# Patient Record
Sex: FEMALE | Race: WHITE | NOT HISPANIC OR LATINO
[De-identification: names, ages, dates, MRNs, and addresses within clinical notes are randomized per-mention and may not be internally consistent; named-entity substitution may affect disease eponyms.]

---

## 2017-12-27 ENCOUNTER — HOSPITAL (OUTPATIENT)
Dept: OTHER | Age: 42
End: 2017-12-27
Attending: NURSE PRACTITIONER

## 2018-06-27 PROBLEM — Z90.710 HISTORY OF TOTAL VAGINAL HYSTERECTOMY: Status: ACTIVE | Noted: 2018-06-27

## 2018-06-27 PROBLEM — Z90.710 HISTORY OF ROBOT-ASSISTED LAPAROSCOPIC HYSTERECTOMY: Status: ACTIVE | Noted: 2018-06-27

## 2018-06-27 PROBLEM — R19.00 PELVIC MASS: Status: ACTIVE | Noted: 2018-06-27

## 2018-06-27 PROCEDURE — 88175 CYTOPATH C/V AUTO FLUID REDO: CPT | Performed by: OBSTETRICS & GYNECOLOGY

## 2018-06-27 PROCEDURE — 87624 HPV HI-RISK TYP POOLED RSLT: CPT | Performed by: OBSTETRICS & GYNECOLOGY

## 2018-07-13 PROBLEM — R19.00 PELVIC MASS: Status: ACTIVE | Noted: 2018-07-13

## 2018-07-13 PROBLEM — R19.00 PELVIC MASS: Status: RESOLVED | Noted: 2018-07-13 | Resolved: 2018-07-13

## 2020-01-08 ENCOUNTER — WALK IN (OUTPATIENT)
Dept: URGENT CARE | Age: 45
End: 2020-01-08

## 2020-01-08 VITALS
TEMPERATURE: 97.7 F | HEART RATE: 104 BPM | DIASTOLIC BLOOD PRESSURE: 90 MMHG | RESPIRATION RATE: 20 BRPM | SYSTOLIC BLOOD PRESSURE: 120 MMHG | OXYGEN SATURATION: 95 %

## 2020-01-08 DIAGNOSIS — J32.9 SINUSITIS, UNSPECIFIED CHRONICITY, UNSPECIFIED LOCATION: ICD-10-CM

## 2020-01-08 DIAGNOSIS — J40 BRONCHITIS: Primary | ICD-10-CM

## 2020-01-08 PROCEDURE — 99204 OFFICE O/P NEW MOD 45 MIN: CPT | Performed by: FAMILY MEDICINE

## 2020-01-08 RX ORDER — CYCLOBENZAPRINE HCL 10 MG
TABLET ORAL
Refills: 0 | COMMUNITY
Start: 2019-12-15 | End: 2023-09-12 | Stop reason: ALTCHOICE

## 2020-01-08 RX ORDER — OMEPRAZOLE 20 MG/1
CAPSULE, DELAYED RELEASE ORAL
Refills: 0 | COMMUNITY
Start: 2020-01-04 | End: 2023-09-12 | Stop reason: SDUPTHER

## 2020-01-08 RX ORDER — SUCRALFATE 1 G/1
TABLET ORAL
Refills: 0 | COMMUNITY
Start: 2019-12-13 | End: 2023-09-12 | Stop reason: SDUPTHER

## 2020-01-08 RX ORDER — OXYCODONE AND ACETAMINOPHEN 10; 325 MG/1; MG/1
TABLET ORAL
Refills: 0 | COMMUNITY
Start: 2019-12-17 | End: 2023-09-12 | Stop reason: SDUPTHER

## 2020-01-08 RX ORDER — ALBUTEROL SULFATE 90 UG/1
AEROSOL, METERED RESPIRATORY (INHALATION)
Qty: 1 INHALER | Refills: 0 | Status: SHIPPED | OUTPATIENT
Start: 2020-01-08 | End: 2023-09-12 | Stop reason: ALTCHOICE

## 2020-01-08 RX ORDER — SUMATRIPTAN 25 MG/1
TABLET, FILM COATED ORAL
COMMUNITY
Start: 2019-10-25 | End: 2023-09-12 | Stop reason: ALTCHOICE

## 2020-01-08 RX ORDER — MORPHINE SULFATE 30 MG/1
TABLET, FILM COATED, EXTENDED RELEASE ORAL
Refills: 0 | COMMUNITY
Start: 2019-12-18 | End: 2023-09-12 | Stop reason: ALTCHOICE

## 2020-01-08 RX ORDER — CEFUROXIME AXETIL 500 MG/1
500 TABLET ORAL 2 TIMES DAILY
Qty: 20 TABLET | Refills: 0 | Status: SHIPPED | OUTPATIENT
Start: 2020-01-08 | End: 2020-01-18

## 2020-01-08 RX ORDER — MORPHINE SULFATE 15 MG/1
TABLET, FILM COATED, EXTENDED RELEASE ORAL
Refills: 0 | COMMUNITY
Start: 2019-12-17 | End: 2023-09-12 | Stop reason: ALTCHOICE

## 2022-01-04 ENCOUNTER — WALK IN (OUTPATIENT)
Dept: URGENT CARE | Age: 47
End: 2022-01-04

## 2022-01-04 VITALS
DIASTOLIC BLOOD PRESSURE: 102 MMHG | RESPIRATION RATE: 18 BRPM | OXYGEN SATURATION: 98 % | TEMPERATURE: 97.9 F | HEART RATE: 104 BPM | SYSTOLIC BLOOD PRESSURE: 154 MMHG

## 2022-01-04 DIAGNOSIS — Z20.822 SUSPECTED COVID-19 VIRUS INFECTION: ICD-10-CM

## 2022-01-04 DIAGNOSIS — J32.9 SINUSITIS, UNSPECIFIED CHRONICITY, UNSPECIFIED LOCATION: Primary | ICD-10-CM

## 2022-01-04 LAB — SARS-COV+SARS-COV-2 AG RESP QL IA.RAPID: NOT DETECTED

## 2022-01-04 PROCEDURE — 87426 SARSCOV CORONAVIRUS AG IA: CPT | Performed by: FAMILY MEDICINE

## 2022-01-04 PROCEDURE — 99213 OFFICE O/P EST LOW 20 MIN: CPT | Performed by: FAMILY MEDICINE

## 2022-01-04 RX ORDER — CEFUROXIME AXETIL 500 MG/1
500 TABLET ORAL 2 TIMES DAILY
Qty: 20 TABLET | Refills: 0 | Status: SHIPPED | OUTPATIENT
Start: 2022-01-04 | End: 2022-01-14

## 2022-01-04 RX ORDER — PREDNISONE 20 MG/1
40 TABLET ORAL DAILY
Qty: 10 TABLET | Refills: 0 | Status: SHIPPED | OUTPATIENT
Start: 2022-01-04 | End: 2022-01-09

## 2023-09-12 ENCOUNTER — WALK IN (OUTPATIENT)
Dept: URGENT CARE | Age: 48
End: 2023-09-12

## 2023-09-12 ENCOUNTER — HOSPITAL ENCOUNTER (EMERGENCY)
Facility: HOSPITAL | Age: 48
Discharge: HOME | End: 2023-09-13
Attending: EMERGENCY MEDICINE
Payer: COMMERCIAL

## 2023-09-12 VITALS
SYSTOLIC BLOOD PRESSURE: 146 MMHG | HEART RATE: 84 BPM | TEMPERATURE: 97.9 F | OXYGEN SATURATION: 100 % | DIASTOLIC BLOOD PRESSURE: 86 MMHG | RESPIRATION RATE: 16 BRPM

## 2023-09-12 DIAGNOSIS — D72.829 LEUKOCYTOSIS, UNSPECIFIED TYPE: ICD-10-CM

## 2023-09-12 DIAGNOSIS — R59.1 LYMPHADENOPATHY: Primary | ICD-10-CM

## 2023-09-12 DIAGNOSIS — D72.829 LEUKOCYTOSIS, UNSPECIFIED TYPE: Primary | ICD-10-CM

## 2023-09-12 DIAGNOSIS — R59.9 SWOLLEN LYMPH NODES: ICD-10-CM

## 2023-09-12 DIAGNOSIS — R59.0 CERVICAL ADENOPATHY: ICD-10-CM

## 2023-09-12 LAB
ALBUMIN SERPL-MCNC: 4 G/DL (ref 3.4–5)
ALBUMIN/GLOB SERPL: 1.2 {RATIO} (ref 1–2)
ALP LIVER SERPL-CCNC: 85 U/L
ALT SERPL-CCNC: 35 U/L
ANION GAP SERPL CALC-SCNC: 4 MMOL/L (ref 0–18)
AST SERPL-CCNC: 22 U/L (ref 15–37)
BASOPHILS # BLD AUTO: 0.16 X10(3) UL (ref 0–0.2)
BASOPHILS NFR BLD AUTO: 0.6 %
BILIRUB SERPL-MCNC: 0.2 MG/DL (ref 0.1–2)
BUN BLD-MCNC: 14 MG/DL (ref 7–18)
CALCIUM BLD-MCNC: 9.3 MG/DL (ref 8.5–10.1)
CHLORIDE SERPL-SCNC: 107 MMOL/L (ref 98–112)
CO2 SERPL-SCNC: 28 MMOL/L (ref 21–32)
CREAT BLD-MCNC: 0.83 MG/DL
DEPRECATED RDW RBC: 43.9 FL (ref 39–50)
EGFRCR SERPLBLD CKD-EPI 2021: 86 ML/MIN/1.73M2 (ref 60–?)
EOSINOPHIL # BLD AUTO: 0.18 X10(3) UL (ref 0–0.7)
EOSINOPHIL NFR BLD AUTO: 0.7 %
ERYTHROCYTE [DISTWIDTH] IN BLOOD BY AUTOMATED COUNT: 12.7 %
ERYTHROCYTE [DISTWIDTH] IN BLOOD: 12.8 % (ref 11–15)
GLOBULIN PLAS-MCNC: 3.3 G/DL (ref 2.8–4.4)
GLUCOSE BLD-MCNC: 123 MG/DL (ref 70–99)
HCT VFR BLD AUTO: 42.7 %
HCT VFR BLD CALC: 45.9 % (ref 36–46.5)
HGB BLD-MCNC: 14.2 G/DL
HGB BLD-MCNC: 14.9 G/DL (ref 12–15.5)
IMM GRANULOCYTES # BLD AUTO: 0.08 X10(3) UL (ref 0–1)
IMM GRANULOCYTES NFR BLD: 0.3 %
INTERNAL PROCEDURAL CONTROLS ACCEPTABLE: YES
LYMPHOCYTES # BLD AUTO: 16.3 X10(3) UL (ref 1–4)
LYMPHOCYTES # BLD: 16.8 K/MCL (ref 1–4.8)
LYMPHOCYTES NFR BLD AUTO: 61 %
LYMPHOCYTES NFR BLD: 62 %
MCH RBC QN AUTO: 30 PG (ref 26–34)
MCH RBC QN AUTO: 30.3 PG (ref 26–34)
MCHC RBC AUTO-ENTMCNC: 32.5 G/DL (ref 32–36.5)
MCHC RBC AUTO-ENTMCNC: 33.3 G/DL (ref 31–37)
MCV RBC AUTO: 90.3 FL
MCV RBC AUTO: 93.5 FL (ref 78–100)
MONOCYTES # BLD AUTO: 3.43 X10(3) UL (ref 0.1–1)
MONOCYTES # BLD: 1 K/MCL (ref 0.3–0.9)
MONOCYTES NFR BLD AUTO: 12.8 %
MONOCYTES NFR BLD: 4 %
NEUTROPHILS # BLD AUTO: 6.56 X10 (3) UL (ref 1.5–7.7)
NEUTROPHILS # BLD AUTO: 6.56 X10(3) UL (ref 1.5–7.7)
NEUTROPHILS # BLD: 6.6 K/MCL (ref 1.8–7.7)
NEUTROPHILS NFR BLD AUTO: 24.6 %
NEUTS SEG NFR BLD: 27 %
NRBC BLD MANUAL-RTO: 0 /100 WBC
OSMOLALITY SERPL CALC.SUM OF ELEC: 290 MOSM/KG (ref 275–295)
PLAT MORPH BLD: NORMAL
PLATELET # BLD AUTO: 191 10(3)UL (ref 150–450)
PLATELET # BLD AUTO: 193 K/MCL (ref 140–450)
POTASSIUM SERPL-SCNC: 3.7 MMOL/L (ref 3.5–5.1)
PROT SERPL-MCNC: 7.3 G/DL (ref 6.4–8.2)
RBC # BLD AUTO: 4.73 X10(6)UL
RBC # BLD: 4.91 MIL/MCL (ref 4–5.2)
RBC MORPH BLD: NORMAL
S PYO AG THROAT QL IA.RAPID: NEGATIVE
SODIUM SERPL-SCNC: 139 MMOL/L (ref 136–145)
TEST LOT EXPIRATION DATE: NORMAL
TEST LOT NUMBER: NORMAL
VARIANT LYMPHS NFR BLD: 7 % (ref 0–5)
WBC # BLD AUTO: 26.7 X10(3) UL (ref 4–11)
WBC # BLD: 24.3 K/MCL (ref 4.2–11)

## 2023-09-12 PROCEDURE — 36415 COLL VENOUS BLD VENIPUNCTURE: CPT | Performed by: FAMILY MEDICINE

## 2023-09-12 PROCEDURE — 86645 CMV ANTIBODY IGM: CPT | Performed by: INTERNAL MEDICINE

## 2023-09-12 PROCEDURE — 99285 EMERGENCY DEPT VISIT HI MDM: CPT

## 2023-09-12 PROCEDURE — 99214 OFFICE O/P EST MOD 30 MIN: CPT | Performed by: FAMILY MEDICINE

## 2023-09-12 PROCEDURE — 85027 COMPLETE CBC AUTOMATED: CPT | Performed by: INTERNAL MEDICINE

## 2023-09-12 PROCEDURE — 86644 CMV ANTIBODY: CPT | Performed by: INTERNAL MEDICINE

## 2023-09-12 PROCEDURE — 85025 COMPLETE CBC W/AUTO DIFF WBC: CPT | Performed by: EMERGENCY MEDICINE

## 2023-09-12 PROCEDURE — 36415 COLL VENOUS BLD VENIPUNCTURE: CPT

## 2023-09-12 PROCEDURE — 86665 EPSTEIN-BARR CAPSID VCA: CPT | Performed by: INTERNAL MEDICINE

## 2023-09-12 PROCEDURE — 86618 LYME DISEASE ANTIBODY: CPT | Performed by: INTERNAL MEDICINE

## 2023-09-12 PROCEDURE — 99284 EMERGENCY DEPT VISIT MOD MDM: CPT

## 2023-09-12 PROCEDURE — 80053 COMPREHEN METABOLIC PANEL: CPT

## 2023-09-12 PROCEDURE — 80053 COMPREHEN METABOLIC PANEL: CPT | Performed by: EMERGENCY MEDICINE

## 2023-09-12 PROCEDURE — 85025 COMPLETE CBC W/AUTO DIFF WBC: CPT

## 2023-09-12 PROCEDURE — 87880 STREP A ASSAY W/OPTIC: CPT | Performed by: FAMILY MEDICINE

## 2023-09-12 RX ORDER — NICOTINE POLACRILEX 4 MG/1
20 GUM, CHEWING ORAL 2 TIMES DAILY
COMMUNITY
Start: 2023-08-08

## 2023-09-12 RX ORDER — SUCRALFATE 1 G/1
1 TABLET ORAL
COMMUNITY

## 2023-09-12 RX ORDER — OXYCODONE AND ACETAMINOPHEN 10; 325 MG/1; MG/1
1 TABLET ORAL EVERY 4 HOURS PRN
COMMUNITY

## 2023-09-13 ENCOUNTER — APPOINTMENT (OUTPATIENT)
Dept: GENERAL RADIOLOGY | Facility: HOSPITAL | Age: 48
End: 2023-09-13
Attending: EMERGENCY MEDICINE
Payer: COMMERCIAL

## 2023-09-13 ENCOUNTER — TELEPHONE (OUTPATIENT)
Dept: HEMATOLOGY/ONCOLOGY | Facility: HOSPITAL | Age: 48
End: 2023-09-13

## 2023-09-13 VITALS
BODY MASS INDEX: 29.62 KG/M2 | RESPIRATION RATE: 18 BRPM | HEART RATE: 62 BPM | HEIGHT: 69 IN | OXYGEN SATURATION: 99 % | WEIGHT: 200 LBS | TEMPERATURE: 98 F | DIASTOLIC BLOOD PRESSURE: 65 MMHG | SYSTOLIC BLOOD PRESSURE: 101 MMHG

## 2023-09-13 LAB
B BURGDOR IGG+IGM SER QL IA: NEGATIVE
CMV IGG SERPL IA-ACNC: 0.06 ISR
EBV VCA IGG SER-ACNC: >8 AI
EBV VCA IGM SER-ACNC: 1 AI

## 2023-09-13 PROCEDURE — 71045 X-RAY EXAM CHEST 1 VIEW: CPT | Performed by: EMERGENCY MEDICINE

## 2023-09-13 PROCEDURE — 87040 BLOOD CULTURE FOR BACTERIA: CPT | Performed by: EMERGENCY MEDICINE

## 2023-09-13 RX ORDER — AMOXICILLIN AND CLAVULANATE POTASSIUM 875; 125 MG/1; MG/1
1 TABLET, FILM COATED ORAL 2 TIMES DAILY
Qty: 20 TABLET | Refills: 0 | Status: SHIPPED | OUTPATIENT
Start: 2023-09-13 | End: 2023-10-03 | Stop reason: ALTCHOICE

## 2023-09-15 ENCOUNTER — OFFICE VISIT (OUTPATIENT)
Dept: INTERNAL MEDICINE CLINIC | Facility: CLINIC | Age: 48
End: 2023-09-15
Payer: COMMERCIAL

## 2023-09-15 VITALS
SYSTOLIC BLOOD PRESSURE: 110 MMHG | TEMPERATURE: 97 F | DIASTOLIC BLOOD PRESSURE: 68 MMHG | RESPIRATION RATE: 18 BRPM | HEIGHT: 69 IN | WEIGHT: 234.19 LBS | BODY MASS INDEX: 34.69 KG/M2 | OXYGEN SATURATION: 99 % | HEART RATE: 91 BPM

## 2023-09-15 DIAGNOSIS — Z12.11 SCREENING FOR COLON CANCER: ICD-10-CM

## 2023-09-15 DIAGNOSIS — R59.0 CERVICAL ADENOPATHY: Primary | ICD-10-CM

## 2023-09-15 DIAGNOSIS — Z12.31 ENCOUNTER FOR SCREENING MAMMOGRAM FOR MALIGNANT NEOPLASM OF BREAST: ICD-10-CM

## 2023-09-15 DIAGNOSIS — R59.0 AXILLARY ADENOPATHY: ICD-10-CM

## 2023-09-15 DIAGNOSIS — D72.820 LYMPHOCYTOSIS: ICD-10-CM

## 2023-09-15 LAB — CMV IGM SERPL IA-ACNC: 0.08 OD RATIO

## 2023-09-15 PROCEDURE — 99204 OFFICE O/P NEW MOD 45 MIN: CPT | Performed by: INTERNAL MEDICINE

## 2023-09-15 PROCEDURE — 3008F BODY MASS INDEX DOCD: CPT | Performed by: INTERNAL MEDICINE

## 2023-09-15 PROCEDURE — 3078F DIAST BP <80 MM HG: CPT | Performed by: INTERNAL MEDICINE

## 2023-09-15 PROCEDURE — 3074F SYST BP LT 130 MM HG: CPT | Performed by: INTERNAL MEDICINE

## 2023-09-18 ENCOUNTER — LAB ENCOUNTER (OUTPATIENT)
Dept: LAB | Age: 48
End: 2023-09-18
Attending: INTERNAL MEDICINE
Payer: COMMERCIAL

## 2023-09-18 DIAGNOSIS — R59.0 CERVICAL ADENOPATHY: ICD-10-CM

## 2023-09-18 DIAGNOSIS — D72.820 LYMPHOCYTOSIS: ICD-10-CM

## 2023-09-18 DIAGNOSIS — R59.0 AXILLARY ADENOPATHY: ICD-10-CM

## 2023-09-18 LAB
BASOPHILS # BLD AUTO: 0.18 X10(3) UL (ref 0–0.2)
BASOPHILS NFR BLD AUTO: 0.7 %
EOSINOPHIL # BLD AUTO: 0.15 X10(3) UL (ref 0–0.7)
EOSINOPHIL NFR BLD AUTO: 0.5 %
ERYTHROCYTE [DISTWIDTH] IN BLOOD BY AUTOMATED COUNT: 13 %
HCT VFR BLD AUTO: 45.9 %
HGB BLD-MCNC: 15.1 G/DL
IMM GRANULOCYTES # BLD AUTO: 0.09 X10(3) UL (ref 0–1)
IMM GRANULOCYTES NFR BLD: 0.3 %
LYMPHOCYTES # BLD AUTO: 17.81 X10(3) UL (ref 1–4)
LYMPHOCYTES NFR BLD AUTO: 65 %
MCH RBC QN AUTO: 30.4 PG (ref 26–34)
MCHC RBC AUTO-ENTMCNC: 32.9 G/DL (ref 31–37)
MCV RBC AUTO: 92.4 FL
MONOCYTES # BLD AUTO: 2.67 X10(3) UL (ref 0.1–1)
MONOCYTES NFR BLD AUTO: 9.7 %
NEUTROPHILS # BLD AUTO: 6.51 X10 (3) UL (ref 1.5–7.7)
NEUTROPHILS # BLD AUTO: 6.51 X10(3) UL (ref 1.5–7.7)
NEUTROPHILS NFR BLD AUTO: 23.8 %
PLATELET # BLD AUTO: 200 10(3)UL (ref 150–450)
RBC # BLD AUTO: 4.97 X10(6)UL
WBC # BLD AUTO: 27.4 X10(3) UL (ref 4–11)

## 2023-09-18 PROCEDURE — 88184 FLOWCYTOMETRY/ TC 1 MARKER: CPT | Performed by: INTERNAL MEDICINE

## 2023-09-18 PROCEDURE — 85025 COMPLETE CBC W/AUTO DIFF WBC: CPT | Performed by: INTERNAL MEDICINE

## 2023-09-19 ENCOUNTER — HOSPITAL ENCOUNTER (OUTPATIENT)
Dept: MAMMOGRAPHY | Age: 48
Discharge: HOME | End: 2023-09-19
Attending: INTERNAL MEDICINE
Payer: COMMERCIAL

## 2023-09-19 ENCOUNTER — TELEPHONE (OUTPATIENT)
Dept: INTERNAL MEDICINE CLINIC | Facility: CLINIC | Age: 48
End: 2023-09-19

## 2023-09-19 DIAGNOSIS — N63.0 MASS OF BREAST, UNSPECIFIED LATERALITY: ICD-10-CM

## 2023-09-19 DIAGNOSIS — Z12.31 ENCOUNTER FOR SCREENING MAMMOGRAM FOR MALIGNANT NEOPLASM OF BREAST: ICD-10-CM

## 2023-09-19 DIAGNOSIS — N64.4 BREAST PAIN: Primary | ICD-10-CM

## 2023-09-19 NOTE — TELEPHONE ENCOUNTER
Patient presented at [facility] mammography today for a bilateral mammogram.  Patient has lumps and pain and the exam was stopped per protocol  Need orders for diagnostic mammogram amd ultrasound ASAP

## 2023-09-20 ENCOUNTER — OFFICE VISIT (OUTPATIENT)
Dept: HEMATOLOGY/ONCOLOGY | Facility: HOSPITAL | Age: 48
End: 2023-09-20
Attending: INTERNAL MEDICINE
Payer: COMMERCIAL

## 2023-09-20 ENCOUNTER — TELEPHONE (OUTPATIENT)
Dept: HEMATOLOGY/ONCOLOGY | Facility: HOSPITAL | Age: 48
End: 2023-09-20

## 2023-09-20 VITALS
DIASTOLIC BLOOD PRESSURE: 88 MMHG | HEART RATE: 91 BPM | SYSTOLIC BLOOD PRESSURE: 136 MMHG | WEIGHT: 231 LBS | RESPIRATION RATE: 18 BRPM | HEIGHT: 69.02 IN | BODY MASS INDEX: 34.21 KG/M2 | TEMPERATURE: 98 F | OXYGEN SATURATION: 99 %

## 2023-09-20 DIAGNOSIS — R59.0 CERVICAL LYMPHADENOPATHY: ICD-10-CM

## 2023-09-20 DIAGNOSIS — R59.0 AXILLARY LYMPHADENOPATHY: ICD-10-CM

## 2023-09-20 DIAGNOSIS — D72.820 LYMPHOCYTOSIS: ICD-10-CM

## 2023-09-20 DIAGNOSIS — C91.10 CLL (CHRONIC LYMPHOCYTIC LEUKEMIA) (HCC): ICD-10-CM

## 2023-09-20 DIAGNOSIS — R59.1 LYMPHADENOPATHY: Primary | ICD-10-CM

## 2023-09-20 LAB
BASOPHILS # BLD AUTO: 0.24 X10(3) UL (ref 0–0.2)
BASOPHILS NFR BLD AUTO: 0.8 %
EOSINOPHIL # BLD AUTO: 0.21 X10(3) UL (ref 0–0.7)
EOSINOPHIL NFR BLD AUTO: 0.7 %
ERYTHROCYTE [DISTWIDTH] IN BLOOD BY AUTOMATED COUNT: 12.9 %
HCT VFR BLD AUTO: 43.3 %
HGB BLD-MCNC: 14.7 G/DL
IGA SERPL-MCNC: 137 MG/DL (ref 70–312)
IGM SERPL-MCNC: 43.9 MG/DL (ref 43–279)
IMM GRANULOCYTES # BLD AUTO: 0.15 X10(3) UL (ref 0–1)
IMM GRANULOCYTES NFR BLD: 0.5 %
IMMUNOGLOBULIN PNL SER-MCNC: 691 MG/DL (ref 791–1643)
IRON SATN MFR SERPL: 23 %
IRON SERPL-MCNC: 95 UG/DL
LYMPHOCYTES # BLD AUTO: 19.49 X10(3) UL (ref 1–4)
LYMPHOCYTES NFR BLD AUTO: 61.5 %
MCH RBC QN AUTO: 30.2 PG (ref 26–34)
MCHC RBC AUTO-ENTMCNC: 33.9 G/DL (ref 31–37)
MCV RBC AUTO: 89.1 FL
MONOCYTES # BLD AUTO: 4.52 X10(3) UL (ref 0.1–1)
MONOCYTES NFR BLD AUTO: 14.3 %
NEUTROPHILS # BLD AUTO: 7.1 X10 (3) UL (ref 1.5–7.7)
NEUTROPHILS # BLD AUTO: 7.1 X10(3) UL (ref 1.5–7.7)
NEUTROPHILS NFR BLD AUTO: 22.2 %
PLATELET # BLD AUTO: 201 10(3)UL (ref 150–450)
RBC # BLD AUTO: 4.86 X10(6)UL
TIBC SERPL-MCNC: 416 UG/DL (ref 240–450)
TRANSFERRIN SERPL-MCNC: 279 MG/DL (ref 200–360)
URATE SERPL-MCNC: 5.1 MG/DL
WBC # BLD AUTO: 31.7 X10(3) UL (ref 4–11)

## 2023-09-20 PROCEDURE — 99244 OFF/OP CNSLTJ NEW/EST MOD 40: CPT | Performed by: INTERNAL MEDICINE

## 2023-09-20 NOTE — PROGRESS NOTES
Education Record    Learner:  Patient    Disease / Diagnosis: swollen LN, elevated WBC     Barriers / Limitations:  None   Comments:    Method:  Discussion   Comments:    General Topics:  Plan of care reviewed   Comments:    Outcome:  Shows understanding   Comments:   Reports swollen LN noted 2-3 weeks. No change with ABX starting. Never felt sick, some scratchy throat, but expected with allergies. Some fatigue. Says some night sweating, and itching. Felt like hot flashes. Unsure about weight loss, unable to exercise due to back trouble. Taking oxycodone/ acetaminophen about 3 per day for her back pain, trying to wean off, but unable to with recent stress. Given contrast and instructions for CT scan. Asked to call / message with any questions.

## 2023-09-20 NOTE — CONSULTS
Cancer Center Report of Consultation    Patient Name: Wilder Blanton   YOB: 1975   Medical Record Number: [id number]   CSN: [id number]   Consulting Physician: Jacky Tapia MD  Referring Physician(s): Ashley eLe DO    Date of Consultation: 9/20/2023     Reason for Consultation:  Wilder Blanton was seen today in the [facility] for the diagnosis of lymphocytosis/lymphadenopathy. History of Present Illness:     51year old female that was seen in the ER a week back with complaints of lymphadenopathy in the neck. She went to immediate care and was noted to have leukocytosis. Patient describes lymphadenopathy in the neck in the axillary area for about 2 weeks. Some paresthesias in her hands. She did have recent sinus infection. In the ER, WBC was elevated at 26.7 with absolute lymphocytosis. EBV IgG was elevated and IgM was a bit high as well. No imaging was done. Presents for same. Occasional illia sweats, 1 ovary, lot of chr back pain. Smoker. Has been noted to have high WBC in past as well. Past Medical History:  No past medical history on file.     Past Surgical History:  Past Surgical History:   Procedure Laterality Date    HYSTERECTOMY      LAPAROSCOPIC CHOLECYSTECTOMY      LUMBAR SPINE FUSION COMBINED      OTHER      C-Spine fusion       Family Medical History:  Family History   Problem Relation Age of Onset    High Blood Pressure Mother     High Blood Pressure Father     Heart Disease Maternal Grandfather     Cancer Paternal Grandfather     Colon Cancer Maternal Uncle     Breast Cancer Paternal Aunt        Psychosocial History:  Social History    Socioeconomic History      Marital status:       Spouse name: Not on file      Number of children: Not on file      Years of education: Not on file      Highest education level: Not on file    Occupational History      Not on file    Tobacco Use      Smoking status: Every Day        Types: Cigarettes      Smokeless tobacco: Never      Tobacco comments: 1 pack every two weeks - stress related    Substance and Sexual Activity      Alcohol use: No      Drug use: No      Sexual activity: Not Currently        Partners: Male        Birth control/protection: Hysterectomy    Other Topics      Concerns:        Not on file    Social History Narrative      Not on file    Social Determinants of Health  Financial Resource Strain: Not on file  Food Insecurity: Not on file  Transportation Needs: Not on file  Physical Activity: Not on file  Stress: Not on file  Social Connections: Not on file  Housing Stability: Not on file    Allergies:     Aleve [Naproxen]        RASH  Ultram [Tramadol]       NAUSEA AND VOMITING    Comment:Severe migraine    Current Medications:    Current Outpatient Medications:     amoxicillin clavulanate 875-125 MG Oral Tab, Take 1 tablet by mouth 2 (two) times daily for 10 days. , Disp: 20 tablet, Rfl: 0    oxyCODONE-acetaminophen  MG Oral Tab, TK 1 T PO QID PRN P, Disp: , Rfl: 0    chlorzoxazone 500 MG Oral Tab, 1 tablet (500 mg total) daily. , Disp: , Rfl: 0    sucralfate 1 g Oral Tab, TK 1 T PO QID OES 1 HOUR B MEALS AND HS, Disp: , Rfl: 0    omeprazole 20 MG Oral Capsule Delayed Release, TK ONE C PO BID 30 MIN B EATING, Disp: , Rfl: 0    Multiple Vitamins-Minerals (MULTI-VITAMIN/MINERALS) Oral Tab, Take 1 tablet by mouth daily. , Disp: , Rfl:     Cholecalciferol (VITAMIN D-3) 5000 units Oral Tab, Take 1 tablet (5,000 Units total) by mouth., Disp: , Rfl:     Review of Systems:    Constitutional: No chills, fevers, malaise, night sweats and weight loss. Eyes: No visual disturbance, irritation and redness. Ears, nose, mouth, throat, and face: No hearing loss, tinnitus, hoarseness and voice change.   Respiratory: No cough, sputum, hemoptysis, chest pain, wheezing, dyspnea on exertion  Cardiovascular: No chest pain, palpitations, syncope,orthopnea, PND, edema  Gastrointestinal:No dysphagia, odynophagia, nausea, vomiting, diarrhea, abdominal pain. Derm: No rash, skin lesions, and pruritus. Hematologic/lymphatic: No easy bruising, bleeding, and lymphadenopathy. Musculoskeletal: No myalgias, arthralgias, muscle weakness. Neurological: No headaches, dizziness, seizures, speech problems, gait problems   Psych: No anxiety/depression. Vital Signs: There were no vitals taken for this visit. ECOG PS: 0    Physical Examination:    General: Patient is alert and oriented x 3, not in acute distress. HEENT: EOMs intact. PERRL. Oropharynx is clear. Neck: Palpable enlarged and rubbery bilateral cervical adenopathy. Lymphatics: Palpable cervical and axillary adenopathy. Mild splenomegaly. Unable to examine inguinal lymph nodes properly. Chest: Clear to auscultation. Heart: Regular rate and rhythm. S1S2 normal.  Abdomen: Soft, non tender with good bowel sounds. No hepatosplenomegaly/mass. Extremities: Pedal pulses are present. No edema. Neurological: Grossly intact. Labs:         Assessment and Plan:    # Lymphocytosis/lymphadenopathy: Lymphocytosis noted since 2015. Has bilateral cervical and axillary lymphadenopathy, rubbery. Concerning for lymphoproliferative disorder. Flow is pending. Recommend imaging and complete lab work-up. Orders Placed This Encounter        CBC With Differential With Platelet      Iron And Tibc      Uric Acid      Monoclonal Protein Study      Immunoglobulin A/G/M, Quant      SED RATE [E]      Connective Tissue Disease (LEANNE) Screen      Tuberculosis Panel      SERUM MONOCLONAL PROTEIN STUDY      CT SOFT TISSUE OF NECK(CONTRAST ONLY) (CPT=70491)      CT CHEST+ABDOMEN+PELVIS(ALL CNTRST ONLY)(FTW=90449/94974)    Neeru Mayer M.D.     [facility]    [address], [address]    9/20/2023

## 2023-09-20 NOTE — TELEPHONE ENCOUNTER
Returned the call,   Let her know we need the CT results for more information. Dr reviewed the labs from today, and one other is still pending. Questions addressed. Reviewed that CT instructions are in the bag, pt did locate, and verbalized understanding. Emotional support offered.

## 2023-09-20 NOTE — TELEPHONE ENCOUNTER
Patient called for test results and instructions on how to use the contrast for her CT scan next week. Please call back.

## 2023-09-21 ENCOUNTER — TELEPHONE (OUTPATIENT)
Dept: HEMATOLOGY/ONCOLOGY | Facility: HOSPITAL | Age: 48
End: 2023-09-21

## 2023-09-21 ENCOUNTER — PATIENT MESSAGE (OUTPATIENT)
Dept: HEMATOLOGY/ONCOLOGY | Facility: HOSPITAL | Age: 48
End: 2023-09-21

## 2023-09-21 LAB
ALBUMIN SERPL ELPH-MCNC: 4.72 G/DL (ref 3.75–5.21)
ALBUMIN/GLOB SERPL: 1.83 {RATIO} (ref 1–2)
ALPHA1 GLOB SERPL ELPH-MCNC: 0.31 G/DL (ref 0.19–0.46)
ALPHA2 GLOB SERPL ELPH-MCNC: 0.87 G/DL (ref 0.48–1.05)
B-GLOBULIN SERPL ELPH-MCNC: 0.76 G/DL (ref 0.68–1.23)
B2 MICROGLOB SERPL-MCNC: 0.3 MG/DL (ref 0.11–0.25)
CD10 CELLS NFR SPEC: <1 %
CD10/CD19: <1 %
CD19 CELLS NFR SPEC: 84 %
CD19+/CD200+: 79 %
CD2 CELLS NFR SPEC: 21 %
CD20 CELLS NFR SPEC: 83 %
CD200 CELLS: 83 %
CD3 CELLS NFR SPEC: 18 %
CD3+/TCRGD+: <1 %
CD3+CD4+ CELLS NFR SPEC: 14 %
CD3+CD4+ CELLS/CD3+CD8+ CLL SPEC: 3.5
CD3+CD8+ CELLS NFR SPEC: 4 %
CD3-/CD56+: 3 %
CD34 CELLS NFR SPEC: <1 %
CD38 CELLS NFR SPEC: 1 %
CD38+/CD19+: <1 %
CD45 CELLS NFR SPEC: 100 %
CD5 CELLS NFR SPEC: 96 %
CD5/CD19 CELLS: 83 %
CD7 CELLS NFR SPEC: 56 %
CELL SURF KAPPA/LAMBDA RATIO: 80
CELL SURF LAMBDA LIGHT CHAIN: 1 %
CELL SURFACE KAPPA LIGHT CHAIN: 80 %
GAMMA GLOB SERPL ELPH-MCNC: 0.64 G/DL (ref 0.62–1.7)
KAPPA LC FREE SER-MCNC: 4.12 MG/DL (ref 0.33–1.94)
KAPPA LC FREE/LAMBDA FREE SER NEPH: 3.83 {RATIO} (ref 0.26–1.65)
LAMBDA LC FREE SERPL-MCNC: 1.07 MG/DL (ref 0.57–2.63)
PROT SERPL-MCNC: 7.3 G/DL (ref 6.4–8.2)
TCR G-D CELLS NFR SPEC: <1 %

## 2023-09-21 NOTE — TELEPHONE ENCOUNTER
Spoke to pt about flow results. CLL. Will try to run [address] on last sample. Adolfo cervical LN bx to r/o mantle cell lymphoma and she agrees. Also ordered PET.

## 2023-09-22 ENCOUNTER — TELEPHONE (OUTPATIENT)
Dept: HEMATOLOGY/ONCOLOGY | Facility: HOSPITAL | Age: 48
End: 2023-09-22

## 2023-09-22 ENCOUNTER — TELEPHONE (OUTPATIENT)
Dept: INTERNAL MEDICINE CLINIC | Facility: CLINIC | Age: 48
End: 2023-09-22

## 2023-09-22 LAB — HAPTOGLOB SERPL-MCNC: 208 MG/DL (ref 30–200)

## 2023-09-22 RX ORDER — ALPRAZOLAM 0.25 MG/1
0.25 TABLET ORAL DAILY PRN
Qty: 10 TABLET | Refills: 0 | Status: SHIPPED | OUTPATIENT
Start: 2023-09-22

## 2023-09-22 NOTE — TELEPHONE ENCOUNTER
Contacted pt, will have labs drawn at the [facility] when she has her biopsy done on Tuesday. Other questions addressed. Pt requests something for claustrophobia for the PET scan. She will have a . Emotional support offered.

## 2023-09-22 NOTE — TELEPHONE ENCOUNTER
Contacted patient, updated that we need the PET scan prior to the biopsy. Will move the biopsy to Friday 9/29/23 at 11am.   Pt upset, but understood. Emotional support offered.

## 2023-09-22 NOTE — TELEPHONE ENCOUNTER
Patient requesting Xanax due to anxiety about recent diagnosis.  Please instruct patient to be cautious with benzo while on oxycodone-acetaminophen for pain

## 2023-09-22 NOTE — TELEPHONE ENCOUNTER
Patient calling just saw Dr. Siobhan Odonnell and is dealing with a lot. Patient tearful on phone. Patient asking for anxiety medication Xanex.     [address] [facility], [phone], [phone]

## 2023-09-22 NOTE — TELEPHONE ENCOUNTER
Called and spoke to pt. Informed her xanax has been sent, but do not take with oxycodone-acetaminophen. Pt stated understanding.

## 2023-09-26 ENCOUNTER — HOSPITAL ENCOUNTER (OUTPATIENT)
Dept: ULTRASOUND IMAGING | Facility: HOSPITAL | Age: 48
End: 2023-09-26
Attending: INTERNAL MEDICINE
Payer: COMMERCIAL

## 2023-09-26 ENCOUNTER — HOSPITAL ENCOUNTER (OUTPATIENT)
Dept: NUCLEAR MEDICINE | Facility: HOSPITAL | Age: 48
Discharge: HOME | End: 2023-09-26
Attending: INTERNAL MEDICINE
Payer: COMMERCIAL

## 2023-09-26 ENCOUNTER — TELEPHONE (OUTPATIENT)
Dept: HEMATOLOGY/ONCOLOGY | Facility: HOSPITAL | Age: 48
End: 2023-09-26

## 2023-09-26 DIAGNOSIS — C85.10 B-CELL LYMPHOMA, UNSPECIFIED B-CELL LYMPHOMA TYPE, UNSPECIFIED BODY REGION (HCC): ICD-10-CM

## 2023-09-26 LAB — GLUCOSE BLD-MCNC: 98 MG/DL (ref 70–99)

## 2023-09-26 PROCEDURE — 82962 GLUCOSE BLOOD TEST: CPT

## 2023-09-26 PROCEDURE — 78815 PET IMAGE W/CT SKULL-THIGH: CPT | Performed by: INTERNAL MEDICINE

## 2023-09-26 RX ORDER — ALPRAZOLAM 0.25 MG/1
0.25 TABLET ORAL DAILY PRN
Qty: 10 TABLET | Refills: 0 | Status: CANCELLED | OUTPATIENT
Start: 2023-09-26

## 2023-09-26 NOTE — TELEPHONE ENCOUNTER
Eric Cruz [phone] would like Loni Veronica to call and give her with the results of her Pet Scan.  Thanks [employer]

## 2023-09-27 ENCOUNTER — LAB ENCOUNTER (OUTPATIENT)
Dept: LAB | Age: 48
End: 2023-09-27
Attending: INTERNAL MEDICINE
Payer: COMMERCIAL

## 2023-09-27 DIAGNOSIS — R59.1 LYMPHADENOPATHY: ICD-10-CM

## 2023-09-27 DIAGNOSIS — R59.0 CERVICAL LYMPHADENOPATHY: ICD-10-CM

## 2023-09-27 DIAGNOSIS — C91.10 CLL (CHRONIC LYMPHOCYTIC LEUKEMIA) (HCC): ICD-10-CM

## 2023-09-27 DIAGNOSIS — R59.0 AXILLARY LYMPHADENOPATHY: ICD-10-CM

## 2023-09-27 DIAGNOSIS — D72.820 LYMPHOCYTOSIS: ICD-10-CM

## 2023-09-27 LAB
BASOPHILS # BLD AUTO: 0.22 X10(3) UL (ref 0–0.2)
BASOPHILS NFR BLD AUTO: 0.7 %
DIRECT COOMBS POLY: NEGATIVE
EOSINOPHIL # BLD AUTO: 0.17 X10(3) UL (ref 0–0.7)
EOSINOPHIL NFR BLD AUTO: 0.5 %
ERYTHROCYTE [DISTWIDTH] IN BLOOD BY AUTOMATED COUNT: 12.8 %
ERYTHROCYTE [SEDIMENTATION RATE] IN BLOOD: 34 MM/HR
HBV CORE AB SERPL QL IA: NONREACTIVE
HBV SURFACE AB SER QL: REACTIVE
HBV SURFACE AB SERPL IA-ACNC: 99.53 MIU/ML
HBV SURFACE AG SER-ACNC: 0.18 [IU]/L
HBV SURFACE AG SERPL QL IA: NONREACTIVE
HCT VFR BLD AUTO: 43.8 %
HCV AB SERPL QL IA: NONREACTIVE
HGB BLD-MCNC: 14.6 G/DL
IMM GRANULOCYTES # BLD AUTO: 0.12 X10(3) UL (ref 0–1)
IMM GRANULOCYTES NFR BLD: 0.4 %
INR BLD: 0.93 (ref 0.85–1.16)
LDH SERPL L TO P-CCNC: 246 U/L
LYMPHOCYTES # BLD AUTO: 20.36 X10(3) UL (ref 1–4)
LYMPHOCYTES NFR BLD AUTO: 62.3 %
MCH RBC QN AUTO: 30.3 PG (ref 26–34)
MCHC RBC AUTO-ENTMCNC: 33.3 G/DL (ref 31–37)
MCV RBC AUTO: 90.9 FL
MONOCYTES # BLD AUTO: 3.93 X10(3) UL (ref 0.1–1)
MONOCYTES NFR BLD AUTO: 12 %
NEUTROPHILS # BLD AUTO: 7.86 X10 (3) UL (ref 1.5–7.7)
NEUTROPHILS # BLD AUTO: 7.86 X10(3) UL (ref 1.5–7.7)
NEUTROPHILS NFR BLD AUTO: 24.1 %
PLATELET # BLD AUTO: 207 10(3)UL (ref 150–450)
PROTHROMBIN TIME: 12.5 SECONDS (ref 11.6–14.8)
RBC # BLD AUTO: 4.82 X10(6)UL
WBC # BLD AUTO: 32.7 X10(3) UL (ref 4–11)

## 2023-09-27 PROCEDURE — 86480 TB TEST CELL IMMUN MEASURE: CPT

## 2023-09-27 PROCEDURE — 85652 RBC SED RATE AUTOMATED: CPT

## 2023-09-27 PROCEDURE — 85025 COMPLETE CBC W/AUTO DIFF WBC: CPT

## 2023-09-27 PROCEDURE — 36415 COLL VENOUS BLD VENIPUNCTURE: CPT

## 2023-09-27 PROCEDURE — 87340 HEPATITIS B SURFACE AG IA: CPT

## 2023-09-27 PROCEDURE — 86880 COOMBS TEST DIRECT: CPT

## 2023-09-27 PROCEDURE — 86038 ANTINUCLEAR ANTIBODIES: CPT

## 2023-09-27 PROCEDURE — 86803 HEPATITIS C AB TEST: CPT

## 2023-09-27 PROCEDURE — 86225 DNA ANTIBODY NATIVE: CPT

## 2023-09-27 PROCEDURE — 83615 LACTATE (LD) (LDH) ENZYME: CPT

## 2023-09-27 PROCEDURE — 81263 IGH VARI REGIONAL MUTATION: CPT

## 2023-09-27 PROCEDURE — 86704 HEP B CORE ANTIBODY TOTAL: CPT

## 2023-09-27 PROCEDURE — 86706 HEP B SURFACE ANTIBODY: CPT

## 2023-09-27 PROCEDURE — 85610 PROTHROMBIN TIME: CPT

## 2023-09-28 LAB
DSDNA IGG SERPL IA-ACNC: <0.6 IU/ML
ENA AB SER QL IA: 0.1 UG/L
ENA AB SER QL IA: NEGATIVE

## 2023-09-29 ENCOUNTER — HOSPITAL ENCOUNTER (OUTPATIENT)
Dept: ULTRASOUND IMAGING | Facility: HOSPITAL | Age: 48
Discharge: HOME | End: 2023-09-29
Attending: INTERNAL MEDICINE
Payer: COMMERCIAL

## 2023-09-29 DIAGNOSIS — D72.820 LYMPHOCYTOSIS: ICD-10-CM

## 2023-09-29 DIAGNOSIS — C85.10 B-CELL LYMPHOMA, UNSPECIFIED B-CELL LYMPHOMA TYPE, UNSPECIFIED BODY REGION (HCC): ICD-10-CM

## 2023-09-29 LAB
M TB IFN-G CD4+ T-CELLS BLD-ACNC: 0.02 IU/ML
M TB TUBERC IFN-G BLD QL: NEGATIVE
M TB TUBERC IGNF/MITOGEN IGNF CONTROL: >10 IU/ML
QFT TB1 AG MINUS NIL: 0.04 IU/ML
QFT TB2 AG MINUS NIL: 0.02 IU/ML

## 2023-09-29 PROCEDURE — 88184 FLOWCYTOMETRY/ TC 1 MARKER: CPT | Performed by: INTERNAL MEDICINE

## 2023-09-29 PROCEDURE — 88185 FLOWCYTOMETRY/TC ADD-ON: CPT | Performed by: INTERNAL MEDICINE

## 2023-09-29 PROCEDURE — 88341 IMHCHEM/IMCYTCHM EA ADD ANTB: CPT | Performed by: INTERNAL MEDICINE

## 2023-09-29 PROCEDURE — 76942 ECHO GUIDE FOR BIOPSY: CPT | Performed by: INTERNAL MEDICINE

## 2023-09-29 PROCEDURE — 38505 NEEDLE BIOPSY LYMPH NODES: CPT | Performed by: INTERNAL MEDICINE

## 2023-09-29 PROCEDURE — 88342 IMHCHEM/IMCYTCHM 1ST ANTB: CPT | Performed by: INTERNAL MEDICINE

## 2023-09-29 PROCEDURE — 88307 TISSUE EXAM BY PATHOLOGIST: CPT | Performed by: INTERNAL MEDICINE

## 2023-10-03 ENCOUNTER — OFFICE VISIT (OUTPATIENT)
Dept: HEMATOLOGY/ONCOLOGY | Facility: HOSPITAL | Age: 48
End: 2023-10-03
Attending: INTERNAL MEDICINE
Payer: COMMERCIAL

## 2023-10-03 ENCOUNTER — RESEARCH ENCOUNTER (OUTPATIENT)
Dept: HEMATOLOGY/ONCOLOGY | Facility: HOSPITAL | Age: 48
End: 2023-10-03

## 2023-10-03 VITALS
HEART RATE: 92 BPM | HEIGHT: 69.02 IN | SYSTOLIC BLOOD PRESSURE: 157 MMHG | WEIGHT: 231 LBS | OXYGEN SATURATION: 98 % | RESPIRATION RATE: 16 BRPM | BODY MASS INDEX: 34.21 KG/M2 | DIASTOLIC BLOOD PRESSURE: 95 MMHG | TEMPERATURE: 98 F

## 2023-10-03 DIAGNOSIS — C91.10 CLL (CHRONIC LYMPHOCYTIC LEUKEMIA) (HCC): Primary | ICD-10-CM

## 2023-10-03 LAB
CD10 CELLS NFR SPEC: <1 %
CD10/CD19: <1 %
CD19 CELLS NFR SPEC: 82 %
CD19+/CD200+: 79 %
CD2 CELLS NFR SPEC: 23 %
CD20 CELLS NFR SPEC: 79 %
CD200 CELLS: 80 %
CD3 CELLS NFR SPEC: 22 %
CD3+/TCRGD+: <1 %
CD3+CD4+ CELLS NFR SPEC: 20 %
CD3+CD4+ CELLS/CD3+CD8+ CLL SPEC: 10
CD3+CD8+ CELLS NFR SPEC: 2 %
CD3-/CD56+: <1 %
CD34 CELLS NFR SPEC: <1 %
CD38 CELLS NFR SPEC: <1 %
CD38+/CD19+: <1 %
CD45 CELLS NFR SPEC: 100 %
CD5 CELLS NFR SPEC: 99 %
CD5/CD19 CELLS: 81 %
CD7 CELLS NFR SPEC: 46 %
CELL SURF LAMBDA LIGHT CHAIN: <1 %
CELL SURFACE KAPPA LIGHT CHAIN: 81 %
TCR G-D CELLS NFR SPEC: <1 %

## 2023-10-03 PROCEDURE — 99215 OFFICE O/P EST HI 40 MIN: CPT | Performed by: INTERNAL MEDICINE

## 2023-10-03 NOTE — PROGRESS NOTES
Education Record    Learner:  Patient    Disease / Diagnosis: plan of care recent imaging and biopsy     Barriers / Limitations:  None   Comments:    Method:  Discussion   Comments:    General Topics:  Plan of care reviewed   Comments:    Outcome:  Shows understanding   Comments:   Reports still painful at biopsy site. Has chronic pain and very intensified now with \"everything that is going on\"  Been a rough 2 weeks. Has taken xanax occ, but not regularly. Says she has a counselor, and multiple good friends she can talk to. Emotional support offered.

## 2023-10-03 NOTE — PROGRESS NOTES
CLINICAL RESEARCH NOTE  STUDY:  , \"Randomized, Phase III Study of Early Intervention with Venetoclax and Obinutuzumab Versus Delayed Therapy with Venetoclax and Obinutuzumab in Newly Diagnosed Asymptomatic High-Risk Patients with Chronic Lymphocytic Leukemia / Small Lymphocytic Lymphoma (CLL/SLL): EVOLVE CLL/SLL Study. \"     Met with patient briefly at the request of med onc. Presented consent to patient to review at home. Advised I would follow up with her later this week after reviewing her for eligibility but requested that patient reach out with any questions or concerns should they arise. Patient agreeable.

## 2023-10-06 ENCOUNTER — RESEARCH ENCOUNTER (OUTPATIENT)
Dept: HEMATOLOGY/ONCOLOGY | Facility: HOSPITAL | Age: 48
End: 2023-10-06

## 2023-10-06 NOTE — PROGRESS NOTES
RESEARCH NOTE    Patients work up is complete. Reviewed and confirmed eligibility with Puneet Lord. Called patient to follow up on her interest in clinical trial. She had questions and these were all addressed. I asked her to write down any other concerns and bring them with her on Monday at 1:00 in the [facility]. She will meet with research to sign consent. Consent (Scalp)/Introductory Paragraph: The rationale for Mohs was explained to the patient and consent was obtained. The risks, benefits and alternatives to therapy were discussed in detail. Specifically, the risks of alopecia in or around the treatment site, changes in hair growth pattern secondary to repair, infection, scarring, bleeding, prolonged wound healing, incomplete removal, allergy to anesthesia, nerve injury and recurrence were addressed. Prior to the procedure, the treatment site was clearly identified and confirmed by the patient using a hand mirror. All components of Universal Protocol/PAUSE Rule completed.

## 2023-10-09 ENCOUNTER — NURSE ONLY (OUTPATIENT)
Dept: HEMATOLOGY/ONCOLOGY | Facility: HOSPITAL | Age: 48
End: 2023-10-09
Attending: INTERNAL MEDICINE
Payer: COMMERCIAL

## 2023-10-09 ENCOUNTER — RESEARCH ENCOUNTER (OUTPATIENT)
Dept: HEMATOLOGY/ONCOLOGY | Facility: HOSPITAL | Age: 48
End: 2023-10-09

## 2023-10-09 DIAGNOSIS — C91.10 CLL (CHRONIC LYMPHOCYTIC LEUKEMIA) (HCC): ICD-10-CM

## 2023-10-09 DIAGNOSIS — C91.10 CLL (CHRONIC LYMPHOCYTIC LEUKEMIA) (HCC): Primary | ICD-10-CM

## 2023-10-09 LAB
ALBUMIN SERPL-MCNC: 4.1 G/DL (ref 3.4–5)
ALBUMIN/GLOB SERPL: 1.2 {RATIO} (ref 1–2)
ALP LIVER SERPL-CCNC: 81 U/L
ALT SERPL-CCNC: 36 U/L
ANION GAP SERPL CALC-SCNC: 3 MMOL/L (ref 0–18)
AST SERPL-CCNC: 26 U/L (ref 15–37)
BILIRUB SERPL-MCNC: 0.3 MG/DL (ref 0.1–2)
BUN BLD-MCNC: 10 MG/DL (ref 7–18)
CALCIUM BLD-MCNC: 9.6 MG/DL (ref 8.5–10.1)
CHLORIDE SERPL-SCNC: 106 MMOL/L (ref 98–112)
CO2 SERPL-SCNC: 30 MMOL/L (ref 21–32)
CREAT BLD-MCNC: 0.86 MG/DL
EGFRCR SERPLBLD CKD-EPI 2021: 83 ML/MIN/1.73M2 (ref 60–?)
FASTING STATUS PATIENT QL REPORTED: NO
GLOBULIN PLAS-MCNC: 3.3 G/DL (ref 2.8–4.4)
GLUCOSE BLD-MCNC: 93 MG/DL (ref 70–99)
OSMOLALITY SERPL CALC.SUM OF ELEC: 287 MOSM/KG (ref 275–295)
PHOSPHATE SERPL-MCNC: 3.4 MG/DL (ref 2.5–4.9)
POTASSIUM SERPL-SCNC: 4 MMOL/L (ref 3.5–5.1)
PROT SERPL-MCNC: 7.4 G/DL (ref 6.4–8.2)
SODIUM SERPL-SCNC: 139 MMOL/L (ref 136–145)

## 2023-10-09 PROCEDURE — 87389 HIV-1 AG W/HIV-1&-2 AB AG IA: CPT

## 2023-10-09 PROCEDURE — 36415 COLL VENOUS BLD VENIPUNCTURE: CPT

## 2023-10-09 PROCEDURE — 84100 ASSAY OF PHOSPHORUS: CPT

## 2023-10-09 PROCEDURE — 80053 COMPREHEN METABOLIC PANEL: CPT

## 2023-10-10 ENCOUNTER — DOCUMENTATION ONLY (OUTPATIENT)
Dept: HEMATOLOGY/ONCOLOGY | Facility: HOSPITAL | Age: 48
End: 2023-10-10

## 2023-10-11 ENCOUNTER — TELEPHONE (OUTPATIENT)
Dept: HEMATOLOGY/ONCOLOGY | Facility: HOSPITAL | Age: 48
End: 2023-10-11

## 2023-10-11 ENCOUNTER — RESEARCH ENCOUNTER (OUTPATIENT)
Dept: HEMATOLOGY/ONCOLOGY | Facility: HOSPITAL | Age: 48
End: 2023-10-11

## 2023-10-11 NOTE — TELEPHONE ENCOUNTER
Spoke to patient. She has been randomized to the early treatment arm on clinical trial . She is agreeable. Knows that she will need a CT, bone marrow biopsy, chemotherapy education. We can cancel her upcoming appointment with me on 10/17 so other appointments can be accommodated. I will see her on 10/25, on day of treatment start.

## 2023-10-11 NOTE — PROGRESS NOTES
CLINICAL RESEARCH NOTE  STUDY:  Evolve  PT ID: TBD  TIMEPOINT: Randomization    Patient was determined to meet all eligibility criteria and also completed all pre-registration requirements per calendar (with exception of buccal swab). It was appropriate to proceed with registration/randomization. Completed in OPEN - patient was assigned to Arm 2: Early Obinutuzumab + Venetoclax treatment. MD was notified. Pt notified of assignment and next steps required prior to treatment. Per protocol, treatment must start within 14 days. Prior to treatment, patient must complete a CT chest/abdomen/pelvis and a bone marrow biopsy. Aspirate will be required to submit to study. Study blood due prior to treatment as well. Study drug ordered pending delivery. Treatment to begin by 10/25.

## 2023-10-13 ENCOUNTER — HOSPITAL ENCOUNTER (OUTPATIENT)
Dept: CT IMAGING | Facility: HOSPITAL | Age: 48
Discharge: HOME | End: 2023-10-13
Attending: INTERNAL MEDICINE
Payer: COMMERCIAL

## 2023-10-13 DIAGNOSIS — C91.10 CLL (CHRONIC LYMPHOCYTIC LEUKEMIA) (HCC): ICD-10-CM

## 2023-10-13 DIAGNOSIS — R59.1 LYMPHADENOPATHY: ICD-10-CM

## 2023-10-13 DIAGNOSIS — R59.0 CERVICAL LYMPHADENOPATHY: ICD-10-CM

## 2023-10-13 DIAGNOSIS — R59.0 AXILLARY LYMPHADENOPATHY: ICD-10-CM

## 2023-10-13 PROCEDURE — 74177 CT ABD & PELVIS W/CONTRAST: CPT | Performed by: INTERNAL MEDICINE

## 2023-10-13 PROCEDURE — 71260 CT THORAX DX C+: CPT | Performed by: INTERNAL MEDICINE

## 2023-10-16 ENCOUNTER — OFFICE VISIT (OUTPATIENT)
Dept: HEMATOLOGY/ONCOLOGY | Facility: HOSPITAL | Age: 48
End: 2023-10-16
Attending: INTERNAL MEDICINE
Payer: COMMERCIAL

## 2023-10-16 DIAGNOSIS — Z71.9 HEALTH EDUCATION/COUNSELING: ICD-10-CM

## 2023-10-16 DIAGNOSIS — C91.10 CLL (CHRONIC LYMPHOCYTIC LEUKEMIA) (HCC): Primary | ICD-10-CM

## 2023-10-16 PROCEDURE — 99215 OFFICE O/P EST HI 40 MIN: CPT | Performed by: NURSE PRACTITIONER

## 2023-10-16 PROCEDURE — 99417 PROLNG OP E/M EACH 15 MIN: CPT | Performed by: NURSE PRACTITIONER

## 2023-10-16 RX ORDER — ALLOPURINOL 300 MG/1
300 TABLET ORAL DAILY
Qty: 90 TABLET | Refills: 0 | Status: SHIPPED | OUTPATIENT
Start: 2023-10-16

## 2023-10-16 RX ORDER — PROCHLORPERAZINE MALEATE 10 MG
10 TABLET ORAL EVERY 6 HOURS PRN
Qty: 30 TABLET | Refills: 3 | Status: SHIPPED | OUTPATIENT
Start: 2023-10-16

## 2023-10-16 NOTE — PROGRESS NOTES
IV Chemotherapy Education    Drug names:  Obinutuzumab     Learner:  Patient    Chemotherapy education goals:  Learn the drug names  Administration schedule  Routes of administration  Treatment setting    Chemotherapy action on cancer / normal cells    Treatment Effects on Bone Marrow:    Chemotherapy action on cancer / normal cells}  Function of white blood cells / signs of infection  Function of red blood cells / signs of anemia:    Function of platelets / signs of bleeding:    Notify MD/RN of any chills or fever 100.5 and above:     Treatment Effects on Nutritional Status/Mucous Membranes:    Appropriate oral hygiene / signs of stomatitis/oral lesions  Oral rinses procedure    Changes in taste perception / appetite  Nausea and vomiting / use of anti-nausea medications.   Diarrhea / constipation / dietary changes    Treatment Effects on Hair:    Possibility of hair loss     Treatment Effects on Neurological System:    Potential numbness / tingling in hands or feet/Notify MD/RN of any numbness / tingling at next visit    Treatment Effects on the Bladder and Kidneys:    Function of the kidneys and bladder  Signs / symptoms of hemorrhagic cystitis  Suggested fluid intake     Notify MD/RN if blood appears in urine or if you have a decreased urine output     Treatment Effects on Reproductive System:    Avoid pregnancy / use of barrier birth control methods:    Possible sterility, impotence, changes in sex drive:      Treatment Effects on Emotional Status:    Potential mood changes, depression, nervousness, difficulty sleeping  Importance of support system  Notify MD/RN of any emotional changes    Vesicants / Irritants:    Potential extravasation at site of administration   Signs / symptoms include redness, swelling, pain, burning, or blistering at the site of administration   To Notify MD/RN IMMEDIATELY if any of the above signs / symptoms occur         Teaching Materials Provided:      [facility] Chemotherapy information sheets  When to contact the Treatment Team Information Sheet  Side Effect Management [address] information sheet    Patient was given ample opportunity to ask questions. All questions and concerns addressed. Chemotherapy Consent Form signed by the patient. Today we reviewed CLL cancer, chemotherapy, and treatment plan. Discussed side effects of each drug in detail, importance of close follow up, lab monitoring and symptom management. Infection and bleeding precautions reviewed while on chemo. Home medications and schedule reviewed. ORAL CHEMOTHERAPY EDUCATION RECORD  Diagnosis:   CLL     Medication Name:   Venetoclax   C1: Start 20mg days 22-28 of 28 day cycle  C2: 50mg days 1-7, 100mg days 8-14, 200mg days 15-21, days 22-28 400mg   C3: 400mg daily    Administration Guidelines:  Take With Food                  Drug / Drug Interactions:  Reviewed     Start Date of Treatment:  C1D22 - 11/15/2023    Chemo Toxicities:  Per chemocare      When to Call the Office:  Fevers  - 100.5 or greater  Nausea /vomiting not controlled with anti-nausea medications  Diarrhea -not controlled with Imodium AD or more than 6 episodes in 24 hours  Constipation - No BM x 3 days, no responsive to stool softeners or laxatives  Shortness of Breath  Excessive fatigue or weakness  New onset rash  Sudden onset of any unexpected symptom - such as change in vision, sense of balance, dizziness or lightheadedness, swelling one arm or leg                  Safe Handling / Disposal:  This was reviewed with the patient and handout provided.                   Missed Dose Management:  Call office     What Phone Number to Call: [phone]    Teaching Materials Provided:   Oral Chemotherapy information sheets  When to contact the Treatment Team Information Sheet  Side Effect Management [address] information sheet  [facility] Sheet      Patient was given ample opportunity to ask questions. All questions and concerns addressed. We discussed self care techniques, symptom management, fluid, diet, and activities. Chemotherapy Consent Form signed by the patient. Encounter Times  PreCharting:   minutes    Reviewing/Obtaining: 10 minutes      Medical Exam:   minutes    Plan:   minutes      Notes:   minutes    Counseling/Education: 60 minutes      Referring/Communicating:   minutes    Ind Interpretation:   minutes      Care Coordination:   minutes       My total time spent caring for the patient on the day of the encounter: 70 minutes.          [address]   Nurse Practitioner  [facility]

## 2023-10-17 ENCOUNTER — APPOINTMENT (OUTPATIENT)
Dept: HEMATOLOGY/ONCOLOGY | Facility: HOSPITAL | Age: 48
End: 2023-10-17
Attending: INTERNAL MEDICINE
Payer: COMMERCIAL

## 2023-10-23 ENCOUNTER — TELEPHONE (OUTPATIENT)
Dept: CT IMAGING | Facility: HOSPITAL | Age: 48
End: 2023-10-23

## 2023-10-24 ENCOUNTER — RESEARCH ENCOUNTER (OUTPATIENT)
Dept: HEMATOLOGY/ONCOLOGY | Facility: HOSPITAL | Age: 48
End: 2023-10-24

## 2023-10-24 ENCOUNTER — HOSPITAL ENCOUNTER (OUTPATIENT)
Dept: CT IMAGING | Facility: HOSPITAL | Age: 48
Discharge: HOME | End: 2023-10-24
Attending: INTERNAL MEDICINE

## 2023-10-24 ENCOUNTER — NURSE ONLY (OUTPATIENT)
Dept: LAB | Facility: HOSPITAL | Age: 48
End: 2023-10-24
Attending: INTERNAL MEDICINE

## 2023-10-24 VITALS
HEIGHT: 69 IN | HEART RATE: 74 BPM | OXYGEN SATURATION: 96 % | SYSTOLIC BLOOD PRESSURE: 125 MMHG | RESPIRATION RATE: 18 BRPM | BODY MASS INDEX: 34.07 KG/M2 | TEMPERATURE: 98 F | DIASTOLIC BLOOD PRESSURE: 80 MMHG | WEIGHT: 230 LBS

## 2023-10-24 DIAGNOSIS — C91.10 CLL (CHRONIC LYMPHOCYTIC LEUKEMIA) (HCC): ICD-10-CM

## 2023-10-24 DIAGNOSIS — C91.10 CLL (CHRONIC LYMPHOCYTIC LEUKEMIA) (HCC): Primary | ICD-10-CM

## 2023-10-24 LAB
INR BLD: 0.94 (ref 0.85–1.16)
PROTHROMBIN TIME: 12.5 SECONDS (ref 11.6–14.8)

## 2023-10-24 PROCEDURE — 36415 COLL VENOUS BLD VENIPUNCTURE: CPT

## 2023-10-24 PROCEDURE — 88342 IMHCHEM/IMCYTCHM 1ST ANTB: CPT | Performed by: INTERNAL MEDICINE

## 2023-10-24 PROCEDURE — 88185 FLOWCYTOMETRY/TC ADD-ON: CPT | Performed by: INTERNAL MEDICINE

## 2023-10-24 PROCEDURE — 88341 IMHCHEM/IMCYTCHM EA ADD ANTB: CPT | Performed by: INTERNAL MEDICINE

## 2023-10-24 PROCEDURE — 85097 BONE MARROW INTERPRETATION: CPT | Performed by: INTERNAL MEDICINE

## 2023-10-24 PROCEDURE — 88305 TISSUE EXAM BY PATHOLOGIST: CPT | Performed by: INTERNAL MEDICINE

## 2023-10-24 PROCEDURE — 88264 CHROMOSOME ANALYSIS 20-25: CPT | Performed by: INTERNAL MEDICINE

## 2023-10-24 PROCEDURE — 88313 SPECIAL STAINS GROUP 2: CPT | Performed by: INTERNAL MEDICINE

## 2023-10-24 PROCEDURE — 38222 DX BONE MARROW BX & ASPIR: CPT | Performed by: INTERNAL MEDICINE

## 2023-10-24 PROCEDURE — 88237 TISSUE CULTURE BONE MARROW: CPT | Performed by: INTERNAL MEDICINE

## 2023-10-24 PROCEDURE — 88291 CYTO/MOLECULAR REPORT: CPT | Performed by: INTERNAL MEDICINE

## 2023-10-24 PROCEDURE — 77012 CT SCAN FOR NEEDLE BIOPSY: CPT | Performed by: INTERNAL MEDICINE

## 2023-10-24 PROCEDURE — 99152 MOD SED SAME PHYS/QHP 5/>YRS: CPT | Performed by: INTERNAL MEDICINE

## 2023-10-24 PROCEDURE — 85610 PROTHROMBIN TIME: CPT

## 2023-10-24 PROCEDURE — 88184 FLOWCYTOMETRY/ TC 1 MARKER: CPT | Performed by: INTERNAL MEDICINE

## 2023-10-24 RX ORDER — HYDROCODONE BITARTRATE AND ACETAMINOPHEN 5; 325 MG/1; MG/1
1 TABLET ORAL EVERY 4 HOURS PRN
Status: DISCONTINUED | OUTPATIENT
Start: 2023-10-24 | End: 2023-10-26

## 2023-10-24 RX ORDER — SODIUM CHLORIDE 9 MG/ML
INJECTION, SOLUTION INTRAVENOUS CONTINUOUS
Status: DISCONTINUED | OUTPATIENT
Start: 2023-10-24 | End: 2023-10-26

## 2023-10-24 RX ORDER — ACETAMINOPHEN 325 MG/1
650 TABLET ORAL EVERY 6 HOURS PRN
Status: DISCONTINUED | OUTPATIENT
Start: 2023-10-24 | End: 2023-10-26

## 2023-10-24 RX ORDER — NALOXONE HYDROCHLORIDE 0.4 MG/ML
80 INJECTION, SOLUTION INTRAMUSCULAR; INTRAVENOUS; SUBCUTANEOUS AS NEEDED
Status: ACTIVE | OUTPATIENT
Start: 2023-10-24 | End: 2023-10-24

## 2023-10-24 RX ORDER — MIDAZOLAM HYDROCHLORIDE 1 MG/ML
1 INJECTION INTRAMUSCULAR; INTRAVENOUS EVERY 5 MIN PRN
Status: ACTIVE | OUTPATIENT
Start: 2023-10-24 | End: 2023-10-24

## 2023-10-24 RX ORDER — DIPHENHYDRAMINE HYDROCHLORIDE 50 MG/ML
50 INJECTION INTRAMUSCULAR; INTRAVENOUS ONCE AS NEEDED
Status: ACTIVE | OUTPATIENT
Start: 2023-10-24 | End: 2023-10-24

## 2023-10-24 RX ORDER — FLUMAZENIL 0.1 MG/ML
0.2 INJECTION INTRAVENOUS AS NEEDED
Status: ACTIVE | OUTPATIENT
Start: 2023-10-24 | End: 2023-10-24

## 2023-10-24 RX ORDER — ONDANSETRON 2 MG/ML
4 INJECTION INTRAMUSCULAR; INTRAVENOUS ONCE AS NEEDED
Status: ACTIVE | OUTPATIENT
Start: 2023-10-24 | End: 2023-10-24

## 2023-10-24 RX ORDER — MIDAZOLAM HYDROCHLORIDE 1 MG/ML
INJECTION INTRAMUSCULAR; INTRAVENOUS
Status: COMPLETED
Start: 2023-10-24 | End: 2023-10-24

## 2023-10-24 RX ADMIN — MIDAZOLAM HYDROCHLORIDE 0.5 MG: 1 INJECTION INTRAMUSCULAR; INTRAVENOUS at 11:38:00

## 2023-10-24 RX ADMIN — MIDAZOLAM HYDROCHLORIDE 0.5 MG: 1 INJECTION INTRAMUSCULAR; INTRAVENOUS at 11:50:00

## 2023-10-24 RX ADMIN — MIDAZOLAM HYDROCHLORIDE 0.5 MG: 1 INJECTION INTRAMUSCULAR; INTRAVENOUS at 11:41:00

## 2023-10-24 RX ADMIN — SODIUM CHLORIDE: 9 INJECTION, SOLUTION INTRAVENOUS at 11:25:00

## 2023-10-24 RX ADMIN — MIDAZOLAM HYDROCHLORIDE 1 MG: 1 INJECTION INTRAMUSCULAR; INTRAVENOUS at 11:33:00

## 2023-10-24 RX ADMIN — HYDROCODONE BITARTRATE AND ACETAMINOPHEN 1 TABLET: 5; 325 TABLET ORAL at 12:55:00

## 2023-10-24 NOTE — DISCHARGE INSTRUCTIONS
[facility] Department of Radiology    Biopsy of any type    Dr. Lyudmila Morton    Have someone drive you home after your procedure. You may not drive yourself home    [address]    Take things easy for the rest of the day after your biopsy.   You may be sore in the biopsy area for one to five days  DO drink plenty of fluids  DO resume your regular diet  DO keep a bandage on the biopsy site for at least 24 hours  DO NOT take a hot bath or shower for at least 12 hours  DO NOT drive or operative machinery for at least 24 hours  DO NOT smoke for at least 24 hours  DO NOT do any strenuous exercise or lifting for at least 48 hours  DO call your doctor immediately if  You start bleeding  There is any change in color or temperature of the area where the biopsy was performed  You develop increasing pain in shortness of breath    You have been given a prescription for Norco 5/325  Norco was Given to you at:12:55 pm  Next dose due: 4:55 pm   Take this medication as directed  This medication contains Tylenol (acetaminophen)  Do not take additional Tylenol while taking [place] [employer] is a Narcotic and can be constipating or upset your stomach  Don't take Norco on an empty stomach  Drink plenty of water  Alcoholic beverages should be avoided while taking narcotics

## 2023-10-24 NOTE — PROCEDURES
[address] Patient Status:  Outpatient    3/12/1975 MRN [id number]   [facility] CT Attending Marilyn Hodgkins, MD   Hosp Day # 0 PCP Sofiya Cross DO         Brief Procedure Report    Pre-Operative Diagnosis: CLL    Post-Operative Diagnosis: Same as above. Procedure Performed: CT guided left iliac bone marrow aspiration and biopsy    Anesthesia: 1% lidocaine. Moderate sedation    EBL: <7LC    Complications: None    Summary of Case:11g bone marrow aspiration and biopsy from left iliac bone. Patient tolerated procedure well without immediate complication. Full report to follow in PACS.     Ying Monty

## 2023-10-24 NOTE — IMAGING NOTE
NPO status verified  Pertinent labs and radiology imaging reviewed  Consent signed and on file    Patient tolerated procedural sedation without any immediate complications noted. Biopsy site to Left iliac crest with tegaderm dressing. C/D/I. Patient denies pain. Report given to [facility]. Patient [facility] to [id number] accompanied by RN and transporter.

## 2023-10-24 NOTE — PROGRESS NOTES
Clinical Research Note  Study: , \"Randomized, Phase III Study of Early Intervention with Venetoclax and Obinutuzumab Versus Delayed Therapy with Venetoclax and Obinutuzumab in Newly Diagnosed Asymptomatic High-Risk Patients with Chronic Lymphocytic Leukemia / Small Lymphocytic Lymphoma (CLL/SLL): EVOLVE CLL/SLL Study   Date:  10/24/2023    Specimen Collection/Shipping Note    Patient presented for bone marrow biopsy this morning. Research present during procedure for collection of optional bone marrow aspirate for biobanking. Collected 10mL (5 mL in x 2 6.0 mL EDTA tubes). Specimens tracked in OG STS system. Shipped to [employer] [facility] (Lab #[id number]). Shipped ambient per protocol/SWOG procedures. Shipped [employer] Expressed priority overnight, tracking # [phone].

## 2023-10-24 NOTE — PROCEDURES
I have discussed with the patient and/or legal representative the potential benefits, risks, and side effects of this CT guided bone marrow aspiration and biopsyprocedure, the likelihood of the patient achieving goals; and the potential problems that might occur during recuperation. I discussed reasonable alternatives to the procedure, including risks, benefits and side effects related to the alternatives, and risks related to not receiving this procedure.

## 2023-10-25 ENCOUNTER — SOCIAL WORK SERVICES (OUTPATIENT)
Dept: HEMATOLOGY/ONCOLOGY | Facility: HOSPITAL | Age: 48
End: 2023-10-25

## 2023-10-25 ENCOUNTER — RESEARCH ENCOUNTER (OUTPATIENT)
Dept: HEMATOLOGY/ONCOLOGY | Facility: HOSPITAL | Age: 48
End: 2023-10-25

## 2023-10-25 ENCOUNTER — OFFICE VISIT (OUTPATIENT)
Dept: HEMATOLOGY/ONCOLOGY | Facility: HOSPITAL | Age: 48
End: 2023-10-25
Attending: INTERNAL MEDICINE
Payer: COMMERCIAL

## 2023-10-25 VITALS
OXYGEN SATURATION: 92 % | HEART RATE: 80 BPM | DIASTOLIC BLOOD PRESSURE: 77 MMHG | SYSTOLIC BLOOD PRESSURE: 124 MMHG | RESPIRATION RATE: 16 BRPM | TEMPERATURE: 100 F

## 2023-10-25 VITALS
SYSTOLIC BLOOD PRESSURE: 141 MMHG | DIASTOLIC BLOOD PRESSURE: 94 MMHG | TEMPERATURE: 98 F | WEIGHT: 230.38 LBS | HEART RATE: 88 BPM | BODY MASS INDEX: 34.12 KG/M2 | OXYGEN SATURATION: 98 % | HEIGHT: 69.02 IN | RESPIRATION RATE: 16 BRPM

## 2023-10-25 DIAGNOSIS — C91.10 CLL (CHRONIC LYMPHOCYTIC LEUKEMIA) (HCC): Primary | ICD-10-CM

## 2023-10-25 DIAGNOSIS — T88.9XXA: ICD-10-CM

## 2023-10-25 LAB
ALBUMIN SERPL-MCNC: 3.8 G/DL (ref 3.4–5)
ALBUMIN/GLOB SERPL: 1.1 {RATIO} (ref 1–2)
ALP LIVER SERPL-CCNC: 92 U/L
ALT SERPL-CCNC: 39 U/L
ANION GAP SERPL CALC-SCNC: 6 MMOL/L (ref 0–18)
AST SERPL-CCNC: 29 U/L (ref 15–37)
BASOPHILS # BLD AUTO: 0.26 X10(3) UL (ref 0–0.2)
BASOPHILS NFR BLD AUTO: 0.7 %
BILIRUB SERPL-MCNC: 0.3 MG/DL (ref 0.1–2)
BUN BLD-MCNC: 11 MG/DL (ref 7–18)
CALCIUM BLD-MCNC: 9.1 MG/DL (ref 8.5–10.1)
CHLORIDE SERPL-SCNC: 107 MMOL/L (ref 98–112)
CO2 SERPL-SCNC: 26 MMOL/L (ref 21–32)
CREAT BLD-MCNC: 1.04 MG/DL
EGFRCR SERPLBLD CKD-EPI 2021: 66 ML/MIN/1.73M2 (ref 60–?)
EOSINOPHIL # BLD AUTO: 0.25 X10(3) UL (ref 0–0.7)
EOSINOPHIL NFR BLD AUTO: 0.7 %
ERYTHROCYTE [DISTWIDTH] IN BLOOD BY AUTOMATED COUNT: 12.9 %
GLOBULIN PLAS-MCNC: 3.5 G/DL (ref 2.8–4.4)
GLUCOSE BLD-MCNC: 109 MG/DL (ref 70–99)
HCT VFR BLD AUTO: 41.6 %
HGB BLD-MCNC: 13.9 G/DL
IMM GRANULOCYTES # BLD AUTO: 0.17 X10(3) UL (ref 0–1)
IMM GRANULOCYTES NFR BLD: 0.5 %
LDH SERPL L TO P-CCNC: 273 U/L
LYMPHOCYTES # BLD AUTO: 18.22 X10(3) UL (ref 1–4)
LYMPHOCYTES NFR BLD AUTO: 49.5 %
MCH RBC QN AUTO: 30.5 PG (ref 26–34)
MCHC RBC AUTO-ENTMCNC: 33.4 G/DL (ref 31–37)
MCV RBC AUTO: 91.4 FL
MONOCYTES # BLD AUTO: 10.8 X10(3) UL (ref 0.1–1)
MONOCYTES NFR BLD AUTO: 29.3 %
NEUTROPHILS # BLD AUTO: 7.13 X10 (3) UL (ref 1.5–7.7)
NEUTROPHILS # BLD AUTO: 7.13 X10(3) UL (ref 1.5–7.7)
NEUTROPHILS NFR BLD AUTO: 19.3 %
OSMOLALITY SERPL CALC.SUM OF ELEC: 288 MOSM/KG (ref 275–295)
PHOSPHATE SERPL-MCNC: 3 MG/DL (ref 2.5–4.9)
PLATELET # BLD AUTO: 189 10(3)UL (ref 150–450)
POTASSIUM SERPL-SCNC: 4.1 MMOL/L (ref 3.5–5.1)
PROT SERPL-MCNC: 7.3 G/DL (ref 6.4–8.2)
RBC # BLD AUTO: 4.55 X10(6)UL
SODIUM SERPL-SCNC: 139 MMOL/L (ref 136–145)
URATE SERPL-MCNC: 3.5 MG/DL
WBC # BLD AUTO: 36.8 X10(3) UL (ref 4–11)

## 2023-10-25 PROCEDURE — 96413 CHEMO IV INFUSION 1 HR: CPT

## 2023-10-25 PROCEDURE — 84100 ASSAY OF PHOSPHORUS: CPT | Performed by: INTERNAL MEDICINE

## 2023-10-25 PROCEDURE — 80053 COMPREHEN METABOLIC PANEL: CPT | Performed by: INTERNAL MEDICINE

## 2023-10-25 PROCEDURE — 96415 CHEMO IV INFUSION ADDL HR: CPT

## 2023-10-25 PROCEDURE — 84550 ASSAY OF BLOOD/URIC ACID: CPT | Performed by: INTERNAL MEDICINE

## 2023-10-25 PROCEDURE — 85025 COMPLETE CBC W/AUTO DIFF WBC: CPT | Performed by: INTERNAL MEDICINE

## 2023-10-25 PROCEDURE — 36415 COLL VENOUS BLD VENIPUNCTURE: CPT

## 2023-10-25 PROCEDURE — 83615 LACTATE (LD) (LDH) ENZYME: CPT | Performed by: INTERNAL MEDICINE

## 2023-10-25 PROCEDURE — 96375 TX/PRO/DX INJ NEW DRUG ADDON: CPT

## 2023-10-25 RX ORDER — ACETAMINOPHEN 325 MG/1
650 TABLET ORAL ONCE
Status: CANCELLED | OUTPATIENT
Start: 2023-10-26

## 2023-10-25 RX ORDER — ACETAMINOPHEN 325 MG/1
650 TABLET ORAL ONCE
Status: CANCELLED | OUTPATIENT
Start: 2023-10-25

## 2023-10-25 RX ORDER — METOCLOPRAMIDE HYDROCHLORIDE 5 MG/ML
10 INJECTION INTRAMUSCULAR; INTRAVENOUS ONCE
Status: COMPLETED | OUTPATIENT
Start: 2023-10-25 | End: 2023-10-25

## 2023-10-25 RX ORDER — ACETAMINOPHEN 325 MG/1
650 TABLET ORAL ONCE
Status: COMPLETED | OUTPATIENT
Start: 2023-10-25 | End: 2023-10-25

## 2023-10-25 RX ORDER — ONDANSETRON HYDROCHLORIDE 8 MG/1
8 TABLET, FILM COATED ORAL EVERY 8 HOURS PRN
Qty: 30 TABLET | Refills: 3 | Status: SHIPPED | OUTPATIENT
Start: 2023-10-25

## 2023-10-25 RX ORDER — DIPHENHYDRAMINE HCL 25 MG
50 CAPSULE ORAL ONCE
Status: COMPLETED | OUTPATIENT
Start: 2023-10-25 | End: 2023-10-25

## 2023-10-25 RX ORDER — DIPHENHYDRAMINE HCL 25 MG
50 CAPSULE ORAL ONCE
Status: CANCELLED | OUTPATIENT
Start: 2023-10-25

## 2023-10-25 RX ORDER — DIPHENHYDRAMINE HCL 25 MG
50 CAPSULE ORAL ONCE
Status: CANCELLED | OUTPATIENT
Start: 2023-10-26

## 2023-10-25 RX ADMIN — DIPHENHYDRAMINE HCL 50 MG: 25 MG CAPSULE ORAL at 11:21:00

## 2023-10-25 RX ADMIN — METOCLOPRAMIDE HYDROCHLORIDE 10 MG: 5 INJECTION INTRAMUSCULAR; INTRAVENOUS at 15:10:00

## 2023-10-25 RX ADMIN — ACETAMINOPHEN 650 MG: 325 TABLET ORAL at 11:21:00

## 2023-10-25 NOTE — PROGRESS NOTES
met with patient in treatment room for introduction and role explanation. Patient scored 5 on Distress Screening, with Practical, Emotional, Physical, and Social concerns. Patient states that she is doing well, though overwhelmed. She states that she had a lot of friends/family who offered to come with her, but she preferred to come alone. Support provided. Patient lives in [place], [place] with  Carlitos Grayson. They have a Puggle, 160lb Pig named Rand, and 2 Cats (Zakia Minor and Ginna). Daughters Federica Voss and Chencho live nearby, and Step-Daughter Piper Esparza lives in [place]. She reports that they are all very supportive and helpful. Her Aunt lives 5minutes from her as well, and is a great support. Patient is on Disability and reports not working since 2013. 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 Her  works as a  and works 6p-3a overnight. Educated on [employer]; family is aware of option, but she prefers that he does not take time off due to high chance of being laid off (has happened even after vacation days). Offered to complete paperwork or letters as needed. Patient continued to share about the different programs and classes her Spiritual Community offers, stating that she is there multiple times a week participating herself. It appears to be a great comfort and coping skill for her. She also reports that she has free counseling through her 's benefits, and that she is comfortable with therapy, and goes about 1x monthly.  spent time educating on other resources, and patient states that she already found flyers for the [facility] around the [employer] that she took. Social Determinants of Health assessment completed with patient; stated concerns with Finances.   provided information on Leukemia and Lymphoma Society for assistance, as well as Isac Werner. She is aware to bring application back with needed documents.  educated on Power of  for [employer], providing document for review; Patient is aware to reach out if they choose to complete. Educated on available resources, providing Social Work Support Packet including [address]ha [address], [facility], Transportation, and [address] contacts. Educated on BHI if desired. Contact provided and family is aware to reach out with any needs. Bringing Zayda Bag given, which patient was grateful for.

## 2023-10-25 NOTE — PROGRESS NOTES
Education Record    Learner:  Patient    Disease / [address] study patient Evolve       Barriers / Limitations:  None   Comments:    Method:  Discussion   Comments:    General Topics:  Plan of care reviewed   Comments:    Outcome:  Shows understanding   Comments:   Chemo ed completed. Other testing done. Pt sore from bone marrow biopsy yesterday. Med list reviewed. Emotional support offered.

## 2023-10-25 NOTE — PROGRESS NOTES
Pt here for C1D1 Drug(s)Obinutuzumab Evolve Study. Arrives Ambulating independently, accompanied by Self     Patient was evaluated today by MD.    Oral medications included in this regimen:  yes - allopurinol    Patient confirms comprehension of cancer treatment schedule:  yes    Pregnancy screening:  Denies possibility of pregnancy    Modifications in dose or schedule:  No    Medications appearance and physical integrity checked by RN: yes. Chemotherapy IV pump settings verified by 2 RNs:  No due to targeted therapy IV administration. Frequency of blood return and site check throughout administration: Prior to administration and At completion of therapy     Infusion/treatment outcome:   patient tolerated treatment, about detention through infusion patient had headache 4/10, nausea, and some burning/tingling of skin on left forearm and top of head. The burning/tingling quickly subsided. Reglan given for nausea per [facility] APRN. Research nurse Laura saunders and reglan was allowed per study guidelines.      Education Record    Learner:  Patient  Barriers / Limitations:  None  Method:  Discussion  Education / instructions given:  reviewed antinausea med schedule for home use and treatment schedule  Outcome:  Shows understanding    Discharged Home, Ambulating independently, accompanied by:Self    Patient/family verbalized understanding of future appointments: by [facility]

## 2023-10-25 NOTE — PROGRESS NOTES
Clinical Research Note  Study: , \"Randomized, Phase III Study of Early Intervention with Venetoclax and Obinutuzumab Versus Delayed Therapy with Venetoclax and Obinutuzumab in Newly Diagnosed Asymptomatic High-Risk Patients with Chronic Lymphocytic Leukemia / Small Lymphocytic Lymphoma (CLL/SLL): EVOLVE CLL/SLL Study   Date: 10/25/2023  Visit: C1D1       Patient is new participant on clinical trial [phone] Evolve, here for C1D1 treatment - obinutuzumab IV infusion. She had her chemo labs drawn peripherally and was assessed by MD. Labs adequate for treatment today. Study blood drawn, buccal swab collected. She completed her bone marrow biopsy yesterday and reports she is still in some pain from procedure. She is nervous about treatment, reports she is anxious/nervous person at baseline. She will meet with social work today. No questionnaires due at this timepoint;due next at 3 mo. Patient denies any changes to medications at this time. Throughout treatment - patient experienced some mild nausea, mild headache, some transient infusion site discomfort radiating up to the top of head. Temp also slightly elevated. APN did not believe this was infusion reaction, just side effects - treated with reglan and motrin. Not in prohibited meds list.     Patient was discharged in stable condition. RTC tomorrow for C1D2 infusion. Specimen Shipping Note:    Whole Blood for MRD:  Whole blood collected in x2 6mL lavender top EDTA K2 vacutainers. Collected via peripheral venipucture today at 1025 AM. Shipped ambient to [facility] lab #[id number] in [facility]. Shipped overnight [employer], tracking # [id number]. Buccal Swab for Biobanking:  Buccal swab collected per SWOG kit instructions today at 1028 AM. Shipped ambient to [facility] , lab #[id number] in [place], [place]. Shipped overnight [employer], tracking # [id number].

## 2023-10-26 ENCOUNTER — RESEARCH ENCOUNTER (OUTPATIENT)
Dept: HEMATOLOGY/ONCOLOGY | Facility: HOSPITAL | Age: 48
End: 2023-10-26

## 2023-10-26 ENCOUNTER — OFFICE VISIT (OUTPATIENT)
Dept: HEMATOLOGY/ONCOLOGY | Facility: HOSPITAL | Age: 48
End: 2023-10-26
Attending: INTERNAL MEDICINE
Payer: COMMERCIAL

## 2023-10-26 VITALS
OXYGEN SATURATION: 97 % | HEART RATE: 86 BPM | HEIGHT: 69.02 IN | DIASTOLIC BLOOD PRESSURE: 82 MMHG | TEMPERATURE: 98 F | RESPIRATION RATE: 18 BRPM | WEIGHT: 228.81 LBS | BODY MASS INDEX: 33.89 KG/M2 | SYSTOLIC BLOOD PRESSURE: 137 MMHG

## 2023-10-26 DIAGNOSIS — C91.10 CLL (CHRONIC LYMPHOCYTIC LEUKEMIA) (HCC): Primary | ICD-10-CM

## 2023-10-26 LAB
ALBUMIN SERPL-MCNC: 3.7 G/DL (ref 3.4–5)
ALBUMIN/GLOB SERPL: 1 {RATIO} (ref 1–2)
ALP LIVER SERPL-CCNC: 113 U/L
ALT SERPL-CCNC: 98 U/L
ANION GAP SERPL CALC-SCNC: 8 MMOL/L (ref 0–18)
AST SERPL-CCNC: 93 U/L (ref 15–37)
BASOPHILS # BLD AUTO: 0.09 X10(3) UL (ref 0–0.2)
BASOPHILS NFR BLD AUTO: 0.5 %
BILIRUB SERPL-MCNC: 0.2 MG/DL (ref 0.1–2)
BUN BLD-MCNC: 14 MG/DL (ref 7–18)
CALCIUM BLD-MCNC: 9.6 MG/DL (ref 8.5–10.1)
CHLORIDE SERPL-SCNC: 107 MMOL/L (ref 98–112)
CO2 SERPL-SCNC: 23 MMOL/L (ref 21–32)
CREAT BLD-MCNC: 1.07 MG/DL
EGFRCR SERPLBLD CKD-EPI 2021: 64 ML/MIN/1.73M2 (ref 60–?)
EOSINOPHIL # BLD AUTO: 0.01 X10(3) UL (ref 0–0.7)
EOSINOPHIL NFR BLD AUTO: 0.1 %
ERYTHROCYTE [DISTWIDTH] IN BLOOD BY AUTOMATED COUNT: 12.9 %
FASTING STATUS PATIENT QL REPORTED: NO
GLOBULIN PLAS-MCNC: 3.7 G/DL (ref 2.8–4.4)
GLUCOSE BLD-MCNC: 128 MG/DL (ref 70–99)
HCT VFR BLD AUTO: 39.4 %
HGB BLD-MCNC: 13.5 G/DL
IMM GRANULOCYTES # BLD AUTO: 0.19 X10(3) UL (ref 0–1)
IMM GRANULOCYTES NFR BLD: 1 %
LDH SERPL L TO P-CCNC: 538 U/L
LYMPHOCYTES # BLD AUTO: 4.36 X10(3) UL (ref 1–4)
LYMPHOCYTES NFR BLD AUTO: 21.8 %
MCH RBC QN AUTO: 30.7 PG (ref 26–34)
MCHC RBC AUTO-ENTMCNC: 34.3 G/DL (ref 31–37)
MCV RBC AUTO: 89.5 FL
MONOCYTES # BLD AUTO: 0.24 X10(3) UL (ref 0.1–1)
MONOCYTES NFR BLD AUTO: 1.2 %
NEUTROPHILS # BLD AUTO: 15.07 X10 (3) UL (ref 1.5–7.7)
NEUTROPHILS # BLD AUTO: 15.07 X10(3) UL (ref 1.5–7.7)
NEUTROPHILS NFR BLD AUTO: 75.4 %
OSMOLALITY SERPL CALC.SUM OF ELEC: 288 MOSM/KG (ref 275–295)
PHOSPHATE SERPL-MCNC: 2.6 MG/DL (ref 2.5–4.9)
PLATELET # BLD AUTO: 142 10(3)UL (ref 150–450)
POTASSIUM SERPL-SCNC: 4.2 MMOL/L (ref 3.5–5.1)
PROT SERPL-MCNC: 7.4 G/DL (ref 6.4–8.2)
RBC # BLD AUTO: 4.4 X10(6)UL
SODIUM SERPL-SCNC: 138 MMOL/L (ref 136–145)
URATE SERPL-MCNC: 3.6 MG/DL
WBC # BLD AUTO: 20 X10(3) UL (ref 4–11)

## 2023-10-26 PROCEDURE — 80053 COMPREHEN METABOLIC PANEL: CPT

## 2023-10-26 PROCEDURE — 83615 LACTATE (LD) (LDH) ENZYME: CPT

## 2023-10-26 PROCEDURE — 84550 ASSAY OF BLOOD/URIC ACID: CPT

## 2023-10-26 PROCEDURE — 96375 TX/PRO/DX INJ NEW DRUG ADDON: CPT

## 2023-10-26 PROCEDURE — 85025 COMPLETE CBC W/AUTO DIFF WBC: CPT

## 2023-10-26 PROCEDURE — 96415 CHEMO IV INFUSION ADDL HR: CPT

## 2023-10-26 PROCEDURE — 84100 ASSAY OF PHOSPHORUS: CPT

## 2023-10-26 PROCEDURE — 96413 CHEMO IV INFUSION 1 HR: CPT

## 2023-10-26 RX ORDER — PROCHLORPERAZINE MALEATE 5 MG/1
5 TABLET ORAL EVERY 6 HOURS PRN
Status: DISCONTINUED | OUTPATIENT
Start: 2023-10-26 | End: 2023-10-26

## 2023-10-26 RX ORDER — PROCHLORPERAZINE MALEATE 10 MG
10 TABLET ORAL ONCE
Status: COMPLETED | OUTPATIENT
Start: 2023-10-26 | End: 2023-10-26

## 2023-10-26 RX ORDER — DIPHENHYDRAMINE HCL 25 MG
50 CAPSULE ORAL ONCE
Status: COMPLETED | OUTPATIENT
Start: 2023-10-26 | End: 2023-10-26

## 2023-10-26 RX ORDER — ONDANSETRON 2 MG/ML
4 INJECTION INTRAMUSCULAR; INTRAVENOUS EVERY 6 HOURS PRN
Status: DISCONTINUED | OUTPATIENT
Start: 2023-10-26 | End: 2023-10-26

## 2023-10-26 RX ORDER — ACETAMINOPHEN 325 MG/1
650 TABLET ORAL ONCE
Status: COMPLETED | OUTPATIENT
Start: 2023-10-26 | End: 2023-10-26

## 2023-10-26 RX ADMIN — ONDANSETRON 4 MG: 2 INJECTION INTRAMUSCULAR; INTRAVENOUS at 16:06:00

## 2023-10-26 RX ADMIN — ACETAMINOPHEN 650 MG: 325 TABLET ORAL at 10:26:00

## 2023-10-26 RX ADMIN — DIPHENHYDRAMINE HCL 50 MG: 25 MG CAPSULE ORAL at 10:26:00

## 2023-10-26 RX ADMIN — PROCHLORPERAZINE MALEATE 10 MG: 10 MG TABLET ORAL at 16:08:00

## 2023-10-26 NOTE — PROGRESS NOTES
Pt here for C1D2 Drug(s)Gazyva. Arrives Ambulating independently, accompanied by Self     Labs reviewed with vane suárez and orders received to proceed with treatment today. Upon completion of infusion today, patient states she is having nausea. Mono suárez notified and orders received to give zofran 4mg ivp now and compazine 10mg po now. Patient was evaluated today by Treatment Nurse. Oral medications included in this regimen:  no    Patient confirms comprehension of cancer treatment schedule:  yes    Pregnancy screening:  Denies possibility of pregnancy    Modifications in dose or schedule:  No    Medications appearance and physical integrity checked by RN: yes. Chemotherapy IV pump settings verified by 2 RNs:  No due to targeted therapy IV administration.   Frequency of blood return and site check throughout administration: Prior to administration and At completion of therapy     Infusion/treatment outcome:  patient tolerated treatment without incident    Education Record    Learner:  Patient  Barriers / Limitations:  None  Method:  Brief focused  Education / instructions given:  schedule reviewed  Outcome:  Shows understanding    Discharged Home, Ambulating independently, accompanied by:Self    Patient/family verbalized understanding of future appointments: by [facility]

## 2023-10-26 NOTE — PROGRESS NOTES
Clinical Research Note  Study: , \"Randomized, Phase III Study of Early Intervention with Venetoclax and Obinutuzumab Versus Delayed Therapy with Venetoclax and Obinutuzumab in Newly Diagnosed Asymptomatic High-Risk Patients with Chronic Lymphocytic Leukemia / Small Lymphocytic Lymphoma (CLL/SLL): EVOLVE CLL/SLL Study   Date: 10/25/2023  Visit: C1D2      Patient presents to clinic for C1D2, Obinutuzumab IV infusion. She did not go home with her IV yesterday, today a new IV was placed in her left hand. Treatment labs drawn peripherally without issue. VSS. Reviewed s/s of TLS with patient and corresponding lab results that would indicate potential TLS. Per APN, labs stable for treatment today. Patient reporting, she experienced some hot flashes and mild sweating yesterday evening, she didn't take her temperature, it resolved before she went to bed. Denies any chills or SOB. Denies any further issues overnight or this am. Mild nausea yesterday, patient currently has Compazine at home and plans to  her Zofran today. Denies nausea today. Patient did take her pain medication (Percocet  mg) at 0600 for her chronic pain. Patient denies constipation, has Senna PRN for any issues. Conmeds reviewed, denies any changes. Patient tolerated treatment well but did experience some nausea at the end of treatment. She was given Zofran 4mg IVP and Compazine 10 mg PO. Patient feeling better and discharged home with instructions to call for any worsening symptoms.   RTC 11/1 for f/u with MD & C1D8

## 2023-10-27 LAB
CD10 CELLS NFR SPEC: <1 %
CD10/CD19: <1 %
CD19 CELLS NFR SPEC: 95 %
CD19+/CD200+: 91 %
CD2 CELLS NFR SPEC: 4 %
CD20 CELLS NFR SPEC: 99 %
CD200 CELLS: 91 %
CD3 CELLS NFR SPEC: 4 %
CD3+/TCRGD+: <1 %
CD3+CD4+ CELLS NFR SPEC: 3 %
CD3+CD4+ CELLS/CD3+CD8+ CLL SPEC: 3
CD3+CD8+ CELLS NFR SPEC: 1 %
CD3-/CD56+: <1 %
CD34 CELLS NFR SPEC: <1 %
CD38 CELLS NFR SPEC: <1 %
CD38+/CD19+: <1 %
CD45 CELLS NFR SPEC: 100 %
CD5 CELLS NFR SPEC: 99 %
CD5/CD19 CELLS: 94 %
CD7 CELLS NFR SPEC: 7 %
CELL SURF KAPPA/LAMBDA RATIO: 95
CELL SURF LAMBDA LIGHT CHAIN: 1 %
CELL SURFACE KAPPA LIGHT CHAIN: 95 %
TCR G-D CELLS NFR SPEC: <1 %

## 2023-10-30 ENCOUNTER — HOSPITAL ENCOUNTER (EMERGENCY)
Facility: HOSPITAL | Age: 48
Discharge: HOME | End: 2023-10-31
Attending: EMERGENCY MEDICINE
Payer: COMMERCIAL

## 2023-10-30 ENCOUNTER — APPOINTMENT (OUTPATIENT)
Dept: CT IMAGING | Facility: HOSPITAL | Age: 48
End: 2023-10-30
Attending: EMERGENCY MEDICINE
Payer: COMMERCIAL

## 2023-10-30 ENCOUNTER — TELEPHONE (OUTPATIENT)
Dept: HEMATOLOGY/ONCOLOGY | Facility: HOSPITAL | Age: 48
End: 2023-10-30

## 2023-10-30 DIAGNOSIS — R51.9 HEADACHE DISORDER: Primary | ICD-10-CM

## 2023-10-30 LAB
ALBUMIN SERPL-MCNC: 3.3 G/DL (ref 3.4–5)
ALBUMIN/GLOB SERPL: 0.9 {RATIO} (ref 1–2)
ALP LIVER SERPL-CCNC: 130 U/L
ALT SERPL-CCNC: 87 U/L
ANION GAP SERPL CALC-SCNC: 3 MMOL/L (ref 0–18)
AST SERPL-CCNC: 28 U/L (ref 15–37)
BASOPHILS # BLD AUTO: 0.04 X10(3) UL (ref 0–0.2)
BASOPHILS NFR BLD AUTO: 0.6 %
BILIRUB SERPL-MCNC: 0.3 MG/DL (ref 0.1–2)
BUN BLD-MCNC: 11 MG/DL (ref 7–18)
CALCIUM BLD-MCNC: 9.1 MG/DL (ref 8.5–10.1)
CHLORIDE SERPL-SCNC: 106 MMOL/L (ref 98–112)
CO2 SERPL-SCNC: 29 MMOL/L (ref 21–32)
CREAT BLD-MCNC: 0.89 MG/DL
EGFRCR SERPLBLD CKD-EPI 2021: 79 ML/MIN/1.73M2 (ref 60–?)
EOSINOPHIL # BLD AUTO: 0.09 X10(3) UL (ref 0–0.7)
EOSINOPHIL NFR BLD AUTO: 1.4 %
ERYTHROCYTE [DISTWIDTH] IN BLOOD BY AUTOMATED COUNT: 12.6 %
GLOBULIN PLAS-MCNC: 3.7 G/DL (ref 2.8–4.4)
GLUCOSE BLD-MCNC: 119 MG/DL (ref 70–99)
HCT VFR BLD AUTO: 42.1 %
HGB BLD-MCNC: 14.6 G/DL
IMM GRANULOCYTES # BLD AUTO: 0.06 X10(3) UL (ref 0–1)
IMM GRANULOCYTES NFR BLD: 1 %
LYMPHOCYTES # BLD AUTO: 1.18 X10(3) UL (ref 1–4)
LYMPHOCYTES NFR BLD AUTO: 19 %
MCH RBC QN AUTO: 30.9 PG (ref 26–34)
MCHC RBC AUTO-ENTMCNC: 34.7 G/DL (ref 31–37)
MCV RBC AUTO: 89.2 FL
MONOCYTES # BLD AUTO: 0.28 X10(3) UL (ref 0.1–1)
MONOCYTES NFR BLD AUTO: 4.5 %
NEUTROPHILS # BLD AUTO: 4.56 X10 (3) UL (ref 1.5–7.7)
NEUTROPHILS # BLD AUTO: 4.56 X10(3) UL (ref 1.5–7.7)
NEUTROPHILS NFR BLD AUTO: 73.5 %
OSMOLALITY SERPL CALC.SUM OF ELEC: 287 MOSM/KG (ref 275–295)
PLATELET # BLD AUTO: 106 10(3)UL (ref 150–450)
POTASSIUM SERPL-SCNC: 4.1 MMOL/L (ref 3.5–5.1)
PROT SERPL-MCNC: 7 G/DL (ref 6.4–8.2)
RBC # BLD AUTO: 4.72 X10(6)UL
SODIUM SERPL-SCNC: 138 MMOL/L (ref 136–145)
WBC # BLD AUTO: 6.2 X10(3) UL (ref 4–11)

## 2023-10-30 PROCEDURE — 70450 CT HEAD/BRAIN W/O DYE: CPT | Performed by: EMERGENCY MEDICINE

## 2023-10-30 PROCEDURE — 99285 EMERGENCY DEPT VISIT HI MDM: CPT

## 2023-10-30 PROCEDURE — 96376 TX/PRO/DX INJ SAME DRUG ADON: CPT

## 2023-10-30 PROCEDURE — 80053 COMPREHEN METABOLIC PANEL: CPT | Performed by: EMERGENCY MEDICINE

## 2023-10-30 PROCEDURE — 96374 THER/PROPH/DIAG INJ IV PUSH: CPT

## 2023-10-30 PROCEDURE — 96372 THER/PROPH/DIAG INJ SC/IM: CPT

## 2023-10-30 PROCEDURE — 96361 HYDRATE IV INFUSION ADD-ON: CPT

## 2023-10-30 PROCEDURE — 85025 COMPLETE CBC W/AUTO DIFF WBC: CPT | Performed by: EMERGENCY MEDICINE

## 2023-10-30 PROCEDURE — 96375 TX/PRO/DX INJ NEW DRUG ADDON: CPT

## 2023-10-30 RX ORDER — HYDROMORPHONE HYDROCHLORIDE 1 MG/ML
0.5 INJECTION, SOLUTION INTRAMUSCULAR; INTRAVENOUS; SUBCUTANEOUS ONCE
Status: COMPLETED | OUTPATIENT
Start: 2023-10-30 | End: 2023-10-31

## 2023-10-30 RX ORDER — DIPHENHYDRAMINE HYDROCHLORIDE 50 MG/ML
25 INJECTION INTRAMUSCULAR; INTRAVENOUS ONCE
Status: COMPLETED | OUTPATIENT
Start: 2023-10-30 | End: 2023-10-30

## 2023-10-30 RX ORDER — METOCLOPRAMIDE HYDROCHLORIDE 5 MG/ML
5 INJECTION INTRAMUSCULAR; INTRAVENOUS ONCE
Status: COMPLETED | OUTPATIENT
Start: 2023-10-30 | End: 2023-10-30

## 2023-10-30 RX ORDER — DEXAMETHASONE SODIUM PHOSPHATE 4 MG/ML
10 VIAL (ML) INJECTION ONCE
Status: COMPLETED | OUTPATIENT
Start: 2023-10-30 | End: 2023-10-30

## 2023-10-30 RX ORDER — SUMATRIPTAN 6 MG/.5ML
6 INJECTION, SOLUTION SUBCUTANEOUS ONCE
Status: COMPLETED | OUTPATIENT
Start: 2023-10-30 | End: 2023-10-30

## 2023-10-30 NOTE — ED INITIAL ASSESSMENT (HPI)
+migraine. Starting today, states that the pain is in the top of her head. +Cx (CLL). Received chemo last Thursday. +nausea. Denies any episodes of emesis.

## 2023-10-30 NOTE — TELEPHONE ENCOUNTER
10/25/23- C1D1 - Obinutuzumab, C1D2 - Obinutuzumab Evolve Study Drug    Patient called, having a \"raging migraine H/A \"started today. States pain in top of head. Nausea, (took previous medication for H/A as well as Oxy-which did not help. Denies fevers, occasional coldness. Denies sob but will get winded. No chest pain. Having nausea, no vomiting no diarrhea. Oral intake decreased, only took some crackers and drank about a liter of fluids today. Having lightheadedness, can't get out of bed. No urinary or bowel complaints. Please advise.

## 2023-10-30 NOTE — TELEPHONE ENCOUNTER
I called Mer Acosta to let her know that Dr Fabio Arenas thinks she should be evaluated in the ER. She said her  is driving her to the [facility] ER now.

## 2023-10-31 VITALS
HEIGHT: 69 IN | BODY MASS INDEX: 34.07 KG/M2 | DIASTOLIC BLOOD PRESSURE: 94 MMHG | RESPIRATION RATE: 18 BRPM | OXYGEN SATURATION: 96 % | SYSTOLIC BLOOD PRESSURE: 157 MMHG | TEMPERATURE: 99 F | HEART RATE: 60 BPM | WEIGHT: 230 LBS

## 2023-10-31 DIAGNOSIS — C91.10 CLL (CHRONIC LYMPHOCYTIC LEUKEMIA) (HCC): Primary | ICD-10-CM

## 2023-11-01 ENCOUNTER — OFFICE VISIT (OUTPATIENT)
Dept: HEMATOLOGY/ONCOLOGY | Facility: HOSPITAL | Age: 48
End: 2023-11-01
Attending: INTERNAL MEDICINE
Payer: COMMERCIAL

## 2023-11-01 ENCOUNTER — RESEARCH ENCOUNTER (OUTPATIENT)
Dept: HEMATOLOGY/ONCOLOGY | Facility: HOSPITAL | Age: 48
End: 2023-11-01

## 2023-11-01 VITALS
TEMPERATURE: 98 F | RESPIRATION RATE: 16 BRPM | DIASTOLIC BLOOD PRESSURE: 85 MMHG | BODY MASS INDEX: 34 KG/M2 | WEIGHT: 228 LBS | OXYGEN SATURATION: 97 % | SYSTOLIC BLOOD PRESSURE: 127 MMHG | HEART RATE: 78 BPM

## 2023-11-01 DIAGNOSIS — T88.9XXA ADVERSE EFFECT OF TREATMENT, INITIAL ENCOUNTER: ICD-10-CM

## 2023-11-01 DIAGNOSIS — R11.0 NAUSEA: ICD-10-CM

## 2023-11-01 DIAGNOSIS — C91.10 CLL (CHRONIC LYMPHOCYTIC LEUKEMIA) (HCC): Primary | ICD-10-CM

## 2023-11-01 LAB
ALBUMIN SERPL-MCNC: 3.5 G/DL (ref 3.4–5)
ALBUMIN/GLOB SERPL: 1 {RATIO} (ref 1–2)
ALP LIVER SERPL-CCNC: 105 U/L
ALT SERPL-CCNC: 63 U/L
ANION GAP SERPL CALC-SCNC: 6 MMOL/L (ref 0–18)
AST SERPL-CCNC: 25 U/L (ref 15–37)
BASOPHILS # BLD AUTO: 0.05 X10(3) UL (ref 0–0.2)
BASOPHILS NFR BLD AUTO: 0.7 %
BILIRUB SERPL-MCNC: 0.4 MG/DL (ref 0.1–2)
BUN BLD-MCNC: 12 MG/DL (ref 9–23)
CALCIUM BLD-MCNC: 8.9 MG/DL (ref 8.5–10.1)
CHLORIDE SERPL-SCNC: 108 MMOL/L (ref 98–112)
CO2 SERPL-SCNC: 26 MMOL/L (ref 21–32)
CREAT BLD-MCNC: 0.94 MG/DL
EGFRCR SERPLBLD CKD-EPI 2021: 74 ML/MIN/1.73M2 (ref 60–?)
EOSINOPHIL # BLD AUTO: 0.19 X10(3) UL (ref 0–0.7)
EOSINOPHIL NFR BLD AUTO: 2.8 %
ERYTHROCYTE [DISTWIDTH] IN BLOOD BY AUTOMATED COUNT: 13.1 %
FASTING STATUS PATIENT QL REPORTED: NO
GLOBULIN PLAS-MCNC: 3.6 G/DL (ref 2.8–4.4)
GLUCOSE BLD-MCNC: 111 MG/DL (ref 70–99)
HCT VFR BLD AUTO: 40.7 %
HGB BLD-MCNC: 13.9 G/DL
IMM GRANULOCYTES # BLD AUTO: 0.08 X10(3) UL (ref 0–1)
IMM GRANULOCYTES NFR BLD: 1.2 %
LDH SERPL L TO P-CCNC: 289 U/L
LYMPHOCYTES # BLD AUTO: 1.86 X10(3) UL (ref 1–4)
LYMPHOCYTES NFR BLD AUTO: 27.2 %
MCH RBC QN AUTO: 30.2 PG (ref 26–34)
MCHC RBC AUTO-ENTMCNC: 34.2 G/DL (ref 31–37)
MCV RBC AUTO: 88.5 FL
MONOCYTES # BLD AUTO: 0.28 X10(3) UL (ref 0.1–1)
MONOCYTES NFR BLD AUTO: 4.1 %
NEUTROPHILS # BLD AUTO: 4.38 X10 (3) UL (ref 1.5–7.7)
NEUTROPHILS # BLD AUTO: 4.38 X10(3) UL (ref 1.5–7.7)
NEUTROPHILS NFR BLD AUTO: 64 %
OSMOLALITY SERPL CALC.SUM OF ELEC: 290 MOSM/KG (ref 275–295)
PHOSPHATE SERPL-MCNC: 3.3 MG/DL (ref 2.5–4.9)
PLATELET # BLD AUTO: 126 10(3)UL (ref 150–450)
POTASSIUM SERPL-SCNC: 3.4 MMOL/L (ref 3.5–5.1)
PROT SERPL-MCNC: 7.1 G/DL (ref 6.4–8.2)
RBC # BLD AUTO: 4.6 X10(6)UL
SODIUM SERPL-SCNC: 140 MMOL/L (ref 136–145)
WBC # BLD AUTO: 6.8 X10(3) UL (ref 4–11)

## 2023-11-01 PROCEDURE — 85025 COMPLETE CBC W/AUTO DIFF WBC: CPT

## 2023-11-01 PROCEDURE — 36415 COLL VENOUS BLD VENIPUNCTURE: CPT

## 2023-11-01 PROCEDURE — 96374 THER/PROPH/DIAG INJ IV PUSH: CPT

## 2023-11-01 PROCEDURE — 96361 HYDRATE IV INFUSION ADD-ON: CPT

## 2023-11-01 PROCEDURE — 96375 TX/PRO/DX INJ NEW DRUG ADDON: CPT

## 2023-11-01 PROCEDURE — 84100 ASSAY OF PHOSPHORUS: CPT

## 2023-11-01 PROCEDURE — 80053 COMPREHEN METABOLIC PANEL: CPT

## 2023-11-01 PROCEDURE — 83615 LACTATE (LD) (LDH) ENZYME: CPT

## 2023-11-01 PROCEDURE — 96415 CHEMO IV INFUSION ADDL HR: CPT

## 2023-11-01 PROCEDURE — 96413 CHEMO IV INFUSION 1 HR: CPT

## 2023-11-01 RX ORDER — DIPHENHYDRAMINE HCL 25 MG
50 CAPSULE ORAL ONCE
Status: CANCELLED | OUTPATIENT
Start: 2023-11-01

## 2023-11-01 RX ORDER — ACETAMINOPHEN 325 MG/1
650 TABLET ORAL ONCE
Status: CANCELLED | OUTPATIENT
Start: 2023-11-01

## 2023-11-01 RX ORDER — ACETAMINOPHEN 325 MG/1
650 TABLET ORAL ONCE
Status: COMPLETED | OUTPATIENT
Start: 2023-11-01 | End: 2023-11-01

## 2023-11-01 RX ORDER — DIPHENHYDRAMINE HCL 25 MG
50 CAPSULE ORAL ONCE
Status: COMPLETED | OUTPATIENT
Start: 2023-11-01 | End: 2023-11-01

## 2023-11-01 RX ORDER — HYDROMORPHONE HYDROCHLORIDE 2 MG/1
2 TABLET ORAL EVERY 6 HOURS PRN
Qty: 20 TABLET | Refills: 0 | Status: SHIPPED | OUTPATIENT
Start: 2023-11-01

## 2023-11-01 RX ORDER — METOCLOPRAMIDE HYDROCHLORIDE 5 MG/ML
10 INJECTION INTRAMUSCULAR; INTRAVENOUS ONCE
Status: COMPLETED | OUTPATIENT
Start: 2023-11-01 | End: 2023-11-01

## 2023-11-01 RX ADMIN — METOCLOPRAMIDE HYDROCHLORIDE 10 MG: 5 INJECTION INTRAMUSCULAR; INTRAVENOUS at 15:56:00

## 2023-11-01 RX ADMIN — DIPHENHYDRAMINE HCL 50 MG: 25 MG CAPSULE ORAL at 09:58:00

## 2023-11-01 RX ADMIN — ACETAMINOPHEN 650 MG: 325 TABLET ORAL at 09:58:00

## 2023-11-01 NOTE — PROGRESS NOTES
Oncology Nutrition Consultation    Patient Name: Roz Portillo  YOB: 1975  Medical Record Number: [id number]   Account Number: [de-identified]  Dietitian: Jasmin White RD, LDN    Date of visit: 11/1/2023    Diet Rx: high protein/quality    Pertinent Dx/PMH: CLL    Past Medical History:   Diagnosis Date    Anxiety     Cancer ([address]     CLL    Depression     PONV (postoperative nausea and vomiting)     Visual impairment     reading glasses       TX: clinical trial  and started Obinutuzumab     Other pertinent subjective/objective information: sx, diet hx obtained    Pertinent Meds:    Current Outpatient Medications:     HYDROmorphone 2 MG Oral Tab, Take 1 tablet (2 mg total) by mouth every 6 (six) hours as needed for Pain., Disp: 20 tablet, Rfl: 0    ondansetron (ZOFRAN) 8 MG tablet, Take 1 tablet (8 mg total) by mouth every 8 (eight) hours as needed for Nausea., Disp: 30 tablet, Rfl: 3    prochlorperazine (COMPAZINE) 10 mg tablet, Take 1 tablet (10 mg total) by mouth every 6 (six) hours as needed for Nausea., Disp: 30 tablet, Rfl: 3    allopurinol 300 MG Oral Tab, Take 1 tablet (300 mg total) by mouth daily. , Disp: 90 tablet, Rfl: 0    ASHWAGANDHA OR, Take 1 capsule by mouth daily. (Patient not taking: Reported on 10/25/2023), Disp: , Rfl:     ALPRAZolam (XANAX) 0.25 MG Oral Tab, Take 1 tablet (0.25 mg total) by mouth daily as needed. , Disp: 10 tablet, Rfl: 0    Turmeric (QC TUMERIC COMPLEX OR), Take 1 tablet by mouth 2 (two) times daily. (Patient not taking: Reported on 10/25/2023), Disp: , Rfl:     Cyanocobalamin (VITAMIN B 12 OR), Take 1 tablet by mouth daily. B 12 complex, Disp: , Rfl:     oxyCODONE-acetaminophen  MG Oral Tab, TK 1 T PO QID PRN P, Disp: , Rfl: 0    sucralfate 1 g Oral Tab, Take 1 tablet (1 g total) by mouth 2 (two) times daily. , Disp: , Rfl: 0    omeprazole 20 MG Oral Capsule Delayed Release, TK ONE C PO BID 30 MIN B EATING, Disp: , Rfl: 0    Cholecalciferol (VITAMIN D-3) 5000 units Oral Tab, Take 1 tablet (5,000 Units total) by mouth daily. , Disp: , Rfl:     Pertinent Labs: noted    Height: 5'9\"            IBW: 145  +/- 10%    WT HX:   Wt Readings from Last 9 Encounters:  11/01/23 : 103.4 kg (228 lb)  10/30/23 : 104.3 kg (230 lb)  10/26/23 : 103.8 kg (228 lb 12.8 oz)  10/25/23 : 104.5 kg (230 lb 6.4 oz)  10/13/23 : 104.3 kg (230 lb)  10/03/23 : 104.8 kg (231 lb)  09/20/23 : 104.8 kg (231 lb)  09/15/23 : 106.2 kg (234 lb 3.2 oz)  09/12/23 : 90.7 kg (200 lb)      Estimated Nutrition Needs: 18-20 kcals/kg = 3274-1173 KCALS/d; 1.0 gms protein/kg = 105 gms/d    Services Provided: Verbal and written ix provided addressing -  importance of nutrition during tx; nutrition w/ c/o Nausea; samples/coupons for [employer]; samples of pineapple brent chews    Assessment/Plan: RD met w/ this pleasant, 52 y/o female in tx room today for introduction, assessment and recommendations. Pt c/o nausea needing to be on antiemetics constantly. Pt c/o poor appetite having only 1 meal/d and drinking primarily H2O throughout the day. RD reviewed recommendations as noted encouraging small, frequent feeds and sipping on calorie containing beverages or brent tea throughout the day. Pt verbalized understanding. RD offered support and will continue to monitor. Thank you for allowing me to participate in the care of [place]. The [facility] makes medical notes like these available to patients in the interest of transparency. Please be advised this is a medical document. Medical documents are intended to carry relevant information, facts as evident, and the clinical opinion of the practitioner. The medical note is intended as peer to peer communication and may appear blunt or direct. It is written in medical language and may contain abbreviations or verbiage that are unfamiliar.

## 2023-11-01 NOTE — PROGRESS NOTES
Education Record    Learner:  Patient    Disease / Diagnosis:  CLL  OTV Evolve research study   Barriers / Limitations:  None   Comments:    Method:  Discussion   Comments:    General Topics:  Plan of care reviewed   Comments:    Outcome:  Shows understanding   Comments:   Ed visit for migraine on Monday. Today HA 3/10. Taking zofran around the clock for nausea. Appetite and energy is low. Constipation. Bone pain, sushant left femur area.

## 2023-11-01 NOTE — PROGRESS NOTES
Clinical Research Note  Study: , \"Randomized, Phase III Study of Early Intervention with Venetoclax and Obinutuzumab Versus Delayed Therapy with Venetoclax and Obinutuzumab in Newly Diagnosed Asymptomatic High-Risk Patients with Chronic Lymphocytic Leukemia / Small Lymphocytic Lymphoma (CLL/SLL): EVOLVE CLL/SLL Study   Date:  11/1/2023  Visit:   C1D8    Patient presented for continued treatment on trial. Patient had chemo labs drawn peripherally. .0, down 33% from baseilne C1D1) which is gr 2 per protocol, no treatment mod indicated. Labs adequate for treatment today, results not indicative of potential TLS. Patient assessed by MD.     Patient was in the ER on 10/30 with a persistent headache. Patient does have PMH of migraines, but states her PMH is cervicogenic and that this HA felt different. Today, pain is still 3/10. Today she was prescribed Dilaudid as home med. AE review:  - Headache gr 1 - improved from a gr 3 on Monday that brought her to ER  - Nausea gr 1 - stable with compazine and zofran at home. Escalated to a gr 2 on Monday, has improved since ER visit. Continues to have neg affect on appetite and water intake. - Constipation gr 1 - minimal symptoms. Likely r/t Zofran. MD encouraged adding fiber. Patient also reports having senna at home  - Fatigue - gr 2. Patient feels hat resting does not relieve her symptoms  - Decreased appetite gr1. Patient reports not eating or drinking as usual due to nausea. - Bone pain - gr 1. Mostly in L femur. R/t tx per MD.   - Platelet count decreased - gr 2. No tx modification necessary per protocol.   - Hypokalemia gr 1 - probably not r/t treatment, not supplemented today. Not reportable. - Fever - gr 1 , patient reports mildly elevated fever on Thursday night only, max temp up to 99.7*.   - Insomnia gr 1 - last Thursday night only. Pt states it was related to fever.      Med changes:  Start: Dilaudid 2 mg oral 1 tab po QID PRN for pain   0.9% NaCl 1000mL bolus once - given today for decreased appetite/nausea  Reglan 10 mg IVP once - given for nausea during treatment    Patient tolerated treatment well and was discharged home in stable condition.  RTC in 1 week for C1D15 pl/chemo

## 2023-11-01 NOTE — PROGRESS NOTES
Pt here for C1D8 Drug(s)Gazyva. Arrives Ambulating independently, accompanied by Self     Patient was evaluated today by MD and Research RN, Laura    Oral medications included in this regimen:  no    Patient confirms comprehension of cancer treatment schedule:  yes    Pregnancy screening:  Denies possibility of pregnancy    Modifications in dose or schedule:  No    Medications appearance and physical integrity checked by RN: yes. Chemotherapy IV pump settings verified by 2 RNs:  Yes. Frequency of blood return and site check throughout administration: Prior to administration and At completion of therapy     Infusion/treatment outcome:  patient tolerated treatment without incident    Education Record    Learner:  Patient  Barriers / Limitations:  None  Method:  Brief focused and Reinforcement  Education / instructions given:  Plan of care reviewed. Outcome:  Shows understanding    Discharged Home, Ambulating independently, accompanied by:Self    Patient/family verbalized understanding of future appointments: by [employer]  Patient c/o headache and nausea toward the end of the Gazyva infusion. Informed research RN, Magalys Draek and  Dr. Rob Keen RN, Cory Fajardo. Patient asked if she can have Reglan as that always works really well for her. Received order from Dr. Mary An for Reglan 10 IVP. Patient also got this on her Day 1 last week. Reglan given as ordered. Patient also complained of chest pain while the Reglan was being given. Patient described it as sharp pain. Patient said that she has hx of acid reflux, but doubts that it's caused by acid. After about a couple of minutes of flushing the IV. Patient disconnected from it and went to the restroom. Patient said that she felt better as far as nausea and chest pain, but the headache was still there. Reminded patient not to take Compazine today due to a drug interaction with  Reglan. May take Zofran at 6 PM tonight. May start Compazine tomorrow PRN.  IV removed and patient discharged home without any further issues. Leanna Santana RN and Dr. Jiménez Done aware of these. Dr. Jiménez Done prescribed Dialudid for the patient as this seemed to help her when she was in the ER. Called patient to inform her of this, but no answer. Left a message with this information.

## 2023-11-08 ENCOUNTER — OFFICE VISIT (OUTPATIENT)
Dept: HEMATOLOGY/ONCOLOGY | Facility: HOSPITAL | Age: 48
End: 2023-11-08
Attending: INTERNAL MEDICINE
Payer: COMMERCIAL

## 2023-11-08 ENCOUNTER — RESEARCH ENCOUNTER (OUTPATIENT)
Dept: HEMATOLOGY/ONCOLOGY | Facility: HOSPITAL | Age: 48
End: 2023-11-08

## 2023-11-08 ENCOUNTER — TELEPHONE (OUTPATIENT)
Dept: HEMATOLOGY/ONCOLOGY | Facility: HOSPITAL | Age: 48
End: 2023-11-08

## 2023-11-08 VITALS
OXYGEN SATURATION: 97 % | RESPIRATION RATE: 16 BRPM | HEIGHT: 69.02 IN | SYSTOLIC BLOOD PRESSURE: 158 MMHG | HEART RATE: 95 BPM | WEIGHT: 228.81 LBS | BODY MASS INDEX: 33.89 KG/M2 | DIASTOLIC BLOOD PRESSURE: 97 MMHG | TEMPERATURE: 98 F

## 2023-11-08 DIAGNOSIS — T88.9XXA ADVERSE EFFECT OF TREATMENT, INITIAL ENCOUNTER: Primary | ICD-10-CM

## 2023-11-08 DIAGNOSIS — R11.0 NAUSEA: ICD-10-CM

## 2023-11-08 DIAGNOSIS — C91.10 CLL (CHRONIC LYMPHOCYTIC LEUKEMIA) (HCC): ICD-10-CM

## 2023-11-08 DIAGNOSIS — C91.10 CLL (CHRONIC LYMPHOCYTIC LEUKEMIA) (HCC): Primary | ICD-10-CM

## 2023-11-08 LAB
ALBUMIN SERPL-MCNC: 3.6 G/DL (ref 3.4–5)
ALBUMIN/GLOB SERPL: 1 {RATIO} (ref 1–2)
ALP LIVER SERPL-CCNC: 90 U/L
ALT SERPL-CCNC: 31 U/L
ANION GAP SERPL CALC-SCNC: 7 MMOL/L (ref 0–18)
AST SERPL-CCNC: 24 U/L (ref 15–37)
BASOPHILS # BLD AUTO: 0.07 X10(3) UL (ref 0–0.2)
BASOPHILS NFR BLD AUTO: 1 %
BILIRUB SERPL-MCNC: 0.3 MG/DL (ref 0.1–2)
BUN BLD-MCNC: 10 MG/DL (ref 9–23)
CALCIUM BLD-MCNC: 9.2 MG/DL (ref 8.5–10.1)
CHLORIDE SERPL-SCNC: 107 MMOL/L (ref 98–112)
CO2 SERPL-SCNC: 25 MMOL/L (ref 21–32)
CREAT BLD-MCNC: 0.89 MG/DL
EGFRCR SERPLBLD CKD-EPI 2021: 79 ML/MIN/1.73M2 (ref 60–?)
EOSINOPHIL # BLD AUTO: 0.15 X10(3) UL (ref 0–0.7)
EOSINOPHIL NFR BLD AUTO: 2.1 %
ERYTHROCYTE [DISTWIDTH] IN BLOOD BY AUTOMATED COUNT: 13.2 %
FASTING STATUS PATIENT QL REPORTED: NO
GLOBULIN PLAS-MCNC: 3.6 G/DL (ref 2.8–4.4)
GLUCOSE BLD-MCNC: 109 MG/DL (ref 70–99)
HCT VFR BLD AUTO: 42.7 %
HGB BLD-MCNC: 14.4 G/DL
IMM GRANULOCYTES # BLD AUTO: 0.04 X10(3) UL (ref 0–1)
IMM GRANULOCYTES NFR BLD: 0.6 %
LYMPHOCYTES # BLD AUTO: 1.58 X10(3) UL (ref 1–4)
LYMPHOCYTES NFR BLD AUTO: 22 %
MCH RBC QN AUTO: 30.5 PG (ref 26–34)
MCHC RBC AUTO-ENTMCNC: 33.7 G/DL (ref 31–37)
MCV RBC AUTO: 90.5 FL
MONOCYTES # BLD AUTO: 0.36 X10(3) UL (ref 0.1–1)
MONOCYTES NFR BLD AUTO: 5 %
NEUTROPHILS # BLD AUTO: 4.99 X10 (3) UL (ref 1.5–7.7)
NEUTROPHILS # BLD AUTO: 4.99 X10(3) UL (ref 1.5–7.7)
NEUTROPHILS NFR BLD AUTO: 69.3 %
OSMOLALITY SERPL CALC.SUM OF ELEC: 288 MOSM/KG (ref 275–295)
PHOSPHATE SERPL-MCNC: 3.9 MG/DL (ref 2.5–4.9)
PLATELET # BLD AUTO: 173 10(3)UL (ref 150–450)
POTASSIUM SERPL-SCNC: 4.1 MMOL/L (ref 3.5–5.1)
PROT SERPL-MCNC: 7.2 G/DL (ref 6.4–8.2)
RBC # BLD AUTO: 4.72 X10(6)UL
SODIUM SERPL-SCNC: 139 MMOL/L (ref 136–145)
WBC # BLD AUTO: 7.2 X10(3) UL (ref 4–11)

## 2023-11-08 PROCEDURE — 36415 COLL VENOUS BLD VENIPUNCTURE: CPT

## 2023-11-08 PROCEDURE — 96375 TX/PRO/DX INJ NEW DRUG ADDON: CPT

## 2023-11-08 PROCEDURE — 85025 COMPLETE CBC W/AUTO DIFF WBC: CPT

## 2023-11-08 PROCEDURE — 96413 CHEMO IV INFUSION 1 HR: CPT

## 2023-11-08 PROCEDURE — 80053 COMPREHEN METABOLIC PANEL: CPT

## 2023-11-08 PROCEDURE — 96415 CHEMO IV INFUSION ADDL HR: CPT

## 2023-11-08 PROCEDURE — 84100 ASSAY OF PHOSPHORUS: CPT

## 2023-11-08 RX ORDER — ACETAMINOPHEN 325 MG/1
650 TABLET ORAL ONCE
Status: COMPLETED | OUTPATIENT
Start: 2023-11-08 | End: 2023-11-08

## 2023-11-08 RX ORDER — ACETAMINOPHEN 325 MG/1
650 TABLET ORAL ONCE
Status: CANCELLED | OUTPATIENT
Start: 2023-11-08

## 2023-11-08 RX ORDER — METOCLOPRAMIDE HYDROCHLORIDE 5 MG/ML
10 INJECTION INTRAMUSCULAR; INTRAVENOUS EVERY 6 HOURS PRN
Status: CANCELLED
Start: 2023-11-08

## 2023-11-08 RX ORDER — DIPHENHYDRAMINE HCL 25 MG
50 CAPSULE ORAL ONCE
Status: CANCELLED | OUTPATIENT
Start: 2023-11-08

## 2023-11-08 RX ORDER — DIPHENHYDRAMINE HCL 25 MG
50 CAPSULE ORAL ONCE
Status: COMPLETED | OUTPATIENT
Start: 2023-11-08 | End: 2023-11-08

## 2023-11-08 RX ORDER — METOCLOPRAMIDE HYDROCHLORIDE 5 MG/ML
10 INJECTION INTRAMUSCULAR; INTRAVENOUS ONCE
Status: COMPLETED | OUTPATIENT
Start: 2023-11-08 | End: 2023-11-08

## 2023-11-08 RX ADMIN — DIPHENHYDRAMINE HCL 50 MG: 25 MG CAPSULE ORAL at 09:51:00

## 2023-11-08 RX ADMIN — ACETAMINOPHEN 650 MG: 325 TABLET ORAL at 09:50:00

## 2023-11-08 RX ADMIN — METOCLOPRAMIDE HYDROCHLORIDE 10 MG: 5 INJECTION INTRAMUSCULAR; INTRAVENOUS at 12:51:00

## 2023-11-08 NOTE — PROGRESS NOTES
Pt here for C 1 D 15  Drug(s)gazyva. Arrives Ambulating independently, accompanied by Self     Patient was evaluated today by MD.    Oral medications included in this regimen:  no    Patient confirms comprehension of cancer treatment schedule:  yes    Pregnancy screening:  Denies possibility of pregnancy    Modifications in dose or schedule:  No    Medications appearance and physical integrity checked by RN: yes. Chemotherapy IV pump settings verified by 2 RNs:  Yes.   Frequency of blood return and site check throughout administration: Prior to administration and At completion of therapy     Infusion/treatment outcome:  patient tolerated treatment without incident    Education Record    Learner:  Patient  Barriers / Limitations:  None  Method:  Discussion  Education / instructions given:  antinausea medication  Outcome:  Shows understanding    Discharged Home, Ambulating independently, accompanied by:Self    Patient/family verbalized understanding of future appointments: by [facility]

## 2023-11-08 NOTE — PROGRESS NOTES
Clinical Research Note  Study: , \"Randomized, Phase III Study of Early Intervention with Venetoclax and Obinutuzumab Versus Delayed Therapy with Venetoclax and Obinutuzumab in Newly Diagnosed Asymptomatic High-Risk Patients with Chronic Lymphocytic Leukemia / Small Lymphocytic Lymphoma (CLL/SLL): EVOLVE CLL/SLL Study   Date:  11/8/2023  Visit:  Slim Hernandez    Patient presenting for continued treatment on trial, this timepoint is still only obinutuzumab infusion only. Labs drawn peripherally. Adequate for treatment today. Patient assessed by MD. No labs values or symptoms suggestive of TLS. Patient's biggest complaint right now is headache. It is consistently 5/10 . Probably now gr 2 now and is helped only slightly by rescue meds. Patient reports that her chronic cervical spine issues are also rearing up and possibly be contributing to headache. Discussion was had with regards to continuing with obinutuzumab or delaying or withdrawing completely due to quality of life concerns. Patient willing to continue with treatment today and will continue to reassess. Patient reports she is still not taking in as much water as usual due to the nausea, but is trying different things and is compensating ok. Labs reflective. No hydration administered today. Patient denies fever, numbness, tingling, heart palpitations, vision changes, ambulation issues. AE review:  Nausea - still gr 1, patient take home meds occasionally  Constipation - still gr 1, patient increasing fiber intake  Decreased appetite - gr 2  Fatigue - still gr 2, patient reported she gets \"winded\" but not \"out of breath\" by activity. She spends most of the day on the couch, but reports she is able to perform ADLs and housekeeping. Bone pain - now in left arm as well as L femur. Reports it is shooting and at times very uncomfortable.  Now gr 2  Platelet count decreased - resolved  Hypokalemia - resolved    Med Changes:  Reglan - 10 mg IVP added as premed/nausea prophylaxis    Patient tolerated treatment today and was discharged in stable condition. Patient will RTC in 1 week for labs, MD and to start venetoclax. TLS Risk Reassessment due prior to administration of first dose. Patient to return weekly for labs and escalating doses of venetoclax through cycle 2. Obinutuzumab now moving to infusions on D1 on each cycle.

## 2023-11-09 ENCOUNTER — RESEARCH ENCOUNTER (OUTPATIENT)
Dept: HEMATOLOGY/ONCOLOGY | Facility: HOSPITAL | Age: 48
End: 2023-11-09

## 2023-11-09 NOTE — PROGRESS NOTES
Research Phone Call Note  Study: , \"Randomized, Phase III Study of Early Intervention with Venetoclax and Obinutuzumab Versus Delayed Therapy with Venetoclax and Obinutuzumab in Newly Diagnosed Asymptomatic High-Risk Patients with Chronic Lymphocytic Leukemia / Small Lymphocytic Lymphoma (CLL/SLL): EVOLVE CLL/SLL Study     Call made to patient to discuss appt for next week. It was rescheduled to [facility] 11/15 945 A. Patient agreeable. Patient also reporting headache is hard to manage. She is also very nauseous. She is taking compazine and zofran alternating. She is able to eat and drink a little today. She is going to take a Dilaudid to see how it managed her pain. I asked that she call back tomorrow if symptoms persist so that we can make a better plan for the weekend. Patient agreeable.

## 2023-11-10 ENCOUNTER — TELEPHONE (OUTPATIENT)
Dept: HEMATOLOGY/ONCOLOGY | Facility: HOSPITAL | Age: 48
End: 2023-11-10

## 2023-11-10 NOTE — TELEPHONE ENCOUNTER
Pt is calling to speak to a nurse, she has nausea, headache and a cough. Report no fever.  Please call her at [phone]

## 2023-11-10 NOTE — TELEPHONE ENCOUNTER
Spoke with patient, chronic headache and nausea. Nausea is a little better with Compazine and Zofran. Patient took Dilaudid yesterday but has not taken today. Took Imitrex yesterday and today as well. Per Dr Siobhan Odonnell, patient to continue antiemetics and advised patient to take Dilaudid now for pain relief. If pain doesn't improve or symptoms are not controlled, she will need to go to the ER for IV pain relief. Patient verbalized understanding.

## 2023-11-14 ENCOUNTER — APPOINTMENT (OUTPATIENT)
Dept: HEMATOLOGY/ONCOLOGY | Facility: HOSPITAL | Age: 48
End: 2023-11-14
Attending: INTERNAL MEDICINE
Payer: COMMERCIAL

## 2023-11-15 ENCOUNTER — OFFICE VISIT (OUTPATIENT)
Dept: HEMATOLOGY/ONCOLOGY | Facility: HOSPITAL | Age: 48
End: 2023-11-15
Attending: INTERNAL MEDICINE
Payer: COMMERCIAL

## 2023-11-15 ENCOUNTER — RESEARCH ENCOUNTER (OUTPATIENT)
Dept: HEMATOLOGY/ONCOLOGY | Facility: HOSPITAL | Age: 48
End: 2023-11-15

## 2023-11-15 VITALS
HEART RATE: 86 BPM | DIASTOLIC BLOOD PRESSURE: 86 MMHG | BODY MASS INDEX: 34.21 KG/M2 | TEMPERATURE: 98 F | SYSTOLIC BLOOD PRESSURE: 129 MMHG | HEIGHT: 69.02 IN | OXYGEN SATURATION: 99 % | WEIGHT: 231 LBS

## 2023-11-15 DIAGNOSIS — R59.1 LYMPHADENOPATHY: ICD-10-CM

## 2023-11-15 DIAGNOSIS — C91.10 CLL (CHRONIC LYMPHOCYTIC LEUKEMIA) (HCC): ICD-10-CM

## 2023-11-15 DIAGNOSIS — C91.10 CLL (CHRONIC LYMPHOCYTIC LEUKEMIA) (HCC): Primary | ICD-10-CM

## 2023-11-15 DIAGNOSIS — T88.9XXA ADVERSE EFFECT OF TREATMENT, INITIAL ENCOUNTER: Primary | ICD-10-CM

## 2023-11-15 LAB
ALBUMIN SERPL-MCNC: 3.5 G/DL (ref 3.4–5)
ALBUMIN/GLOB SERPL: 0.9 {RATIO} (ref 1–2)
ALP LIVER SERPL-CCNC: 92 U/L
ALT SERPL-CCNC: 46 U/L
ANION GAP SERPL CALC-SCNC: 6 MMOL/L (ref 0–18)
AST SERPL-CCNC: 31 U/L (ref 15–37)
BASOPHILS # BLD AUTO: 0.05 X10(3) UL (ref 0–0.2)
BASOPHILS NFR BLD AUTO: 0.7 %
BILIRUB SERPL-MCNC: 0.3 MG/DL (ref 0.1–2)
BUN BLD-MCNC: 11 MG/DL (ref 9–23)
CALCIUM BLD-MCNC: 9.3 MG/DL (ref 8.5–10.1)
CHLORIDE SERPL-SCNC: 105 MMOL/L (ref 98–112)
CO2 SERPL-SCNC: 27 MMOL/L (ref 21–32)
CREAT BLD-MCNC: 0.94 MG/DL
EGFRCR SERPLBLD CKD-EPI 2021: 74 ML/MIN/1.73M2 (ref 60–?)
EOSINOPHIL # BLD AUTO: 0.16 X10(3) UL (ref 0–0.7)
EOSINOPHIL NFR BLD AUTO: 2.1 %
ERYTHROCYTE [DISTWIDTH] IN BLOOD BY AUTOMATED COUNT: 13.1 %
FASTING STATUS PATIENT QL REPORTED: NO
GLOBULIN PLAS-MCNC: 3.7 G/DL (ref 2.8–4.4)
GLUCOSE BLD-MCNC: 122 MG/DL (ref 70–99)
HCT VFR BLD AUTO: 43.5 %
HGB BLD-MCNC: 14.6 G/DL
IMM GRANULOCYTES # BLD AUTO: 0.06 X10(3) UL (ref 0–1)
IMM GRANULOCYTES NFR BLD: 0.8 %
LDH SERPL L TO P-CCNC: 267 U/L
LYMPHOCYTES # BLD AUTO: 1.69 X10(3) UL (ref 1–4)
LYMPHOCYTES NFR BLD AUTO: 22.5 %
MCH RBC QN AUTO: 30.6 PG (ref 26–34)
MCHC RBC AUTO-ENTMCNC: 33.6 G/DL (ref 31–37)
MCV RBC AUTO: 91.2 FL
MONOCYTES # BLD AUTO: 0.47 X10(3) UL (ref 0.1–1)
MONOCYTES NFR BLD AUTO: 6.3 %
NEUTROPHILS # BLD AUTO: 5.08 X10 (3) UL (ref 1.5–7.7)
NEUTROPHILS # BLD AUTO: 5.08 X10(3) UL (ref 1.5–7.7)
NEUTROPHILS NFR BLD AUTO: 67.6 %
OSMOLALITY SERPL CALC.SUM OF ELEC: 287 MOSM/KG (ref 275–295)
PLATELET # BLD AUTO: 140 10(3)UL (ref 150–450)
POTASSIUM SERPL-SCNC: 4 MMOL/L (ref 3.5–5.1)
PROT SERPL-MCNC: 7.2 G/DL (ref 6.4–8.2)
RBC # BLD AUTO: 4.77 X10(6)UL
SODIUM SERPL-SCNC: 138 MMOL/L (ref 136–145)
URATE SERPL-MCNC: 2.8 MG/DL
WBC # BLD AUTO: 7.5 X10(3) UL (ref 4–11)

## 2023-11-15 PROCEDURE — 84550 ASSAY OF BLOOD/URIC ACID: CPT | Performed by: INTERNAL MEDICINE

## 2023-11-15 PROCEDURE — 99211 OFF/OP EST MAY X REQ PHY/QHP: CPT

## 2023-11-15 PROCEDURE — 80053 COMPREHEN METABOLIC PANEL: CPT | Performed by: INTERNAL MEDICINE

## 2023-11-15 PROCEDURE — 36415 COLL VENOUS BLD VENIPUNCTURE: CPT

## 2023-11-15 PROCEDURE — 83615 LACTATE (LD) (LDH) ENZYME: CPT | Performed by: INTERNAL MEDICINE

## 2023-11-15 PROCEDURE — 85025 COMPLETE CBC W/AUTO DIFF WBC: CPT | Performed by: INTERNAL MEDICINE

## 2023-11-15 RX ORDER — METOCLOPRAMIDE HYDROCHLORIDE 5 MG/ML
10 INJECTION INTRAMUSCULAR; INTRAVENOUS EVERY 6 HOURS PRN
Status: CANCELLED
Start: 2023-11-15

## 2023-11-15 RX ORDER — MORPHINE SULFATE 15 MG/1
15 TABLET ORAL EVERY 4 HOURS PRN
Qty: 40 TABLET | Refills: 0 | Status: SHIPPED | OUTPATIENT
Start: 2023-11-15

## 2023-11-15 NOTE — PROGRESS NOTES
Education Record    Learner:  Patient    Disease / Diagnosis:  CLL  OTV C1D22 - clinical trial     Barriers / Limitations:  None   Comments:    Method:  Discussion   Comments:    General Topics:  Plan of care reviewed   Comments:    Outcome:  Shows understanding   Comments: To start Venetoclax 20mg daily today, week 1. Pt feeling poorly. Very severe fatigue, low stamina, feels out of breath with activity. Reports muscle spasms to her back, different  than her normal , started a few days ago. Cont with severe HA, taking imitrex and dilaudid prn, takes the edge off, but not completely gone. Reiki therapy helps. Constipation, taking senna. Appetite is up and down, craving sugar. Needing to take more of her regular pain meds. Emotional support offered.

## 2023-11-15 NOTE — PROGRESS NOTES
CLINICAL RESEARCH NOTE  Study: , \"Randomized, Phase III Study of Early Intervention with Venetoclax and Obinutuzumab Versus Delayed Therapy with Venetoclax and Obinutuzumab in Newly Diagnosed Asymptomatic High-Risk Patients with Chronic Lymphocytic Leukemia / Small Lymphocytic Lymphoma (CLL/SLL): EVOLVE CLL/SLL Study   Patient ID: [id number]  Date: 11/15/2023  Visit:  C1D22    Patient presents for treatment on study - due today for labs and to start venetoclax. Labs drawn peripherally. Patient assessed by MD. TLS Risk Reassessment completed by MD - patient now low risk for TLS. Taking allopurinol. Patient evaluated for TLS using [place] Criteria - nothing suggestive noted. Patient denies fever, numbness, tingling, heart palpitations. Stable AEs:   - Nausea, gr 1. taking meds at home. Affects appetite but weight is stable. - Constipation, gr 1. taking colace now  - Fatigue, gr 2. She is still able to complete self care ADLs, but does feel she is spending more time on the couch. - Bone pain, gr 2. Reports it is widespread now, but does not feel it is limiting her self care    AE Changes:  - New insomnia, gr 1. Feels she is having some trouble falling asleep  - New arthralgia, gr 1 + new myalgia, gr 1 - patient reports feeling mildly achy in her muscles and joints. Less painful than the bone pain. - New muscle spasms/cramps, gr 1 - reporting muscle spasms to mid and upper back. She does have chronic spasms in this area due to h/o spinal surgeries, but feels this is different. - Platelet count decreased, gr 2 using IWCLL scale. -25.9% change from baseline (C1D1). - Headache. Patient report today is first day she is headache-free, but reports they have been persistent since treatment started. Medication Changes:  Stop Dilaudid  Start Morphine 15 mg 1 tablet po q4h PRN for headache    Medication Dispensing:  Dispensed Venetoclax as blister pack of #16 10mg tablets, lot #[id number].  Patient also given pill diary. Patient given instruction on taking pills and using diary. Answered all questions, patient reminded to contact research with any questions or concerns. Starting dose for [id number] is 20mg daily.      Patient will return to clinic for C2D1 for labs/APN/chemo

## 2023-11-20 ENCOUNTER — OFFICE VISIT (OUTPATIENT)
Dept: HEMATOLOGY/ONCOLOGY | Facility: HOSPITAL | Age: 48
End: 2023-11-20
Attending: INTERNAL MEDICINE
Payer: COMMERCIAL

## 2023-11-20 ENCOUNTER — RESEARCH ENCOUNTER (OUTPATIENT)
Dept: HEMATOLOGY/ONCOLOGY | Facility: HOSPITAL | Age: 48
End: 2023-11-20

## 2023-11-20 VITALS
WEIGHT: 229 LBS | TEMPERATURE: 98 F | OXYGEN SATURATION: 97 % | SYSTOLIC BLOOD PRESSURE: 126 MMHG | DIASTOLIC BLOOD PRESSURE: 83 MMHG | HEIGHT: 69.02 IN | BODY MASS INDEX: 33.92 KG/M2 | RESPIRATION RATE: 16 BRPM | HEART RATE: 95 BPM

## 2023-11-20 DIAGNOSIS — R10.30 LOWER ABDOMINAL PAIN: Primary | ICD-10-CM

## 2023-11-20 DIAGNOSIS — C91.10 CLL (CHRONIC LYMPHOCYTIC LEUKEMIA) (HCC): ICD-10-CM

## 2023-11-20 DIAGNOSIS — R10.30 LOWER ABDOMINAL PAIN: ICD-10-CM

## 2023-11-20 DIAGNOSIS — C91.10 CLL (CHRONIC LYMPHOCYTIC LEUKEMIA) (HCC): Primary | ICD-10-CM

## 2023-11-20 DIAGNOSIS — R51.9 NONINTRACTABLE EPISODIC HEADACHE, UNSPECIFIED HEADACHE TYPE: ICD-10-CM

## 2023-11-20 LAB
ALBUMIN SERPL-MCNC: 3.4 G/DL (ref 3.4–5)
ALBUMIN/GLOB SERPL: 1 {RATIO} (ref 1–2)
ALP LIVER SERPL-CCNC: 95 U/L
ALT SERPL-CCNC: 35 U/L
ANION GAP SERPL CALC-SCNC: 3 MMOL/L (ref 0–18)
AST SERPL-CCNC: 31 U/L (ref 15–37)
BASOPHILS # BLD AUTO: 0.03 X10(3) UL (ref 0–0.2)
BASOPHILS NFR BLD AUTO: 0.4 %
BILIRUB SERPL-MCNC: 0.4 MG/DL (ref 0.1–2)
BILIRUB UR QL STRIP.AUTO: NEGATIVE
BUN BLD-MCNC: 12 MG/DL (ref 9–23)
CALCIUM BLD-MCNC: 9.2 MG/DL (ref 8.5–10.1)
CHLORIDE SERPL-SCNC: 107 MMOL/L (ref 98–112)
CLARITY UR REFRACT.AUTO: CLEAR
CO2 SERPL-SCNC: 28 MMOL/L (ref 21–32)
COLOR UR AUTO: YELLOW
CREAT BLD-MCNC: 0.95 MG/DL
EGFRCR SERPLBLD CKD-EPI 2021: 73 ML/MIN/1.73M2 (ref 60–?)
EOSINOPHIL # BLD AUTO: 0.12 X10(3) UL (ref 0–0.7)
EOSINOPHIL NFR BLD AUTO: 1.8 %
ERYTHROCYTE [DISTWIDTH] IN BLOOD BY AUTOMATED COUNT: 13.2 %
GLOBULIN PLAS-MCNC: 3.5 G/DL (ref 2.8–4.4)
GLUCOSE BLD-MCNC: 111 MG/DL (ref 70–99)
GLUCOSE UR STRIP.AUTO-MCNC: NORMAL MG/DL
HCT VFR BLD AUTO: 39.4 %
HGB BLD-MCNC: 13.4 G/DL
IMM GRANULOCYTES # BLD AUTO: 0.02 X10(3) UL (ref 0–1)
IMM GRANULOCYTES NFR BLD: 0.3 %
KETONES UR STRIP.AUTO-MCNC: NEGATIVE MG/DL
LDH SERPL L TO P-CCNC: 210 U/L
LEUKOCYTE ESTERASE UR QL STRIP.AUTO: NEGATIVE
LYMPHOCYTES # BLD AUTO: 1.41 X10(3) UL (ref 1–4)
LYMPHOCYTES NFR BLD AUTO: 20.7 %
MCH RBC QN AUTO: 30.7 PG (ref 26–34)
MCHC RBC AUTO-ENTMCNC: 34 G/DL (ref 31–37)
MCV RBC AUTO: 90.2 FL
MONOCYTES # BLD AUTO: 0.42 X10(3) UL (ref 0.1–1)
MONOCYTES NFR BLD AUTO: 6.2 %
NEUTROPHILS # BLD AUTO: 4.8 X10 (3) UL (ref 1.5–7.7)
NEUTROPHILS # BLD AUTO: 4.8 X10(3) UL (ref 1.5–7.7)
NEUTROPHILS NFR BLD AUTO: 70.6 %
NITRITE UR QL STRIP.AUTO: NEGATIVE
OSMOLALITY SERPL CALC.SUM OF ELEC: 286 MOSM/KG (ref 275–295)
PH UR STRIP.AUTO: 5.5 [PH] (ref 5–8)
PHOSPHATE SERPL-MCNC: 3.1 MG/DL (ref 2.5–4.9)
PLATELET # BLD AUTO: 165 10(3)UL (ref 150–450)
POTASSIUM SERPL-SCNC: 3.9 MMOL/L (ref 3.5–5.1)
PROT SERPL-MCNC: 6.9 G/DL (ref 6.4–8.2)
PROT UR STRIP.AUTO-MCNC: NEGATIVE MG/DL
RBC # BLD AUTO: 4.37 X10(6)UL
RBC UR QL AUTO: NEGATIVE
SODIUM SERPL-SCNC: 138 MMOL/L (ref 136–145)
SP GR UR STRIP.AUTO: 1.01 (ref 1–1.03)
URATE SERPL-MCNC: 3.5 MG/DL
UROBILINOGEN UR STRIP.AUTO-MCNC: NORMAL MG/DL
WBC # BLD AUTO: 6.8 X10(3) UL (ref 4–11)

## 2023-11-20 PROCEDURE — 80053 COMPREHEN METABOLIC PANEL: CPT | Performed by: CLINICAL NURSE SPECIALIST

## 2023-11-20 PROCEDURE — 84550 ASSAY OF BLOOD/URIC ACID: CPT | Performed by: CLINICAL NURSE SPECIALIST

## 2023-11-20 PROCEDURE — 96415 CHEMO IV INFUSION ADDL HR: CPT

## 2023-11-20 PROCEDURE — 83615 LACTATE (LD) (LDH) ENZYME: CPT | Performed by: CLINICAL NURSE SPECIALIST

## 2023-11-20 PROCEDURE — 85025 COMPLETE CBC W/AUTO DIFF WBC: CPT | Performed by: CLINICAL NURSE SPECIALIST

## 2023-11-20 PROCEDURE — 96375 TX/PRO/DX INJ NEW DRUG ADDON: CPT

## 2023-11-20 PROCEDURE — 84100 ASSAY OF PHOSPHORUS: CPT | Performed by: CLINICAL NURSE SPECIALIST

## 2023-11-20 PROCEDURE — 81003 URINALYSIS AUTO W/O SCOPE: CPT

## 2023-11-20 PROCEDURE — 96413 CHEMO IV INFUSION 1 HR: CPT

## 2023-11-20 RX ORDER — METOCLOPRAMIDE HYDROCHLORIDE 5 MG/ML
10 INJECTION INTRAMUSCULAR; INTRAVENOUS ONCE
Status: COMPLETED | OUTPATIENT
Start: 2023-11-20 | End: 2023-11-20

## 2023-11-20 RX ORDER — METOCLOPRAMIDE HYDROCHLORIDE 5 MG/ML
10 INJECTION INTRAMUSCULAR; INTRAVENOUS EVERY 6 HOURS PRN
Status: CANCELLED
Start: 2023-11-22

## 2023-11-20 RX ORDER — DIPHENHYDRAMINE HCL 25 MG
50 CAPSULE ORAL ONCE
Status: CANCELLED | OUTPATIENT
Start: 2023-11-22

## 2023-11-20 RX ORDER — BUTALBITAL, ACETAMINOPHEN AND CAFFEINE 300; 40; 50 MG/1; MG/1; MG/1
1 CAPSULE ORAL EVERY 4 HOURS PRN
Qty: 40 CAPSULE | Refills: 0 | Status: SHIPPED | OUTPATIENT
Start: 2023-11-20 | End: 2023-11-20

## 2023-11-20 RX ORDER — ACETAMINOPHEN 325 MG/1
650 TABLET ORAL ONCE
Status: COMPLETED | OUTPATIENT
Start: 2023-11-20 | End: 2023-11-20

## 2023-11-20 RX ORDER — ACETAMINOPHEN 325 MG/1
650 TABLET ORAL ONCE
Status: CANCELLED | OUTPATIENT
Start: 2023-11-22

## 2023-11-20 RX ORDER — DIPHENHYDRAMINE HCL 25 MG
50 CAPSULE ORAL ONCE
Status: COMPLETED | OUTPATIENT
Start: 2023-11-20 | End: 2023-11-20

## 2023-11-20 RX ADMIN — ACETAMINOPHEN 650 MG: 325 TABLET ORAL at 10:27:00

## 2023-11-20 RX ADMIN — METOCLOPRAMIDE HYDROCHLORIDE 10 MG: 5 INJECTION INTRAMUSCULAR; INTRAVENOUS at 14:20:00

## 2023-11-20 RX ADMIN — DIPHENHYDRAMINE HCL 50 MG: 25 MG CAPSULE ORAL at 10:27:00

## 2023-11-20 NOTE — PROGRESS NOTES
Pt here for C2D1 Drug(s) Gazyva. Arrives Ambulating independently, accompanied by Self     Patient was evaluated today by SRINIVAS. Oral medications included in this regimen:  yes - venetoclax    Patient confirms comprehension of cancer treatment schedule:  yes    Pregnancy screening:  Denies possibility of pregnancy    Modifications in dose or schedule:  No    Medications appearance and physical integrity checked by RN: yes. Chemotherapy IV pump settings verified by 2 RNs:  No due to targeted therapy IV administration.   Frequency of blood return and site check throughout administration: Prior to administration, Prior to each drug, and At completion of therapy     Infusion/treatment outcome:  patient tolerated treatment without incident    Education Record    Learner:  Patient  Barriers / Limitations:  None  Method:  Discussion and Printed material  Education / instructions given:  plan of care, next appts  Outcome:  Shows understanding    Discharged Home, Ambulating independently, accompanied by:Self    Patient/family verbalized understanding of future appointments: by printed AVS

## 2023-11-20 NOTE — PROGRESS NOTES
Chief Complaint   Patient presents with    Follow - Up     Pt is here today for cycle 1 day 22 of venetoclax and obinutuzumab. Pt has multiple complaints. Pt c/o headache, constipation, extreme fatigue, pain head/neck/back LOP 7/10, SOB with activities, abd cramping, and neuropathy spasms. Pt has questions on mushroom supplements.  Pt denies V, D, N, and fevers    Education Record    Learner:  Patient    Disease / Diagnosis: chronic lymphocytic leukemia    Barriers / Limitations:  None   Comments:    Method:  Brief focused   Comments:    General Topics:  Pain and Plan of care reviewed   Comments:    Outcome:  Shows understanding   Comments:

## 2023-11-20 NOTE — PROGRESS NOTES
STUDY:  EVOLVE  ID # [id number]  Date: 11/20/23  VISIT: C2D1 Obinutuzumab/Venetclax    Labs drawn, reviewed and adequate for treatment today. Seen and examined by APN. Clinical Response Assessment was reviewed and indicated in APN's note. Per protocol, current assessment using parameters listed in Table 10.1 indicates CR. All current AE's that patient is experiencing are treatment related and not disease related side effects. No evidence of TLS; assessed per [facility] Criteria. AE's reviewed:   Nausea, gr 1 - takes compazine as needed but does not effect her appetite. Constipation, gr 1-takes senna as needed  Fatigue, gr 2- tires easily and wants to [employer]" most of the day. Able to perform ADLs and goes out. Headaches, gr 2 - persist , manages with Morphine  Bone pain, gr 2- still in left arm and femur, manageable    No change in conmeds. Reconciled her current venetoclax 10mg tabs, only 4 doses left and given back to complete the cycle. Provided her with the the new escalated dose pack , 50mg tabs  with instructions to take one daily beginning 11/22/23. Collected her  pill diary and provided with a new one for cycle 2. Patient to return on 11/28 for labs/ RN.

## 2023-11-21 ENCOUNTER — APPOINTMENT (OUTPATIENT)
Dept: HEMATOLOGY/ONCOLOGY | Facility: HOSPITAL | Age: 48
End: 2023-11-21
Attending: INTERNAL MEDICINE
Payer: COMMERCIAL

## 2023-11-28 ENCOUNTER — NURSE ONLY (OUTPATIENT)
Dept: HEMATOLOGY/ONCOLOGY | Facility: HOSPITAL | Age: 48
End: 2023-11-28
Attending: INTERNAL MEDICINE
Payer: COMMERCIAL

## 2023-11-28 ENCOUNTER — RESEARCH ENCOUNTER (OUTPATIENT)
Dept: HEMATOLOGY/ONCOLOGY | Facility: HOSPITAL | Age: 48
End: 2023-11-28

## 2023-11-28 DIAGNOSIS — C91.10 CLL (CHRONIC LYMPHOCYTIC LEUKEMIA) (HCC): ICD-10-CM

## 2023-11-28 DIAGNOSIS — C91.10 CLL (CHRONIC LYMPHOCYTIC LEUKEMIA) (HCC): Primary | ICD-10-CM

## 2023-11-28 LAB
ALBUMIN SERPL-MCNC: 4 G/DL (ref 3.4–5)
ALBUMIN/GLOB SERPL: 1 {RATIO} (ref 1–2)
ALP LIVER SERPL-CCNC: 97 U/L
ALT SERPL-CCNC: 34 U/L
ANION GAP SERPL CALC-SCNC: 7 MMOL/L (ref 0–18)
AST SERPL-CCNC: 17 U/L (ref 15–37)
BASOPHILS # BLD AUTO: 0.02 X10(3) UL (ref 0–0.2)
BASOPHILS NFR BLD AUTO: 0.2 %
BILIRUB SERPL-MCNC: 0.5 MG/DL (ref 0.1–2)
BUN BLD-MCNC: 12 MG/DL (ref 9–23)
CALCIUM BLD-MCNC: 9.6 MG/DL (ref 8.5–10.1)
CHLORIDE SERPL-SCNC: 105 MMOL/L (ref 98–112)
CO2 SERPL-SCNC: 27 MMOL/L (ref 21–32)
CREAT BLD-MCNC: 0.98 MG/DL
EGFRCR SERPLBLD CKD-EPI 2021: 71 ML/MIN/1.73M2 (ref 60–?)
EOSINOPHIL # BLD AUTO: 0.06 X10(3) UL (ref 0–0.7)
EOSINOPHIL NFR BLD AUTO: 0.7 %
ERYTHROCYTE [DISTWIDTH] IN BLOOD BY AUTOMATED COUNT: 13.2 %
FASTING STATUS PATIENT QL REPORTED: NO
GLOBULIN PLAS-MCNC: 4 G/DL (ref 2.8–4.4)
GLUCOSE BLD-MCNC: 148 MG/DL (ref 70–99)
HCT VFR BLD AUTO: 46.6 %
HGB BLD-MCNC: 15.5 G/DL
IMM GRANULOCYTES # BLD AUTO: 0.04 X10(3) UL (ref 0–1)
IMM GRANULOCYTES NFR BLD: 0.5 %
LDH SERPL L TO P-CCNC: 174 U/L
LYMPHOCYTES # BLD AUTO: 1.48 X10(3) UL (ref 1–4)
LYMPHOCYTES NFR BLD AUTO: 18.2 %
MCH RBC QN AUTO: 30.6 PG (ref 26–34)
MCHC RBC AUTO-ENTMCNC: 33.3 G/DL (ref 31–37)
MCV RBC AUTO: 92.1 FL
MONOCYTES # BLD AUTO: 0.39 X10(3) UL (ref 0.1–1)
MONOCYTES NFR BLD AUTO: 4.8 %
NEUTROPHILS # BLD AUTO: 6.15 X10 (3) UL (ref 1.5–7.7)
NEUTROPHILS # BLD AUTO: 6.15 X10(3) UL (ref 1.5–7.7)
NEUTROPHILS NFR BLD AUTO: 75.6 %
OSMOLALITY SERPL CALC.SUM OF ELEC: 291 MOSM/KG (ref 275–295)
PHOSPHATE SERPL-MCNC: 3.1 MG/DL (ref 2.5–4.9)
PLATELET # BLD AUTO: 203 10(3)UL (ref 150–450)
POTASSIUM SERPL-SCNC: 4.4 MMOL/L (ref 3.5–5.1)
PROT SERPL-MCNC: 8 G/DL (ref 6.4–8.2)
RBC # BLD AUTO: 5.06 X10(6)UL
SODIUM SERPL-SCNC: 139 MMOL/L (ref 136–145)
URATE SERPL-MCNC: 4.3 MG/DL
WBC # BLD AUTO: 8.1 X10(3) UL (ref 4–11)

## 2023-11-28 PROCEDURE — 84100 ASSAY OF PHOSPHORUS: CPT

## 2023-11-28 PROCEDURE — 83615 LACTATE (LD) (LDH) ENZYME: CPT

## 2023-11-28 PROCEDURE — 80053 COMPREHEN METABOLIC PANEL: CPT

## 2023-11-28 PROCEDURE — 36415 COLL VENOUS BLD VENIPUNCTURE: CPT

## 2023-11-28 PROCEDURE — 85025 COMPLETE CBC W/AUTO DIFF WBC: CPT

## 2023-11-28 PROCEDURE — 84550 ASSAY OF BLOOD/URIC ACID: CPT

## 2023-11-28 NOTE — PROGRESS NOTES
CLINICAL RESEARCH NOTE  Study: , \"Randomized, Phase III Study of Early Intervention with Venetoclax and Obinutuzumab Versus Delayed Therapy with Venetoclax and Obinutuzumab in Newly Diagnosed Asymptomatic High-Risk Patients with Chronic Lymphocytic Leukemia / Small Lymphocytic Lymphoma (CLL/SLL): EVOLVE CLL/SLL Study   Patient ID: [id number]  Visit: C2D8    Patient presents for labs and evaluation to continue escalation of Venetoclax. Labs drawn peripherally. Labs reviewewed, only abnormal value is nonfasted glucose. Labs and symptoms reviewed for TLS via [address], nothing indicative of this. Discussed with MD, patient to continue treatment. AE Changes:  - Malaise (new), gr 1. Patient reports feeling shaky and uneasy intermittently over the last week. She states it resolves after taking prescribed xanax. - Neuropathy (new), gr 1 to L shoulder and L hand. Reports she experienced a burning feeling to posterior L shoulder and L hand on 11/24 and 11/25 only and has since resolved. She did not take anything for the symptom. - Lori Driscoll, increased to gr 2. patient reports feeling moderately pain, achy and sore 11/25 - 11/26. Today it is back to gr 1 with mild symptoms onlu. Stable AEs:  - Headache, gr 1  - Fatigue, gr 2  - Nausea, gr 1  - Constipation, gr 1  - Bone pain, gr 1  - Muscle cramps, gr 1  - Insomnia, gr 1    Medication Changes:  None    Venetoclax Dispensing/Reconciliation:  Patient returned diary for [id number] which was reviewed and placed in chart. Patient also returned 10mg tablets x 2 for disposal. Patient has x2 tablets remaining of 50mg tablets of which she will take 1 more tablet tonight and then escalate to 100mg daily tomorrow. Reviewed diary - patient compliant. Dispensed x 1 bottle of 100mg tablets  #32 to patient. Patient will return to clinic in 1 week for C2D15 MD/labs.

## 2023-12-05 ENCOUNTER — OFFICE VISIT (OUTPATIENT)
Dept: HEMATOLOGY/ONCOLOGY | Facility: HOSPITAL | Age: 48
End: 2023-12-05
Attending: INTERNAL MEDICINE
Payer: COMMERCIAL

## 2023-12-05 ENCOUNTER — RESEARCH ENCOUNTER (OUTPATIENT)
Dept: HEMATOLOGY/ONCOLOGY | Facility: HOSPITAL | Age: 48
End: 2023-12-05

## 2023-12-05 VITALS
SYSTOLIC BLOOD PRESSURE: 139 MMHG | DIASTOLIC BLOOD PRESSURE: 89 MMHG | WEIGHT: 229 LBS | OXYGEN SATURATION: 98 % | HEIGHT: 69.02 IN | RESPIRATION RATE: 16 BRPM | TEMPERATURE: 98 F | BODY MASS INDEX: 33.92 KG/M2 | HEART RATE: 93 BPM

## 2023-12-05 DIAGNOSIS — R51.9 NONINTRACTABLE EPISODIC HEADACHE, UNSPECIFIED HEADACHE TYPE: ICD-10-CM

## 2023-12-05 DIAGNOSIS — T88.9XXD: ICD-10-CM

## 2023-12-05 DIAGNOSIS — C91.10 CLL (CHRONIC LYMPHOCYTIC LEUKEMIA) (HCC): Primary | ICD-10-CM

## 2023-12-05 LAB
ALBUMIN SERPL-MCNC: 3.7 G/DL (ref 3.4–5)
ALBUMIN/GLOB SERPL: 1 {RATIO} (ref 1–2)
ALP LIVER SERPL-CCNC: 91 U/L
ALT SERPL-CCNC: 35 U/L
ANION GAP SERPL CALC-SCNC: 6 MMOL/L (ref 0–18)
AST SERPL-CCNC: 24 U/L (ref 15–37)
BASOPHILS # BLD AUTO: 0.02 X10(3) UL (ref 0–0.2)
BASOPHILS NFR BLD AUTO: 0.3 %
BILIRUB SERPL-MCNC: 0.3 MG/DL (ref 0.1–2)
BUN BLD-MCNC: 9 MG/DL (ref 9–23)
CALCIUM BLD-MCNC: 9.1 MG/DL (ref 8.5–10.1)
CHLORIDE SERPL-SCNC: 107 MMOL/L (ref 98–112)
CO2 SERPL-SCNC: 28 MMOL/L (ref 21–32)
CREAT BLD-MCNC: 0.91 MG/DL
EGFRCR SERPLBLD CKD-EPI 2021: 77 ML/MIN/1.73M2 (ref 60–?)
EOSINOPHIL # BLD AUTO: 0.01 X10(3) UL (ref 0–0.7)
EOSINOPHIL NFR BLD AUTO: 0.1 %
ERYTHROCYTE [DISTWIDTH] IN BLOOD BY AUTOMATED COUNT: 13.2 %
FASTING STATUS PATIENT QL REPORTED: NO
GLOBULIN PLAS-MCNC: 3.8 G/DL (ref 2.8–4.4)
GLUCOSE BLD-MCNC: 108 MG/DL (ref 70–99)
HCT VFR BLD AUTO: 42.4 %
HGB BLD-MCNC: 14.4 G/DL
IMM GRANULOCYTES # BLD AUTO: 0.03 X10(3) UL (ref 0–1)
IMM GRANULOCYTES NFR BLD: 0.4 %
LYMPHOCYTES # BLD AUTO: 1.27 X10(3) UL (ref 1–4)
LYMPHOCYTES NFR BLD AUTO: 17.8 %
MCH RBC QN AUTO: 30.8 PG (ref 26–34)
MCHC RBC AUTO-ENTMCNC: 34 G/DL (ref 31–37)
MCV RBC AUTO: 90.8 FL
MONOCYTES # BLD AUTO: 0.26 X10(3) UL (ref 0.1–1)
MONOCYTES NFR BLD AUTO: 3.6 %
NEUTROPHILS # BLD AUTO: 5.55 X10 (3) UL (ref 1.5–7.7)
NEUTROPHILS # BLD AUTO: 5.55 X10(3) UL (ref 1.5–7.7)
NEUTROPHILS NFR BLD AUTO: 77.8 %
OSMOLALITY SERPL CALC.SUM OF ELEC: 291 MOSM/KG (ref 275–295)
PHOSPHATE SERPL-MCNC: 3.5 MG/DL (ref 2.5–4.9)
PLATELET # BLD AUTO: 239 10(3)UL (ref 150–450)
POTASSIUM SERPL-SCNC: 4.2 MMOL/L (ref 3.5–5.1)
PROT SERPL-MCNC: 7.5 G/DL (ref 6.4–8.2)
RBC # BLD AUTO: 4.67 X10(6)UL
SODIUM SERPL-SCNC: 141 MMOL/L (ref 136–145)
URATE SERPL-MCNC: 3.9 MG/DL
WBC # BLD AUTO: 7.1 X10(3) UL (ref 4–11)

## 2023-12-05 PROCEDURE — 80053 COMPREHEN METABOLIC PANEL: CPT | Performed by: INTERNAL MEDICINE

## 2023-12-05 PROCEDURE — 36415 COLL VENOUS BLD VENIPUNCTURE: CPT

## 2023-12-05 PROCEDURE — 84550 ASSAY OF BLOOD/URIC ACID: CPT | Performed by: INTERNAL MEDICINE

## 2023-12-05 PROCEDURE — 84100 ASSAY OF PHOSPHORUS: CPT | Performed by: INTERNAL MEDICINE

## 2023-12-05 PROCEDURE — 85025 COMPLETE CBC W/AUTO DIFF WBC: CPT | Performed by: INTERNAL MEDICINE

## 2023-12-05 NOTE — PROGRESS NOTES
CLINICAL RESEARCH NOTE  Study: , \"Randomized, Phase III Study of Early Intervention with Venetoclax and Obinutuzumab Versus Delayed Therapy with Venetoclax and Obinutuzumab in Newly Diagnosed Asymptomatic High-Risk Patients with Chronic Lymphocytic Leukemia / Small Lymphocytic Lymphoma (CLL/SLL): EVOLVE CLL/SLL Study   Patient ID: [id number]  Visit: V7S07    Patient presents for continued treatment on trial.  Labs drawn peripherally. Patient assessed by MD today. LDH not drawn, was not noticed until patient had left clinic. Patient declined to return to clinic for additional blood draw. MD made aware. No abnormal labs today besides nonfasting glucose. Nothing indicative of TLS, will continue to monitor. AE changes:  - Myalgia/Arthralgia, inc to grade 2. She reports feeling achy all over and uncomfortable more frequently. She reports spending more time on the couch and more fatigued in general due to this. However, still able to complete ADLs as needed. Stable AEs:   - Headache remains grade 2. Patient reporting pain is still fairly persistent and slightly more intense over the last several days. She is taking meds as prescribed. She reports only needing to use Morphine 1-2 times per week. She maintains symptoms are tolerable. - Fatigue, gr 2  - Nausea, gr 1  - Constipation, Gr 1  - Bone pain, gr 1  - Muscle cramps, gr 1  - Insomnia, gr 1  - Paresthesia (L shoulder and L hand), gr 1  - Malaise, gr 1    Medication Changes:  None    Reviewed pill diary for last week - patient is compliant. Collected pill pack for 50 mg pills, empty, Lot # [id number]. Returned to pharmacy. Patient will return to clinic in 1 week for C2D22 labs/research.

## 2023-12-05 NOTE — PROGRESS NOTES
Education Record    Learner:  Patient    Disease / Diagnosis:CLL   On study       Barriers / Limitations:  None   Comments:    Method:  Discussion   Comments:    General Topics:  Plan of care reviewed   Comments:    Outcome:  Shows understanding   Comments:   Pt reports HA frequents  and severe, pain meds take the edge off. Did not have nausea for about a week, has some today. Feels SOB with minimal exertion. Visited with grand kids last weekend in [place]. Emotional support offered.

## 2023-12-06 ENCOUNTER — APPOINTMENT (OUTPATIENT)
Dept: HEMATOLOGY/ONCOLOGY | Facility: HOSPITAL | Age: 48
End: 2023-12-06
Attending: INTERNAL MEDICINE
Payer: COMMERCIAL

## 2023-12-07 ENCOUNTER — TELEPHONE (OUTPATIENT)
Dept: INTERNAL MEDICINE CLINIC | Facility: CLINIC | Age: 48
End: 2023-12-07

## 2023-12-07 ENCOUNTER — RESEARCH ENCOUNTER (OUTPATIENT)
Dept: HEMATOLOGY/ONCOLOGY | Facility: HOSPITAL | Age: 48
End: 2023-12-07

## 2023-12-07 NOTE — TELEPHONE ENCOUNTER
Received message from nurse at oncology regarding patient having headaches.    Lm to see if patient wanted to schedule appointment with MP for headaches.

## 2023-12-08 NOTE — PROGRESS NOTES
Research Phone Call Note    Received call from patient appx [phone] on 12/7/2023. Patient reporting she received a call from PCP Dr. Sangita Munoz office to discuss headaches. Patient reports she is not sure she wants to involve PCP office at this point and also wasn't aware that was the plan. I advised hem onc is involving this office to get more opinions on pain management regime for headaches. Patient would like to defer for now. She is also reporting that since last visit, patient feeling many symptoms are still continuing to worsen, specifically myalgias/arthalgias and fatigue. Patient reporting she slept into later morning, which is unusual for. In addition, she spent the day on the couch, only getting up to use the bathroom. She reports not feeling like she has the energy to tackle tasks she needs to (like [employer] shopping, wrapping gifts). She did however attend a weekly meeting on Wed evening and did go to the pet store and pharmacy today, so she is still able to complete some tasks as needed. She also reports that she just feels more emotional than usual, we discussed malaise that started a couple weeks ago, patient confirmed feeling uneasy and \"off\" and that it intruding slightly more in her everyday life. She feels more overwhelmed than usual and usual methods to manage anxiety are not as effective    These symptoms were discussed with med onc who is recommending that patient hold Venetoclax x 1 week. This was discussed with patient. She will hold medication starting tonight. She will still be seen on Tuesday for labs and assessments, and understands that we will most likely discuss ASs further over the phone next Friday, before deciding whether or not to restart medication . Patient agreeable. Per protocol, should hold medication until AE is grade 1 or less and with first occurrence can restart at same dosage.

## 2023-12-12 ENCOUNTER — NURSE ONLY (OUTPATIENT)
Dept: HEMATOLOGY/ONCOLOGY | Facility: HOSPITAL | Age: 48
End: 2023-12-12
Attending: INTERNAL MEDICINE
Payer: COMMERCIAL

## 2023-12-12 ENCOUNTER — RESEARCH ENCOUNTER (OUTPATIENT)
Dept: HEMATOLOGY/ONCOLOGY | Facility: HOSPITAL | Age: 48
End: 2023-12-12

## 2023-12-12 DIAGNOSIS — C91.10 CLL (CHRONIC LYMPHOCYTIC LEUKEMIA) (HCC): ICD-10-CM

## 2023-12-12 DIAGNOSIS — C91.10 CLL (CHRONIC LYMPHOCYTIC LEUKEMIA) (HCC): Primary | ICD-10-CM

## 2023-12-12 LAB
ALBUMIN SERPL-MCNC: 3.9 G/DL (ref 3.4–5)
ALBUMIN/GLOB SERPL: 1.1 {RATIO} (ref 1–2)
ALP LIVER SERPL-CCNC: 79 U/L
ALT SERPL-CCNC: 22 U/L
ANION GAP SERPL CALC-SCNC: 4 MMOL/L (ref 0–18)
AST SERPL-CCNC: 14 U/L (ref 15–37)
BASOPHILS # BLD AUTO: 0.03 X10(3) UL (ref 0–0.2)
BASOPHILS NFR BLD AUTO: 0.4 %
BILIRUB SERPL-MCNC: 0.4 MG/DL (ref 0.1–2)
BUN BLD-MCNC: 8 MG/DL (ref 9–23)
CALCIUM BLD-MCNC: 8.8 MG/DL (ref 8.5–10.1)
CHLORIDE SERPL-SCNC: 105 MMOL/L (ref 98–112)
CO2 SERPL-SCNC: 31 MMOL/L (ref 21–32)
CREAT BLD-MCNC: 0.93 MG/DL
EGFRCR SERPLBLD CKD-EPI 2021: 75 ML/MIN/1.73M2 (ref 60–?)
EOSINOPHIL # BLD AUTO: 0.01 X10(3) UL (ref 0–0.7)
EOSINOPHIL NFR BLD AUTO: 0.1 %
ERYTHROCYTE [DISTWIDTH] IN BLOOD BY AUTOMATED COUNT: 13.2 %
FASTING STATUS PATIENT QL REPORTED: NO
GLOBULIN PLAS-MCNC: 3.4 G/DL (ref 2.8–4.4)
GLUCOSE BLD-MCNC: 111 MG/DL (ref 70–99)
HCT VFR BLD AUTO: 42.6 %
HGB BLD-MCNC: 14.1 G/DL
IMM GRANULOCYTES # BLD AUTO: 0.02 X10(3) UL (ref 0–1)
IMM GRANULOCYTES NFR BLD: 0.3 %
LDH SERPL L TO P-CCNC: 169 U/L
LYMPHOCYTES # BLD AUTO: 1.55 X10(3) UL (ref 1–4)
LYMPHOCYTES NFR BLD AUTO: 19.7 %
MCH RBC QN AUTO: 31.1 PG (ref 26–34)
MCHC RBC AUTO-ENTMCNC: 33.1 G/DL (ref 31–37)
MCV RBC AUTO: 93.8 FL
MONOCYTES # BLD AUTO: 0.46 X10(3) UL (ref 0.1–1)
MONOCYTES NFR BLD AUTO: 5.9 %
NEUTROPHILS # BLD AUTO: 5.78 X10 (3) UL (ref 1.5–7.7)
NEUTROPHILS # BLD AUTO: 5.78 X10(3) UL (ref 1.5–7.7)
NEUTROPHILS NFR BLD AUTO: 73.6 %
OSMOLALITY SERPL CALC.SUM OF ELEC: 289 MOSM/KG (ref 275–295)
PHOSPHATE SERPL-MCNC: 2.4 MG/DL (ref 2.5–4.9)
PLATELET # BLD AUTO: 253 10(3)UL (ref 150–450)
POTASSIUM SERPL-SCNC: 3.7 MMOL/L (ref 3.5–5.1)
PROT SERPL-MCNC: 7.3 G/DL (ref 6.4–8.2)
RBC # BLD AUTO: 4.54 X10(6)UL
SODIUM SERPL-SCNC: 140 MMOL/L (ref 136–145)
URATE SERPL-MCNC: 3.4 MG/DL
WBC # BLD AUTO: 7.9 X10(3) UL (ref 4–11)

## 2023-12-12 PROCEDURE — 36415 COLL VENOUS BLD VENIPUNCTURE: CPT

## 2023-12-12 PROCEDURE — 80053 COMPREHEN METABOLIC PANEL: CPT

## 2023-12-12 PROCEDURE — 84550 ASSAY OF BLOOD/URIC ACID: CPT

## 2023-12-12 PROCEDURE — 83615 LACTATE (LD) (LDH) ENZYME: CPT

## 2023-12-12 PROCEDURE — 85025 COMPLETE CBC W/AUTO DIFF WBC: CPT

## 2023-12-12 PROCEDURE — 84100 ASSAY OF PHOSPHORUS: CPT

## 2023-12-12 NOTE — PROGRESS NOTES
[facility]: [facility]  ID # [id number]  VISIT: C2 D22    Labs drawn and noted. Patient does not have any clinically significant abnormal values. TLS grading reviewed and patient does not meet the definitions per [facility] criteria. On Friday ,12/8, patient was instructed to HOLD venetoclax and is currently not taking. She reports an improvement within 24 hours of stopping the drug in mood, emotions described as \"feeling like a cloud over me was lifted \". She still has the fatigue,  myalgias, arthralgia but those symptoms have slightly improved. No change in conmeds. Given today's assessment, patient will continue to hold the Venetoclax. We will call her on 12/15 for an update on symptoms prior to her resuming the drug. Patient expresses concern over restarting the drug, that the emotions and depression symptoms will come back. Discussed with Patito Cedeno and a prescription was sent for Lexapro. Called patient and instructed to begin taking now at low dose, 5 mg. Tolerated better if taken in am. Advised to call us if any new side effects develop. Verbalized understanding.

## 2023-12-13 NOTE — TELEPHONE ENCOUNTER
CAROLE     Pt was called and stated that the headaches have subsided since she is not on chemo at the moment. She is seeing her oncologist soon and will see how they would like her to proceed moving forward. Pt stated that she will call us back if she needs to schedule something with us for a follow up or if the headaches return.

## 2023-12-19 ENCOUNTER — APPOINTMENT (OUTPATIENT)
Dept: HEMATOLOGY/ONCOLOGY | Facility: HOSPITAL | Age: 48
End: 2023-12-19
Attending: INTERNAL MEDICINE
Payer: COMMERCIAL

## 2023-12-20 ENCOUNTER — OFFICE VISIT (OUTPATIENT)
Dept: HEMATOLOGY/ONCOLOGY | Facility: HOSPITAL | Age: 48
End: 2023-12-20
Attending: INTERNAL MEDICINE
Payer: COMMERCIAL

## 2023-12-20 ENCOUNTER — RESEARCH ENCOUNTER (OUTPATIENT)
Dept: HEMATOLOGY/ONCOLOGY | Facility: HOSPITAL | Age: 48
End: 2023-12-20

## 2023-12-20 VITALS
HEART RATE: 70 BPM | OXYGEN SATURATION: 95 % | HEIGHT: 69.02 IN | RESPIRATION RATE: 18 BRPM | TEMPERATURE: 98 F | DIASTOLIC BLOOD PRESSURE: 70 MMHG | BODY MASS INDEX: 34.21 KG/M2 | WEIGHT: 231 LBS | SYSTOLIC BLOOD PRESSURE: 131 MMHG

## 2023-12-20 DIAGNOSIS — C91.10 CLL (CHRONIC LYMPHOCYTIC LEUKEMIA) (HCC): Primary | ICD-10-CM

## 2023-12-20 DIAGNOSIS — F32.A DEPRESSION, UNSPECIFIED DEPRESSION TYPE: ICD-10-CM

## 2023-12-20 DIAGNOSIS — T88.9XXD: ICD-10-CM

## 2023-12-20 DIAGNOSIS — R51.9 NONINTRACTABLE EPISODIC HEADACHE, UNSPECIFIED HEADACHE TYPE: ICD-10-CM

## 2023-12-20 LAB
ALBUMIN SERPL-MCNC: 3.7 G/DL (ref 3.4–5)
ALBUMIN/GLOB SERPL: 1.1 {RATIO} (ref 1–2)
ALP LIVER SERPL-CCNC: 71 U/L
ALT SERPL-CCNC: 17 U/L
ANION GAP SERPL CALC-SCNC: 4 MMOL/L (ref 0–18)
AST SERPL-CCNC: 21 U/L (ref 15–37)
BASOPHILS # BLD AUTO: 0.01 X10(3) UL (ref 0–0.2)
BASOPHILS NFR BLD AUTO: 0.1 %
BILIRUB SERPL-MCNC: 0.4 MG/DL (ref 0.1–2)
BUN BLD-MCNC: 10 MG/DL (ref 9–23)
CALCIUM BLD-MCNC: 8.9 MG/DL (ref 8.5–10.1)
CHLORIDE SERPL-SCNC: 106 MMOL/L (ref 98–112)
CO2 SERPL-SCNC: 28 MMOL/L (ref 21–32)
CREAT BLD-MCNC: 0.93 MG/DL
EGFRCR SERPLBLD CKD-EPI 2021: 75 ML/MIN/1.73M2 (ref 60–?)
EOSINOPHIL # BLD AUTO: 0.01 X10(3) UL (ref 0–0.7)
EOSINOPHIL NFR BLD AUTO: 0.1 %
ERYTHROCYTE [DISTWIDTH] IN BLOOD BY AUTOMATED COUNT: 13.2 %
GLOBULIN PLAS-MCNC: 3.3 G/DL (ref 2.8–4.4)
GLUCOSE BLD-MCNC: 108 MG/DL (ref 70–99)
HCT VFR BLD AUTO: 40.5 %
HGB BLD-MCNC: 13.5 G/DL
IMM GRANULOCYTES # BLD AUTO: 0.04 X10(3) UL (ref 0–1)
IMM GRANULOCYTES NFR BLD: 0.4 %
LDH SERPL L TO P-CCNC: 164 U/L
LYMPHOCYTES # BLD AUTO: 1.34 X10(3) UL (ref 1–4)
LYMPHOCYTES NFR BLD AUTO: 14.7 %
MCH RBC QN AUTO: 30.2 PG (ref 26–34)
MCHC RBC AUTO-ENTMCNC: 33.3 G/DL (ref 31–37)
MCV RBC AUTO: 90.6 FL
MONOCYTES # BLD AUTO: 0.44 X10(3) UL (ref 0.1–1)
MONOCYTES NFR BLD AUTO: 4.8 %
NEUTROPHILS # BLD AUTO: 7.28 X10 (3) UL (ref 1.5–7.7)
NEUTROPHILS # BLD AUTO: 7.28 X10(3) UL (ref 1.5–7.7)
NEUTROPHILS NFR BLD AUTO: 79.9 %
OSMOLALITY SERPL CALC.SUM OF ELEC: 286 MOSM/KG (ref 275–295)
PHOSPHATE SERPL-MCNC: 2.9 MG/DL (ref 2.5–4.9)
PLATELET # BLD AUTO: 222 10(3)UL (ref 150–450)
POTASSIUM SERPL-SCNC: 3.9 MMOL/L (ref 3.5–5.1)
PROT SERPL-MCNC: 7 G/DL (ref 6.4–8.2)
RBC # BLD AUTO: 4.47 X10(6)UL
SODIUM SERPL-SCNC: 138 MMOL/L (ref 136–145)
URATE SERPL-MCNC: 3.4 MG/DL
WBC # BLD AUTO: 9.1 X10(3) UL (ref 4–11)

## 2023-12-20 PROCEDURE — 84100 ASSAY OF PHOSPHORUS: CPT | Performed by: INTERNAL MEDICINE

## 2023-12-20 PROCEDURE — 80053 COMPREHEN METABOLIC PANEL: CPT | Performed by: INTERNAL MEDICINE

## 2023-12-20 PROCEDURE — 83615 LACTATE (LD) (LDH) ENZYME: CPT | Performed by: INTERNAL MEDICINE

## 2023-12-20 PROCEDURE — 85025 COMPLETE CBC W/AUTO DIFF WBC: CPT | Performed by: INTERNAL MEDICINE

## 2023-12-20 PROCEDURE — 96375 TX/PRO/DX INJ NEW DRUG ADDON: CPT

## 2023-12-20 PROCEDURE — 36415 COLL VENOUS BLD VENIPUNCTURE: CPT

## 2023-12-20 PROCEDURE — 96413 CHEMO IV INFUSION 1 HR: CPT

## 2023-12-20 PROCEDURE — 84550 ASSAY OF BLOOD/URIC ACID: CPT | Performed by: INTERNAL MEDICINE

## 2023-12-20 PROCEDURE — 96415 CHEMO IV INFUSION ADDL HR: CPT

## 2023-12-20 RX ORDER — DIPHENHYDRAMINE HCL 25 MG
50 CAPSULE ORAL ONCE
Status: CANCELLED | OUTPATIENT
Start: 2023-12-20

## 2023-12-20 RX ORDER — METOCLOPRAMIDE HYDROCHLORIDE 5 MG/ML
10 INJECTION INTRAMUSCULAR; INTRAVENOUS EVERY 6 HOURS PRN
Status: DISCONTINUED | OUTPATIENT
Start: 2023-12-20 | End: 2023-12-20

## 2023-12-20 RX ORDER — DIPHENHYDRAMINE HCL 25 MG
50 CAPSULE ORAL ONCE
Status: COMPLETED | OUTPATIENT
Start: 2023-12-20 | End: 2023-12-20

## 2023-12-20 RX ORDER — METOCLOPRAMIDE HYDROCHLORIDE 5 MG/ML
10 INJECTION INTRAMUSCULAR; INTRAVENOUS EVERY 6 HOURS PRN
Status: CANCELLED
Start: 2023-12-20

## 2023-12-20 RX ORDER — ACETAMINOPHEN 325 MG/1
650 TABLET ORAL ONCE
Status: COMPLETED | OUTPATIENT
Start: 2023-12-20 | End: 2023-12-20

## 2023-12-20 RX ORDER — ACETAMINOPHEN 325 MG/1
650 TABLET ORAL ONCE
Status: CANCELLED | OUTPATIENT
Start: 2023-12-20

## 2023-12-20 RX ADMIN — DIPHENHYDRAMINE HCL 50 MG: 25 MG CAPSULE ORAL at 10:32:00

## 2023-12-20 RX ADMIN — ACETAMINOPHEN 650 MG: 325 TABLET ORAL at 10:33:00

## 2023-12-20 RX ADMIN — METOCLOPRAMIDE HYDROCHLORIDE 10 MG: 5 INJECTION INTRAMUSCULAR; INTRAVENOUS at 14:34:00

## 2023-12-20 NOTE — PROGRESS NOTES
Education Record    Learner:  Patient    Disease / Diagnosis:CLL  Evolve Study  Cycle 3 Obinutuzumab infusion    Barriers / Limitations:  None   Comments:    Method:  Discussion   Comments:    General Topics:  Plan of care reviewed   Comments:    Outcome:  Shows understanding   Comments:   Resumed Venetoclax 200mg Friday. Mild headache came back within a day of resuming venetoclax. Feeling body aches, and tiredness as well. Pt reports these symptoms are tolerable. Depression started after taking increased dose about a week into, will see how she feels on Saturday. Taking antidepressant, but feeling ok today.

## 2023-12-20 NOTE — PROGRESS NOTES
Pt here for C2D22 Drug(s)Obinutuzumab (EVOLVE STUDY). Arrives Ambulating independently, accompanied by Self     Patient was evaluated today by MD.    Oral medications included in this regimen:  yes - Venetoclax (EVOLVE STUDY)    Patient confirms comprehension of cancer treatment schedule:  yes    Pregnancy screening:  Denies possibility of pregnancy    Modifications in dose or schedule:  No    Medications appearance and physical integrity checked by RN: yes. Chemotherapy IV pump settings verified by 2 RNs:  No due to targeted therapy IV administration. Frequency of blood return and site check throughout administration: Prior to administration and At completion of therapy     Infusion/treatment outcome:  patient tolerated treatment without incident    Education Record    Learner:  Patient  Barriers / Limitations:  None  Method:  Brief focused and Reinforcement  Education / instructions given:  Plan of care reviewed. Outcome:  Shows understanding    Discharged Home, Ambulating independently, accompanied by:Self    Patient/family verbalized understanding of future appointments: by printed AVS  Research RN, Magalys Drake, met with the patient. All appointments set up by Magalys Drake.

## 2023-12-27 ENCOUNTER — RESEARCH ENCOUNTER (OUTPATIENT)
Dept: HEMATOLOGY/ONCOLOGY | Facility: HOSPITAL | Age: 48
End: 2023-12-27

## 2023-12-27 NOTE — PROGRESS NOTES
RESEARCH TELEPHONE CALL NOTE    Received call from patient to report symptoms. Patient reporting several symptoms previously reported, but have been worsening over the last week. To review, she had her obinutuzumab infusion and increased venetoclax to 400mg last Wed, 12/20. Worst AEs:  - Depression + Fatigue -  she states feels uninterested in everything, doesn't want to shower, to move, to do anything after she has sat down on the couch. Doesn't have to energy to care. She report she is not crying uncontrollably, not emotionally labile but just feeling down. In addition she reports the following AEs:  - Myalgia/Arthalgia - worsening, states it takes her twice the amount of time to get ready  - Headache - has had persistent HA for 1.5 weeks. Overall managea  - Neuropathy - burning pain in shoulder, arm and other various locations (not new but worsening)  - Night sweats - had prior to study treatment administration. Now returned, wakes her x3 times a night. - Temperature intolerance - she reports being either cold or hot at any given moment, never comfortable. This has been happening for about a month. [facility] increase her escitalopram again to see if that will help her tolerate the symptoms better - she has been taking 10mg daily x 1 week. She is almost out. Advised patient I will send a message to clinic and someone will reach out to discuss options. UPDATE, 1:30 PM - provider indicating patient to increase to 20mg tablet, 1 po daily. New rx sent. Patient notified on new prescription and dosing. She will deplete her current supply of 5mg tabs (taking 4po daily) and then obtain new prescription from pharmacy. Patient agreed to call back if symptoms do not improve or worsen.

## 2024-01-17 ENCOUNTER — OFFICE VISIT (OUTPATIENT)
Dept: HEMATOLOGY/ONCOLOGY | Facility: HOSPITAL | Age: 49
End: 2024-01-17
Attending: INTERNAL MEDICINE
Payer: COMMERCIAL

## 2024-01-17 VITALS
TEMPERATURE: 98 F | RESPIRATION RATE: 18 BRPM | DIASTOLIC BLOOD PRESSURE: 79 MMHG | OXYGEN SATURATION: 93 % | WEIGHT: 235.63 LBS | BODY MASS INDEX: 34.9 KG/M2 | HEIGHT: 69.02 IN | HEART RATE: 82 BPM | SYSTOLIC BLOOD PRESSURE: 122 MMHG

## 2024-01-17 DIAGNOSIS — R51.9 NONINTRACTABLE EPISODIC HEADACHE, UNSPECIFIED HEADACHE TYPE: ICD-10-CM

## 2024-01-17 DIAGNOSIS — T88.9XXA: ICD-10-CM

## 2024-01-17 DIAGNOSIS — T88.9XXD: Primary | ICD-10-CM

## 2024-01-17 DIAGNOSIS — C91.10 CLL (CHRONIC LYMPHOCYTIC LEUKEMIA) (HCC): Primary | ICD-10-CM

## 2024-01-17 DIAGNOSIS — C91.10 CLL (CHRONIC LYMPHOCYTIC LEUKEMIA) (HCC): ICD-10-CM

## 2024-01-17 DIAGNOSIS — R11.0 NAUSEA: ICD-10-CM

## 2024-01-17 PROBLEM — Z51.5 PALLIATIVE CARE BY SPECIALIST: Status: ACTIVE | Noted: 2024-01-17

## 2024-01-17 LAB
ALBUMIN SERPL-MCNC: 3.7 G/DL (ref 3.4–5)
ALBUMIN/GLOB SERPL: 1.1 {RATIO} (ref 1–2)
ALP LIVER SERPL-CCNC: 81 U/L
ALT SERPL-CCNC: 26 U/L
ANION GAP SERPL CALC-SCNC: 2 MMOL/L (ref 0–18)
AST SERPL-CCNC: 22 U/L (ref 15–37)
BASOPHILS # BLD AUTO: 0.01 X10(3) UL (ref 0–0.2)
BASOPHILS NFR BLD AUTO: 0.1 %
BILIRUB SERPL-MCNC: 0.3 MG/DL (ref 0.1–2)
BUN BLD-MCNC: 13 MG/DL (ref 9–23)
CALCIUM BLD-MCNC: 8.7 MG/DL (ref 8.5–10.1)
CHLORIDE SERPL-SCNC: 107 MMOL/L (ref 98–112)
CO2 SERPL-SCNC: 29 MMOL/L (ref 21–32)
CREAT BLD-MCNC: 0.82 MG/DL
EGFRCR SERPLBLD CKD-EPI 2021: 88 ML/MIN/1.73M2 (ref 60–?)
EOSINOPHIL # BLD AUTO: 0 X10(3) UL (ref 0–0.7)
EOSINOPHIL NFR BLD AUTO: 0 %
ERYTHROCYTE [DISTWIDTH] IN BLOOD BY AUTOMATED COUNT: 12.9 %
FASTING STATUS PATIENT QL REPORTED: NO
GLOBULIN PLAS-MCNC: 3.5 G/DL (ref 2.8–4.4)
GLUCOSE BLD-MCNC: 103 MG/DL (ref 70–99)
HCT VFR BLD AUTO: 42.8 %
HGB BLD-MCNC: 14.1 G/DL
IMM GRANULOCYTES # BLD AUTO: 0.14 X10(3) UL (ref 0–1)
IMM GRANULOCYTES NFR BLD: 1.6 %
LDH SERPL L TO P-CCNC: 162 U/L
LYMPHOCYTES # BLD AUTO: 1.42 X10(3) UL (ref 1–4)
LYMPHOCYTES NFR BLD AUTO: 16.7 %
MCH RBC QN AUTO: 31.2 PG (ref 26–34)
MCHC RBC AUTO-ENTMCNC: 32.9 G/DL (ref 31–37)
MCV RBC AUTO: 94.7 FL
MONOCYTES # BLD AUTO: 0.67 X10(3) UL (ref 0.1–1)
MONOCYTES NFR BLD AUTO: 7.9 %
NEUTROPHILS # BLD AUTO: 6.28 X10 (3) UL (ref 1.5–7.7)
NEUTROPHILS # BLD AUTO: 6.28 X10(3) UL (ref 1.5–7.7)
NEUTROPHILS NFR BLD AUTO: 73.7 %
OSMOLALITY SERPL CALC.SUM OF ELEC: 286 MOSM/KG (ref 275–295)
PHOSPHATE SERPL-MCNC: 3.6 MG/DL (ref 2.5–4.9)
PLATELET # BLD AUTO: 252 10(3)UL (ref 150–450)
POTASSIUM SERPL-SCNC: 4 MMOL/L (ref 3.5–5.1)
PROT SERPL-MCNC: 7.2 G/DL (ref 6.4–8.2)
RBC # BLD AUTO: 4.52 X10(6)UL
SODIUM SERPL-SCNC: 138 MMOL/L (ref 136–145)
URATE SERPL-MCNC: 4.1 MG/DL
WBC # BLD AUTO: 8.5 X10(3) UL (ref 4–11)

## 2024-01-17 PROCEDURE — 83615 LACTATE (LD) (LDH) ENZYME: CPT | Performed by: INTERNAL MEDICINE

## 2024-01-17 PROCEDURE — 96413 CHEMO IV INFUSION 1 HR: CPT

## 2024-01-17 PROCEDURE — 85025 COMPLETE CBC W/AUTO DIFF WBC: CPT | Performed by: INTERNAL MEDICINE

## 2024-01-17 PROCEDURE — 96415 CHEMO IV INFUSION ADDL HR: CPT

## 2024-01-17 PROCEDURE — 96375 TX/PRO/DX INJ NEW DRUG ADDON: CPT

## 2024-01-17 PROCEDURE — 84100 ASSAY OF PHOSPHORUS: CPT | Performed by: INTERNAL MEDICINE

## 2024-01-17 PROCEDURE — 36415 COLL VENOUS BLD VENIPUNCTURE: CPT

## 2024-01-17 PROCEDURE — 84550 ASSAY OF BLOOD/URIC ACID: CPT | Performed by: INTERNAL MEDICINE

## 2024-01-17 PROCEDURE — 80053 COMPREHEN METABOLIC PANEL: CPT | Performed by: INTERNAL MEDICINE

## 2024-01-17 RX ORDER — ACETAMINOPHEN 325 MG/1
650 TABLET ORAL ONCE
Status: COMPLETED | OUTPATIENT
Start: 2024-01-17 | End: 2024-01-17

## 2024-01-17 RX ORDER — DIPHENHYDRAMINE HCL 25 MG
50 CAPSULE ORAL ONCE
Status: COMPLETED | OUTPATIENT
Start: 2024-01-17 | End: 2024-01-17

## 2024-01-17 RX ORDER — ONDANSETRON HYDROCHLORIDE 8 MG/1
8 TABLET, FILM COATED ORAL EVERY 8 HOURS PRN
Qty: 30 TABLET | Refills: 3 | Status: SHIPPED | OUTPATIENT
Start: 2024-01-17

## 2024-01-17 RX ORDER — PROCHLORPERAZINE MALEATE 10 MG
10 TABLET ORAL EVERY 6 HOURS PRN
Qty: 30 TABLET | Refills: 3 | Status: SHIPPED | OUTPATIENT
Start: 2024-01-17

## 2024-01-17 RX ORDER — ACETAMINOPHEN 325 MG/1
650 TABLET ORAL ONCE
Status: CANCELLED | OUTPATIENT
Start: 2024-01-17

## 2024-01-17 RX ORDER — METOCLOPRAMIDE HYDROCHLORIDE 5 MG/ML
10 INJECTION INTRAMUSCULAR; INTRAVENOUS EVERY 6 HOURS PRN
Status: CANCELLED
Start: 2024-01-17

## 2024-01-17 RX ORDER — METOCLOPRAMIDE HYDROCHLORIDE 5 MG/ML
10 INJECTION INTRAMUSCULAR; INTRAVENOUS EVERY 6 HOURS PRN
Status: DISCONTINUED | OUTPATIENT
Start: 2024-01-17 | End: 2024-01-17

## 2024-01-17 RX ORDER — DIPHENHYDRAMINE HCL 25 MG
50 CAPSULE ORAL ONCE
Status: CANCELLED | OUTPATIENT
Start: 2024-01-17

## 2024-01-17 RX ADMIN — METOCLOPRAMIDE HYDROCHLORIDE 10 MG: 5 INJECTION INTRAMUSCULAR; INTRAVENOUS at 15:45:00

## 2024-01-17 RX ADMIN — DIPHENHYDRAMINE HCL 50 MG: 25 MG CAPSULE ORAL at 10:28:00

## 2024-01-17 RX ADMIN — ACETAMINOPHEN 650 MG: 325 TABLET ORAL at 10:28:00

## 2024-01-17 NOTE — PROGRESS NOTES
Education Record    Learner:  Patient    Disease / Diagnosis:CLL   Research pt.   Gaxyva infusion    Barriers / Limitations:  None   Comments:    Method:  Discussion   Comments:    General Topics:  Plan of care reviewed   Comments:    Outcome:  Shows understanding   Comments:   Pt cont with HA, some from neck ( had spinal procedure last Tuesday) feeling blah,totally exhausted, achy.   Uses pain meds only prn,  Some nausea, some constipation.   Venetaclex dose 400mg,   Thinks she needs increase in antidepressant too.   Does not difficulty sleeping with antidepressant.   Emotional support offered.

## 2024-01-17 NOTE — PROGRESS NOTES
[facility] Progress Note      Patient Name:  Lisa Cabral  YOB: 1975  Medical Record Number:  [id number]    Date of visit:  1/17/2024    CHIEF COMPLAINT: CLL    HPI:     48 year old female that I have followed since 9/23 after she was seen in the ER for cervical lymphadenopathy and leukocytosis. In retrospect, leukocytosis noted since 2015 but higher recently.  Cervical adenopathy has been going on for a few months.  On work-up, flow-monotypic B cells, positive for CD19, CD20, CD5.  FISH panel on peripheral blood showed deletion 17p with 42% nuclei positive for T p53 gene deletion.  PET 9/23-elevated uptake in bilateral neck, axilla and pelvic LN.  Cervical LN biopsy-CLL/SLL.  CT-lymphadenopathy in mediastinal/hilar region.  Also LN's supraclavicular, axillary, retroperitoneal, iliac areas.      Enrolled in clinical trial  and started Obinutuzumab on 10/25/2023.  Had severe headache with Obinutuzumab and also with venetoclax.  Started escitalopram for anxiety.  Dose was increased.  Presents for cycle #4 today.    Continues to have some depression.  Fatigue.  Headaches are present but not as bad as before.  Some nausea.  No fevers, chills, night sweats.    SOCIAL HISTORY:    .  Current smoker. 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  Neurologic:  headache grade 2.  Respiratory: no cough, SOB or hemoptysis  Cardiovascular: no chest pain, ankle swelling, JARAMILLO  GI: Nausea grade 1.  Musculoskeletal: Muscle spasms.  Dermatologic: no alopecia, rash, pruritis  : no hematuria, dysuria or frequency  Psych: Depression grade 1.  Heme: no easy bruising or bleeding     ALLERGIES:    Allergies   Allergen Reactions    Aleve [Naproxen] HIVES and RASH    Ultram [Tramadol] NAUSEA AND VOMITING     Severe migraine       MEDICATIONS:    Current Outpatient Medications   Medication Sig Dispense Refill     prochlorperazine (COMPAZINE) 10 mg tablet Take 1 tablet (10 mg total) by mouth every 6 (six) hours as needed for Nausea. 30 tablet 3    ondansetron (ZOFRAN) 8 MG tablet Take 1 tablet (8 mg total) by mouth every 8 (eight) hours as needed for Nausea. 30 tablet 3    escitalopram 20 MG Oral Tab Take 1 tablet (20 mg total) by mouth daily. 30 tablet 3    venetoclax 100 MG Oral (EVOLVE STUDY DRUG) Take 1 tablet (100 mg total) by mouth daily. Take 100 mg daily for one week, then take 200 mg daily for one week. 32 tablet 0    venetoclax 50 MG Oral (EVOLVE STUDY DRUG) Take 1 tablet (50 mg total) by mouth daily. 8 tablet 0    morphINE 15 MG Oral Tab Take 1 tablet (15 mg total) by mouth every 4 (four) hours as needed for Pain. 40 tablet 0    venetoclax Oral (EVOLVE STUDY DRUG) Take 2 tablets (20 mg total) by mouth daily. Take two 10 mg tablets daily. 16 tablet 0    HYDROmorphone 2 MG Oral Tab Take 1 tablet (2 mg total) by mouth every 6 (six) hours as needed for Pain. 20 tablet 0    allopurinol 300 MG Oral Tab Take 1 tablet (300 mg total) by mouth daily. 90 tablet 0    ASHWAGANDHA OR Take 1 capsule by mouth daily.      ALPRAZolam (XANAX) 0.25 MG Oral Tab Take 1 tablet (0.25 mg total) by mouth daily as needed. 10 tablet 0    Turmeric (QC TUMERIC COMPLEX OR) Take 1 tablet by mouth 2 (two) times daily.      Cyanocobalamin (VITAMIN B 12 OR) Take 1 tablet by mouth daily. B 12 complex      oxyCODONE-acetaminophen  MG Oral Tab TK 1 T PO QID PRN P  0    sucralfate 1 g Oral Tab Take 1 tablet (1 g total) by mouth 2 (two) times daily.  0    omeprazole 20 MG Oral Capsule Delayed Release TK ONE C PO BID 30 MIN B EATING  0    Cholecalciferol (VITAMIN D-3) 5000 units Oral Tab Take 1 tablet (5,000 Units total) by mouth daily.      venetoclax 100 MG Oral (EVOLVE STUDY DRUG) Take 4 tablets (400 mg total) by mouth daily. 128 tablet 0    mirtazapine 7.5 MG Oral Tab Take 1 tablet (7.5 mg total) by mouth nightly. 30 tablet 1       VITALS:   Height: 175.3 cm (5' 9.02\") (910)  Weight: 106.9 kg (235 lb 9.6 oz) (910)  BSA (Calculated - sq m): 2.22 sq meters (910)  Pulse: 82 (910)  BP: 122/79 (910)  Temp: 97.8 °F (36.6 °C) (910)  Do Not Use - Resp Rate: --  SpO2: 93 % (910)    PS:  ECO    PHYSICAL EXAM:    General: alert and oriented x 3, not in acute distress.  HEENT: YASMANI, oropharynx  clear.  Neck: No palpable lymphadenopathy in neck or axillary area.  CVS: S1S2, regular  Rhythm, no murmurs.   Lungs: Clear to auscultation and percussion.  No axillary adenopathy.  Abdomen: No hepato-splenomegaly.  Unable to palpate spleen.  Extremities:  No edema.  CNS: no focal deficit    Emotional well being: Patient's emotional well being was assessed.  No issues requiring acute psychosocial intervention were identified.     LABS:     Recent Results (from the past 24 hour(s))   LDH [E]    Collection Time: 24  9:03 AM   Result Value Ref Range     84 - 246 U/L   URIC ACID, SERUM [E]    Collection Time: 24  9:03 AM   Result Value Ref Range    Uric Acid 4.1 2.6 - 6.0 mg/dL   Comp Metabolic Panel (14)    Collection Time: 24  9:03 AM   Result Value Ref Range    Glucose 103 (H) 70 - 99 mg/dL    Sodium 138 136 - 145 mmol/L    Potassium 4.0 3.5 - 5.1 mmol/L    Chloride 107 98 - 112 mmol/L    CO2 29.0 21.0 - 32.0 mmol/L    Anion Gap 2 0 - 18 mmol/L    BUN 13 9 - 23 mg/dL    Creatinine 0.82 0.55 - 1.02 mg/dL    Calcium, Total 8.7 8.5 - 10.1 mg/dL    Calculated Osmolality 286 275 - 295 mOsm/kg    eGFR-Cr 88 >=60 mL/min/1.73m2    AST 22 15 - 37 U/L    ALT 26 13 - 56 U/L    Alkaline Phosphatase 81 39 - 100 U/L    Bilirubin, Total 0.3 0.1 - 2.0 mg/dL    Total Protein 7.2 6.4 - 8.2 g/dL    Albumin 3.7 3.4 - 5.0 g/dL    Globulin  3.5 2.8 - 4.4 g/dL    A/G Ratio 1.1 1.0 - 2.0    Patient Fasting for CMP? No    PHOSPHORUS [E]    Collection Time: 24  9:03 AM   Result Value Ref Range    Phosphorus 3.6 2.5 -  4.9 mg/dL   CBC W/ DIFFERENTIAL    Collection Time: 01/17/24  9:03 AM   Result Value Ref Range    WBC 8.5 4.0 - 11.0 x10(3) uL    RBC 4.52 3.80 - 5.30 x10(6)uL    HGB 14.1 12.0 - 16.0 g/dL    HCT 42.8 35.0 - 48.0 %    .0 150.0 - 450.0 10(3)uL    MCV 94.7 80.0 - 100.0 fL    MCH 31.2 26.0 - 34.0 pg    MCHC 32.9 31.0 - 37.0 g/dL    RDW 12.9 %    Neutrophil Absolute Prelim 6.28 1.50 - 7.70 x10 (3) uL    Neutrophil Absolute 6.28 1.50 - 7.70 x10(3) uL    Lymphocyte Absolute 1.42 1.00 - 4.00 x10(3) uL    Monocyte Absolute 0.67 0.10 - 1.00 x10(3) uL    Eosinophil Absolute 0.00 0.00 - 0.70 x10(3) uL    Basophil Absolute 0.01 0.00 - 0.20 x10(3) uL    Immature Granulocyte Absolute 0.14 0.00 - 1.00 x10(3) uL    Neutrophil % 73.7 %    Lymphocyte % 16.7 %    Monocyte % 7.9 %    Eosinophil % 0.0 %    Basophil % 0.1 %    Immature Granulocyte % 1.6 %         ASSESSMENT AND PLAN:     # CLL: diag 9/23 on peripheral blood flow and biopsy of R cervical LN.  Aponte stage I.   CLL IPI: 7  Cytogenetics-del 17p  IGHV unmutated  Elevated beta-2 microglobulin.    Enrolled in clinical trial  and started Obinutuzumab 10/25/23.  Tolerating well apart from severe headache.  WBC has normalized.  No tumor lysis thus far, not expected at this time.   Cycle 4-day 1 today.    # Headache: Both with Obinutuzumab and venetoclax.  She is managing and motivated to continue.    # Depression: Escitalopram started and dose increased.  Met with palliative care today and mirtazapine started.    Side effects are manageable.  No evidence of tumor lysis.  Continue current management.    ORDERS PLACED:    Return in about 4 weeks (around 2/14/2024) for Labs, MD visit, Chemo.    Tamica Herring M.D.    [facility]    [address] [place], [address]    1/17/2024

## 2024-01-17 NOTE — PROGRESS NOTES
Pt here for C3D1 Drug(s)Gazyva.  Arrives Ambulating independently, accompanied by Self     Patient was evaluated today by MD and Research RNLaura    Oral medications included in this regimen:  yes - Venetoclax    Patient confirms comprehension of cancer treatment schedule:  yes    Pregnancy screening:  Denies possibility of pregnancy    Modifications in dose or schedule:  No    Medications appearance and physical integrity checked by RN: yes.    Chemotherapy IV pump settings verified by 2 RNs:  No due to targeted therapy IV administration.  Frequency of blood return and site check throughout administration: Prior to administration and At completion of therapy     Infusion/treatment outcome:  patient tolerated treatment without incident    Education Record    Learner:  Patient  Barriers / Limitations:  None  Method:  Brief focused and Reinforcement  Education / instructions given:  Plan of care reviewed.  Outcome:  Shows understanding    Discharged Home, Ambulating independently, accompanied by:Self    Patient/family verbalized understanding of future appointments: by [employer] messaging

## 2024-02-13 NOTE — PROGRESS NOTES
[facility] Progress Note      Patient Name:  Lisa Cabral  YOB: 1975  Medical Record Number:  [id number]    Date of visit:  2/14/2024    CHIEF COMPLAINT: CLL    HPI:     48 year old female that I have followed since 9/23 after she was seen in the ER for cervical lymphadenopathy and leukocytosis. In retrospect, leukocytosis noted since 2015 but higher recently.  Cervical adenopathy has been going on for a few months.  On work-up, flow-monotypic B cells, positive for CD19, CD20, CD5.  FISH panel on peripheral blood showed deletion 17p with 42% nuclei positive for T p53 gene deletion.  PET 9/23-elevated uptake in bilateral neck, axilla and pelvic LN.  Cervical LN biopsy-CLL/SLL.  CT-lymphadenopathy in mediastinal/hilar region.  Also LN's supraclavicular, axillary, retroperitoneal, iliac areas.      Enrolled in clinical trial  and started Obinutuzumab on 10/25/2023.  Had severe headache with Obinutuzumab and also with venetoclax.  Presents for cycle # 5 today.    Continues to have headache especially after infusion, persistent nausea, fatigue and fairly significant hot flashes.  Has been craving sugar, weight is up a little bit.  Anxiety has improved with escitalopram.    SOCIAL HISTORY:    .  Current smoker. 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  Neurologic:  headache grade 2.  Respiratory: no cough, SOB or hemoptysis  Cardiovascular: no chest pain, ankle swelling, JARAMILLO  GI: Nausea grade 1.  Musculoskeletal: Muscle spasms.  Dermatologic: no alopecia, rash, pruritis  : no hematuria, dysuria or frequency  Psych: Depression grade 1.  Heme: no easy bruising or bleeding     ALLERGIES:    Allergies   Allergen Reactions    Aleve [Naproxen] HIVES and RASH    Ultram [Tramadol] NAUSEA AND VOMITING     Severe migraine       MEDICATIONS:    Current Outpatient Medications   Medication Sig Dispense  Refill    prochlorperazine (COMPAZINE) 10 mg tablet Take 1 tablet (10 mg total) by mouth every 6 (six) hours as needed for Nausea. 30 tablet 3    ondansetron (ZOFRAN) 8 MG tablet Take 1 tablet (8 mg total) by mouth every 8 (eight) hours as needed for Nausea. 30 tablet 3    venetoclax 100 MG Oral (EVOLVE STUDY DRUG) Take 4 tablets (400 mg total) by mouth daily. 128 tablet 0    mirtazapine 7.5 MG Oral Tab Take 1 tablet (7.5 mg total) by mouth nightly. (Patient taking differently: Take 2 tablets (15 mg total) by mouth nightly.) 30 tablet 1    escitalopram 20 MG Oral Tab Take 1 tablet (20 mg total) by mouth daily. 30 tablet 3    venetoclax 100 MG Oral (EVOLVE STUDY DRUG) Take 1 tablet (100 mg total) by mouth daily. Take 100 mg daily for one week, then take 200 mg daily for one week. 32 tablet 0    venetoclax 50 MG Oral (EVOLVE STUDY DRUG) Take 1 tablet (50 mg total) by mouth daily. 8 tablet 0    morphINE 15 MG Oral Tab Take 1 tablet (15 mg total) by mouth every 4 (four) hours as needed for Pain. 40 tablet 0    venetoclax Oral (EVOLVE STUDY DRUG) Take 2 tablets (20 mg total) by mouth daily. Take two 10 mg tablets daily. 16 tablet 0    HYDROmorphone 2 MG Oral Tab Take 1 tablet (2 mg total) by mouth every 6 (six) hours as needed for Pain. 20 tablet 0    ASHWAGANDHA OR Take 1 capsule by mouth daily.      ALPRAZolam (XANAX) 0.25 MG Oral Tab Take 1 tablet (0.25 mg total) by mouth daily as needed. 10 tablet 0    Turmeric (QC TUMERIC COMPLEX OR) Take 1 tablet by mouth 2 (two) times daily.      Cyanocobalamin (VITAMIN B 12 OR) Take 1 tablet by mouth daily. B 12 complex      oxyCODONE-acetaminophen  MG Oral Tab TK 1 T PO QID PRN P  0    sucralfate 1 g Oral Tab Take 1 tablet (1 g total) by mouth 2 (two) times daily.  0    omeprazole 20 MG Oral Capsule Delayed Release TK ONE C PO BID 30 MIN B EATING  0    Cholecalciferol (VITAMIN D-3) 5000 units Oral Tab Take 1 tablet (5,000 Units total) by mouth daily.         VITALS:   Height: 175.3 cm (5' 9.02\") (938)  Weight: 109.4 kg (241 lb 3.2 oz) (938)  BSA (Calculated - sq m): 2.24 sq meters (938)  Pulse: 78 (938)  BP: 137/88 (938)  Temp: 97.5 °F (36.4 °C) (938)  Do Not Use - Resp Rate: --  SpO2: 94 % (938)    PS:  ECO    PHYSICAL EXAM:    General: alert and oriented x 3, not in acute distress.  HEENT: YASMANI, oropharynx  clear.  Neck: No palpable lymphadenopathy in neck or axillary area.  CVS: S1S2, regular  Rhythm, no murmurs.   Lungs: Clear to auscultation and percussion.  No axillary adenopathy.  Abdomen: No hepato-splenomegaly.  Unable to palpate spleen.  Extremities:  No edema.  CNS: no focal deficit    Emotional well being: Patient's emotional well being was assessed.  No issues requiring acute psychosocial intervention were identified.     LABS:     Recent Results (from the past 24 hour(s))   LDH [E]    Collection Time: 24  9:30 AM   Result Value Ref Range     84 - 246 U/L   URIC ACID, SERUM [E]    Collection Time: 24  9:30 AM   Result Value Ref Range    Uric Acid 4.6 2.6 - 6.0 mg/dL   Comp Metabolic Panel (14)    Collection Time: 24  9:30 AM   Result Value Ref Range    Glucose 111 (H) 70 - 99 mg/dL    Sodium 139 136 - 145 mmol/L    Potassium 3.9 3.5 - 5.1 mmol/L    Chloride 106 98 - 112 mmol/L    CO2 28.0 21.0 - 32.0 mmol/L    Anion Gap 5 0 - 18 mmol/L    BUN 11 9 - 23 mg/dL    Creatinine 0.80 0.55 - 1.02 mg/dL    Calcium, Total 9.0 8.5 - 10.1 mg/dL    Calculated Osmolality 288 275 - 295 mOsm/kg    eGFR-Cr 90 >=60 mL/min/1.73m2    AST 18 15 - 37 U/L    ALT 27 13 - 56 U/L    Alkaline Phosphatase 87 39 - 100 U/L    Bilirubin, Total 0.3 0.1 - 2.0 mg/dL    Total Protein 7.3 6.4 - 8.2 g/dL    Albumin 3.6 3.4 - 5.0 g/dL    Globulin  3.7 2.8 - 4.4 g/dL    A/G Ratio 1.0 1.0 - 2.0    Patient Fasting for CMP? No    PHOSPHORUS [E]    Collection Time: 24  9:30 AM   Result Value Ref Range    Phosphorus 2.8 2.5 -  4.9 mg/dL   CBC W/ DIFFERENTIAL    Collection Time: 02/14/24  9:30 AM   Result Value Ref Range    WBC 8.3 4.0 - 11.0 x10(3) uL    RBC 4.74 3.80 - 5.30 x10(6)uL    HGB 14.5 12.0 - 16.0 g/dL    HCT 44.7 35.0 - 48.0 %    .0 150.0 - 450.0 10(3)uL    MCV 94.3 80.0 - 100.0 fL    MCH 30.6 26.0 - 34.0 pg    MCHC 32.4 31.0 - 37.0 g/dL    RDW 12.3 %    Neutrophil Absolute Prelim 6.06 1.50 - 7.70 x10 (3) uL    Neutrophil Absolute 6.06 1.50 - 7.70 x10(3) uL    Lymphocyte Absolute 1.45 1.00 - 4.00 x10(3) uL    Monocyte Absolute 0.73 0.10 - 1.00 x10(3) uL    Eosinophil Absolute 0.00 0.00 - 0.70 x10(3) uL    Basophil Absolute 0.01 0.00 - 0.20 x10(3) uL    Immature Granulocyte Absolute 0.04 0.00 - 1.00 x10(3) uL    Neutrophil % 73.1 %    Lymphocyte % 17.5 %    Monocyte % 8.8 %    Eosinophil % 0.0 %    Basophil % 0.1 %    Immature Granulocyte % 0.5 %           ASSESSMENT AND PLAN:     # CLL: diag 9/23 on peripheral blood flow and biopsy of R cervical LN.  Aponte stage I.   CLL IPI: 7  Cytogenetics-del 17p  IGHV unmutated  Elevated beta-2 microglobulin.    Enrolled in clinical trial  and started Obinutuzumab 10/25/23.  Cycle #5 today.  WBC has normalized.  No tumor lysis.  Off allopurinol.  She has had complete resolution of all palpable adenopathy and splenomegaly.  She does have headache and fatigue but she is managing reasonably well and does not wish to change or reduce treatment doses.      # Headache: Both with Obinutuzumab and venetoclax.  She is managing and motivated to continue.    # Depression: Escitalopram started and dose increased.  Feeling better, on mirtazapine as well.     ORDERS PLACED:    Return in about 4 weeks (around 3/13/2024) for Labs, MD visit, Chemo.    Tamica Herring M.D.    [facility]    [facility]  [address] [place], [address]    2/14/2024

## 2024-02-14 ENCOUNTER — OFFICE VISIT (OUTPATIENT)
Dept: HEMATOLOGY/ONCOLOGY | Facility: HOSPITAL | Age: 49
End: 2024-02-14
Attending: INTERNAL MEDICINE
Payer: COMMERCIAL

## 2024-02-14 ENCOUNTER — RESEARCH ENCOUNTER (OUTPATIENT)
Dept: HEMATOLOGY/ONCOLOGY | Facility: HOSPITAL | Age: 49
End: 2024-02-14

## 2024-02-14 VITALS
OXYGEN SATURATION: 94 % | WEIGHT: 241.19 LBS | TEMPERATURE: 98 F | RESPIRATION RATE: 16 BRPM | DIASTOLIC BLOOD PRESSURE: 88 MMHG | HEIGHT: 69.02 IN | BODY MASS INDEX: 35.72 KG/M2 | SYSTOLIC BLOOD PRESSURE: 137 MMHG | HEART RATE: 78 BPM

## 2024-02-14 DIAGNOSIS — T88.9XXD: ICD-10-CM

## 2024-02-14 DIAGNOSIS — C91.10 CLL (CHRONIC LYMPHOCYTIC LEUKEMIA) (HCC): Primary | ICD-10-CM

## 2024-02-14 DIAGNOSIS — R11.0 NAUSEA: ICD-10-CM

## 2024-02-14 DIAGNOSIS — G47.9 SLEEPING DIFFICULTY: ICD-10-CM

## 2024-02-14 LAB
ALBUMIN SERPL-MCNC: 3.6 G/DL (ref 3.4–5)
ALBUMIN/GLOB SERPL: 1 {RATIO} (ref 1–2)
ALP LIVER SERPL-CCNC: 87 U/L
ALT SERPL-CCNC: 27 U/L
ANION GAP SERPL CALC-SCNC: 5 MMOL/L (ref 0–18)
AST SERPL-CCNC: 18 U/L (ref 15–37)
BASOPHILS # BLD AUTO: 0.01 X10(3) UL (ref 0–0.2)
BASOPHILS NFR BLD AUTO: 0.1 %
BILIRUB SERPL-MCNC: 0.3 MG/DL (ref 0.1–2)
BUN BLD-MCNC: 11 MG/DL (ref 9–23)
CALCIUM BLD-MCNC: 9 MG/DL (ref 8.5–10.1)
CHLORIDE SERPL-SCNC: 106 MMOL/L (ref 98–112)
CO2 SERPL-SCNC: 28 MMOL/L (ref 21–32)
CREAT BLD-MCNC: 0.8 MG/DL
EGFRCR SERPLBLD CKD-EPI 2021: 90 ML/MIN/1.73M2 (ref 60–?)
EOSINOPHIL # BLD AUTO: 0 X10(3) UL (ref 0–0.7)
EOSINOPHIL NFR BLD AUTO: 0 %
ERYTHROCYTE [DISTWIDTH] IN BLOOD BY AUTOMATED COUNT: 12.3 %
FASTING STATUS PATIENT QL REPORTED: NO
GLOBULIN PLAS-MCNC: 3.7 G/DL (ref 2.8–4.4)
GLUCOSE BLD-MCNC: 111 MG/DL (ref 70–99)
HCT VFR BLD AUTO: 44.7 %
HGB BLD-MCNC: 14.5 G/DL
IMM GRANULOCYTES # BLD AUTO: 0.04 X10(3) UL (ref 0–1)
IMM GRANULOCYTES NFR BLD: 0.5 %
LDH SERPL L TO P-CCNC: 176 U/L
LYMPHOCYTES # BLD AUTO: 1.45 X10(3) UL (ref 1–4)
LYMPHOCYTES NFR BLD AUTO: 17.5 %
MCH RBC QN AUTO: 30.6 PG (ref 26–34)
MCHC RBC AUTO-ENTMCNC: 32.4 G/DL (ref 31–37)
MCV RBC AUTO: 94.3 FL
MONOCYTES # BLD AUTO: 0.73 X10(3) UL (ref 0.1–1)
MONOCYTES NFR BLD AUTO: 8.8 %
NEUTROPHILS # BLD AUTO: 6.06 X10 (3) UL (ref 1.5–7.7)
NEUTROPHILS # BLD AUTO: 6.06 X10(3) UL (ref 1.5–7.7)
NEUTROPHILS NFR BLD AUTO: 73.1 %
OSMOLALITY SERPL CALC.SUM OF ELEC: 288 MOSM/KG (ref 275–295)
PHOSPHATE SERPL-MCNC: 2.8 MG/DL (ref 2.5–4.9)
PLATELET # BLD AUTO: 262 10(3)UL (ref 150–450)
POTASSIUM SERPL-SCNC: 3.9 MMOL/L (ref 3.5–5.1)
PROT SERPL-MCNC: 7.3 G/DL (ref 6.4–8.2)
RBC # BLD AUTO: 4.74 X10(6)UL
SODIUM SERPL-SCNC: 139 MMOL/L (ref 136–145)
URATE SERPL-MCNC: 4.6 MG/DL
WBC # BLD AUTO: 8.3 X10(3) UL (ref 4–11)

## 2024-02-14 PROCEDURE — 96413 CHEMO IV INFUSION 1 HR: CPT

## 2024-02-14 PROCEDURE — 96375 TX/PRO/DX INJ NEW DRUG ADDON: CPT

## 2024-02-14 PROCEDURE — 96415 CHEMO IV INFUSION ADDL HR: CPT

## 2024-02-14 PROCEDURE — 99215 OFFICE O/P EST HI 40 MIN: CPT | Performed by: INTERNAL MEDICINE

## 2024-02-14 RX ORDER — ACETAMINOPHEN 325 MG/1
650 TABLET ORAL ONCE
Status: CANCELLED | OUTPATIENT
Start: 2024-02-14

## 2024-02-14 RX ORDER — METOCLOPRAMIDE HYDROCHLORIDE 5 MG/ML
10 INJECTION INTRAMUSCULAR; INTRAVENOUS ONCE
Status: COMPLETED | OUTPATIENT
Start: 2024-02-14 | End: 2024-02-14

## 2024-02-14 RX ORDER — METOCLOPRAMIDE HYDROCHLORIDE 5 MG/ML
10 INJECTION INTRAMUSCULAR; INTRAVENOUS EVERY 6 HOURS PRN
Status: CANCELLED
Start: 2024-02-14

## 2024-02-14 RX ORDER — DIPHENHYDRAMINE HCL 25 MG
50 CAPSULE ORAL ONCE
Status: CANCELLED | OUTPATIENT
Start: 2024-02-14

## 2024-02-14 RX ORDER — DIPHENHYDRAMINE HCL 25 MG
50 CAPSULE ORAL ONCE
Status: COMPLETED | OUTPATIENT
Start: 2024-02-14 | End: 2024-02-14

## 2024-02-14 RX ORDER — ACETAMINOPHEN 325 MG/1
650 TABLET ORAL ONCE
Status: COMPLETED | OUTPATIENT
Start: 2024-02-14 | End: 2024-02-14

## 2024-02-14 RX ADMIN — METOCLOPRAMIDE HYDROCHLORIDE 10 MG: 5 INJECTION INTRAMUSCULAR; INTRAVENOUS at 14:44:00

## 2024-02-14 RX ADMIN — DIPHENHYDRAMINE HCL 50 MG: 25 MG CAPSULE ORAL at 10:57:00

## 2024-02-14 RX ADMIN — ACETAMINOPHEN 650 MG: 325 TABLET ORAL at 10:57:00

## 2024-02-14 NOTE — PROGRESS NOTES
STUDY: - [facility]  ID: [id number]  TIMEPOINT: C5 D1 (on study)     Patient here for continued treatment on study. Seen and examined by medical oncology. No new issues. Non- fasting labs resulted, adequate for treatment today.   Conmeds: patient not  taking allopurinol. Mirtazapine increased to 15mg.   AEs reviewed:     Depression, gr 2 - seen and evaluated by palliative care today; med dose changes made.  Headache, gr 1 -same  Nausea, gr 1 appetite good, no vomiting ; uses prn meds  Bone pain, gr 1 -describes as intermittent in hips and legs  Paresthesia , gr 1 -all over ,comes and goes   Malaise , gr 1, same  Fatigue, gr 2- not relieved by rest or sleep  Arthralgia - denies   Myalgia - continues at gr 2  Insomnia, gr 1 better with mirtazapine  Bone pain, diffuse, gr 1 manageable per pt; has prn meds  Hot flashes, gr 2   Diarrhrea, gr 1 - experience 3-4 stools per day for couple of days after last treatment.  Patient denies any jerky movements or tremors.     Patient brought in 3 empty bottles of study drug and 1 bottle with #20 pills; all lot numbers were [id number]. Collected diary  Dispensed 4 bottles of Venetoclax( [id number]), each with 32 tabs, Lot #[id number]. Exp.9/30/25; new medication diary provided    Emotional support provided and encouraged patient to reach out to us as needed between clinic appointments.   She will return in 4 weeks for C6

## 2024-02-14 NOTE — PROGRESS NOTES
Pt here for C5D1 Drug(s)Gazyva.  Arrives Ambulating independently, accompanied by Self     Patient was evaluated today by MD.    Oral medications included in this regimen:  no    Patient confirms comprehension of cancer treatment schedule:  yes    Pregnancy screening:  Denies possibility of pregnancy    Modifications in dose or schedule:  No    Medications appearance and physical integrity checked by RN: yes.    Chemotherapy IV pump settings verified by 2 RNs:  Yes.  Frequency of blood return and site check throughout administration: Prior to administration     Infusion/treatment outcome:  patient tolerated treatment without incident    Education Record    Learner:  Patient  Barriers / Limitations:  None  Method:  Discussion  Education / instructions given:  POC  Outcome:  Shows understanding    Discharged Home, Ambulating independently, accompanied by:Self    Patient/family verbalized understanding of future appointments: by [employer] messaging    Pt tolerated treatment well. Left in stable condition. Pre-meds given and wait time observed. Subsequent rate used.

## 2024-02-19 RX ORDER — MIRTAZAPINE 15 MG/1
15 TABLET, FILM COATED ORAL NIGHTLY
Qty: 90 TABLET | Refills: 0 | Status: SHIPPED | OUTPATIENT
Start: 2024-02-19

## 2024-03-12 NOTE — PROGRESS NOTES
[facility] Progress Note      Patient Name:  Lisa Cabral  YOB: 1975  Medical Record Number:  [id number]    Date of visit:  3/13/2024      CHIEF COMPLAINT: CLL    HPI:     49 year old female that I have followed since 9/23 after she was seen in the ER for cervical lymphadenopathy and leukocytosis. In retrospect, leukocytosis noted since 2015 but higher recently.  Cervical adenopathy has been going on for a few months.  On work-up, flow-monotypic B cells, positive for CD19, CD20, CD5.  FISH panel on peripheral blood showed deletion 17p with 42% nuclei positive for T p53 gene deletion.  PET 9/23-elevated uptake in bilateral neck, axilla and pelvic LN.  Cervical LN biopsy-CLL/SLL.  CT-lymphadenopathy in mediastinal/hilar region.  Also LN's supraclavicular, axillary, retroperitoneal, iliac areas.      Enrolled in clinical trial  and started Obinutuzumab on 10/23.  Venetoclax started 11/23.  Presents for cycle # 6.  Has been responding to treatment but has severe headache after fusion.  Also fatigue, body ache and joint pain.  Has gained weight.       SOCIAL HISTORY:    .  Current smoker. 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  Neurologic:  headache grade 2.  Respiratory: no cough, SOB or hemoptysis  Cardiovascular: no chest pain, ankle swelling, JARAMILLO  GI: Nausea grade 1.  Musculoskeletal: Muscle spasms.  Dermatologic: no alopecia, rash, pruritis  : no hematuria, dysuria or frequency  Psych: Depression grade 1.  Heme: no easy bruising or bleeding     ALLERGIES:    Allergies   Allergen Reactions    Aleve [Naproxen] HIVES and RASH    Ultram [Tramadol] NAUSEA AND VOMITING     Severe migraine       MEDICATIONS:    Current Outpatient Medications   Medication Sig Dispense Refill    mirtazapine 15 MG Oral Tab Take 1 tablet (15 mg total) by mouth nightly. 90 tablet 0    prochlorperazine (COMPAZINE)  10 mg tablet Take 1 tablet (10 mg total) by mouth every 6 (six) hours as needed for Nausea. 30 tablet 3    ondansetron (ZOFRAN) 8 MG tablet Take 1 tablet (8 mg total) by mouth every 8 (eight) hours as needed for Nausea. 30 tablet 3    venetoclax 100 MG Oral (EVOLVE STUDY DRUG) Take 4 tablets (400 mg total) by mouth daily. 128 tablet 0    escitalopram 20 MG Oral Tab Take 1 tablet (20 mg total) by mouth daily. 30 tablet 3    venetoclax 100 MG Oral (EVOLVE STUDY DRUG) Take 1 tablet (100 mg total) by mouth daily. Take 100 mg daily for one week, then take 200 mg daily for one week. 32 tablet 0    venetoclax 50 MG Oral (EVOLVE STUDY DRUG) Take 1 tablet (50 mg total) by mouth daily. 8 tablet 0    morphINE 15 MG Oral Tab Take 1 tablet (15 mg total) by mouth every 4 (four) hours as needed for Pain. 40 tablet 0    venetoclax Oral (EVOLVE STUDY DRUG) Take 2 tablets (20 mg total) by mouth daily. Take two 10 mg tablets daily. 16 tablet 0    HYDROmorphone 2 MG Oral Tab Take 1 tablet (2 mg total) by mouth every 6 (six) hours as needed for Pain. 20 tablet 0    ASHWAGANDHA OR Take 1 capsule by mouth daily.      ALPRAZolam (XANAX) 0.25 MG Oral Tab Take 1 tablet (0.25 mg total) by mouth daily as needed. 10 tablet 0    Turmeric (QC TUMERIC COMPLEX OR) Take 1 tablet by mouth 2 (two) times daily.      Cyanocobalamin (VITAMIN B 12 OR) Take 1 tablet by mouth daily. B 12 complex      oxyCODONE-acetaminophen  MG Oral Tab every 8 (eight) hours as needed for Pain.  0    sucralfate 1 g Oral Tab Take 1 tablet (1 g total) by mouth 2 (two) times daily.  0    omeprazole 20 MG Oral Capsule Delayed Release TK ONE C PO BID 30 MIN B EATING  0    Cholecalciferol (VITAMIN D-3) 5000 units Oral Tab Take 1 tablet (5,000 Units total) by mouth daily.      venetoclax 100 MG Oral (EVOLVE STUDY DRUG) Take 4 tablets (400 mg total) by mouth daily. 128 tablet 0       VITALS:  Height: 175.3 cm (5' 9.02\") (03/13 0953)  Weight: 112 kg (247 lb) (03/13 0953)  BSA  (Calculated - sq m): 2.26 sq meters (953)  Pulse: 79 (953)  BP: 133/84 (953)  Temp: 97.7 °F (36.5 °C) (953)  Do Not Use - Resp Rate: --  SpO2: 96 % (953)    PS:  ECO    PHYSICAL EXAM:    General: alert and oriented x 3, not in acute distress.  HEENT: YASMANI, oropharynx  clear.  Neck: No palpable lymphadenopathy in neck or axillary area.  CVS: S1S2, regular  Rhythm, no murmurs.   Lungs: Clear to auscultation and percussion.  No axillary adenopathy.  Abdomen: No hepato-splenomegaly.  Unable to palpate spleen.  Extremities:  No edema.  CNS: no focal deficit    Emotional well being: Patient's emotional well being was assessed.  No issues requiring acute psychosocial intervention were identified.     LABS:     Recent Results (from the past 24 hour(s))   LDH [E]    Collection Time: 24  9:44 AM   Result Value Ref Range     84 - 246 U/L   URIC ACID, SERUM [E]    Collection Time: 24  9:44 AM   Result Value Ref Range    Uric Acid 4.9 2.6 - 6.0 mg/dL   Comp Metabolic Panel (14)    Collection Time: 24  9:44 AM   Result Value Ref Range    Glucose 118 (H) 70 - 99 mg/dL    Sodium 139 136 - 145 mmol/L    Potassium 4.0 3.5 - 5.1 mmol/L    Chloride 106 98 - 112 mmol/L    CO2 24.0 21.0 - 32.0 mmol/L    Anion Gap 9 0 - 18 mmol/L    BUN 11 9 - 23 mg/dL    Creatinine 0.79 0.55 - 1.02 mg/dL    Calcium, Total 9.3 8.5 - 10.1 mg/dL    Calculated Osmolality 288 275 - 295 mOsm/kg    eGFR-Cr 91 >=60 mL/min/1.73m2    AST 27 15 - 37 U/L    ALT 33 13 - 56 U/L    Alkaline Phosphatase 94 39 - 100 U/L    Bilirubin, Total 0.2 0.1 - 2.0 mg/dL    Total Protein 7.4 6.4 - 8.2 g/dL    Albumin 3.6 3.4 - 5.0 g/dL    Globulin  3.8 2.8 - 4.4 g/dL    A/G Ratio 0.9 (L) 1.0 - 2.0    Patient Fasting for CMP? Patient not present    PHOSPHORUS [E]    Collection Time: 24  9:44 AM   Result Value Ref Range    Phosphorus 3.1 2.5 - 4.9 mg/dL   CBC W/ DIFFERENTIAL    Collection Time: 24  9:44 AM    Result Value Ref Range    WBC 8.3 4.0 - 11.0 x10(3) uL    RBC 4.65 3.80 - 5.30 x10(6)uL    HGB 14.3 12.0 - 16.0 g/dL    HCT 44.1 35.0 - 48.0 %    .0 150.0 - 450.0 10(3)uL    MCV 94.8 80.0 - 100.0 fL    MCH 30.8 26.0 - 34.0 pg    MCHC 32.4 31.0 - 37.0 g/dL    RDW 12.7 %    Neutrophil Absolute Prelim 5.87 1.50 - 7.70 x10 (3) uL    Neutrophil Absolute 5.87 1.50 - 7.70 x10(3) uL    Lymphocyte Absolute 1.75 1.00 - 4.00 x10(3) uL    Monocyte Absolute 0.60 0.10 - 1.00 x10(3) uL    Eosinophil Absolute 0.00 0.00 - 0.70 x10(3) uL    Basophil Absolute 0.01 0.00 - 0.20 x10(3) uL    Immature Granulocyte Absolute 0.04 0.00 - 1.00 x10(3) uL    Neutrophil % 70.9 %    Lymphocyte % 21.2 %    Monocyte % 7.3 %    Eosinophil % 0.0 %    Basophil % 0.1 %    Immature Granulocyte % 0.5 %         ASSESSMENT AND PLAN:     # CLL: diag 9/23 on peripheral blood flow and biopsy of R cervical LN.  Aponte stage I.   CLL IPI: 7  Cytogenetics-del 17p  IGHV unmutated  Elevated beta-2 microglobulin.    Enrolled in clinical trial . Started Obinutuzumab 10/23 and venetoclax 11/23.  Cycle # 6 today.  Normal WBC, no tumor lysis.  Off allopurinol.  Complete resolution of palpable adenopathy/splenomegaly.  Headache and fatigue due to treatment which she is managing and does not wish to change her dose reduced.      # Headache: Both with Obinutuzumab and venetoclax.  We discussed dose reduction versus holding but she  is managing and motivated to continue.    # Depression: Escitalopram started and dose increased.  Feeling better, on mirtazapine as well.     ORDERS PLACED:    No follow-ups on file.    Tamica Herring M.D.    [facility]    [address] [place], [address]    3/13/2024

## 2024-03-13 ENCOUNTER — RESEARCH ENCOUNTER (OUTPATIENT)
Dept: HEMATOLOGY/ONCOLOGY | Facility: HOSPITAL | Age: 49
End: 2024-03-13

## 2024-03-13 ENCOUNTER — OFFICE VISIT (OUTPATIENT)
Dept: HEMATOLOGY/ONCOLOGY | Facility: HOSPITAL | Age: 49
End: 2024-03-13
Attending: INTERNAL MEDICINE
Payer: COMMERCIAL

## 2024-03-13 VITALS
SYSTOLIC BLOOD PRESSURE: 133 MMHG | HEIGHT: 69.02 IN | RESPIRATION RATE: 16 BRPM | DIASTOLIC BLOOD PRESSURE: 84 MMHG | BODY MASS INDEX: 36.58 KG/M2 | HEART RATE: 79 BPM | TEMPERATURE: 98 F | OXYGEN SATURATION: 96 % | WEIGHT: 247 LBS

## 2024-03-13 DIAGNOSIS — T88.9XXD: ICD-10-CM

## 2024-03-13 DIAGNOSIS — C91.10 CLL (CHRONIC LYMPHOCYTIC LEUKEMIA) (HCC): Primary | ICD-10-CM

## 2024-03-13 DIAGNOSIS — F41.8 DEPRESSION WITH ANXIETY: ICD-10-CM

## 2024-03-13 DIAGNOSIS — C91.10 CLL (CHRONIC LYMPHOCYTIC LEUKEMIA) (HCC): ICD-10-CM

## 2024-03-13 DIAGNOSIS — R63.5 WEIGHT GAIN: Primary | ICD-10-CM

## 2024-03-13 LAB
ALBUMIN SERPL-MCNC: 3.6 G/DL (ref 3.4–5)
ALBUMIN/GLOB SERPL: 0.9 {RATIO} (ref 1–2)
ALP LIVER SERPL-CCNC: 94 U/L
ALT SERPL-CCNC: 33 U/L
ANION GAP SERPL CALC-SCNC: 9 MMOL/L (ref 0–18)
AST SERPL-CCNC: 27 U/L (ref 15–37)
BASOPHILS # BLD AUTO: 0.01 X10(3) UL (ref 0–0.2)
BASOPHILS NFR BLD AUTO: 0.1 %
BILIRUB SERPL-MCNC: 0.2 MG/DL (ref 0.1–2)
BUN BLD-MCNC: 11 MG/DL (ref 9–23)
CALCIUM BLD-MCNC: 9.3 MG/DL (ref 8.5–10.1)
CHLORIDE SERPL-SCNC: 106 MMOL/L (ref 98–112)
CO2 SERPL-SCNC: 24 MMOL/L (ref 21–32)
CREAT BLD-MCNC: 0.79 MG/DL
EGFRCR SERPLBLD CKD-EPI 2021: 91 ML/MIN/1.73M2 (ref 60–?)
EOSINOPHIL # BLD AUTO: 0 X10(3) UL (ref 0–0.7)
EOSINOPHIL NFR BLD AUTO: 0 %
ERYTHROCYTE [DISTWIDTH] IN BLOOD BY AUTOMATED COUNT: 12.7 %
GLOBULIN PLAS-MCNC: 3.8 G/DL (ref 2.8–4.4)
GLUCOSE BLD-MCNC: 118 MG/DL (ref 70–99)
HCT VFR BLD AUTO: 44.1 %
HGB BLD-MCNC: 14.3 G/DL
IMM GRANULOCYTES # BLD AUTO: 0.04 X10(3) UL (ref 0–1)
IMM GRANULOCYTES NFR BLD: 0.5 %
LDH SERPL L TO P-CCNC: 220 U/L
LYMPHOCYTES # BLD AUTO: 1.75 X10(3) UL (ref 1–4)
LYMPHOCYTES NFR BLD AUTO: 21.2 %
MCH RBC QN AUTO: 30.8 PG (ref 26–34)
MCHC RBC AUTO-ENTMCNC: 32.4 G/DL (ref 31–37)
MCV RBC AUTO: 94.8 FL
MONOCYTES # BLD AUTO: 0.6 X10(3) UL (ref 0.1–1)
MONOCYTES NFR BLD AUTO: 7.3 %
NEUTROPHILS # BLD AUTO: 5.87 X10 (3) UL (ref 1.5–7.7)
NEUTROPHILS # BLD AUTO: 5.87 X10(3) UL (ref 1.5–7.7)
NEUTROPHILS NFR BLD AUTO: 70.9 %
OSMOLALITY SERPL CALC.SUM OF ELEC: 288 MOSM/KG (ref 275–295)
PHOSPHATE SERPL-MCNC: 3.1 MG/DL (ref 2.5–4.9)
PLATELET # BLD AUTO: 277 10(3)UL (ref 150–450)
POTASSIUM SERPL-SCNC: 4 MMOL/L (ref 3.5–5.1)
PROT SERPL-MCNC: 7.4 G/DL (ref 6.4–8.2)
RBC # BLD AUTO: 4.65 X10(6)UL
SODIUM SERPL-SCNC: 139 MMOL/L (ref 136–145)
URATE SERPL-MCNC: 4.9 MG/DL
WBC # BLD AUTO: 8.3 X10(3) UL (ref 4–11)

## 2024-03-13 PROCEDURE — 83615 LACTATE (LD) (LDH) ENZYME: CPT | Performed by: INTERNAL MEDICINE

## 2024-03-13 PROCEDURE — 80053 COMPREHEN METABOLIC PANEL: CPT | Performed by: INTERNAL MEDICINE

## 2024-03-13 PROCEDURE — 85025 COMPLETE CBC W/AUTO DIFF WBC: CPT | Performed by: INTERNAL MEDICINE

## 2024-03-13 PROCEDURE — 84100 ASSAY OF PHOSPHORUS: CPT | Performed by: INTERNAL MEDICINE

## 2024-03-13 PROCEDURE — 84550 ASSAY OF BLOOD/URIC ACID: CPT | Performed by: INTERNAL MEDICINE

## 2024-03-13 PROCEDURE — 96375 TX/PRO/DX INJ NEW DRUG ADDON: CPT

## 2024-03-13 PROCEDURE — 96415 CHEMO IV INFUSION ADDL HR: CPT

## 2024-03-13 PROCEDURE — 96413 CHEMO IV INFUSION 1 HR: CPT

## 2024-03-13 PROCEDURE — 36415 COLL VENOUS BLD VENIPUNCTURE: CPT

## 2024-03-13 RX ORDER — DIPHENHYDRAMINE HCL 25 MG
50 CAPSULE ORAL ONCE
Status: CANCELLED | OUTPATIENT
Start: 2024-03-13

## 2024-03-13 RX ORDER — METOCLOPRAMIDE HYDROCHLORIDE 5 MG/ML
10 INJECTION INTRAMUSCULAR; INTRAVENOUS EVERY 6 HOURS PRN
Status: CANCELLED
Start: 2024-03-13

## 2024-03-13 RX ORDER — METOCLOPRAMIDE HYDROCHLORIDE 5 MG/ML
10 INJECTION INTRAMUSCULAR; INTRAVENOUS ONCE
Status: DISCONTINUED | OUTPATIENT
Start: 2024-03-13 | End: 2024-03-13

## 2024-03-13 RX ORDER — ACETAMINOPHEN 325 MG/1
650 TABLET ORAL ONCE
Status: CANCELLED | OUTPATIENT
Start: 2024-03-13

## 2024-03-13 RX ORDER — DIPHENHYDRAMINE HCL 25 MG
50 CAPSULE ORAL ONCE
Status: COMPLETED | OUTPATIENT
Start: 2024-03-13 | End: 2024-03-13

## 2024-03-13 RX ORDER — ACETAMINOPHEN 325 MG/1
650 TABLET ORAL ONCE
Status: COMPLETED | OUTPATIENT
Start: 2024-03-13 | End: 2024-03-13

## 2024-03-13 RX ADMIN — ACETAMINOPHEN 650 MG: 325 TABLET ORAL at 10:55:00

## 2024-03-13 RX ADMIN — DIPHENHYDRAMINE HCL 50 MG: 25 MG CAPSULE ORAL at 10:55:00

## 2024-03-13 NOTE — PROGRESS NOTES
CLINICAL RESEARCH NOTE  Study: , \"Randomized, Phase III Study of Early Intervention with Venetoclax and Obinutuzumab Versus Delayed Therapy with Venetoclax and Obinutuzumab in Newly Diagnosed Asymptomatic High-Risk Patients with Chronic Lymphocytic Leukemia / Small Lymphocytic Lymphoma (CLL/SLL): EVOLVE CLL/SLL Study   Patient ID:  [id number]  Treatment Assignment: Early Treatment  Visit: C6D1    Patient presents for on-treatment visit.  Labs drawn peripherally and are adequate for treatment. Patient assessed by MD. Clinical Response Assessment completed by MD - patient remains in complete remission.     AE Review:  - Diarrhea, stable gr 1. Occurs sporadically during the week after treatment. Not every day, but sometimes occurs 3-4x/day. Not needing meds at this time.   - Constipation, stable gr 1  - Depression, stable gr 2  - Insomnia, resolved with Remeron, better than baseline now.   - Headache, stable. Still present daily, but she states her meds are managing  - Bone pain, still reporting stable diffuse, deep bone aching.   - Arthralgia/Mylagia - stable. Still reporting pain in muscles and joints, diffuse. Feels this worsened slightly when her chronic pain management medications were reduced recently.   - Nausea, stable gr 1. Using meds prn, appetite good. Weight gain recently  - Paresthesias (burning sensations, diffuse), stable gr 1  - Malaise, stable gr 1  - Fatigue, stable gr 2.   - Hot flashes, stable gr 2    Medication Changes:  None    Medication Dispensing:  Patient returned 3 empty bottles and 1 partially filled bottle. All lot #s are [id number]. Collected pill diary. Dispensed 4 full bottles of Venetoclax Lot# [id number] and new pill diary.     Patient will return to clinic in 4 weeks for C7. Study blood due at that time.

## 2024-03-13 NOTE — PROGRESS NOTES
Pt here for C 6 D 1  Drug(s)Obnutzumab.  Arrives Ambulating independently, accompanied by Self     Patient was evaluated today by MD.    Oral medications included in this regimen:  no    Patient confirms comprehension of cancer treatment schedule:  yes    Pregnancy screening:  Not applicable    Modifications in dose or schedule:  No    Medications appearance and physical integrity checked by RN: no.    Chemotherapy IV pump settings verified by 2 RNs:  No due to targeted therapy IV administration.  Frequency of blood return and site check throughout administration: Prior to administration and At completion of therapy     Infusion/treatment outcome:  patient tolerated treatment without incident    Education Record    Learner:  Patient  Barriers / Limitations:  None  Method:  Discussion  Education / instructions given:    Outcome:  Shows understanding    Discharged Home, Ambulating independently, accompanied by:Self    Patient/family verbalized understanding of future appointments: by MyChart messaging

## 2024-03-13 NOTE — PROGRESS NOTES
Education Record    Learner:  Patient    Disease / Diagnosis:CLL  Research patient   Obinutuzumab with Venetoclax.     Barriers / Limitations:  None   Comments:    Method:  Discussion   Comments:    General Topics:  Plan of care reviewed   Comments:    Outcome:  Shows understanding   Comments: pt reports worse joint pain,   Pain MD decreased her maintenance pain medicine.   Reports a cough after the infusion for a few days.   Pretty much constant HA, daily.   Doesn't feel it's from her neck.   No fevers.   Reports her appetite varies, sometimes nausea, sometime constipated, sometimes with diarrhea.   No way to predict day to day.

## 2024-03-18 ENCOUNTER — RESEARCH ENCOUNTER (OUTPATIENT)
Dept: HEMATOLOGY/ONCOLOGY | Facility: HOSPITAL | Age: 49
End: 2024-03-18

## 2024-03-18 NOTE — TELEPHONE ENCOUNTER
Collin Cernaor [phone]  has question about her test results not sure what they mean would like a call back.  Thanks [employer]
Pt spoke with MD
Medical decision making

## 2024-03-19 NOTE — PROGRESS NOTES
Research Phone Call Note      Received call from patient stating she has x2 bowel movements and each had a portion that was white in color. She declined taking new medications, or changing diet. She reports some bloating and gas but this is not unusual for her after treatment. This information was relayed to MD who indicated opinion to watch. If persists, come in for labs. This was relayed to patient who was in agreement with plan.

## 2024-04-09 NOTE — PROGRESS NOTES
[facility] Progress Note      Patient Name:  Lisa Cabral  YOB: 1975  Medical Record Number:  [id number]    Date of visit: 4/10/2024    CHIEF COMPLAINT: CLL    HPI:     49 year old female that I have followed since 9/23 after she was seen in the ER for cervical lymphadenopathy and leukocytosis. In retrospect, leukocytosis noted since 2015 but higher recently.  Cervical adenopathy has been going on for a few months.  On work-up, flow-monotypic B cells, positive for CD19, CD20, CD5.  FISH panel on peripheral blood showed deletion 17p with 42% nuclei positive for T p53 gene deletion.  PET 9/23-elevated uptake in bilateral neck, axilla and pelvic LN.  Cervical LN biopsy-CLL/SLL.  CT-lymphadenopathy in mediastinal/hilar region.  Also LN's supraclavicular, axillary, retroperitoneal, iliac areas.      Enrolled in clinical trial  and started Obinutuzumab on 10/23.  Venetoclax started 11/23.  Presents for cycle # 7.  More fatigue, wt gain.  Pain meds were adjusted by her specialist and that is helping.  Stable headache.  Sees therapist once a month.    SOCIAL HISTORY:    .  Current smoker. 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  Neurologic:  headache grade 2.  Respiratory: no cough, SOB or hemoptysis  Cardiovascular: no chest pain, ankle swelling, JARAMILLO  GI: Nausea grade 1.  Musculoskeletal: Muscle spasms.  Dermatologic: no alopecia, rash, pruritis  : no hematuria, dysuria or frequency  Psych: Depression grade 1.  Heme: no easy bruising or bleeding     ALLERGIES:    Allergies   Allergen Reactions    Aleve [Naproxen] HIVES and RASH    Ultram [Tramadol] NAUSEA AND VOMITING     Severe migraine       MEDICATIONS:    Current Outpatient Medications   Medication Sig Dispense Refill    venetoclax 100 MG Oral (EVOLVE STUDY DRUG) Take 4 tablets (400 mg total) by mouth daily. 128 tablet 0    mirtazapine 15  MG Oral Tab Take 1 tablet (15 mg total) by mouth nightly. 90 tablet 0    prochlorperazine (COMPAZINE) 10 mg tablet Take 1 tablet (10 mg total) by mouth every 6 (six) hours as needed for Nausea. 30 tablet 3    ondansetron (ZOFRAN) 8 MG tablet Take 1 tablet (8 mg total) by mouth every 8 (eight) hours as needed for Nausea. 30 tablet 3    venetoclax 100 MG Oral (EVOLVE STUDY DRUG) Take 4 tablets (400 mg total) by mouth daily. 128 tablet 0    escitalopram 20 MG Oral Tab Take 1 tablet (20 mg total) by mouth daily. 30 tablet 3    venetoclax 100 MG Oral (EVOLVE STUDY DRUG) Take 1 tablet (100 mg total) by mouth daily. Take 100 mg daily for one week, then take 200 mg daily for one week. 32 tablet 0    venetoclax 50 MG Oral (EVOLVE STUDY DRUG) Take 1 tablet (50 mg total) by mouth daily. 8 tablet 0    morphINE 15 MG Oral Tab Take 1 tablet (15 mg total) by mouth every 4 (four) hours as needed for Pain. 40 tablet 0    venetoclax Oral (EVOLVE STUDY DRUG) Take 2 tablets (20 mg total) by mouth daily. Take two 10 mg tablets daily. 16 tablet 0    HYDROmorphone 2 MG Oral Tab Take 1 tablet (2 mg total) by mouth every 6 (six) hours as needed for Pain. 20 tablet 0    ASHWAGANDHA OR Take 1 capsule by mouth daily.      ALPRAZolam (XANAX) 0.25 MG Oral Tab Take 1 tablet (0.25 mg total) by mouth daily as needed. 10 tablet 0    Turmeric (QC TUMERIC COMPLEX OR) Take 1 tablet by mouth 2 (two) times daily.      Cyanocobalamin (VITAMIN B 12 OR) Take 1 tablet by mouth daily. B 12 complex      oxyCODONE-acetaminophen  MG Oral Tab every 8 (eight) hours as needed for Pain.  0    sucralfate 1 g Oral Tab Take 1 tablet (1 g total) by mouth 2 (two) times daily.  0    omeprazole 20 MG Oral Capsule Delayed Release TK ONE C PO BID 30 MIN B EATING  0    Cholecalciferol (VITAMIN D-3) 5000 units Oral Tab Take 1 tablet (5,000 Units total) by mouth daily.         VITALS:  Height: 175.3 cm (5' 9.02\") (04/10 0944)  Weight: 114.8 kg (253 lb) (04/10 0944)  BSA  (Calculated - sq m): 2.28 sq meters (04/10 0944)  Pulse: 79 (04/10 0944)  BP: 134/84 (04/10 0944)  Temp: 97.4 °F (36.3 °C) (04/10 0944)  Do Not Use - Resp Rate: --  SpO2: 97 % (04/10 0944)    PS:  ECO    PHYSICAL EXAM:    General: alert and oriented x 3, not in acute distress.  HEENT: YASMANI, oropharynx  clear.  Neck: No palpable lymphadenopathy in neck or axillary area.  CVS: S1S2, regular  Rhythm, no murmurs.   Lungs: Clear to auscultation and percussion.  No axillary adenopathy.  Abdomen: No hepato-splenomegaly.  Unable to palpate spleen.  Extremities:  No edema.  CNS: no focal deficit    Emotional well being: Patient's emotional well being was assessed.  No issues requiring acute psychosocial intervention were identified.     LABS:     Recent Results (from the past 24 hour(s))   LDH [E]    Collection Time: 04/10/24  9:36 AM   Result Value Ref Range     84 - 246 U/L   URIC ACID, SERUM [E]    Collection Time: 04/10/24  9:36 AM   Result Value Ref Range    Uric Acid 4.6 2.6 - 6.0 mg/dL   Comp Metabolic Panel (14)    Collection Time: 04/10/24  9:36 AM   Result Value Ref Range    Glucose 111 (H) 70 - 99 mg/dL    Sodium 137 136 - 145 mmol/L    Potassium 3.9 3.5 - 5.1 mmol/L    Chloride 104 98 - 112 mmol/L    CO2 27.0 21.0 - 32.0 mmol/L    Anion Gap 6 0 - 18 mmol/L    BUN 12 9 - 23 mg/dL    Creatinine 0.86 0.55 - 1.02 mg/dL    Calcium, Total 8.9 8.5 - 10.1 mg/dL    Calculated Osmolality 284 275 - 295 mOsm/kg    eGFR-Cr 82 >=60 mL/min/1.73m2    AST 17 15 - 37 U/L    ALT 35 13 - 56 U/L    Alkaline Phosphatase 88 39 - 100 U/L    Bilirubin, Total 0.3 0.1 - 2.0 mg/dL    Total Protein 7.2 6.4 - 8.2 g/dL    Albumin 3.6 3.4 - 5.0 g/dL    Globulin  3.6 2.8 - 4.4 g/dL    A/G Ratio 1.0 1.0 - 2.0    Patient Fasting for CMP? Patient not present    PHOSPHORUS [E]    Collection Time: 04/10/24  9:36 AM   Result Value Ref Range    Phosphorus 3.2 2.5 - 4.9 mg/dL   CBC W/ DIFFERENTIAL    Collection Time: 04/10/24  9:36 AM    Result Value Ref Range    WBC 9.6 4.0 - 11.0 x10(3) uL    RBC 4.68 3.80 - 5.30 x10(6)uL    HGB 14.7 12.0 - 16.0 g/dL    HCT 43.1 35.0 - 48.0 %    .0 150.0 - 450.0 10(3)uL    MCV 92.1 80.0 - 100.0 fL    MCH 31.4 26.0 - 34.0 pg    MCHC 34.1 31.0 - 37.0 g/dL    RDW 13.1 %    Neutrophil Absolute Prelim 6.97 1.50 - 7.70 x10 (3) uL    Neutrophil Absolute 6.97 1.50 - 7.70 x10(3) uL    Lymphocyte Absolute 1.81 1.00 - 4.00 x10(3) uL    Monocyte Absolute 0.74 0.10 - 1.00 x10(3) uL    Eosinophil Absolute 0.00 0.00 - 0.70 x10(3) uL    Basophil Absolute 0.02 0.00 - 0.20 x10(3) uL    Immature Granulocyte Absolute 0.04 0.00 - 1.00 x10(3) uL    Neutrophil % 72.8 %    Lymphocyte % 18.9 %    Monocyte % 7.7 %    Eosinophil % 0.0 %    Basophil % 0.2 %    Immature Granulocyte % 0.4 %           ASSESSMENT AND PLAN:     # CLL: diag 9/23 on peripheral blood flow and biopsy of R cervical LN.  Aponte stage I.   CLL IPI: 7  Cytogenetics-del 17p  IGHV unmutated  Elevated beta-2 microglobulin.    Enrolled in clinical trial .  Received Obinutuzumab 10/23-3/24. Venetoclax 11/23, dose successfully ramped up, remains on it.  Cycle # 7 today.  Normal WBC, no tumor lysis.  Off allopurinol.  Complete resolution of palpable adenopathy/splenomegaly.  Has some side effects which are manageable and she does not wish to do dose reduction.    # Headache: Possibly due to 1 or both medications.  Stable.    # Depression: Escitalopram started and dose increased.  Seeing palliative care and her own therapist.    # Weight gain: Referral to weight loss clinic.    ORDERS PLACED:        Procedures    LDH [E]    URIC ACID, SERUM [E]    CBC W Differential W Platelet    Comp Metabolic Panel (14)    PHOSPHORUS [E]    [facility] [place] Location       Return in about 2 months (around 6/10/2024) for Labs, MD visit.    Tamica Herring M.D.    [facility]    [address] [place],  [address]    4/10/2024

## 2024-04-10 ENCOUNTER — APPOINTMENT (OUTPATIENT)
Dept: HEMATOLOGY/ONCOLOGY | Facility: HOSPITAL | Age: 49
End: 2024-04-10
Attending: INTERNAL MEDICINE

## 2024-04-10 ENCOUNTER — RESEARCH ENCOUNTER (OUTPATIENT)
Dept: HEMATOLOGY/ONCOLOGY | Facility: HOSPITAL | Age: 49
End: 2024-04-10

## 2024-04-10 ENCOUNTER — OFFICE VISIT (OUTPATIENT)
Dept: HEMATOLOGY/ONCOLOGY | Facility: HOSPITAL | Age: 49
End: 2024-04-10
Attending: INTERNAL MEDICINE
Payer: COMMERCIAL

## 2024-04-10 VITALS
DIASTOLIC BLOOD PRESSURE: 84 MMHG | BODY MASS INDEX: 37.47 KG/M2 | SYSTOLIC BLOOD PRESSURE: 134 MMHG | HEART RATE: 79 BPM | RESPIRATION RATE: 18 BRPM | HEIGHT: 69.02 IN | WEIGHT: 253 LBS | TEMPERATURE: 97 F | OXYGEN SATURATION: 97 %

## 2024-04-10 DIAGNOSIS — T88.9XXD: ICD-10-CM

## 2024-04-10 DIAGNOSIS — C91.10 CLL (CHRONIC LYMPHOCYTIC LEUKEMIA) (HCC): Primary | ICD-10-CM

## 2024-04-10 DIAGNOSIS — R63.5 WEIGHT GAIN: ICD-10-CM

## 2024-04-10 LAB
ALBUMIN SERPL-MCNC: 3.6 G/DL (ref 3.4–5)
ALBUMIN/GLOB SERPL: 1 {RATIO} (ref 1–2)
ALP LIVER SERPL-CCNC: 88 U/L
ALT SERPL-CCNC: 35 U/L
ANION GAP SERPL CALC-SCNC: 6 MMOL/L (ref 0–18)
AST SERPL-CCNC: 17 U/L (ref 15–37)
BASOPHILS # BLD AUTO: 0.02 X10(3) UL (ref 0–0.2)
BASOPHILS NFR BLD AUTO: 0.2 %
BILIRUB SERPL-MCNC: 0.3 MG/DL (ref 0.1–2)
BUN BLD-MCNC: 12 MG/DL (ref 9–23)
CALCIUM BLD-MCNC: 8.9 MG/DL (ref 8.5–10.1)
CHLORIDE SERPL-SCNC: 104 MMOL/L (ref 98–112)
CO2 SERPL-SCNC: 27 MMOL/L (ref 21–32)
CREAT BLD-MCNC: 0.86 MG/DL
EGFRCR SERPLBLD CKD-EPI 2021: 82 ML/MIN/1.73M2 (ref 60–?)
EOSINOPHIL # BLD AUTO: 0 X10(3) UL (ref 0–0.7)
EOSINOPHIL NFR BLD AUTO: 0 %
ERYTHROCYTE [DISTWIDTH] IN BLOOD BY AUTOMATED COUNT: 13.1 %
GLOBULIN PLAS-MCNC: 3.6 G/DL (ref 2.8–4.4)
GLUCOSE BLD-MCNC: 111 MG/DL (ref 70–99)
HCT VFR BLD AUTO: 43.1 %
HGB BLD-MCNC: 14.7 G/DL
IMM GRANULOCYTES # BLD AUTO: 0.04 X10(3) UL (ref 0–1)
IMM GRANULOCYTES NFR BLD: 0.4 %
LDH SERPL L TO P-CCNC: 193 U/L
LYMPHOCYTES # BLD AUTO: 1.81 X10(3) UL (ref 1–4)
LYMPHOCYTES NFR BLD AUTO: 18.9 %
MCH RBC QN AUTO: 31.4 PG (ref 26–34)
MCHC RBC AUTO-ENTMCNC: 34.1 G/DL (ref 31–37)
MCV RBC AUTO: 92.1 FL
MONOCYTES # BLD AUTO: 0.74 X10(3) UL (ref 0.1–1)
MONOCYTES NFR BLD AUTO: 7.7 %
NEUTROPHILS # BLD AUTO: 6.97 X10 (3) UL (ref 1.5–7.7)
NEUTROPHILS # BLD AUTO: 6.97 X10(3) UL (ref 1.5–7.7)
NEUTROPHILS NFR BLD AUTO: 72.8 %
OSMOLALITY SERPL CALC.SUM OF ELEC: 284 MOSM/KG (ref 275–295)
PHOSPHATE SERPL-MCNC: 3.2 MG/DL (ref 2.5–4.9)
PLATELET # BLD AUTO: 254 10(3)UL (ref 150–450)
POTASSIUM SERPL-SCNC: 3.9 MMOL/L (ref 3.5–5.1)
PROT SERPL-MCNC: 7.2 G/DL (ref 6.4–8.2)
RBC # BLD AUTO: 4.68 X10(6)UL
SODIUM SERPL-SCNC: 137 MMOL/L (ref 136–145)
URATE SERPL-MCNC: 4.6 MG/DL
WBC # BLD AUTO: 9.6 X10(3) UL (ref 4–11)

## 2024-04-10 PROCEDURE — 80053 COMPREHEN METABOLIC PANEL: CPT | Performed by: INTERNAL MEDICINE

## 2024-04-10 PROCEDURE — 99211 OFF/OP EST MAY X REQ PHY/QHP: CPT

## 2024-04-10 PROCEDURE — 36415 COLL VENOUS BLD VENIPUNCTURE: CPT

## 2024-04-10 PROCEDURE — 85025 COMPLETE CBC W/AUTO DIFF WBC: CPT | Performed by: INTERNAL MEDICINE

## 2024-04-10 PROCEDURE — 84550 ASSAY OF BLOOD/URIC ACID: CPT | Performed by: INTERNAL MEDICINE

## 2024-04-10 PROCEDURE — 84100 ASSAY OF PHOSPHORUS: CPT | Performed by: INTERNAL MEDICINE

## 2024-04-10 PROCEDURE — 83615 LACTATE (LD) (LDH) ENZYME: CPT | Performed by: INTERNAL MEDICINE

## 2024-04-10 NOTE — PROGRESS NOTES
CLINICAL RESEARCH NOTE  Study: , \"Randomized, Phase III Study of Early Intervention with Venetoclax and Obinutuzumab Versus Delayed Therapy with Venetoclax and Obinutuzumab in Newly Diagnosed Asymptomatic High-Risk Patients with Chronic Lymphocytic Leukemia / Small Lymphocytic Lymphoma (CLL/SLL): EVOLVE CLL/SLL Study   Patient ID: [id number]  Arm: Early Treatment with VO  Visit: C7D1    Patient presents for on-treatment visit. Fact-BERENICE questionnaire administered to subject who completed it independently. Chemo labs and study blood drawn peripherally. Labs adequate for treatment. Patient assessed by MD.    Patient denies shortness of breath, cough, rash, fevers.     AE Review:  - Diarrhea, stable gr 1.   - Constipation, stable gr 1  - Depression, gr 2. Still with flat affect, but feels it is starting to improve now that weather is warmer and sunnier. Seen by palliative care today  - Headache, stable. Still present daily,  - Bone pain, still reporting stable diffuse, deep bone aching.   - Arthralgia/Mylagia - stable. Still reporting pain in muscles and joints, diffuse.   - Nausea, stable gr 1. Using meds prn, appetite good. More weight gain recently  - Paresthesias (burning sensations, diffuse), stable gr 1  - Malaise, stable grade 1.   - Fatigue, stable gr 2.   - Hot flashes, stable gr 2    Medication Changes:  Oxycodone/Acetaminophen 10/325 increased to QID from TID    Return to clinic:  No longer required to have monthly labs and clinical assessments. Patient will return to clinic in 2 months for C9D1 and see MD. Appt requested.           Medication Dispensing Note:  Patient returned x 3 empty bottles and x1 partial bottle of study drug, same lot # as dispensed. Returned #24 total tablets. Patient returned pill diary - she missed x 2 doses. Patient reports this is due to falling asleep early. Bottles were logged in [employer].  Patient was dispensed x 4 new bottles of venetoclax Lot [id number]. Patent was also given new  pill diary for C7.     Specimen Shipping Note:   Collected whole blood for both [facility] (6-10mL) and banking for [facility] (appx 15 mL). Collected in EDTA tubes per [facility] directions. Labeled and packaged per specs. Shipped overnight via [employer].

## 2024-04-10 NOTE — PROGRESS NOTES
Education Record    Learner:  Patient    Disease / Diagnosis:CLL   On study     Barriers / Limitations:  None   Comments:    Method:  Discussion   Comments:    General Topics:  Plan of care reviewed   Comments:    Outcome:  Shows understanding   Comments:   Venetoclax 400mg daily  Pt feeling very fatigue and low energy.   SOB with any exertion.   Reports itchy skin all over, seems worse.   Pain md increased pain medicine, so more tolerable.   Weight gain.

## 2024-04-11 ENCOUNTER — TELEPHONE (OUTPATIENT)
Dept: HEMATOLOGY/ONCOLOGY | Facility: HOSPITAL | Age: 49
End: 2024-04-11

## 2024-04-12 ENCOUNTER — TELEPHONE (OUTPATIENT)
Dept: HEMATOLOGY/ONCOLOGY | Facility: HOSPITAL | Age: 49
End: 2024-04-12

## 2024-04-15 RX ORDER — ESCITALOPRAM OXALATE 20 MG/1
20 TABLET ORAL DAILY
Qty: 30 TABLET | Refills: 1 | Status: SHIPPED | OUTPATIENT
Start: 2024-04-15

## 2024-04-19 ENCOUNTER — DOCUMENTATION ONLY (OUTPATIENT)
Dept: HEMATOLOGY/ONCOLOGY | Facility: HOSPITAL | Age: 49
End: 2024-04-19

## 2024-05-09 ENCOUNTER — RESEARCH ENCOUNTER (OUTPATIENT)
Dept: HEMATOLOGY/ONCOLOGY | Facility: HOSPITAL | Age: 49
End: 2024-05-09

## 2024-05-09 NOTE — PROGRESS NOTES
CLINICAL RESEARCH NOTE  Study: , \"Randomized, Phase III Study of Early Intervention with Venetoclax and Obinutuzumab Versus Delayed Therapy with Venetoclax and Obinutuzumab in Newly Diagnosed Asymptomatic High-Risk Patients with Chronic Lymphocytic Leukemia / Small Lymphocytic Lymphoma (CLL/SLL): EVOLVE CLL/SLL Study   Patient ID: [id number]  Visit: C8    Patient here for dispensing of Venetoclax. Physical exam and labs not required at this timepoint. However, patient expressing she is not feeling well today. Increased nausea, headache and pain still present. Patient unfortunately refusing to see APN for additional assessment. MD made aware.     AE Review:  - Diarrhea, stable gr 1.   - Constipation, stable gr 1  - Depression, gr 2. Still with flat affect.  - Headache, stable. Still present daily, usually tolerable, using morphine PRN.   - Bone pain, grade 2.   - Arthralgia/Mylagia , grade 2. Aching slightly more prominent, reports still able to complete activities PRN.   - Nausea, increased gr 2. Using meds prn, but now appetite is decreased, sometimes only will eat crackers for the whole day.   - Paresthesias (burning sensations, diffuse), stable gr 2.   - Malaise, resolved  - Fatigue, still gr 2, she feels this may be worsening. She is napping more, but still completing daily activities.   - Hot flashes, gr 2    Medication Changes:  None    Patient will return to clinic in 4 weeks  for C9.     Medication Dispensing Note:  Patient did not return empty bottles from C7, and did not return pill diary. Patient verbally reports she is compliant. x4 bottles of 100mg venetoclax were dispensed to patient, all with lot # [id number].Advised patient to bring empty bottle and two pill diaries to appt on 6/5/24.

## 2024-05-20 RX ORDER — MIRTAZAPINE 15 MG/1
15 TABLET, FILM COATED ORAL NIGHTLY
Qty: 90 TABLET | Refills: 0 | Status: SHIPPED | OUTPATIENT
Start: 2024-05-20

## 2024-05-24 ENCOUNTER — RESEARCH ENCOUNTER (OUTPATIENT)
Dept: HEMATOLOGY/ONCOLOGY | Facility: HOSPITAL | Age: 49
End: 2024-05-24

## 2024-05-24 NOTE — PROGRESS NOTES
CLINICAL RESEARCH NOTE  Study: , \"Randomized, Phase III Study of Early Intervention with Venetoclax and Obinutuzumab Versus Delayed Therapy with Venetoclax and Obinutuzumab in Newly Diagnosed Asymptomatic High-Risk Patients with Chronic Lymphocytic Leukemia / Small Lymphocytic Lymphoma (CLL/SLL): EVOLVE CLL/SLL Study   Patient ID: [id number]  Date: 5/24/24    Phone Call Note/AE Assessment - Treatment Held    Patient called in to report that she experience sudden onset of blurry vision, bilateral eyes, in the peripheries. She rested, ate and drank and symptoms resolved within 30 minutes. Patient was advised to monitor and report back if symptoms persist.     Patient also called to discuss worsening symptoms related to venetoclax.   - Arthalgia/Myalgias - now gr 3. She is having difficulty moving and she is not able to perform all ADLs  - Bone pain, now gr 3. Limited self care  - Fatigue, still gr 2 but important to note thatshe reports she is sleeping 10 hours a night, and taking a 2-3 hour a day nap. It does not relieve her fatigue and fatigue is now limited her self care (she is not able to take showers or clean some days). However, she also report depression may be playing a part with how much she is sleeping. Will maintain grading for now and wait until reassessemtn.     Care was discussed with treating investigator and protocol was reviewed - it is advised that patient hold venetoclax until symptoms above are grade 1 or less. Patient able to hold for up to 28 days to allow for resolution. This is the first instance of treatment delay due to these severity of these particular AEs, so when grade is acceptable, patient is allowed to return to previous dosing.     Patient was advised of above and agreeable with plan. Patient will call back if symptoms improve, otherwise, research will call to reassess patient in 1 week. Next clinic appt is 6/5.

## 2024-05-31 ENCOUNTER — RESEARCH ENCOUNTER (OUTPATIENT)
Dept: HEMATOLOGY/ONCOLOGY | Facility: HOSPITAL | Age: 49
End: 2024-05-31

## 2024-05-31 NOTE — PROGRESS NOTES
CLINICAL RESEARCH NOTE  Study: , \"Randomized, Phase III Study of Early Intervention with Venetoclax and Obinutuzumab Versus Delayed Therapy with Venetoclax and Obinutuzumab in Newly Diagnosed Asymptomatic High-Risk Patients with Chronic Lymphocytic Leukemia / Small Lymphocytic Lymphoma (CLL/SLL): EVOLVE CLL/SLL Study     Phone Call    Spoke with patient in regards to Aes reported x 1 weeks ago - drug was held and patient was called to be reassessed for restarting.     Patient reporting only mildly improved symptoms, not indicative of return to baseline or </= grade 1 at this time.     Specifically, patient reporting she is less fatigued, not sleeping as much, and was able to complete some ADLs , however, she is still having to rest often. She is still spending a lot of time on the couch. In addition, she reports her arthalgia and myalgias are still significant. She is still having trouble getting moving and needs to rest often for this reason as well.     She will be coming in for evaluation of C9 next week. Will continue to hold medication until that time.

## 2024-06-05 ENCOUNTER — RESEARCH ENCOUNTER (OUTPATIENT)
Dept: HEMATOLOGY/ONCOLOGY | Facility: HOSPITAL | Age: 49
End: 2024-06-05

## 2024-06-05 ENCOUNTER — OFFICE VISIT (OUTPATIENT)
Dept: HEMATOLOGY/ONCOLOGY | Facility: HOSPITAL | Age: 49
End: 2024-06-05
Attending: INTERNAL MEDICINE
Payer: COMMERCIAL

## 2024-06-05 VITALS
HEART RATE: 67 BPM | WEIGHT: 259.38 LBS | OXYGEN SATURATION: 97 % | TEMPERATURE: 98 F | BODY MASS INDEX: 38.42 KG/M2 | SYSTOLIC BLOOD PRESSURE: 146 MMHG | HEIGHT: 69.02 IN | RESPIRATION RATE: 20 BRPM | DIASTOLIC BLOOD PRESSURE: 94 MMHG

## 2024-06-05 DIAGNOSIS — R52 PAIN: ICD-10-CM

## 2024-06-05 DIAGNOSIS — F41.8 DEPRESSION WITH ANXIETY: ICD-10-CM

## 2024-06-05 DIAGNOSIS — R53.82 CHRONIC FATIGUE, UNSPECIFIED: ICD-10-CM

## 2024-06-05 DIAGNOSIS — C91.10 CLL (CHRONIC LYMPHOCYTIC LEUKEMIA) (HCC): Primary | ICD-10-CM

## 2024-06-05 DIAGNOSIS — Z51.5 PALLIATIVE CARE BY SPECIALIST: ICD-10-CM

## 2024-06-05 DIAGNOSIS — G47.9 SLEEPING DIFFICULTY: ICD-10-CM

## 2024-06-05 DIAGNOSIS — M79.10 MYALGIA: ICD-10-CM

## 2024-06-05 LAB
ALBUMIN SERPL-MCNC: 3.8 G/DL (ref 3.4–5)
ALBUMIN/GLOB SERPL: 1 {RATIO} (ref 1–2)
ALP LIVER SERPL-CCNC: 87 U/L
ALT SERPL-CCNC: 33 U/L
ANION GAP SERPL CALC-SCNC: 5 MMOL/L (ref 0–18)
AST SERPL-CCNC: 15 U/L (ref 15–37)
BASOPHILS # BLD AUTO: 0.05 X10(3) UL (ref 0–0.2)
BASOPHILS NFR BLD AUTO: 0.2 %
BILIRUB SERPL-MCNC: 0.3 MG/DL (ref 0.1–2)
BUN BLD-MCNC: 8 MG/DL (ref 9–23)
CALCIUM BLD-MCNC: 9.3 MG/DL (ref 8.5–10.1)
CHLORIDE SERPL-SCNC: 107 MMOL/L (ref 98–112)
CO2 SERPL-SCNC: 28 MMOL/L (ref 21–32)
CREAT BLD-MCNC: 0.7 MG/DL
EGFRCR SERPLBLD CKD-EPI 2021: 105 ML/MIN/1.73M2 (ref 60–?)
EOSINOPHIL # BLD AUTO: 0 X10(3) UL (ref 0–0.7)
EOSINOPHIL NFR BLD AUTO: 0 %
ERYTHROCYTE [DISTWIDTH] IN BLOOD BY AUTOMATED COUNT: 12.9 %
GLOBULIN PLAS-MCNC: 3.8 G/DL (ref 2.8–4.4)
GLUCOSE BLD-MCNC: 90 MG/DL (ref 70–99)
HCT VFR BLD AUTO: 41.4 %
HGB BLD-MCNC: 14.4 G/DL
IMM GRANULOCYTES # BLD AUTO: 0.27 X10(3) UL (ref 0–1)
IMM GRANULOCYTES NFR BLD: 1.1 %
LDH SERPL L TO P-CCNC: 227 U/L
LYMPHOCYTES # BLD AUTO: 1.76 X10(3) UL (ref 1–4)
LYMPHOCYTES NFR BLD AUTO: 7.3 %
MCH RBC QN AUTO: 32.1 PG (ref 26–34)
MCHC RBC AUTO-ENTMCNC: 34.8 G/DL (ref 31–37)
MCV RBC AUTO: 92.2 FL
MONOCYTES # BLD AUTO: 1.16 X10(3) UL (ref 0.1–1)
MONOCYTES NFR BLD AUTO: 4.8 %
NEUTROPHILS # BLD AUTO: 20.73 X10 (3) UL (ref 1.5–7.7)
NEUTROPHILS # BLD AUTO: 20.73 X10(3) UL (ref 1.5–7.7)
NEUTROPHILS NFR BLD AUTO: 86.6 %
OSMOLALITY SERPL CALC.SUM OF ELEC: 288 MOSM/KG (ref 275–295)
PHOSPHATE SERPL-MCNC: 2.7 MG/DL (ref 2.5–4.9)
PLATELET # BLD AUTO: 230 10(3)UL (ref 150–450)
POTASSIUM SERPL-SCNC: 4.2 MMOL/L (ref 3.5–5.1)
PROT SERPL-MCNC: 7.6 G/DL (ref 6.4–8.2)
RBC # BLD AUTO: 4.49 X10(6)UL
SODIUM SERPL-SCNC: 140 MMOL/L (ref 136–145)
URATE SERPL-MCNC: 3.4 MG/DL
WBC # BLD AUTO: 24 X10(3) UL (ref 4–11)

## 2024-06-05 PROCEDURE — 99215 OFFICE O/P EST HI 40 MIN: CPT | Performed by: CLINICAL NURSE SPECIALIST

## 2024-06-05 PROCEDURE — 99214 OFFICE O/P EST MOD 30 MIN: CPT | Performed by: NURSE PRACTITIONER

## 2024-06-05 RX ORDER — METOCLOPRAMIDE HYDROCHLORIDE 5 MG/ML
10 INJECTION INTRAMUSCULAR; INTRAVENOUS EVERY 6 HOURS PRN
Status: CANCELLED
Start: 2024-06-06

## 2024-06-05 RX ORDER — METOCLOPRAMIDE HYDROCHLORIDE 5 MG/ML
10 INJECTION INTRAMUSCULAR; INTRAVENOUS EVERY 6 HOURS PRN
Status: CANCELLED
Start: 2024-06-05

## 2024-06-05 NOTE — PROGRESS NOTES
Palliative Care Follow Up Note     Patient Name: Lisa Cabral   YOB: 1975   Medical Record Number: [id number]   CSN: [id number]   Date of visit: 6/5/2024     Chief Complaint/Reason for Visit:  Follow up visit for mood     History of Present Illness:         Lisa Cabral is a 49 year old female with CLL receiving treatment.  She has chronic back pain and has been using percocet for 10 years managed by her pain specialist in [place].  She also uses morphine IR very infrequently to help with headaches as a result of her CLL treatment,  She recently received radiofrequency ablation to help with neck pain and headaches.  She continues to complain of generalize arthralgias and myalgias since being on treatment.  She feels this is manageable most days.  She feels her mood swings and overall mood is more stable and better with escitalopram and mirtazapine.  She is sleeping well which she is happy about.  She has gained weight as a result f the mirtazapine and this bothers her some.  She still struggles with fatigue but feels this is manageable.        Problem List:  Patient Active Problem List   Diagnosis    Spasm of back muscles    Sciatica    Headache    History of robot-assisted laparoscopic hysterectomy    Pelvic mass    CLL (chronic lymphocytic leukemia) (HCC)    Palliative care by specialist      Medical History:  Past Medical History:    Anxiety    Cancer (HCC)    CLL    Depression    PONV (postoperative nausea and vomiting)    Visual impairment    reading glasses     Surgical History:  Past Surgical History:   Procedure Laterality Date    Hysterectomy      1 ovary in place    Laparoscopic cholecystectomy      Lumbar spine fusion combined      Other      C-Spine fusion       Allergies:  Allergies   Allergen Reactions    Aleve [Naproxen] HIVES and RASH    Ultram [Tramadol] NAUSEA AND VOMITING     Severe migraine       Palliative Care Social History:    Marital Status: Erik  Children: 3 grown  daughters and 3 grandchildren  Living Situation: independent in home      Medications:  Current Outpatient Medications   Medication Sig Dispense Refill    venetoclax 100 MG Oral (EVOLVE STUDY DRUG) Take 4 tablets (400 mg total) by mouth daily. 128 tablet 0    MIRTAZAPINE 15 MG Oral Tab TAKE 1 TABLET(15 MG) BY MOUTH EVERY NIGHT 90 tablet 0    escitalopram 20 MG Oral Tab Take 1 tablet (20 mg total) by mouth daily. 30 tablet 1    prochlorperazine (COMPAZINE) 10 mg tablet Take 1 tablet (10 mg total) by mouth every 6 (six) hours as needed for Nausea. (Patient not taking: Reported on 6/5/2024) 30 tablet 3    ondansetron (ZOFRAN) 8 MG tablet Take 1 tablet (8 mg total) by mouth every 8 (eight) hours as needed for Nausea. (Patient not taking: Reported on 6/5/2024) 30 tablet 3    morphINE 15 MG Oral Tab Take 1 tablet (15 mg total) by mouth every 4 (four) hours as needed for Pain. 40 tablet 0    HYDROmorphone 2 MG Oral Tab Take 1 tablet (2 mg total) by mouth every 6 (six) hours as needed for Pain. 20 tablet 0    ASHWAGANDHA OR Take 1 capsule by mouth daily.      ALPRAZolam (XANAX) 0.25 MG Oral Tab Take 1 tablet (0.25 mg total) by mouth daily as needed. 10 tablet 0    Turmeric (QC TUMERIC COMPLEX OR) Take 1 tablet by mouth 2 (two) times daily.      Cyanocobalamin (VITAMIN B 12 OR) Take 1 tablet by mouth daily. B 12 complex      oxyCODONE-acetaminophen  MG Oral Tab every 8 (eight) hours as needed for Pain.  0    sucralfate 1 g Oral Tab Take 1 tablet (1 g total) by mouth 2 (two) times daily.  0    omeprazole 20 MG Oral Capsule Delayed Release TK ONE C PO BID 30 MIN B EATING  0    Cholecalciferol (VITAMIN D-3) 5000 units Oral Tab Take 1 tablet (5,000 Units total) by mouth daily.         Review of Systems:  General:  Fatigue.  Feels well.    Respiratory:  Denies SOB, denies cough  Cardiac:  Denies chest pain, heart palpitations  Abdomen:  Denies constipation, diarrhea.  Denies pain.    Psych:  No complaints.  Sleeping  well    Palliative Performance Scale:   90%    Physical Examination:  General: Patient is alert and oriented, not in acute distress.  Respiratory: normal excursions and effort  Cardiac: Regular rate   No edema  Abdomen: Soft, non tender   Musculoskeletal: Normal gait.  Psych:  Mood/Affect appropriate    Advanced Directives Discussed and Completed:     HCPOA/Health Surrogate:    There is no completed HCPOA documentation on file in Epic.    POLST Discussed and Completed:     Palliative Care:   patient is happy with current plan.  She will complete treatment in October and she wants to wean off lexapro and mirtazapine then.    Offered to reduce mirtazapine dose now but she likes that its helping her sleep at night.  Discussed that she has 3 prn opioids on her med list.  She is using percocet QID from her pain specialist.  She feels the prn morphine provides more benefit for treatment related headaches when needed so she will continue this as needed.  She will stop using the hydromorphone    Impression/Plan:   1. Depression with anxiety  Escitalopram 20mg daily  Mirtazapine 15mg Q HS     2. Sleeping difficulty  Melatonin 10mg nightly prn  mirtazapine    3. Pain - managed by pain specialist  Percocet 10mg QID  Morphine 15mg Q 4 prn      Planned Follow up: Return in about 2 months (around 8/5/2024).    Encounter Times  Reviewing/Obtaining:  minutes    Medical Exam:   minutes      Plan:   5 minutes    Notes:   5 minutes      Counseling/Education: 20 minutes    Care Coordination:   minutes      My total time spent caring for the patient on the day of the encounter:  30 minutes.       The 21st Century Cures Act makes medical notes like these available to patients in the interest of transparency. Please be advised this is a medical document. Medical documents are intended to carry relevant information, facts as evident, and the clinical opinion of the practitioner. The medical note is intended as peer to peer communication  and may appear blunt or direct. It is written in medical language and may contain abbreviations or verbiage that are unfamiliar.         Electronically Signed by:  LETTY IBARRA Outpatient Palliative Nurse Practitioner

## 2024-06-05 NOTE — PROGRESS NOTES
CLINICAL RESEARCH NOTE  Study: , \"Randomized, Phase III Study of Early Intervention with Venetoclax and Obinutuzumab Versus Delayed Therapy with Venetoclax and Obinutuzumab in Newly Diagnosed Asymptomatic High-Risk Patients with Chronic Lymphocytic Leukemia / Small Lymphocytic Lymphoma (CLL/SLL): EVOLVE CLL/SLL Study   Patient ID: [id number]  Date: 6/5/2024  Visit: C9D1    Patient presents for evaluation to resume treatment with C9D1.  Labs drawn per protocol and specimens for MRD collected, kept ambient until shipping. Chemo labs complete and adequate for treatment. WBC count and lymphocytes elevated - discussed with APN and MD on site - likely due to infectious process not related to CLL or tx. To note, patient had cervical RFA procedure done yesterday. Patient assessed by med onc APN and palliative care APN today.     Venetoclax had been on hold since 5/24/24 due to significant grade 3 arthralgia, myalgia and bone pain. Patient reporting today these are all now grade 1. Special note made regarding myalgia, patient reports last she was at a grade 1 up until today. Yesterday, she underwent a cervical rafiofrequency ablation for her chronic neck pain, and because of this, her neck and shoulders are sore. Patient reports myalgia otherwise was mild. This is not attributable to study drug, APN in agreement. Per protocol, we can resume treatment today at 400mg daily. We do not need to dose reduce as this is first instance of these AEs.     Patient denies fever, rash, cough, SOB, no vomiting, diarrhea, constipation.     AE Changes:  -Arthalgia - diffuse, gr 1 today  -Myalgia - gr 1. baseline, post=procedure neck/shoulder pain only present today.   -Fatigue, remains gr 2  -Nausea, mild gr 1. Not using meds regularly  -Hot flashes, grade 1 today, improved  -Depression, still gr 2. Seeing palliative care RN  -Headache, gr 1 improved over the last week.   -Muscle cramps, still gr 1.   -Paresthesias, resolved  -Diarrhea,  resolved  -Bone pain - resolved  -Constipation, resolved    Medication Changes:  None    Patient will return to clinic in 4 weeks  for C10D1.     Medication Dispensing Note:  Patient returned 8 bottles of venetoclax 100mg tablets back to clinic from C7 and C8. All bottles of C7 medications were return empty (kit [id number]). From C8. patient returned x1 empty bottle and one partially empty bottle with lot #[id number]. The partial empty bottle contained #15. All of these bottles were returned to the pharmacy for destruction per institution guidelines. The remaining x2 bottles were unopened. These were re-dispensed to patient along with 2 new unopened bottles. All 4 bottles given to patient today were kit#[id number]. Patient returned diary for C7 + C8 and this is stored in source chart. Review of compliance completed. Patient continued taking pills on 5/24-5/25 despite being advised to stop treatment on 5/24 for AEs. This is reflected on the pill diaries. Patient should have returned 8 tablets in the unopened bottle. Patient did not account for the tablet count being off. No risk of overdose as patient returned more than logged. I reviewed importance of accurate information logging and pill compliance with patient.     Specimen Shipping Note:   Specimens (x2 6 mL purple EDTA tubes) for MRD Assay collected at today's visit were shipped ambient day of collection via [employer] tracking # [id number]

## 2024-06-05 NOTE — PROGRESS NOTES
Chief Complaint   Patient presents with    Follow - Up     Pt is here for follow up - oral chemo EVOLVE drug study venetoclax. She had a nerve pain procedure done yesterday that is causing pain today. She reports a good appetite and hydration; occasional nausea but no vomiting. On study drug she states she has cyclical diarrhea and constipation. BP elevated, patient reports elevated secondary to pain.    Education Record    Learner:  Patient    Disease / Diagnosis: CLL    Barriers / Limitations:  None   Comments:    Method:  Brief focused   Comments:    General Topics:  Diet, Medication, Pain, Side effects and symptom management, and Plan of care reviewed   Comments:    Outcome:  Shows understanding   Comments:

## 2024-06-05 NOTE — PROGRESS NOTES
[facility] Progress Note    Patient Name: Lisa Cabral   YOB: 1975   Medical Record Number: [id number]   CSN: [id number]   Date of visit: 6/5/2024  Provider: LETTY Hackett  Referring Physician: Elsy Herring         Chief Complaint:  Follow up     The 21st Century Cures Act makes medical notes like these available to patients in the interest of transparency. Please be advised this is a medical document. Medical documents are intended to carry relevant information, facts as evident, and the clinical opinion of the practitioner. The medical note is intended as peer to peer communication and may appear blunt or direct. It is written in medical language and may contain abbreviations or verbiage that are unfamiliar.        Hematologic/Oncologic History:  49 year old female  followed since 9/23 after she was seen in the ER for cervical lymphadenopathy and leukocytosis. In retrospect, leukocytosis noted since 2015 but higher at the time she was seen by Dr. Herring initially.  Cervical adenopathy had been going on for a few months.  On work-up, flow-monotypic B cells, positive for CD19, CD20, CD5.  FISH panel on peripheral blood showed deletion 17p with 42% nuclei positive for T p53 gene deletion.  PET 9/23-elevated uptake in bilateral neck, axilla and pelvic LN.  Cervical LN biopsy-CLL/SLL.  CT-lymphadenopathy in mediastinal/hilar region.  Also LN's supraclavicular, axillary, retroperitoneal, iliac areas.       Enrolled in clinical trial  and started Obinutuzumab on 10/25/2023.    Started Venetoclax 11/15/23    Interval Events:  49 year old  patient of Dr. Herring's  who presents for evaluation on above protocol.  She has been held due to severe arthralgia/myalgia, bone pain, and fatigue which she continues to report but also states is associated with the neck RFA yesterday for her chronic neck pain/migraines..    Difficult to discern due to preexisting chronic pains and underlying  depression/anxiety.  She is sore from neck RFA she had done   She has some nausea/queasiness.  No emesis.  Does not like to take antiemetics bec of her senstivity to medications.  No active diarrhea or constipation.  She complains of abdominal bloating.  She has chronic pains for which she sees a pain physician.  She also follows with Lu William.  She has not had Venetoclax for 1.5 weeks due to not meeting criteria based on her symptoms.  She is afebrile.  No cough/SOB or CP.  No diarrhea or dysuria.      Allergies:  Allergies   Allergen Reactions    Aleve [Naproxen] HIVES and RASH    Ultram [Tramadol] NAUSEA AND VOMITING     Severe migraine       Social History     Socioeconomic History    Marital status:    Tobacco Use    Smoking status: Every Day     Current packs/day: 0.50     Types: Cigarettes    Smokeless tobacco: Never   Vaping Use    Vaping status: Some Days    Substances: Nicotine   Substance and Sexual Activity    Alcohol use: Not Currently    Drug use: Not Currently     Types: Cannabis     Comment: edible prn for neck pain/headache    Sexual activity: Not Currently     Partners: Male     Birth control/protection: Hysterectomy        Past Medical History:    Anxiety    Cancer (HCC)    CLL    Depression    PONV (postoperative nausea and vomiting)    Visual impairment    reading glasses        Past Surgical History:   Procedure Laterality Date    Hysterectomy      1 ovary in place    Laparoscopic cholecystectomy      Lumbar spine fusion combined      Other      C-Spine fusion          Vital Signs:  Height: 175.3 cm (5' 9.02\") (06/05 1035)  Weight: 117.7 kg (259 lb 6.4 oz) (06/05 1035)  BSA (Calculated - sq m): 2.31 sq meters (06/05 1035)  Pulse: 85 (06/05 1035)  BP: 161/100 (06/05 1035)  Temp: 98 °F (36.7 °C) (06/05 1035)  Do Not Use - Resp Rate: --  SpO2: 96 % (06/05 1035)  Height: 175.3 cm (5' 9.02\") (06/05 1035)  Weight: 117.7 kg (259 lb 6.4 oz) (06/05 1035)  BSA (Calculated - sq m): 2.31 sq meters  (06/05 1035)  Pulse: 67 (06/05 1301)  BP: 146/94 (06/05 1301)  Temp: 98 °F (36.7 °C) (06/05 1035)  Do Not Use - Resp Rate: --  SpO2: 97 % (06/05 1301)          Medications:    Current Outpatient Medications:     MIRTAZAPINE 15 MG Oral Tab, TAKE 1 TABLET(15 MG) BY MOUTH EVERY NIGHT, Disp: 90 tablet, Rfl: 0    venetoclax 100 MG Oral (EVOLVE STUDY DRUG), Take 4 tablets (400 mg total) by mouth daily., Disp: 128 tablet, Rfl: 0    escitalopram 20 MG Oral Tab, Take 1 tablet (20 mg total) by mouth daily., Disp: 30 tablet, Rfl: 1    venetoclax 100 MG Oral (EVOLVE STUDY DRUG), Take 4 tablets (400 mg total) by mouth daily., Disp: 128 tablet, Rfl: 0    prochlorperazine (COMPAZINE) 10 mg tablet, Take 1 tablet (10 mg total) by mouth every 6 (six) hours as needed for Nausea., Disp: 30 tablet, Rfl: 3    ondansetron (ZOFRAN) 8 MG tablet, Take 1 tablet (8 mg total) by mouth every 8 (eight) hours as needed for Nausea., Disp: 30 tablet, Rfl: 3    venetoclax 100 MG Oral (EVOLVE STUDY DRUG), Take 4 tablets (400 mg total) by mouth daily., Disp: 128 tablet, Rfl: 0    venetoclax 100 MG Oral (EVOLVE STUDY DRUG), Take 1 tablet (100 mg total) by mouth daily. Take 100 mg daily for one week, then take 200 mg daily for one week., Disp: 32 tablet, Rfl: 0    venetoclax 50 MG Oral (EVOLVE STUDY DRUG), Take 1 tablet (50 mg total) by mouth daily., Disp: 8 tablet, Rfl: 0    morphINE 15 MG Oral Tab, Take 1 tablet (15 mg total) by mouth every 4 (four) hours as needed for Pain., Disp: 40 tablet, Rfl: 0    venetoclax Oral (EVOLVE STUDY DRUG), Take 2 tablets (20 mg total) by mouth daily. Take two 10 mg tablets daily., Disp: 16 tablet, Rfl: 0    HYDROmorphone 2 MG Oral Tab, Take 1 tablet (2 mg total) by mouth every 6 (six) hours as needed for Pain., Disp: 20 tablet, Rfl: 0    ASHWAGANDHA OR, Take 1 capsule by mouth daily., Disp: , Rfl:     ALPRAZolam (XANAX) 0.25 MG Oral Tab, Take 1 tablet (0.25 mg total) by mouth daily as needed., Disp: 10 tablet, Rfl: 0     Turmeric (QC TUMERIC COMPLEX OR), Take 1 tablet by mouth 2 (two) times daily., Disp: , Rfl:     Cyanocobalamin (VITAMIN B 12 OR), Take 1 tablet by mouth daily. B 12 complex, Disp: , Rfl:     oxyCODONE-acetaminophen  MG Oral Tab, every 8 (eight) hours as needed for Pain., Disp: , Rfl: 0    sucralfate 1 g Oral Tab, Take 1 tablet (1 g total) by mouth 2 (two) times daily., Disp: , Rfl: 0    omeprazole 20 MG Oral Capsule Delayed Release, TK ONE C PO BID 30 MIN B EATING, Disp: , Rfl: 0    Cholecalciferol (VITAMIN D-3) 5000 units Oral Tab, Take 1 tablet (5,000 Units total) by mouth daily., Disp: , Rfl:     Review of Systems:   As in HPI    Physical Examination:  General: Awake, alert, oriented x3, no acute distress.    HEENT:  Anicteric, conjunctivae and sclerae clear, no sinus tenderness, no oropharyngeal lesion/thrush, mucous membranes are moist.  Neck:  Supple, no tenderness, no palpable masses/adenopathy  Lymphatics: No palpable adenopathy in either axillae or inguinal areas.  Lungs:  Clear to auscultation bilaterally  CV:  Regular rate and rhythm  Abdomen:  Non-distended, normoactive bowel sounds, soft,nontender, no hepatosplenomegaly.  No palpable organomegaly.  Extremities:  No edema, no tenderness  Neuro:  CN 2-12 intact    ECO-2    Labs:  Recent Results (from the past 24 hour(s))   LDH [E]    Collection Time: 24 12:37 PM   Result Value Ref Range     84 - 246 U/L   URIC ACID, SERUM [E]    Collection Time: 24 12:37 PM   Result Value Ref Range    Uric Acid 3.4 2.6 - 6.0 mg/dL   Comp Metabolic Panel (14)    Collection Time: 24 12:37 PM   Result Value Ref Range    Glucose 90 70 - 99 mg/dL    Sodium 140 136 - 145 mmol/L    Potassium 4.2 3.5 - 5.1 mmol/L    Chloride 107 98 - 112 mmol/L    CO2 28.0 21.0 - 32.0 mmol/L    Anion Gap 5 0 - 18 mmol/L    BUN 8 (L) 9 - 23 mg/dL    Creatinine 0.70 0.55 - 1.02 mg/dL    Calcium, Total 9.3 8.5 - 10.1 mg/dL    Calculated Osmolality 288 275 - 295  mOsm/kg    eGFR-Cr 105 >=60 mL/min/1.73m2    AST 15 15 - 37 U/L    ALT 33 13 - 56 U/L    Alkaline Phosphatase 87 39 - 100 U/L    Bilirubin, Total 0.3 0.1 - 2.0 mg/dL    Total Protein 7.6 6.4 - 8.2 g/dL    Albumin 3.8 3.4 - 5.0 g/dL    Globulin  3.8 2.8 - 4.4 g/dL    A/G Ratio 1.0 1.0 - 2.0    Patient Fasting for CMP? Patient not present    PHOSPHORUS [E]    Collection Time: 06/05/24 12:37 PM   Result Value Ref Range    Phosphorus 2.7 2.5 - 4.9 mg/dL   CBC W/ DIFFERENTIAL    Collection Time: 06/05/24 12:37 PM   Result Value Ref Range    WBC 24.0 (H) 4.0 - 11.0 x10(3) uL    RBC 4.49 3.80 - 5.30 x10(6)uL    HGB 14.4 12.0 - 16.0 g/dL    HCT 41.4 35.0 - 48.0 %    .0 150.0 - 450.0 10(3)uL    MCV 92.2 80.0 - 100.0 fL    MCH 32.1 26.0 - 34.0 pg    MCHC 34.8 31.0 - 37.0 g/dL    RDW 12.9 %    Neutrophil Absolute Prelim 20.73 (H) 1.50 - 7.70 x10 (3) uL    Neutrophil Absolute 20.73 (H) 1.50 - 7.70 x10(3) uL    Lymphocyte Absolute 1.76 1.00 - 4.00 x10(3) uL    Monocyte Absolute 1.16 (H) 0.10 - 1.00 x10(3) uL    Eosinophil Absolute 0.00 0.00 - 0.70 x10(3) uL    Basophil Absolute 0.05 0.00 - 0.20 x10(3) uL    Immature Granulocyte Absolute 0.27 0.00 - 1.00 x10(3) uL    Neutrophil % 86.6 %    Lymphocyte % 7.3 %    Monocyte % 4.8 %    Eosinophil % 0.0 %    Basophil % 0.2 %    Immature Granulocyte % 1.1 %               Impression/Plan:    CLL  diag 9/23 on peripheral blood flow and biopsy of R cervical LN.  Aponte stage I.   CLL IPI: 7  Cytogenetics-del 17p  IGHV unmutated  Elevated beta-2 microglobulin.     Enrolled in clinical trial  and started Obinutuzumab 10/25/2023 which completed after 6 cycles.  Venetoclax 400mg has been on hold per clinical trial protocol for the past 1 1/2 wks.  Detailed discussion with Laura Rodríguez RN clinical research.  Much of her symptoms are due to pre-existing chronic neck pains/RFA procedure.  Blood counts are WNL  LNs are not palpable  WBC elev - neutrophilia and monocytosis-it is  suspicious for possible reactive process.  This may explain her increased fatigue and malaise recently - possible viral syndrome.  Pt has no active focal symptoms.  I discussed this case with Dr. Santos in Dr. Herring's absence and he concurs with this assessment and does not see any contraindications with resuming Venetoclax.      Headache:  Has h/o migraines but the current headaches are of different characteristics since starting this treatment.    S/p neck RFA  Sees a pain physician as well.    Myalgia,  Diffuse, Grade 2    Arthralgia:  Diffuse, Grade 1    Fatigue:  Grade 2    Emotional Well Being:  I have assessed the patient's emotional well-being and any concerns about anxiety or depression.  No acute psychosocial intervention required at this time.    Risk level:  High-CLL on chemotherapy, requiring and close monitoring.    RTC 4 wks for lab/MD/chemo    LETTY Hackett  [facility]

## 2024-07-02 NOTE — PROGRESS NOTES
[facility] Progress Note      Patient Name:  Lisa Cabral  YOB: 1975  Medical Record Number:  [id number]    Date of visit: 7/3/2024    CHIEF COMPLAINT: CLL    HPI:     49 year old female that I have followed since 9/23 after she was seen in the ER for cervical lymphadenopathy and leukocytosis. In retrospect, leukocytosis noted since 2015 but higher recently.  Cervical adenopathy has been going on for a few months.  On work-up, flow-monotypic B cells, positive for CD19, CD20, CD5.  FISH panel on peripheral blood showed deletion 17p with 42% nuclei positive for T p53 gene deletion.  PET 9/23-elevated uptake in bilateral neck, axilla and pelvic LN.  Cervical LN biopsy-CLL/SLL.  CT-lymphadenopathy in mediastinal/hilar region.  Also LN's supraclavicular, axillary, retroperitoneal, iliac areas.      Enrolled in clinical trial  and started Obinutuzumab on 10/23.  Venetoclax started 11/23.  Presents for cycle # 10.  More fatigue, wt gain.  Pain meds were adjusted by her specialist and that is helping.  Stable headache.  Sees therapist once a month.    Took 2 week break from meds. No improvement in headache.  Occasional nausea and vomiting.  Diarrhea.  Had low back pain for which ablation is planned.  Had neck ablation which has improved some of the C-spine pain.      SOCIAL HISTORY:    .  Current smoker. 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  Neurologic:  headache grade 2.  Respiratory: no cough, SOB or hemoptysis  Cardiovascular: no chest pain, ankle swelling, JARAMILLO  GI: Nausea grade 1.  Musculoskeletal: Muscle spasms.  Dermatologic: no alopecia, rash, pruritis  : no hematuria, dysuria or frequency  Psych: Depression grade 1.  Heme: no easy bruising or bleeding     ALLERGIES:    Allergies   Allergen Reactions    Aleve [Naproxen] HIVES and RASH    Ultram [Tramadol] NAUSEA AND VOMITING     Severe  migraine       MEDICATIONS:    Current Outpatient Medications   Medication Sig Dispense Refill    morphINE 15 MG Oral Tab Take 1 tablet (15 mg total) by mouth every 4 (four) hours as needed for Pain. 40 tablet 0    escitalopram 20 MG Oral Tab Take 1 tablet (20 mg total) by mouth daily. 90 tablet 1    venetoclax 100 MG Oral (EVOLVE STUDY DRUG) Take 4 tablets (400 mg total) by mouth daily. 128 tablet 0    MIRTAZAPINE 15 MG Oral Tab TAKE 1 TABLET(15 MG) BY MOUTH EVERY NIGHT 90 tablet 0    prochlorperazine (COMPAZINE) 10 mg tablet Take 1 tablet (10 mg total) by mouth every 6 (six) hours as needed for Nausea. 30 tablet 3    ondansetron (ZOFRAN) 8 MG tablet Take 1 tablet (8 mg total) by mouth every 8 (eight) hours as needed for Nausea. 30 tablet 3    ASHWAGANDHA OR Take 1 capsule by mouth daily.      ALPRAZolam (XANAX) 0.25 MG Oral Tab Take 1 tablet (0.25 mg total) by mouth daily as needed. 10 tablet 0    Turmeric (QC TUMERIC COMPLEX OR) Take 1 tablet by mouth 2 (two) times daily.      Cyanocobalamin (VITAMIN B 12 OR) Take 1 tablet by mouth daily. B 12 complex      oxyCODONE-acetaminophen  MG Oral Tab every 8 (eight) hours as needed for Pain.  0    sucralfate 1 g Oral Tab Take 1 tablet (1 g total) by mouth 2 (two) times daily.  0    omeprazole 20 MG Oral Capsule Delayed Release TK ONE C PO BID 30 MIN B EATING  0    Cholecalciferol (VITAMIN D-3) 5000 units Oral Tab Take 1 tablet (5,000 Units total) by mouth daily.         VITALS:  Height: 175.3 cm (5' 9.02\") (1037)  Weight: 117.5 kg (259 lb) (1037)  BSA (Calculated - sq m): 2.31 sq meters (1037)  Pulse: 85 (1037)  BP: 133/79 (1037)  Temp: 98 °F (36.7 °C) (1037)  Do Not Use - Resp Rate: --  SpO2: 95 % (1037)    PS:  ECO    PHYSICAL EXAM:    General: alert and oriented x 3, not in acute distress.  HEENT: YASMANI, oropharynx  clear.  Neck: No palpable lymphadenopathy in neck or axillary area.  Slight tenderness in left  axilla.  CVS: S1S2, regular  Rhythm, no murmurs.   Lungs: Clear to auscultation and percussion.  No axillary adenopathy.  Abdomen: No hepato-splenomegaly.  Unable to palpate spleen.  Extremities:  No edema.  CNS: no focal deficit    Emotional well being: Patient's emotional well being was assessed.  No issues requiring acute psychosocial intervention were identified.     LABS:     Recent Results (from the past 24 hour(s))   CBC W/ DIFFERENTIAL    Collection Time: 07/03/24 10:30 AM   Result Value Ref Range    WBC 7.4 4.0 - 11.0 x10(3) uL    RBC 4.45 3.80 - 5.30 x10(6)uL    HGB 14.0 12.0 - 16.0 g/dL    HCT 42.0 35.0 - 48.0 %    .0 150.0 - 450.0 10(3)uL    MCV 94.4 80.0 - 100.0 fL    MCH 31.5 26.0 - 34.0 pg    MCHC 33.3 31.0 - 37.0 g/dL    RDW 12.7 %    Neutrophil Absolute Prelim 5.41 1.50 - 7.70 x10 (3) uL    Neutrophil Absolute 5.41 1.50 - 7.70 x10(3) uL    Lymphocyte Absolute 1.34 1.00 - 4.00 x10(3) uL    Monocyte Absolute 0.57 0.10 - 1.00 x10(3) uL    Eosinophil Absolute 0.00 0.00 - 0.70 x10(3) uL    Basophil Absolute 0.01 0.00 - 0.20 x10(3) uL    Immature Granulocyte Absolute 0.03 0.00 - 1.00 x10(3) uL    Neutrophil % 73.6 %    Lymphocyte % 18.2 %    Monocyte % 7.7 %    Eosinophil % 0.0 %    Basophil % 0.1 %    Immature Granulocyte % 0.4 %             ASSESSMENT AND PLAN:     # CLL: diag 9/23 on peripheral blood flow and biopsy of R cervical LN.  Aponte stage I.   CLL IPI: 7  Cytogenetics-del 17p  IGHV unmutated  Elevated beta-2 microglobulin.    Enrolled in clinical trial .  Received Obinutuzumab 10/23-3/24. Venetoclax 11/23, dose successfully ramped up, remains on it.  Cycle # 10 today.  Normal WBC, no tumor lysis.  Off allopurinol.  Complete resolution of palpable adenopathy/splenomegaly.      Held venetoclax for 2 weeks to see if headaches got better.  They did not.  She is motivated to continue and complete treatment per protocol.      # Headache: Possibly due to venetoclax but no improvement after  holding for 2 weeks.  She is motivated to finish course of medication.    # Depression: Escitalopram started and dose increased.  Seeing palliative care and her own therapist.    ORDERS PLACED:    Return in about 1 month (around 8/3/2024) for Labs, MD visit.    Tamica Herring M.D.    [facility]    [facility]  [address] [place] [address] [place], [place], [address]    7/3/2024

## 2024-07-03 ENCOUNTER — OFFICE VISIT (OUTPATIENT)
Dept: HEMATOLOGY/ONCOLOGY | Facility: HOSPITAL | Age: 49
End: 2024-07-03
Attending: INTERNAL MEDICINE
Payer: COMMERCIAL

## 2024-07-03 VITALS
DIASTOLIC BLOOD PRESSURE: 79 MMHG | RESPIRATION RATE: 18 BRPM | HEART RATE: 85 BPM | SYSTOLIC BLOOD PRESSURE: 133 MMHG | TEMPERATURE: 98 F | HEIGHT: 69.02 IN | BODY MASS INDEX: 38.36 KG/M2 | OXYGEN SATURATION: 95 % | WEIGHT: 259 LBS

## 2024-07-03 DIAGNOSIS — R11.0 NAUSEA: ICD-10-CM

## 2024-07-03 DIAGNOSIS — R52 PAIN: ICD-10-CM

## 2024-07-03 DIAGNOSIS — C91.10 CLL (CHRONIC LYMPHOCYTIC LEUKEMIA) (HCC): Primary | ICD-10-CM

## 2024-07-03 DIAGNOSIS — T88.9XXD: ICD-10-CM

## 2024-07-03 DIAGNOSIS — R51.9 NONINTRACTABLE EPISODIC HEADACHE, UNSPECIFIED HEADACHE TYPE: ICD-10-CM

## 2024-07-03 LAB
ALBUMIN SERPL-MCNC: 3.7 G/DL (ref 3.4–5)
ALBUMIN/GLOB SERPL: 1.1 {RATIO} (ref 1–2)
ALP LIVER SERPL-CCNC: 89 U/L
ALT SERPL-CCNC: 43 U/L
ANION GAP SERPL CALC-SCNC: 7 MMOL/L (ref 0–18)
AST SERPL-CCNC: 33 U/L (ref 15–37)
BILIRUB SERPL-MCNC: 0.4 MG/DL (ref 0.1–2)
BUN BLD-MCNC: 10 MG/DL (ref 9–23)
CALCIUM BLD-MCNC: 9 MG/DL (ref 8.5–10.1)
CHLORIDE SERPL-SCNC: 106 MMOL/L (ref 98–112)
CO2 SERPL-SCNC: 26 MMOL/L (ref 21–32)
CREAT BLD-MCNC: 0.88 MG/DL
EGFRCR SERPLBLD CKD-EPI 2021: 80 ML/MIN/1.73M2 (ref 60–?)
GLOBULIN PLAS-MCNC: 3.5 G/DL (ref 2.8–4.4)
GLUCOSE BLD-MCNC: 166 MG/DL (ref 70–99)
LDH SERPL L TO P-CCNC: 234 U/L
OSMOLALITY SERPL CALC.SUM OF ELEC: 291 MOSM/KG (ref 275–295)
POTASSIUM SERPL-SCNC: 3.7 MMOL/L (ref 3.5–5.1)
PROT SERPL-MCNC: 7.2 G/DL (ref 6.4–8.2)
SODIUM SERPL-SCNC: 139 MMOL/L (ref 136–145)
URATE SERPL-MCNC: 5.1 MG/DL

## 2024-07-03 PROCEDURE — 99215 OFFICE O/P EST HI 40 MIN: CPT | Performed by: INTERNAL MEDICINE

## 2024-07-03 RX ORDER — MORPHINE SULFATE 15 MG/1
15 TABLET ORAL EVERY 4 HOURS PRN
Qty: 40 TABLET | Refills: 0 | Status: SHIPPED | OUTPATIENT
Start: 2024-07-03

## 2024-07-03 NOTE — PROGRESS NOTES
Education Record    Learner:  Patient    Disease / Diagnosis: CML    Research patient     Barriers / Limitations:  None   Comments:    Method:  Discussion   Comments:    General Topics:  Plan of care reviewed   Comments:    Outcome:  Shows understanding   Comments:   Venetoclax 400mg daily  interrupt dosing for diffuse arthralgia/myalgias.     Pt feeling awful. Taking a break did not help.   More freq. Headaches.   Occ and on diarrhea.   Some nausea, no emesis.   May have ablation for low back pain.

## 2024-07-30 NOTE — PROGRESS NOTES
[facility] Progress Note    Patient Name: Lisa Cabral   YOB: 1975   Medical Record Number: [id number]   CSN: [id number]   Date of visit: 7/31/2024  Provider: LETTY Hackett  Referring Physician: Elsy Herring         Chief Complaint:  Follow up     The 21st Century Cures Act makes medical notes like these available to patients in the interest of transparency. Please be advised this is a medical document. Medical documents are intended to carry relevant information, facts as evident, and the clinical opinion of the practitioner. The medical note is intended as peer to peer communication and may appear blunt or direct. It is written in medical language and may contain abbreviations or verbiage that are unfamiliar.        Hematologic/Oncologic History:  49 year old female  followed since 9/23 after she was seen in the ER for cervical lymphadenopathy and leukocytosis. In retrospect, leukocytosis noted since 2015 but higher at the time she was seen by Dr. Herring initially.  Cervical adenopathy had been going on for a few months.  On work-up, flow-monotypic B cells, positive for CD19, CD20, CD5.  FISH panel on peripheral blood showed deletion 17p with 42% nuclei positive for T p53 gene deletion.  PET 9/23-elevated uptake in bilateral neck, axilla and pelvic LN.  Cervical LN biopsy-CLL/SLL.  CT-lymphadenopathy in mediastinal/hilar region.  Also LN's supraclavicular, axillary, retroperitoneal, iliac areas.       Enrolled in clinical trial  and started Obinutuzumab on 10/25/2023.    Started Venetoclax 11/15/23    Interval Events:  49 year old  patient of Dr. Herring's  who presents for evaluation for C11 on above protocol.    Right elbow/fore arm pain which is new as of ~10 days ago.  Pain as high as 8/10  only when she moves the arm, no trauma.  Ongoing joint pains.  Chronic back pain.  Continues to see her pain specialist but is only managing the back pain from a prior  work incident.  She  takes percocet up to 4x/day.  Leg twitching.  Has chronic headaches and neck pain.  Has diarrhea intermittently, but not taking antidiarrheals.  Occ nausea, no emesis, does not take med.  Poor sleep.  Afebrile.  No cough or SOB.  She wants to continue Venetoclax.    Allergies:  Allergies   Allergen Reactions    Aleve [Naproxen] HIVES and RASH    Ultram [Tramadol] NAUSEA AND VOMITING     Severe migraine       Social History     Socioeconomic History    Marital status:    Tobacco Use    Smoking status: Every Day     Current packs/day: 0.50     Types: Cigarettes    Smokeless tobacco: Never   Vaping Use    Vaping status: Some Days    Substances: Nicotine   Substance and Sexual Activity    Alcohol use: Not Currently    Drug use: Not Currently     Types: Cannabis     Comment: edible prn for neck pain/headache    Sexual activity: Not Currently     Partners: Male     Birth control/protection: Hysterectomy        Past Medical History:    Anxiety    Cancer (HCC)    CLL    Depression    PONV (postoperative nausea and vomiting)    Visual impairment    reading glasses        Past Surgical History:   Procedure Laterality Date    Hysterectomy      1 ovary in place    Laparoscopic cholecystectomy      Lumbar spine fusion combined      Other      C-Spine fusion          Vital Signs:    Height: 175.3 cm (5' 9.02\") (07/31 1017)  Weight: 116.1 kg (256 lb) (07/31 1017)  BSA (Calculated - sq m): 2.29 sq meters (07/31 1017)  Pulse: 81 (07/31 1017)  BP: 133/90 (07/31 1017)  Temp: 97.6 °F (36.4 °C) (07/31 1017)  Do Not Use - Resp Rate: --  SpO2: 96 % (07/31 1017)        Medications:    Current Outpatient Medications:     morphINE 15 MG Oral Tab, Take 1 tablet (15 mg total) by mouth every 4 (four) hours as needed for Pain., Disp: 40 tablet, Rfl: 0    escitalopram 20 MG Oral Tab, Take 1 tablet (20 mg total) by mouth daily., Disp: 90 tablet, Rfl: 1    venetoclax 100 MG Oral (EVOLVE STUDY DRUG), Take 4 tablets (400 mg total) by mouth  daily., Disp: 128 tablet, Rfl: 0    MIRTAZAPINE 15 MG Oral Tab, TAKE 1 TABLET(15 MG) BY MOUTH EVERY NIGHT, Disp: 90 tablet, Rfl: 0    prochlorperazine (COMPAZINE) 10 mg tablet, Take 1 tablet (10 mg total) by mouth every 6 (six) hours as needed for Nausea., Disp: 30 tablet, Rfl: 3    ondansetron (ZOFRAN) 8 MG tablet, Take 1 tablet (8 mg total) by mouth every 8 (eight) hours as needed for Nausea., Disp: 30 tablet, Rfl: 3    ASHWAGANDHA OR, Take 1 capsule by mouth daily., Disp: , Rfl:     ALPRAZolam (XANAX) 0.25 MG Oral Tab, Take 1 tablet (0.25 mg total) by mouth daily as needed., Disp: 10 tablet, Rfl: 0    Turmeric (QC TUMERIC COMPLEX OR), Take 1 tablet by mouth 2 (two) times daily., Disp: , Rfl:     Cyanocobalamin (VITAMIN B 12 OR), Take 1 tablet by mouth daily. B 12 complex, Disp: , Rfl:     oxyCODONE-acetaminophen  MG Oral Tab, every 8 (eight) hours as needed for Pain., Disp: , Rfl: 0    sucralfate 1 g Oral Tab, Take 1 tablet (1 g total) by mouth 2 (two) times daily., Disp: , Rfl: 0    omeprazole 20 MG Oral Capsule Delayed Release, TK ONE C PO BID 30 MIN B EATING, Disp: , Rfl: 0    Cholecalciferol (VITAMIN D-3) 5000 units Oral Tab, Take 1 tablet (5,000 Units total) by mouth daily., Disp: , Rfl:     Review of Systems:   As in HPI    Physical Examination:  General: Awake, alert, oriented x3, no acute distress.    HEENT:  Anicteric, conjunctivae and sclerae clear, no sinus tenderness, no oropharyngeal lesion/thrush, mucous membranes are moist.  Neck:  Supple, no tenderness, no palpable masses/adenopathy  Lymphatics: No palpable adenopathy in either axillae or inguinal areas.  Lungs:  Clear to auscultation bilaterally  CV:  Regular rate and rhythm  Abdomen:  Non-distended, normoactive bowel sounds, soft,nontender, no hepatosplenomegaly.   Back:  No spinal tenderness  Extremities:  No edema, no tenderness.  There was no palpable tenderness to the area of concern on the right elbow/forearm.  No visible  abnormality.  Neuro:  CN 2-12 intact    ECO-2    Labs:  Recent Results (from the past 24 hour(s))   Comp Metabolic Panel (14)    Collection Time: 24 10:12 AM   Result Value Ref Range    Glucose 111 (H) 70 - 99 mg/dL    Sodium 137 136 - 145 mmol/L    Potassium 4.1 3.5 - 5.1 mmol/L    Chloride 105 98 - 112 mmol/L    CO2 30.0 21.0 - 32.0 mmol/L    Anion Gap 2 0 - 18 mmol/L    BUN 9 9 - 23 mg/dL    Creatinine 0.84 0.55 - 1.02 mg/dL    Calcium, Total 9.4 8.7 - 10.4 mg/dL    Calculated Osmolality 283 275 - 295 mOsm/kg    eGFR-Cr 85 >=60 mL/min/1.73m2    AST 31 <34 U/L    ALT 31 10 - 49 U/L    Alkaline Phosphatase 90 39 - 100 U/L    Bilirubin, Total 0.4 0.3 - 1.2 mg/dL    Total Protein 7.5 5.7 - 8.2 g/dL    Albumin 5.0 (H) 3.2 - 4.8 g/dL    Globulin  2.5 2.0 - 3.5 g/dL    A/G Ratio 2.0 1.0 - 2.0    Patient Fasting for CMP? No    LDH [E]    Collection Time: 24 10:12 AM   Result Value Ref Range     120-246 U/L U/L   URIC ACID, SERUM [E]    Collection Time: 24 10:12 AM   Result Value Ref Range    Uric Acid 5.5 3.1 - 7.8 mg/dL   CBC W/ DIFFERENTIAL    Collection Time: 24 10:12 AM   Result Value Ref Range    WBC 7.2 4.0 - 11.0 x10(3) uL    RBC 4.61 3.80 - 5.30 x10(6)uL    HGB 14.6 12.0 - 16.0 g/dL    HCT 42.9 35.0 - 48.0 %    .0 150.0 - 450.0 10(3)uL    MCV 93.1 80.0 - 100.0 fL    MCH 31.7 26.0 - 34.0 pg    MCHC 34.0 31.0 - 37.0 g/dL    RDW 12.6 %    Neutrophil Absolute Prelim 5.15 1.50 - 7.70 x10 (3) uL    Neutrophil Absolute 5.15 1.50 - 7.70 x10(3) uL    Lymphocyte Absolute 1.34 1.00 - 4.00 x10(3) uL    Monocyte Absolute 0.64 0.10 - 1.00 x10(3) uL    Eosinophil Absolute 0.00 0.00 - 0.70 x10(3) uL    Basophil Absolute 0.02 0.00 - 0.20 x10(3) uL    Immature Granulocyte Absolute 0.02 0.00 - 1.00 x10(3) uL    Neutrophil % 71.8 %    Lymphocyte % 18.7 %    Monocyte % 8.9 %    Eosinophil % 0.0 %    Basophil % 0.3 %    Immature Granulocyte % 0.3 %         Impression/Plan:    CLL  diag  on  peripheral blood flow and biopsy of R cervical LN.  Aponte stage I.   CLL IPI: 7  Cytogenetics-del 17p  IGHV unmutated  Elevated beta-2 microglobulin.     Enrolled in clinical trial  and started Obinutuzumab 10/25/2023 which completed after 6 cycles.  On Venetoclax 400mg   - patient does not wish to interrupt given her pain issues.    Headache:  Chronic    Myalgia,  Diffuse, Grade 2    Arthralgia:  Diffuse, Grade 1    Fatigue:  Grade 2    Arm pain:  Unclear etiology.  Pain is focal and less likely due to chemo.  Xray right elbow/forearm.  PRN pain meds avaialble.    Emotional Well Being:  I have assessed the patient's emotional well-being and any concerns about anxiety or depression.  No acute psychosocial intervention required at this time.    Risk level:  High-CLL on chemotherapy, requiring and close monitoring.    RTC 4 wks for lab/MD/chemo    LETTY Hackett  [facility]

## 2024-07-31 ENCOUNTER — RESEARCH ENCOUNTER (OUTPATIENT)
Dept: HEMATOLOGY/ONCOLOGY | Facility: HOSPITAL | Age: 49
End: 2024-07-31

## 2024-07-31 ENCOUNTER — OFFICE VISIT (OUTPATIENT)
Dept: HEMATOLOGY/ONCOLOGY | Facility: HOSPITAL | Age: 49
End: 2024-07-31
Attending: INTERNAL MEDICINE
Payer: COMMERCIAL

## 2024-07-31 VITALS
BODY MASS INDEX: 37.92 KG/M2 | TEMPERATURE: 98 F | DIASTOLIC BLOOD PRESSURE: 90 MMHG | HEIGHT: 69.02 IN | SYSTOLIC BLOOD PRESSURE: 133 MMHG | OXYGEN SATURATION: 96 % | HEART RATE: 81 BPM | RESPIRATION RATE: 16 BRPM | WEIGHT: 256 LBS

## 2024-07-31 DIAGNOSIS — M25.50 ARTHRALGIA, UNSPECIFIED JOINT: ICD-10-CM

## 2024-07-31 DIAGNOSIS — M79.10 MYALGIA: ICD-10-CM

## 2024-07-31 DIAGNOSIS — R53.82 CHRONIC FATIGUE: ICD-10-CM

## 2024-07-31 DIAGNOSIS — G44.86 CERVICOGENIC HEADACHE: ICD-10-CM

## 2024-07-31 DIAGNOSIS — C91.10 CLL (CHRONIC LYMPHOCYTIC LEUKEMIA) (HCC): Primary | ICD-10-CM

## 2024-07-31 DIAGNOSIS — M79.601 RIGHT ARM PAIN: ICD-10-CM

## 2024-07-31 LAB
ALBUMIN SERPL-MCNC: 5 G/DL (ref 3.2–4.8)
ALBUMIN/GLOB SERPL: 2 {RATIO} (ref 1–2)
ALP LIVER SERPL-CCNC: 90 U/L
ALT SERPL-CCNC: 31 U/L
ANION GAP SERPL CALC-SCNC: 2 MMOL/L (ref 0–18)
AST SERPL-CCNC: 31 U/L (ref ?–34)
BASOPHILS # BLD AUTO: 0.02 X10(3) UL (ref 0–0.2)
BASOPHILS NFR BLD AUTO: 0.3 %
BILIRUB SERPL-MCNC: 0.4 MG/DL (ref 0.3–1.2)
BUN BLD-MCNC: 9 MG/DL (ref 9–23)
CALCIUM BLD-MCNC: 9.4 MG/DL (ref 8.7–10.4)
CHLORIDE SERPL-SCNC: 105 MMOL/L (ref 98–112)
CO2 SERPL-SCNC: 30 MMOL/L (ref 21–32)
CREAT BLD-MCNC: 0.84 MG/DL
EGFRCR SERPLBLD CKD-EPI 2021: 85 ML/MIN/1.73M2 (ref 60–?)
EOSINOPHIL # BLD AUTO: 0 X10(3) UL (ref 0–0.7)
EOSINOPHIL NFR BLD AUTO: 0 %
ERYTHROCYTE [DISTWIDTH] IN BLOOD BY AUTOMATED COUNT: 12.6 %
FASTING STATUS PATIENT QL REPORTED: NO
GLOBULIN PLAS-MCNC: 2.5 G/DL (ref 2–3.5)
GLUCOSE BLD-MCNC: 111 MG/DL (ref 70–99)
HCT VFR BLD AUTO: 42.9 %
HGB BLD-MCNC: 14.6 G/DL
IMM GRANULOCYTES # BLD AUTO: 0.02 X10(3) UL (ref 0–1)
IMM GRANULOCYTES NFR BLD: 0.3 %
LDH SERPL L TO P-CCNC: 215 U/L
LYMPHOCYTES # BLD AUTO: 1.34 X10(3) UL (ref 1–4)
LYMPHOCYTES NFR BLD AUTO: 18.7 %
MCH RBC QN AUTO: 31.7 PG (ref 26–34)
MCHC RBC AUTO-ENTMCNC: 34 G/DL (ref 31–37)
MCV RBC AUTO: 93.1 FL
MONOCYTES # BLD AUTO: 0.64 X10(3) UL (ref 0.1–1)
MONOCYTES NFR BLD AUTO: 8.9 %
NEUTROPHILS # BLD AUTO: 5.15 X10 (3) UL (ref 1.5–7.7)
NEUTROPHILS # BLD AUTO: 5.15 X10(3) UL (ref 1.5–7.7)
NEUTROPHILS NFR BLD AUTO: 71.8 %
OSMOLALITY SERPL CALC.SUM OF ELEC: 283 MOSM/KG (ref 275–295)
PLATELET # BLD AUTO: 228 10(3)UL (ref 150–450)
POTASSIUM SERPL-SCNC: 4.1 MMOL/L (ref 3.5–5.1)
PROT SERPL-MCNC: 7.5 G/DL (ref 5.7–8.2)
RBC # BLD AUTO: 4.61 X10(6)UL
SODIUM SERPL-SCNC: 137 MMOL/L (ref 136–145)
URATE SERPL-MCNC: 5.5 MG/DL
WBC # BLD AUTO: 7.2 X10(3) UL (ref 4–11)

## 2024-07-31 PROCEDURE — 36415 COLL VENOUS BLD VENIPUNCTURE: CPT

## 2024-07-31 PROCEDURE — 84550 ASSAY OF BLOOD/URIC ACID: CPT | Performed by: CLINICAL NURSE SPECIALIST

## 2024-07-31 PROCEDURE — 80053 COMPREHEN METABOLIC PANEL: CPT | Performed by: CLINICAL NURSE SPECIALIST

## 2024-07-31 PROCEDURE — 85025 COMPLETE CBC W/AUTO DIFF WBC: CPT | Performed by: CLINICAL NURSE SPECIALIST

## 2024-07-31 PROCEDURE — 83615 LACTATE (LD) (LDH) ENZYME: CPT | Performed by: CLINICAL NURSE SPECIALIST

## 2024-07-31 RX ORDER — LIDOCAINE 50 MG/G
1 PATCH TOPICAL EVERY 24 HOURS
COMMUNITY

## 2024-07-31 NOTE — PROGRESS NOTES
Chief Complaint   Patient presents with    Follow - Up     Pt is here for treatment - C11 D1 [employer] with oral chemo venetoclax. She reports worsening pain; right arm pain, joint pains, back pain, leg twitching when not weight bearing. She is eating and drinking; has nausea but no vomiting; diarrhea 2-4 times per day but no constipation. Not sleeping well.    Education Record    Learner:  Patient    Disease / Diagnosis: CLL    Barriers / Limitations:  None   Comments:    Method:  Brief focused   Comments:    General Topics:  Diet, Medication, Pain, Side effects and symptom management, and Plan of care reviewed   Comments:    Outcome:  Shows understanding   Comments:

## 2024-07-31 NOTE — PROGRESS NOTES
CLINICAL RESEARCH NOTE  Study: , \"Randomized, Phase III Study of Early Intervention with Venetoclax and Obinutuzumab Versus Delayed Therapy with Venetoclax and Obinutuzumab in Newly Diagnosed Asymptomatic High-Risk Patients with Chronic Lymphocytic Leukemia / Small Lymphocytic Lymphoma (CLL/SLL): EVOLVE CLL/SLL Study   Patient ID: [id number]  Arm: 2  Visit: C11D1    Patient presented today for office visit, medication return and medication dispensing. Standard of care labs drawn peripherally at 1012. Abnormal lab results include elevated glucose (non-fasting) and albumin. Labs adequate for treatment. Patient assessed by MEME Gramajo. No research lab draws or QOLs required at this time.    Patient presented with flat affect which is not a new finding and patient reports that her energy level remains low. Patient advised that her mood has slightly improved and she was able to attend a gathering amongst friends since her last visit. Pain remains unresolved and she is hoping to schedule lumbar RFA with her pain management physician soon. Patient reports new pain in right elbow without cause/injury. After being assessed by provider it was advised that elbow pain is most likely musculoskeletal in nature. Patient will complete XR of right elbow which has been ordered.  Patient states she has new onset of dry skin present on bilateral lower extremities. Patient denies visual changes, hot flashes, edema, cough, vomiting, or constipation.     Patient to return to clinic in 4 weeks for office visit with MD and labs.     Adverse Event Review:  Nausea - Stable - Grade 1  Hot flashes - Resolved   Fatigue - Stable - Grade 2  Headache - Stable- Grade 2  Arthralgia - Stable - Grade 2  Myalgia - Stable - Grade 2  Diarrhea - Stable - Grade 1  Paresthesia (diffuse burning)- Stable- Grade 1  Depression - Stable - Grade 2   Muscle cramps - Stable - Grade 2  Dry Skin - New finding - Grade 1   Pain in extremity (right elbow) - Grade 2      Medication Return:  Patient presented with four bottles of Venetoclax 100mg tablets lot #[id number]. Three bottles were empty and one bottle contained QTY#16 of Venetoclax 100mg.  Medication count witnessed by documenting RN along with Laura GORDILLO RN and given to pharmacist for disposal per protocol. Medication QTY received indicates medication compliance.     Received pill diary from patient and discussed the importance of complying with documenting medication administration in real time.     Medication Dispensed:   Patient received 4 bottles of Venetoclax 100mg tablets, all of which were lot #[id number]. Each bottle contained QTY #32 tablets for a total of 128 tablets.     Provided patient with new pill diary.

## 2024-08-01 ENCOUNTER — HOSPITAL ENCOUNTER (OUTPATIENT)
Dept: GENERAL RADIOLOGY | Age: 49
Discharge: HOME | End: 2024-08-01
Attending: CLINICAL NURSE SPECIALIST
Payer: COMMERCIAL

## 2024-08-01 DIAGNOSIS — M79.601 RIGHT ARM PAIN: ICD-10-CM

## 2024-08-01 PROCEDURE — 73080 X-RAY EXAM OF ELBOW: CPT | Performed by: CLINICAL NURSE SPECIALIST

## 2024-08-19 ENCOUNTER — RESEARCH ENCOUNTER (OUTPATIENT)
Dept: HEMATOLOGY/ONCOLOGY | Facility: HOSPITAL | Age: 49
End: 2024-08-19

## 2024-08-19 NOTE — PROGRESS NOTES
CLINICAL RESEARCH NOTE  Study: , \"Randomized, Phase III Study of Early Intervention with Venetoclax and Obinutuzumab Versus Delayed Therapy with Venetoclax and Obinutuzumab in Newly Diagnosed Asymptomatic High-Risk Patients with Chronic Lymphocytic Leukemia / Small Lymphocytic Lymphoma (CLL/SLL): EVOLVE CLL/SLL Study     Phone Call Documentation    Patient called research RN to relay symptoms. Patient reports she has been feeling very bloated and flatulence is not helping the symptoms and is foul-smelling. Patient reports that she feels \"7 months pregnant\". She has tried natural remedies such as lemon and gingers without relief. Complaints were relayed to Dr. Herring who ordered a stat CT A/P. Patient was notified and patient indicated that she would call to schedule exam ASAP.

## 2024-08-20 ENCOUNTER — HOSPITAL ENCOUNTER (OUTPATIENT)
Dept: CT IMAGING | Facility: HOSPITAL | Age: 49
Discharge: HOME | End: 2024-08-20
Attending: INTERNAL MEDICINE
Payer: COMMERCIAL

## 2024-08-20 DIAGNOSIS — C91.10 CLL (CHRONIC LYMPHOCYTIC LEUKEMIA) (HCC): ICD-10-CM

## 2024-08-20 DIAGNOSIS — R59.1 LYMPHADENOPATHY: ICD-10-CM

## 2024-08-20 DIAGNOSIS — R10.84 GENERALIZED ABDOMINAL PAIN: ICD-10-CM

## 2024-08-20 DIAGNOSIS — R14.0 ABDOMINAL DISTENTION: ICD-10-CM

## 2024-08-20 PROCEDURE — 74177 CT ABD & PELVIS W/CONTRAST: CPT | Performed by: INTERNAL MEDICINE

## 2024-08-27 NOTE — PROGRESS NOTES
[facility] Progress Note      Patient Name:  Lisa Cabral  YOB: 1975  Medical Record Number:  [id number]    Date of visit: 8/28/2024      CHIEF COMPLAINT: CLL    HPI:     49 year old female that I have followed since 9/23 for CLL that was diagnosed after she underwent workup of cervical lymphadenopathy and leukocytosis noted in the ER.  In retrospect, high WBC since 2015.  FISH-deletion 17p with 42% nuclei positive for T p53 gene deletion.  PET 9/23-elevated uptake in bilateral neck, axilla and pelvic LN.  Cervical LN biopsy-CLL/SLL.  CT-lymphadenopathy in mediastinal/hilar region.  Also LN's supraclavicular, axillary, retroperitoneal, iliac areas.      Enrolled in clinical trial  and started Obinutuzumab on 10/23.  Venetoclax started 11/23.  Presents for cycle # 12.     Continues to have some abdominal pain.  Recent CT scan did not show any new problems.  Still headaches, occasional nausea.  Generalized body ache.  Some neuropathy involving legs.  No recent infections.      SOCIAL HISTORY:    .  Current smoker. 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  Neurologic:  headache grade 1  Respiratory: no cough, SOB or hemoptysis  Cardiovascular: no chest pain, ankle swelling, JARAMILLO  GI: Nausea grade 1. Bloating gr 1  Musculoskeletal: Muscle spasms.  Dermatologic: no alopecia, rash, pruritis  : no hematuria, dysuria or frequency  Psych: Depression grade 1.  Heme: no easy bruising or bleeding     ALLERGIES:    Allergies   Allergen Reactions    Aleve [Naproxen] HIVES and RASH    Ultram [Tramadol] NAUSEA AND VOMITING     Severe migraine       MEDICATIONS:    Current Outpatient Medications   Medication Sig Dispense Refill    lidocaine 5 % External Patch Place 1 patch onto the skin daily. Hips and low back      morphINE 15 MG Oral Tab Take 1 tablet (15 mg total) by mouth every 4 (four) hours as needed  for Pain. 40 tablet 0    escitalopram 20 MG Oral Tab Take 1 tablet (20 mg total) by mouth daily. 90 tablet 1    venetoclax 100 MG Oral (EVOLVE STUDY DRUG) Take 4 tablets (400 mg total) by mouth daily. 128 tablet 0    MIRTAZAPINE 15 MG Oral Tab TAKE 1 TABLET(15 MG) BY MOUTH EVERY NIGHT 90 tablet 0    prochlorperazine (COMPAZINE) 10 mg tablet Take 1 tablet (10 mg total) by mouth every 6 (six) hours as needed for Nausea. 30 tablet 3    ondansetron (ZOFRAN) 8 MG tablet Take 1 tablet (8 mg total) by mouth every 8 (eight) hours as needed for Nausea. 30 tablet 3    ASHWAGANDHA OR Take 1 capsule by mouth daily.      ALPRAZolam (XANAX) 0.25 MG Oral Tab Take 1 tablet (0.25 mg total) by mouth daily as needed. 10 tablet 0    Turmeric (QC TUMERIC COMPLEX OR) Take 1 tablet by mouth 2 (two) times daily.      Cyanocobalamin (VITAMIN B 12 OR) Take 1 tablet by mouth daily. B 12 complex      oxyCODONE-acetaminophen  MG Oral Tab every 8 (eight) hours as needed for Pain.  0    sucralfate 1 g Oral Tab Take 1 tablet (1 g total) by mouth 2 (two) times daily.  0    omeprazole 20 MG Oral Capsule Delayed Release TK ONE C PO BID 30 MIN B EATING  0    Cholecalciferol (VITAMIN D-3) 5000 units Oral Tab Take 1 tablet (5,000 Units total) by mouth daily.         VITALS:  Height: 175.3 cm (5' 9.02\") (1026)  Weight: 116.3 kg (256 lb 6.4 oz) (1026)  BSA (Calculated - sq m): 2.3 sq meters (1026)  Pulse: 92 (1026)  BP: 128/90 (1026)  Temp: 98.4 °F (36.9 °C) (1026)  Do Not Use - Resp Rate: --  SpO2: 95 % (1026)    PS:  ECO    PHYSICAL EXAM:    General: alert and oriented x 3, not in acute distress.  HEENT: YASMANI, oropharynx  clear.  Neck: No palpable lymphadenopathy in neck or axillary area.  Slight tenderness in left axilla.  CVS: S1S2, regular  Rhythm, no murmurs.   Lungs: Clear to auscultation and percussion.  No axillary adenopathy.  Abdomen: No hepato-splenomegaly.  Unable to palpate  spleen.  Extremities:  No edema.  CNS: no focal deficit    Emotional well being: Patient's emotional well being was assessed.  No issues requiring acute psychosocial intervention were identified.     LABS:     Recent Results (from the past 24 hour(s))   CBC W/DIFF [E]    Collection Time: 08/28/24 10:25 AM   Result Value Ref Range    WBC 7.4 4.0 - 11.0 x10(3) uL    RBC 4.46 3.80 - 5.30 x10(6)uL    HGB 14.2 12.0 - 16.0 g/dL    HCT 41.4 35.0 - 48.0 %    .0 150.0 - 450.0 10(3)uL    MCV 92.8 80.0 - 100.0 fL    MCH 31.8 26.0 - 34.0 pg    MCHC 34.3 31.0 - 37.0 g/dL    RDW 12.7 %    Neutrophil Absolute Prelim 5.77 1.50 - 7.70 x10 (3) uL    Neutrophil Absolute 5.77 1.50 - 7.70 x10(3) uL    Lymphocyte Absolute 1.13 1.00 - 4.00 x10(3) uL    Monocyte Absolute 0.51 0.10 - 1.00 x10(3) uL    Eosinophil Absolute 0.00 0.00 - 0.70 x10(3) uL    Basophil Absolute 0.01 0.00 - 0.20 x10(3) uL    Immature Granulocyte Absolute 0.02 0.00 - 1.00 x10(3) uL    Neutrophil % 77.5 %    Lymphocyte % 15.2 %    Monocyte % 6.9 %    Eosinophil % 0.0 %    Basophil % 0.1 %    Immature Granulocyte % 0.3 %   COMP METABOLIC PANEL [E]    Collection Time: 08/28/24 10:25 AM   Result Value Ref Range    Glucose 150 (H) 70 - 99 mg/dL    Sodium 140 136 - 145 mmol/L    Potassium 3.8 3.5 - 5.1 mmol/L    Chloride 106 98 - 112 mmol/L    CO2 28.0 21.0 - 32.0 mmol/L    Anion Gap 6 0 - 18 mmol/L    BUN 12 9 - 23 mg/dL    Creatinine 0.93 0.55 - 1.02 mg/dL    Calcium, Total 9.7 8.7 - 10.4 mg/dL    Calculated Osmolality 293 275 - 295 mOsm/kg    eGFR-Cr 75 >=60 mL/min/1.73m2    AST 30 <34 U/L    ALT 36 10 - 49 U/L    Alkaline Phosphatase 91 39 - 100 U/L    Bilirubin, Total 0.4 0.3 - 1.2 mg/dL    Total Protein 7.2 5.7 - 8.2 g/dL    Albumin 4.7 3.2 - 4.8 g/dL    Globulin  2.5 2.0 - 3.5 g/dL    A/G Ratio 1.9 1.0 - 2.0    Patient Fasting for CMP? No    LDH [E]    Collection Time: 08/28/24 10:25 AM   Result Value Ref Range     120-246 U/L U/L   Uric Acid [E]     Collection Time: 08/28/24 10:25 AM   Result Value Ref Range    Uric Acid 5.8 3.1 - 7.8 mg/dL           ASSESSMENT AND PLAN:     # CLL: diag 9/23 on peripheral blood flow and biopsy of R cervical LN.  Aponte stage I.   CLL IPI: 7  Cytogenetics-del 17p  IGHV unmutated  Elevated beta-2 microglobulin.    Enrolled in clinical trial .  Received Obinutuzumab 10/23-3/24. Venetoclax 11/23, dose successfully ramped up, remains on it.  Cycle # 12 today.  Excellent response with complete hematologic and clinical response.       Last cycle of venetoclax.  Continue follow-up per protocol.     # Bloating: Possibly medication related.  Recent CT was okay.    # Depression: Better.    ORDERS PLACED:    Return for Labs, MD 2 and 4 months.    Tamica Herring M.D.    [facility]    [address] [place], [address]    8/28/2024

## 2024-08-28 ENCOUNTER — RESEARCH ENCOUNTER (OUTPATIENT)
Dept: HEMATOLOGY/ONCOLOGY | Facility: HOSPITAL | Age: 49
End: 2024-08-28

## 2024-08-28 ENCOUNTER — OFFICE VISIT (OUTPATIENT)
Dept: HEMATOLOGY/ONCOLOGY | Facility: HOSPITAL | Age: 49
End: 2024-08-28
Attending: INTERNAL MEDICINE
Payer: COMMERCIAL

## 2024-08-28 VITALS
SYSTOLIC BLOOD PRESSURE: 128 MMHG | BODY MASS INDEX: 37.97 KG/M2 | OXYGEN SATURATION: 95 % | HEART RATE: 92 BPM | TEMPERATURE: 98 F | WEIGHT: 256.38 LBS | HEIGHT: 69.02 IN | DIASTOLIC BLOOD PRESSURE: 90 MMHG

## 2024-08-28 DIAGNOSIS — T50.905D ADVERSE EFFECT OF DRUG, SUBSEQUENT ENCOUNTER: ICD-10-CM

## 2024-08-28 DIAGNOSIS — C91.10 CLL (CHRONIC LYMPHOCYTIC LEUKEMIA) (HCC): Primary | ICD-10-CM

## 2024-08-28 LAB
ALBUMIN SERPL-MCNC: 4.7 G/DL (ref 3.2–4.8)
ALBUMIN/GLOB SERPL: 1.9 {RATIO} (ref 1–2)
ALP LIVER SERPL-CCNC: 91 U/L
ALT SERPL-CCNC: 36 U/L
ANION GAP SERPL CALC-SCNC: 6 MMOL/L (ref 0–18)
AST SERPL-CCNC: 30 U/L (ref ?–34)
BASOPHILS # BLD AUTO: 0.01 X10(3) UL (ref 0–0.2)
BASOPHILS NFR BLD AUTO: 0.1 %
BILIRUB SERPL-MCNC: 0.4 MG/DL (ref 0.3–1.2)
BUN BLD-MCNC: 12 MG/DL (ref 9–23)
CALCIUM BLD-MCNC: 9.7 MG/DL (ref 8.7–10.4)
CHLORIDE SERPL-SCNC: 106 MMOL/L (ref 98–112)
CO2 SERPL-SCNC: 28 MMOL/L (ref 21–32)
CREAT BLD-MCNC: 0.93 MG/DL
EGFRCR SERPLBLD CKD-EPI 2021: 75 ML/MIN/1.73M2 (ref 60–?)
EOSINOPHIL # BLD AUTO: 0 X10(3) UL (ref 0–0.7)
EOSINOPHIL NFR BLD AUTO: 0 %
ERYTHROCYTE [DISTWIDTH] IN BLOOD BY AUTOMATED COUNT: 12.7 %
FASTING STATUS PATIENT QL REPORTED: NO
GLOBULIN PLAS-MCNC: 2.5 G/DL (ref 2–3.5)
GLUCOSE BLD-MCNC: 150 MG/DL (ref 70–99)
HCT VFR BLD AUTO: 41.4 %
HGB BLD-MCNC: 14.2 G/DL
IMM GRANULOCYTES # BLD AUTO: 0.02 X10(3) UL (ref 0–1)
IMM GRANULOCYTES NFR BLD: 0.3 %
LDH SERPL L TO P-CCNC: 206 U/L
LYMPHOCYTES # BLD AUTO: 1.13 X10(3) UL (ref 1–4)
LYMPHOCYTES NFR BLD AUTO: 15.2 %
MCH RBC QN AUTO: 31.8 PG (ref 26–34)
MCHC RBC AUTO-ENTMCNC: 34.3 G/DL (ref 31–37)
MCV RBC AUTO: 92.8 FL
MONOCYTES # BLD AUTO: 0.51 X10(3) UL (ref 0.1–1)
MONOCYTES NFR BLD AUTO: 6.9 %
NEUTROPHILS # BLD AUTO: 5.77 X10 (3) UL (ref 1.5–7.7)
NEUTROPHILS # BLD AUTO: 5.77 X10(3) UL (ref 1.5–7.7)
NEUTROPHILS NFR BLD AUTO: 77.5 %
OSMOLALITY SERPL CALC.SUM OF ELEC: 293 MOSM/KG (ref 275–295)
PLATELET # BLD AUTO: 239 10(3)UL (ref 150–450)
POTASSIUM SERPL-SCNC: 3.8 MMOL/L (ref 3.5–5.1)
PROT SERPL-MCNC: 7.2 G/DL (ref 5.7–8.2)
RBC # BLD AUTO: 4.46 X10(6)UL
SODIUM SERPL-SCNC: 140 MMOL/L (ref 136–145)
URATE SERPL-MCNC: 5.8 MG/DL
WBC # BLD AUTO: 7.4 X10(3) UL (ref 4–11)

## 2024-08-28 PROCEDURE — 99211 OFF/OP EST MAY X REQ PHY/QHP: CPT

## 2024-08-28 PROCEDURE — 84550 ASSAY OF BLOOD/URIC ACID: CPT | Performed by: INTERNAL MEDICINE

## 2024-08-28 PROCEDURE — 85025 COMPLETE CBC W/AUTO DIFF WBC: CPT | Performed by: INTERNAL MEDICINE

## 2024-08-28 PROCEDURE — 83615 LACTATE (LD) (LDH) ENZYME: CPT | Performed by: INTERNAL MEDICINE

## 2024-08-28 PROCEDURE — 36415 COLL VENOUS BLD VENIPUNCTURE: CPT

## 2024-08-28 PROCEDURE — 80053 COMPREHEN METABOLIC PANEL: CPT | Performed by: INTERNAL MEDICINE

## 2024-08-28 RX ORDER — MIRTAZAPINE 15 MG/1
15 TABLET, FILM COATED ORAL NIGHTLY
Qty: 30 TABLET | Refills: 0 | OUTPATIENT
Start: 2024-08-28

## 2024-08-28 NOTE — PROGRESS NOTES
Education Record    Learner:  Patient    Disease / Diagnosis: CLL   OTV [employer]     Barriers / Limitations:  None   Comments:    Method:  Discussion   Comments:    General Topics:  Plan of care reviewed   Comments:    Outcome:  Shows understanding   Comments:   [id number] mg daily.   Pt reports having diarrhea/ loose stool every day 2-4 times.   Cont with bloating abdominal pain, sl better but very uncomfortable.   Appetite is up and down.   Reports some burning neuropathy pain to upper back and limbs at times.   Denies signs of infection.   Emotional support offered.

## 2024-08-28 NOTE — PROGRESS NOTES
CLINICAL RESEARCH NOTE  Study: , \"Randomized, Phase III Study of Early Intervention with Venetoclax and Obinutuzumab Versus Delayed Therapy with Venetoclax and Obinutuzumab in Newly Diagnosed Asymptomatic High-Risk Patients with Chronic Lymphocytic Leukemia / Small Lymphocytic Lymphoma (CLL/SLL): EVOLVE CLL/SLL Study   Patient ID: [id number]  Arm: 2  Visit: C12D1     Patient presented today for C12D1 office visit with MD, labs, medication return and medication dispensing. Study blood and standard of care labs were drawn at 10:32 am without incident. Patient was found to have an elevated non-fasting glucose and all other lab values are WNL. After lab results reviewed and MD approval patient is cleared to continue treatment.     Patient presented with a slightly improved mood but admits her depression remains at a grade 2. She advised the pain in her right elbow has not completely resolved but she has noticed some improvement. Patient did complete XR of the right elbow on 08/01/24 which was unremarkable. Patient denies any reports of hot flashes, visual disturbances, cough, chest pain, edema, vomiting, or constipation.     Advised pt that next research RN assessment that needs to be completed 1 month from now can be completed over the phone.       Adverse Event Review:  Nausea - Stable - Grade 1  Fatigue - Stable - Grade 2  Headache - Stable- Grade 2  Arthralgia - Stable - Grade 2  Myalgia - Stable - Grade 2  Diarrhea - Stable - Grade 1  Paresthesia (diffuse burning)- Stable- Grade 1  Depression - Stable - Grade 2   Muscle cramps - Stable - Grade 2  Dry Skin - Stable - Grade 1   Pain in extremity (right elbow) - Decreased - Grade 1    Medication Return:  Patient presented with four bottles of Venetoclax 100mg tablets lot #[id number]. Three bottles were empty and one bottle contained QTY#20 of Venetoclax 100mg.  Venetoclax 100mg QTY#20 given to pharmacist for disposal per protocol and documented accordingly in DARF.  Medication QTY# received indicates medication compliance.     Medication Dispensed:   Patient received 4 bottles of Venetoclax 100mg tablets, all of which were lot #[id number]. Each bottle contained QTY #32 tablets for a total of 128 tablets.     Patient returned completed pill diary for Cycle 11 dated 7/31/2024 - 8/27/2024. Provided her with a new pill diary for Cycle 12.      Study Requirements:  Peripheral blood collected for MRD and banking. Specimens were handled and shipped per protocol. Specimens entered in [facility] Specimen Tracking System.   Package submitted to [employer] - [employer] tracking # [id number]   Package submitted to [facility]  [employer] - [employer] tracking # [id number]

## 2024-09-25 ENCOUNTER — RESEARCH ENCOUNTER (OUTPATIENT)
Dept: HEMATOLOGY/ONCOLOGY | Facility: HOSPITAL | Age: 49
End: 2024-09-25

## 2024-09-25 NOTE — PROGRESS NOTES
CLINICAL RESEARCH NOTE  Study: , \"Randomized, Phase III Study of Early Intervention with Venetoclax and Obinutuzumab Versus Delayed Therapy with Venetoclax and Obinutuzumab in Newly Diagnosed Asymptomatic High-Risk Patients with Chronic Lymphocytic Leukemia / Small Lymphocytic Lymphoma (CLL/SLL): EVOLVE CLL/SLL Study   Patient ID: [id number]  Arm: Early VO  Visit: Post-C12    Phone Call Documentation    Call made to patient to follow up after completion of trial treatment. Patient took last dose of venetoclax yesterday evening. She is glad to be done with the medication and is looking forward to feeling better.     Overall, patient still struggling with various symptoms. Over the last month her biggest concern has been gastrointestinal symptoms. She reports 6 loose stools per day on average, bloating and nausea that impacts appetite and consumption. Bloating is relatively unchanged - she underwent a CT scan on 8/20 which ruled out any acute processes. Patient reports, though her symptoms are moderate, she is still managing without medications or other interventions. She is \"pushing through\". Patient also reporting new rash, located underneath breasts, that appeared this month. She describes it as a \"heat rash\" - red, bumpy, itchy. She reports it worsens when she wears a bra and it up and moving. She reports she is not using any medication to treat this. It is not improving. Other previously reported symptoms are stable or slightly better as reported below.     She reports no changes to concomitant medications. She reports she has been compliant with Venetoclax over the last month, only missed 1 month due to severity of symptoms. Patient was advised to keep remaining pill bottles and pill diary on hand until next in-person visit.     We reviewed that I will send above information to med onc to review. Next scheduled visit with med onc is 10/29/2024, which is not a study timepoint. Otherwise, CT CAP, bone marrow  biopsy and 15 mo post treatment initiation visit are due 1/2025 per study.     AE REVIEW:  Arthralgia, stable gr 2  Bloating, stable, gr 2  IMPROVED, Depression, gr 1  WORSE, Diarrhea, gr 2  Resolved, Dry skin  Fatigue, stable gr 2  Headache, stable gr 2  Muscle cramps, stable gr 2  Myalgia, stable, gr 2  Nausea, stable gr 1  Paresthesias (diffuse burning), stable, gr 1  Pain (elbow), stable, gr 1  NEW, rash acneiform, gr 1

## 2024-10-18 ENCOUNTER — RESEARCH ENCOUNTER (OUTPATIENT)
Dept: HEMATOLOGY/ONCOLOGY | Facility: HOSPITAL | Age: 49
End: 2024-10-18

## 2024-10-18 NOTE — PROGRESS NOTES
Clinical Research Note    Study: , \"Randomized, Phase III Study of Early Intervention with Venetoclax and Obinutuzumab Versus Delayed Therapy with Venetoclax and Obinutuzumab in Newly Diagnosed Asymptomatic High-Risk Patients with Chronic Lymphocytic Leukemia / Small Lymphocytic Lymphoma (CLL/SLL): EVOLVE CLL/SLL Study   Patient ID: [id number]  Attempted to call patient X3 to administer questionnaires required by the study at this time point. Left voicemail for patient requesting a call back on 10/11/24, 10/14/24 and 10/16/2024. I also sent her a my chart message on 10/14/24.

## 2024-10-23 ENCOUNTER — RESEARCH ENCOUNTER (OUTPATIENT)
Dept: HEMATOLOGY/ONCOLOGY | Facility: HOSPITAL | Age: 49
End: 2024-10-23

## 2024-10-24 NOTE — PROGRESS NOTES
CLINICAL RESEARCH NOTE  Study: , \"Randomized, Phase III Study of Early Intervention with Venetoclax and Obinutuzumab Versus Delayed Therapy with Venetoclax and Obinutuzumab in Newly Diagnosed Asymptomatic High-Risk Patients with Chronic Lymphocytic Leukemia / Small Lymphocytic Lymphoma (CLL/SLL): EVOLVE CLL/SLL Study   Patient ID: [id number]    Phone Call Documentation    Received call from patient to complete 12-mo questionnaires over the phone. Administered FACT-BERENICE version 4 to patient verbally, who understood and did not require assistance to answer questions. Documented on paper which is kept in source chart.

## 2024-10-29 ENCOUNTER — APPOINTMENT (OUTPATIENT)
Dept: HEMATOLOGY/ONCOLOGY | Facility: HOSPITAL | Age: 49
End: 2024-10-29
Attending: INTERNAL MEDICINE
Payer: COMMERCIAL

## 2024-11-01 ENCOUNTER — TELEPHONE (OUTPATIENT)
Dept: INTERNAL MEDICINE CLINIC | Facility: CLINIC | Age: 49
End: 2024-11-01

## 2024-11-01 DIAGNOSIS — Z13.29 THYROID DISORDER SCREEN: ICD-10-CM

## 2024-11-01 DIAGNOSIS — Z13.220 LIPID SCREENING: ICD-10-CM

## 2024-11-01 DIAGNOSIS — Z00.00 ROUTINE GENERAL MEDICAL EXAMINATION AT A HEALTH CARE FACILITY: ICD-10-CM

## 2024-11-01 NOTE — TELEPHONE ENCOUNTER
Orders to    [facility]         Pt aware to get labs done no sooner than 2 weeks prior to the appt.  Pt aware to fast.  No call back required.]    Future Appointments   Date Time Provider Department Center   11/22/2024 10:20 AM Ashley Cross DO [facility]

## 2024-11-06 ENCOUNTER — HOSPITAL ENCOUNTER (EMERGENCY)
Facility: HOSPITAL | Age: 49
Discharge: HOME | End: 2024-11-07
Attending: EMERGENCY MEDICINE
Payer: COMMERCIAL

## 2024-11-06 ENCOUNTER — APPOINTMENT (OUTPATIENT)
Dept: GENERAL RADIOLOGY | Facility: HOSPITAL | Age: 49
End: 2024-11-06
Attending: EMERGENCY MEDICINE
Payer: COMMERCIAL

## 2024-11-06 VITALS
TEMPERATURE: 97 F | RESPIRATION RATE: 18 BRPM | DIASTOLIC BLOOD PRESSURE: 78 MMHG | SYSTOLIC BLOOD PRESSURE: 129 MMHG | HEART RATE: 76 BPM | OXYGEN SATURATION: 95 %

## 2024-11-06 DIAGNOSIS — M25.511 ACUTE PAIN OF RIGHT SHOULDER: Primary | ICD-10-CM

## 2024-11-06 LAB
ALBUMIN SERPL-MCNC: 4.3 G/DL (ref 3.2–4.8)
ALBUMIN/GLOB SERPL: 1.3 {RATIO} (ref 1–2)
ALP LIVER SERPL-CCNC: 91 U/L
ALT SERPL-CCNC: 41 U/L
ANION GAP SERPL CALC-SCNC: 3 MMOL/L (ref 0–18)
AST SERPL-CCNC: 39 U/L (ref ?–34)
BASOPHILS # BLD AUTO: 0.05 X10(3) UL (ref 0–0.2)
BASOPHILS NFR BLD AUTO: 0.3 %
BILIRUB SERPL-MCNC: 0.2 MG/DL (ref 0.3–1.2)
BUN BLD-MCNC: 10 MG/DL (ref 9–23)
CALCIUM BLD-MCNC: 10 MG/DL (ref 8.7–10.4)
CHLORIDE SERPL-SCNC: 104 MMOL/L (ref 98–112)
CO2 SERPL-SCNC: 28 MMOL/L (ref 21–32)
CREAT BLD-MCNC: 0.83 MG/DL
EGFRCR SERPLBLD CKD-EPI 2021: 86 ML/MIN/1.73M2 (ref 60–?)
EOSINOPHIL # BLD AUTO: 0.18 X10(3) UL (ref 0–0.7)
EOSINOPHIL NFR BLD AUTO: 1.2 %
ERYTHROCYTE [DISTWIDTH] IN BLOOD BY AUTOMATED COUNT: 12.4 %
GLOBULIN PLAS-MCNC: 3.3 G/DL (ref 2–3.5)
GLUCOSE BLD-MCNC: 135 MG/DL (ref 70–99)
HCT VFR BLD AUTO: 41.4 %
HGB BLD-MCNC: 14.3 G/DL
IMM GRANULOCYTES # BLD AUTO: 0.1 X10(3) UL (ref 0–1)
IMM GRANULOCYTES NFR BLD: 0.7 %
LYMPHOCYTES # BLD AUTO: 1.13 X10(3) UL (ref 1–4)
LYMPHOCYTES NFR BLD AUTO: 7.4 %
MCH RBC QN AUTO: 32.1 PG (ref 26–34)
MCHC RBC AUTO-ENTMCNC: 34.5 G/DL (ref 31–37)
MCV RBC AUTO: 92.8 FL
MONOCYTES # BLD AUTO: 0.9 X10(3) UL (ref 0.1–1)
MONOCYTES NFR BLD AUTO: 5.9 %
NEUTROPHILS # BLD AUTO: 12.81 X10 (3) UL (ref 1.5–7.7)
NEUTROPHILS # BLD AUTO: 12.81 X10(3) UL (ref 1.5–7.7)
NEUTROPHILS NFR BLD AUTO: 84.5 %
OSMOLALITY SERPL CALC.SUM OF ELEC: 281 MOSM/KG (ref 275–295)
PLATELET # BLD AUTO: 216 10(3)UL (ref 150–450)
POTASSIUM SERPL-SCNC: 4.3 MMOL/L (ref 3.5–5.1)
PROT SERPL-MCNC: 7.6 G/DL (ref 5.7–8.2)
RBC # BLD AUTO: 4.46 X10(6)UL
SODIUM SERPL-SCNC: 135 MMOL/L (ref 136–145)
WBC # BLD AUTO: 15.2 X10(3) UL (ref 4–11)

## 2024-11-06 PROCEDURE — 80053 COMPREHEN METABOLIC PANEL: CPT | Performed by: EMERGENCY MEDICINE

## 2024-11-06 PROCEDURE — 99285 EMERGENCY DEPT VISIT HI MDM: CPT

## 2024-11-06 PROCEDURE — 96374 THER/PROPH/DIAG INJ IV PUSH: CPT

## 2024-11-06 PROCEDURE — 73030 X-RAY EXAM OF SHOULDER: CPT | Performed by: EMERGENCY MEDICINE

## 2024-11-06 PROCEDURE — 99284 EMERGENCY DEPT VISIT MOD MDM: CPT

## 2024-11-06 PROCEDURE — 96376 TX/PRO/DX INJ SAME DRUG ADON: CPT

## 2024-11-06 PROCEDURE — 85025 COMPLETE CBC W/AUTO DIFF WBC: CPT | Performed by: EMERGENCY MEDICINE

## 2024-11-06 RX ORDER — MORPHINE SULFATE 4 MG/ML
4 INJECTION, SOLUTION INTRAMUSCULAR; INTRAVENOUS ONCE
Status: COMPLETED | OUTPATIENT
Start: 2024-11-06 | End: 2024-11-07

## 2024-11-06 RX ORDER — MORPHINE SULFATE 4 MG/ML
4 INJECTION, SOLUTION INTRAMUSCULAR; INTRAVENOUS ONCE
Status: COMPLETED | OUTPATIENT
Start: 2024-11-06 | End: 2024-11-06

## 2024-11-07 ENCOUNTER — RESEARCH ENCOUNTER (OUTPATIENT)
Dept: HEMATOLOGY/ONCOLOGY | Facility: HOSPITAL | Age: 49
End: 2024-11-07

## 2024-11-07 ENCOUNTER — PATIENT MESSAGE (OUTPATIENT)
Dept: HEMATOLOGY/ONCOLOGY | Facility: HOSPITAL | Age: 49
End: 2024-11-07

## 2024-11-07 NOTE — ED PROVIDER NOTES
Patient Seen in: [facility] Emergency Department      History     Chief Complaint   Patient presents with    Arm or Hand Injury     Stated Complaint: right shoulder pain, recently finished chemo for CLL cancer, says joint hurt, n*    Subjective:   HPI      The 49-year-old female patient presented with severe shoulder pain that started in the middle of the day. She could not identify any specific event or action that might have triggered the pain. The pain was described as excruciating, similar to an axe being stuck into her shoulder and being pried. The pain also had a pulsating quality and at times radiated up into her jaw. The patient was unable to determine whether the pain was musculoskeletal or bone-related. The pain was significantly worse with movement, including attempts to lift the shoulder or even walking without swinging the arm. The patient also reported having had similar pain in the past, but not as severe as the current episode. The patient has a history of Chronic Lymphocytic Leukemia (CLL) and had just completed treatments for it. She reported having had bone pain associated with the CLL, but not as severe as the current shoulder pain. The patient arrived at the consultation with her , but he had since left. The patient was not driving herself.    Objective:     Past Medical History:    Anxiety    Cancer (HCC)    CLL    Depression    PONV (postoperative nausea and vomiting)    Visual impairment    reading glasses              Past Surgical History:   Procedure Laterality Date    Hysterectomy      1 ovary in place    Laparoscopic cholecystectomy      Lumbar spine fusion combined      Other      C-Spine fusion                Social History     Socioeconomic History    Marital status:    Tobacco Use    Smoking status: Every Day     Current packs/day: 0.50     Types: Cigarettes    Smokeless tobacco: Never   Vaping Use    Vaping status: Some Days    Substances: Nicotine   Substance  and Sexual Activity    Alcohol use: Not Currently    Drug use: Not Currently     Types: Cannabis     Comment: edible prn for neck pain/headache    Sexual activity: Not Currently     Partners: Male     Birth control/protection: Hysterectomy                  Physical Exam     ED Triage Vitals   BP 11/06/24 2300 132/82   Pulse 11/06/24 2300 70   Resp 11/06/24 2359 18   Temp 11/06/24 2359 97.2 °F (36.2 °C)   Temp src 11/06/24 2359 Temporal   SpO2 11/06/24 2300 93 %   O2 Device 11/06/24 2315 None (Room air)       Current Vitals:   Vital Signs  BP: 129/78  Pulse: 76  Resp: 18  Temp: 97.2 °F (36.2 °C)  Temp src: Temporal  MAP (mmHg): 93    Oxygen Therapy  SpO2: 95 %  O2 Device: None (Room air)        Physical Exam    Vital signs reviewed  General appearance: Patient is alert and in moderate to severe pain distress tearful  HEENT: Pupils equal react to light extraocular muscles intact no scleral icterus, mucous membranes are moist, there is no erythema or exudate in the posterior pharynx  Neck: Supple no JVD no lymphadenopathy no meningismus no carotid bruit  CV: Regular rate and rhythm no murmur rub  Respiratory: Clear to auscultation bilaterally no crackles no wheezes no accessory muscle use  Abdomen: Soft nontender nondistended, no rebound no guarding  no hepatosplenomegaly bowel sounds are present , no pulsatile mass  Extremities: No clubbing cyanosis or edema 2+ distal pulses.  Patient has significant tenderness over the lateral deltoid even with just light touch pectoral groove was nontender trapezius nontender but any manipulation of shoulder patient would scream  Neuro: Cranial nerves II through XII intact with no gross focal sensory or motor abnormality.      ED Course     Labs Reviewed   COMP METABOLIC PANEL (14) - Abnormal; Notable for the following components:       Result Value    Glucose 135 (*)     Sodium 135 (*)     AST 39 (*)     Bilirubin, Total 0.2 (*)     All other components within normal limits   CBC  WITH DIFFERENTIAL WITH PLATELET - Abnormal; Notable for the following components:    WBC 15.2 (*)     Neutrophil Absolute Prelim 12.81 (*)     Neutrophil Absolute 12.81 (*)     All other components within normal limits   RAINBOW DRAW BLUE   RAINBOW DRAW GOLD            Due to patient's history of CLL and undergoing treatment will check baseline labs.  Also get an x-ray of the shoulder.  She does look extremely uncomfortable do feel it could be bone pain from the cancer possibly from the treatment.  Will give patient some morphine    XR SHOULDER, COMPLETE (MIN 2 VIEWS), RIGHT (CPT=73030)    Result Date: 11/6/2024  CONCLUSION:  There is no acute abnormality in the right shoulder.   LOCATION:  [facility]   Dictated by (CST): Irving Braden MD on 11/06/2024 at 10:25 PM     Finalized by (CST): Irving Braden MD on 11/06/2024 at 10:26 PM        Patient did feel better after the morphine but was requesting 1 more dose for going home.  X-ray was unremarkable and patient will be sent home with some pain medication could be bone pain from leukemia but bone does not show any lesions.  Will have her also do some anti-inflammatories.  Follow-up with orthopedics if needed.  Patient was put in a sling and is feeling better with an immobilized     MDM      Differential diagnosis reflecting the complexity of care include: Shoulder strain, bone pain from cancer, musculoskeletal strain    Comorbidities that add complexity to management include: CLL      My independent interpretation of studies of: X-ray shows no acute abnormality in the right shoulder no fracture no bony lesions      Shared decision making was done by myself and patient.  Patient agrees with plan and will apply some ice    Patient was screened and evaluated during this visit.  As the treating physician attending to the patient, I determined within reasonable clinical confidence and prior to discharge, that an emergency medical condition was not or was no longer present.   There was no indication for further evaluation, treatment, or admission on an emergency basis.  Comprehensive verbal and written discharge and follow-up instructions were provided to help prevent relapse or worsening.  Patient was instructed to follow-up with primary care provider for further evaluation treatment, return immediately to ER for worsening, concerning, new, or changing/persisting symptoms.  I discussed the case with the patient and they had no questions, complaints, or concerns.  Patient was comfortable going home.      Dictation Disclaimer Note:   To increase efficiency this document may have been prepared using voice recognition technology. Every effort has been made to correct any errors made during preparation of this note. However, if a word or phrase is confusing, or does not make sense, this is likely due to a recognition error within the program which was not discovered during editing. Please do not hesitate to contact to address any significant errors.    Note to Patient:   The 21st Century Cures Act makes medical notes like these available to patients in the interest of transparency. Please be advised this is a medical document. Medical documents are intended to carry relevant information, facts as evident, and the clinical opinion of the practitioner. The medical note is intended as peer to peer communication and may appear blunt or direct. It is written in medical language and may contain abbreviations or verbiage that are unfamiliar.               Medical Decision Making      Disposition and Plan     Clinical Impression:  1. Acute pain of right shoulder         Disposition:  Discharge  11/6/2024 11:43 pm    Follow-up:  No follow-up provider specified.        Medications Prescribed:  Discharge Medication List as of 11/7/2024 12:03 AM              Supplementary Documentation:

## 2024-11-07 NOTE — PROGRESS NOTES
Phone Call Documentation    Patient called to follow up with clinic after ER visit last night. She reports she experiences severe R shoulder bone pain and muscle tenderness to R shoulder. She presented to ER and was assessed via XR. No acute processed were found, patient received Morphine and was dc'd home. Patient had some abnormal lab values as well. She is still experiencing pain today, not improved and would like Dr. Herring's opinion. Message sent to provider and clinic RNs for review and follow up.

## 2024-11-10 ENCOUNTER — HOSPITAL ENCOUNTER (OUTPATIENT)
Dept: MRI IMAGING | Facility: HOSPITAL | Age: 49
Discharge: HOME | End: 2024-11-10
Attending: INTERNAL MEDICINE
Payer: COMMERCIAL

## 2024-11-10 ENCOUNTER — HOSPITAL ENCOUNTER (OUTPATIENT)
Dept: MRI IMAGING | Facility: HOSPITAL | Age: 49
End: 2024-11-10
Attending: INTERNAL MEDICINE
Payer: COMMERCIAL

## 2024-11-10 DIAGNOSIS — R52 INTRACTABLE PAIN: ICD-10-CM

## 2024-11-10 DIAGNOSIS — C91.10 CLL (CHRONIC LYMPHOCYTIC LEUKEMIA) (HCC): ICD-10-CM

## 2024-11-10 PROCEDURE — A9575 INJ GADOTERATE MEGLUMI 0.1ML: HCPCS | Performed by: INTERNAL MEDICINE

## 2024-11-10 PROCEDURE — 73223 MRI JOINT UPR EXTR W/O&W/DYE: CPT | Performed by: INTERNAL MEDICINE

## 2024-11-10 RX ORDER — DIPHENHYDRAMINE HYDROCHLORIDE 50 MG/ML
20 INJECTION, SOLUTION INTRAMUSCULAR; INTRAVENOUS
Status: COMPLETED | OUTPATIENT
Start: 2024-11-10 | End: 2024-11-10

## 2024-11-10 RX ADMIN — DIPHENHYDRAMINE HYDROCHLORIDE 20 ML: 50 INJECTION, SOLUTION INTRAMUSCULAR; INTRAVENOUS at 12:53:00

## 2024-11-11 ENCOUNTER — TELEPHONE (OUTPATIENT)
Dept: HEMATOLOGY/ONCOLOGY | Facility: HOSPITAL | Age: 49
End: 2024-11-11

## 2024-11-15 ENCOUNTER — VIRTUAL PHONE E/M (OUTPATIENT)
Dept: HEMATOLOGY/ONCOLOGY | Facility: HOSPITAL | Age: 49
End: 2024-11-15
Attending: INTERNAL MEDICINE
Payer: COMMERCIAL

## 2024-11-15 ENCOUNTER — HOSPITAL ENCOUNTER (OUTPATIENT)
Dept: NUCLEAR MEDICINE | Facility: HOSPITAL | Age: 49
Discharge: HOME | End: 2024-11-15
Attending: INTERNAL MEDICINE
Payer: COMMERCIAL

## 2024-11-15 ENCOUNTER — RESEARCH ENCOUNTER (OUTPATIENT)
Dept: HEMATOLOGY/ONCOLOGY | Facility: HOSPITAL | Age: 49
End: 2024-11-15

## 2024-11-15 ENCOUNTER — ORDER TRANSCRIPTION (OUTPATIENT)
Dept: ADMINISTRATIVE | Facility: HOSPITAL | Age: 49
End: 2024-11-15

## 2024-11-15 DIAGNOSIS — M89.9 LYTIC LESION OF BONE ON X-RAY: ICD-10-CM

## 2024-11-15 DIAGNOSIS — Z13.220 LIPID SCREENING: ICD-10-CM

## 2024-11-15 DIAGNOSIS — Z00.00 ROUTINE GENERAL MEDICAL EXAMINATION AT A HEALTH CARE FACILITY: ICD-10-CM

## 2024-11-15 DIAGNOSIS — C91.10 CLL (CHRONIC LYMPHOCYTIC LEUKEMIA) (HCC): Primary | ICD-10-CM

## 2024-11-15 DIAGNOSIS — R59.0 AXILLARY LYMPHADENOPATHY: ICD-10-CM

## 2024-11-15 DIAGNOSIS — Z13.29 THYROID DISORDER SCREEN: ICD-10-CM

## 2024-11-15 DIAGNOSIS — M89.9 LYTIC BONE LESIONS ON XRAY: ICD-10-CM

## 2024-11-15 DIAGNOSIS — C91.10 CLL (CHRONIC LYMPHOCYTIC LEUKEMIA) (HCC): ICD-10-CM

## 2024-11-15 DIAGNOSIS — R93.89 ABNORMAL FINDING ON IMAGING: ICD-10-CM

## 2024-11-15 LAB
ALBUMIN SERPL-MCNC: 4.6 G/DL (ref 3.2–4.8)
ALBUMIN/GLOB SERPL: 1.8 {RATIO} (ref 1–2)
ALP LIVER SERPL-CCNC: 108 U/L
ALT SERPL-CCNC: 39 U/L
ANION GAP SERPL CALC-SCNC: 5 MMOL/L (ref 0–18)
AST SERPL-CCNC: 25 U/L (ref ?–34)
BASOPHILS # BLD AUTO: 0.04 X10(3) UL (ref 0–0.2)
BASOPHILS NFR BLD AUTO: 0.3 %
BILIRUB SERPL-MCNC: 0.4 MG/DL (ref 0.3–1.2)
BUN BLD-MCNC: 14 MG/DL (ref 9–23)
CALCIUM BLD-MCNC: 9.9 MG/DL (ref 8.7–10.4)
CHLORIDE SERPL-SCNC: 103 MMOL/L (ref 98–112)
CHOLEST SERPL-MCNC: 246 MG/DL (ref ?–200)
CO2 SERPL-SCNC: 31 MMOL/L (ref 21–32)
CREAT BLD-MCNC: 0.84 MG/DL
EGFRCR SERPLBLD CKD-EPI 2021: 85 ML/MIN/1.73M2 (ref 60–?)
EOSINOPHIL # BLD AUTO: 0.18 X10(3) UL (ref 0–0.7)
EOSINOPHIL NFR BLD AUTO: 1.4 %
ERYTHROCYTE [DISTWIDTH] IN BLOOD BY AUTOMATED COUNT: 12.3 %
FASTING PATIENT LIPID ANSWER: YES
GLOBULIN PLAS-MCNC: 2.5 G/DL (ref 2–3.5)
GLUCOSE BLD-MCNC: 108 MG/DL (ref 70–99)
GLUCOSE BLD-MCNC: 120 MG/DL (ref 70–99)
HCT VFR BLD AUTO: 42.5 %
HDLC SERPL-MCNC: 41 MG/DL (ref 40–59)
HGB BLD-MCNC: 14.5 G/DL
IMM GRANULOCYTES # BLD AUTO: 0.08 X10(3) UL (ref 0–1)
IMM GRANULOCYTES NFR BLD: 0.6 %
LDH SERPL L TO P-CCNC: 173 U/L
LDLC SERPL CALC-MCNC: 147 MG/DL (ref ?–100)
LYMPHOCYTES # BLD AUTO: 1.63 X10(3) UL (ref 1–4)
LYMPHOCYTES NFR BLD AUTO: 12.4 %
MCH RBC QN AUTO: 31.9 PG (ref 26–34)
MCHC RBC AUTO-ENTMCNC: 34.1 G/DL (ref 31–37)
MCV RBC AUTO: 93.6 FL
MONOCYTES # BLD AUTO: 0.74 X10(3) UL (ref 0.1–1)
MONOCYTES NFR BLD AUTO: 5.6 %
NEUTROPHILS # BLD AUTO: 10.44 X10 (3) UL (ref 1.5–7.7)
NEUTROPHILS # BLD AUTO: 10.44 X10(3) UL (ref 1.5–7.7)
NEUTROPHILS NFR BLD AUTO: 79.7 %
NONHDLC SERPL-MCNC: 205 MG/DL (ref ?–130)
OSMOLALITY SERPL CALC.SUM OF ELEC: 289 MOSM/KG (ref 275–295)
PLATELET # BLD AUTO: 254 10(3)UL (ref 150–450)
POTASSIUM SERPL-SCNC: 3.6 MMOL/L (ref 3.5–5.1)
PROT SERPL-MCNC: 7.1 G/DL (ref 5.7–8.2)
RBC # BLD AUTO: 4.54 X10(6)UL
SODIUM SERPL-SCNC: 139 MMOL/L (ref 136–145)
T4 FREE SERPL-MCNC: 1.3 NG/DL (ref 0.8–1.7)
TRIGL SERPL-MCNC: 313 MG/DL (ref 30–149)
TSI SER-ACNC: 4.9 UIU/ML (ref 0.55–4.78)
URATE SERPL-MCNC: 4.9 MG/DL
VLDLC SERPL CALC-MCNC: 60 MG/DL (ref 0–30)
WBC # BLD AUTO: 13.1 X10(3) UL (ref 4–11)

## 2024-11-15 PROCEDURE — 83615 LACTATE (LD) (LDH) ENZYME: CPT

## 2024-11-15 PROCEDURE — 84443 ASSAY THYROID STIM HORMONE: CPT

## 2024-11-15 PROCEDURE — 85025 COMPLETE CBC W/AUTO DIFF WBC: CPT

## 2024-11-15 PROCEDURE — 84439 ASSAY OF FREE THYROXINE: CPT

## 2024-11-15 PROCEDURE — 82962 GLUCOSE BLOOD TEST: CPT

## 2024-11-15 PROCEDURE — 78815 PET IMAGE W/CT SKULL-THIGH: CPT | Performed by: INTERNAL MEDICINE

## 2024-11-15 PROCEDURE — 84550 ASSAY OF BLOOD/URIC ACID: CPT

## 2024-11-15 PROCEDURE — 36415 COLL VENOUS BLD VENIPUNCTURE: CPT

## 2024-11-15 PROCEDURE — 80061 LIPID PANEL: CPT

## 2024-11-15 PROCEDURE — 99215 OFFICE O/P EST HI 40 MIN: CPT | Performed by: INTERNAL MEDICINE

## 2024-11-15 PROCEDURE — 80053 COMPREHEN METABOLIC PANEL: CPT

## 2024-11-15 NOTE — PROGRESS NOTES
CLINICAL RESEARCH NOTE  Study: , \"Randomized, Phase III Study of Early Intervention with Venetoclax and Obinutuzumab Versus Delayed Therapy with Venetoclax and Obinutuzumab in Newly Diagnosed Asymptomatic High-Risk Patients with Chronic Lymphocytic Leukemia / Small Lymphocytic Lymphoma (CLL/SLL): EVOLVE CLL/SLL Study   Patient ID: [id number]  Arm: Early VO    Medication Reconciliation    Today, patient returned x4 bottles of venetoclax 100mg tablets. All bottles were lot # [id number]. #3 bottles were empty and one bottle contained #20 tablets. Patient's diary indicates she missed one dose on 9/22/24 but was otherwise compliant. Count is appropriate for 1 missed dose. Bottles were stored in pharmacy until pharmacist review and destruction per SOP. Diary stored in patient's chart.

## 2024-11-15 NOTE — PROGRESS NOTES
Virtual Telephone Check-In    Lisa Cabral verbally consents to a Virtual/Telephone Check-In visit on 11/15/24.  Patient has been referred to the [facility] website at [email or web address] to review the yearly Consent to Treat document.    Patient understands and accepts financial responsibility for any deductible, co-insurance and/or co-pays associated with this service.    Duration of the service: 40 minutes    Patient with CLL diagnosed 9/23 after she underwent biopsy of cervical lymphadenopathy.  Enrolled in clinical trial .  Received fixed duration therapy with Obinutuzumab/venetoclax 10/23-9/24.  Had complete hematologic and clinical response.    Recently underwent MRI of right shoulder for severe pain which showed lymphomatous involvement of right humerus.  PET scan done.  Telephone visit.    PET scan shows uptake in right humerus, right axilla and right iliac bone.     Recommend biopsy of right axilla to rule out transformation/another etiology.    Using morphine for pain.    Will contact her with results and plan once available.    Tamica Herring M.D.    [facility]    [address] [place], [address]

## 2024-11-21 ENCOUNTER — NURSE ONLY (OUTPATIENT)
Dept: LAB | Facility: HOSPITAL | Age: 49
End: 2024-11-21
Attending: INTERNAL MEDICINE
Payer: COMMERCIAL

## 2024-11-21 ENCOUNTER — HOSPITAL ENCOUNTER (OUTPATIENT)
Dept: ULTRASOUND IMAGING | Facility: HOSPITAL | Age: 49
Discharge: HOME | End: 2024-11-21
Attending: INTERNAL MEDICINE
Payer: COMMERCIAL

## 2024-11-21 DIAGNOSIS — C91.10 CLL (CHRONIC LYMPHOCYTIC LEUKEMIA) (HCC): ICD-10-CM

## 2024-11-21 DIAGNOSIS — R59.0 AXILLARY LYMPHADENOPATHY: ICD-10-CM

## 2024-11-21 LAB
ALBUMIN SERPL-MCNC: 4.7 G/DL (ref 3.2–4.8)
ALBUMIN/GLOB SERPL: 1.9 {RATIO} (ref 1–2)
ALP LIVER SERPL-CCNC: 104 U/L
ALT SERPL-CCNC: 36 U/L
ANION GAP SERPL CALC-SCNC: 4 MMOL/L (ref 0–18)
AST SERPL-CCNC: 22 U/L (ref ?–34)
BASOPHILS # BLD AUTO: 0.04 X10(3) UL (ref 0–0.2)
BASOPHILS NFR BLD AUTO: 0.3 %
BILIRUB SERPL-MCNC: 0.2 MG/DL (ref 0.3–1.2)
BUN BLD-MCNC: 14 MG/DL (ref 9–23)
CALCIUM BLD-MCNC: 9.7 MG/DL (ref 8.7–10.4)
CHLORIDE SERPL-SCNC: 105 MMOL/L (ref 98–112)
CO2 SERPL-SCNC: 30 MMOL/L (ref 21–32)
CREAT BLD-MCNC: 0.93 MG/DL
EGFRCR SERPLBLD CKD-EPI 2021: 75 ML/MIN/1.73M2 (ref 60–?)
EOSINOPHIL # BLD AUTO: 0.08 X10(3) UL (ref 0–0.7)
EOSINOPHIL NFR BLD AUTO: 0.7 %
ERYTHROCYTE [DISTWIDTH] IN BLOOD BY AUTOMATED COUNT: 12.4 %
FASTING STATUS PATIENT QL REPORTED: NO
GLOBULIN PLAS-MCNC: 2.5 G/DL (ref 2–3.5)
GLUCOSE BLD-MCNC: 135 MG/DL (ref 70–99)
HCT VFR BLD AUTO: 43 %
HGB BLD-MCNC: 14.5 G/DL
IMM GRANULOCYTES # BLD AUTO: 0.08 X10(3) UL (ref 0–1)
IMM GRANULOCYTES NFR BLD: 0.7 %
LDH SERPL L TO P-CCNC: 172 U/L
LYMPHOCYTES # BLD AUTO: 2.22 X10(3) UL (ref 1–4)
LYMPHOCYTES NFR BLD AUTO: 18.7 %
MCH RBC QN AUTO: 31.9 PG (ref 26–34)
MCHC RBC AUTO-ENTMCNC: 33.7 G/DL (ref 31–37)
MCV RBC AUTO: 94.7 FL
MONOCYTES # BLD AUTO: 0.6 X10(3) UL (ref 0.1–1)
MONOCYTES NFR BLD AUTO: 5 %
NEUTROPHILS # BLD AUTO: 8.88 X10 (3) UL (ref 1.5–7.7)
NEUTROPHILS # BLD AUTO: 8.88 X10(3) UL (ref 1.5–7.7)
NEUTROPHILS NFR BLD AUTO: 74.6 %
OSMOLALITY SERPL CALC.SUM OF ELEC: 291 MOSM/KG (ref 275–295)
PLATELET # BLD AUTO: 245 10(3)UL (ref 150–450)
POTASSIUM SERPL-SCNC: 4 MMOL/L (ref 3.5–5.1)
PROT SERPL-MCNC: 7.2 G/DL (ref 5.7–8.2)
RBC # BLD AUTO: 4.54 X10(6)UL
SODIUM SERPL-SCNC: 139 MMOL/L (ref 136–145)
URATE SERPL-MCNC: 4.7 MG/DL
WBC # BLD AUTO: 11.9 X10(3) UL (ref 4–11)

## 2024-11-21 PROCEDURE — 76942 ECHO GUIDE FOR BIOPSY: CPT | Performed by: INTERNAL MEDICINE

## 2024-11-21 PROCEDURE — 80053 COMPREHEN METABOLIC PANEL: CPT

## 2024-11-21 PROCEDURE — 38505 NEEDLE BIOPSY LYMPH NODES: CPT | Performed by: INTERNAL MEDICINE

## 2024-11-21 PROCEDURE — 84550 ASSAY OF BLOOD/URIC ACID: CPT

## 2024-11-21 PROCEDURE — 36415 COLL VENOUS BLD VENIPUNCTURE: CPT

## 2024-11-21 PROCEDURE — 83615 LACTATE (LD) (LDH) ENZYME: CPT

## 2024-11-21 PROCEDURE — 85025 COMPLETE CBC W/AUTO DIFF WBC: CPT

## 2024-11-22 ENCOUNTER — OFFICE VISIT (OUTPATIENT)
Dept: INTERNAL MEDICINE CLINIC | Facility: CLINIC | Age: 49
End: 2024-11-22
Payer: COMMERCIAL

## 2024-11-22 VITALS
BODY MASS INDEX: 38 KG/M2 | SYSTOLIC BLOOD PRESSURE: 132 MMHG | DIASTOLIC BLOOD PRESSURE: 84 MMHG | OXYGEN SATURATION: 99 % | TEMPERATURE: 98 F | WEIGHT: 259 LBS | HEART RATE: 98 BPM | RESPIRATION RATE: 18 BRPM

## 2024-11-22 DIAGNOSIS — Z90.710 HISTORY OF ROBOT-ASSISTED LAPAROSCOPIC HYSTERECTOMY: ICD-10-CM

## 2024-11-22 DIAGNOSIS — E66.812 CLASS 2 OBESITY DUE TO EXCESS CALORIES WITHOUT SERIOUS COMORBIDITY WITH BODY MASS INDEX (BMI) OF 38.0 TO 38.9 IN ADULT: ICD-10-CM

## 2024-11-22 DIAGNOSIS — Z00.00 WELLNESS EXAMINATION: Primary | ICD-10-CM

## 2024-11-22 DIAGNOSIS — Z12.31 ENCOUNTER FOR SCREENING MAMMOGRAM FOR MALIGNANT NEOPLASM OF BREAST: ICD-10-CM

## 2024-11-22 DIAGNOSIS — Z72.0 TOBACCO USE: ICD-10-CM

## 2024-11-22 DIAGNOSIS — F33.0 MILD EPISODE OF RECURRENT MAJOR DEPRESSIVE DISORDER (HCC): ICD-10-CM

## 2024-11-22 DIAGNOSIS — Z12.11 SCREEN FOR COLON CANCER: ICD-10-CM

## 2024-11-22 DIAGNOSIS — E66.09 CLASS 2 OBESITY DUE TO EXCESS CALORIES WITHOUT SERIOUS COMORBIDITY WITH BODY MASS INDEX (BMI) OF 38.0 TO 38.9 IN ADULT: ICD-10-CM

## 2024-11-22 DIAGNOSIS — E03.8 SUBCLINICAL HYPOTHYROIDISM: ICD-10-CM

## 2024-11-22 DIAGNOSIS — Z80.0 FAMILY HISTORY OF COLON CANCER: ICD-10-CM

## 2024-11-22 DIAGNOSIS — Z98.1 HISTORY OF SPINAL FUSION: ICD-10-CM

## 2024-11-22 DIAGNOSIS — E78.2 MIXED HYPERCHOLESTEROLEMIA AND HYPERTRIGLYCERIDEMIA: ICD-10-CM

## 2024-11-22 DIAGNOSIS — C91.10 CLL (CHRONIC LYMPHOCYTIC LEUKEMIA) (HCC): ICD-10-CM

## 2024-11-22 RX ORDER — BUPROPION HYDROCHLORIDE 150 MG/1
150 TABLET ORAL DAILY
Qty: 30 TABLET | Refills: 0 | Status: SHIPPED | OUTPATIENT
Start: 2024-11-22

## 2024-11-22 NOTE — PROGRESS NOTES
Lisa Cabral  3/12/1975    Chief Complaint   Patient presents with    Physical     Physical      SUBJECTIVE   Lisa Cabral is a 49 year old female who presents for annual physical examination.    Notable labs:  TSH 4.899  T4 1.3  Total cholesterol 246  Triglycerides 313    VLDL 60    She needs form filled out for a permanent parking placard.    11/10 MRI right shoulder showed \"lymphomatous involvement of the right humerus\".  11/15 PET showed \"new or worsening FDG uptake which is abnormal involving the right proximal humerus, right axillary lymph node and right iliac bone suspicious for lymphomatous involvement.  Biopsy of right axillary lymph nodes done 11/21 pathology is pending.    The patient is interested in pharmacotherapy for weight loss..    Review of Systems   Review of Systems   No f/c/chest pain or sob. No cough. No abd pain/n/v/d.   OBJECTIVE:   /90   Pulse 98   Temp 97.9 °F (36.6 °C)   Resp 18   Wt 259 lb (117.5 kg)   SpO2 99%   BMI 38.23 kg/m²   Physical Exam   Constitutional: Oriented to person, place, and time. No distress.   HEENT:  Normocephalic and atraumatic. Tympanic membranes appear normal.  Nose normal. Oropharynx is clear and moist.   Eyes: Conjunctivae wnl. PERRLA.  Neck: Normal range of motion. Neck supple.   Cardiovascular: Normal rate, regular rhythm and intact distal pulses.  No murmur, rubs or gallops.   Pulmonary/Chest: Effort normal and breath sounds normal. No respiratory distress.  Abdominal: Soft. Bowel sounds are present. Non tender, no masses, no organomegaly or hernias.  Musculoskeletal: No edema  Psychiatric: Normal mood and affect.     Lab Results   Component Value Date     (H) 11/21/2024    BUN 14 11/21/2024    CREATSERUM 0.93 11/21/2024    ANIONGAP 4 11/21/2024    CA 9.7 11/21/2024     11/21/2024    K 4.0 11/21/2024     11/21/2024    CO2 30.0 11/21/2024    OSMOCALC 291 11/21/2024      Lab Results   Component Value Date    WBC 11.9 (H)  11/21/2024    RBC 4.54 11/21/2024    HGB 14.5 11/21/2024    HCT 43.0 11/21/2024    MCV 94.7 11/21/2024    MCH 31.9 11/21/2024    MCHC 33.7 11/21/2024    RDW 12.4 11/21/2024    .0 11/21/2024      Lab Results   Component Value Date    T4F 1.3 11/15/2024    TSH 4.899 (H) 11/15/2024        ASSESSMENT AND PLAN:       ICD-10-CM    1. Wellness examination  Z00.00       2. Family history of colon cancer  Z80.0 GASTRO - INTERNAL      3. Screen for colon cancer  Z12.11 GASTRO - INTERNAL      4. Encounter for screening mammogram for malignant neoplasm of breast  Z12.31 [facility] 2D+3D SCREENING BILAT (CPT=77067/28866)      5. Subclinical hypothyroidism  E03.8 Thyroid Peroxidase (TPO) AB [E]     TSH and Free T4 [E]      6. CLL (chronic lymphocytic leukemia) (HCC)  C91.10 Management per oncology.  Per last office visit note from August patient had asked to complete hematologic and medical response.  Now with recent finding bone involvement.  Biopsy of axillary lymph node done yesterday pathology pending.      7. Class 2 obesity due to excess calories without serious comorbidity with body mass index (BMI) of 38.0 to 38.9 in adult  E66.812 buPROPion  MG Oral Tablet 24 Hr    E66.09     Z68.38       8. Mild episode of recurrent major depressive disorder (HCC)  F33.0 buPROPion  MG Oral Tablet 24 Hr      9. Tobacco use  Z72.0 buPROPion  MG Oral Tablet 24 Hr      10. History of robot-assisted laparoscopic hysterectomy  Z90.710 Stable, continue to monitor      11. History of spinal fusion  Z98.1 Management per pain management      12. Mixed hypercholesterolemia and hypertriglyceridemia  E78.2 Lifestyle modifications encouraged.  Hold on statin for now            TODAY'S ORDERS     No orders of the defined types were placed in this encounter.      Meds & Refills:  Requested Prescriptions      No prescriptions requested or ordered in this encounter       Imaging & Consults:  None    No follow-ups on file.  There  are no Patient Instructions on file for this visit.    All questions were answered and the patient agrees with the plan.     Thank you,  Ashley Cross, DO

## 2024-11-26 LAB
CD10 CELLS NFR SPEC: <1 %
CD10/CD19: <1 %
CD19 CELLS NFR SPEC: 67 %
CD19+/CD200+: 38 %
CD2 CELLS NFR SPEC: 34 %
CD20 CELLS NFR SPEC: 65 %
CD200 CELLS: 41 %
CD3 CELLS NFR SPEC: 32 %
CD3+/TCRGD+: <1 %
CD3+CD4+ CELLS NFR SPEC: 27 %
CD3+CD4+ CELLS/CD3+CD8+ CLL SPEC: 6.8
CD3+CD8+ CELLS NFR SPEC: 4 %
CD3-/CD56+: 1 %
CD34 CELLS NFR SPEC: <1 %
CD38 CELLS NFR SPEC: 2 %
CD38+/CD19+: <1 %
CD45 CELLS NFR SPEC: 100 %
CD5 CELLS NFR SPEC: 96 %
CD5/CD19 CELLS: 66 %
CD7 CELLS NFR SPEC: 40 %
CELL SURF LAMBDA LIGHT CHAIN: <1 %
CELL SURFACE KAPPA LIGHT CHAIN: 66 %
TCR G-D CELLS NFR SPEC: <1 %

## 2024-11-27 ENCOUNTER — VIRTUAL PHONE E/M (OUTPATIENT)
Dept: HEMATOLOGY/ONCOLOGY | Facility: HOSPITAL | Age: 49
End: 2024-11-27
Attending: INTERNAL MEDICINE
Payer: COMMERCIAL

## 2024-11-27 DIAGNOSIS — C91.12 CLL (CHRONIC LYMPHOID LEUKEMIA) IN RELAPSE (HCC): Primary | ICD-10-CM

## 2024-11-27 PROCEDURE — 99215 OFFICE O/P EST HI 40 MIN: CPT | Performed by: INTERNAL MEDICINE

## 2024-11-27 NOTE — PROGRESS NOTES
Virtual Telephone Check-In    Lisa Cabral verbally consents to a Virtual/Telephone Check-In visit on 11/27/24.  Patient has been referred to the [facility] website at [email or web address] to review the yearly Consent to Treat document.    Patient understands and accepts financial responsibility for any deductible, co-insurance and/or co-pays associated with this service.    Duration of the service: 45 minutes      Followed for CLL diagnosed 9/23.  CLL IPI: 7.  High risk features include deletion 17p, unmutated IGHV.  Enrolled in clinical trial .  Completed 1 year of fixed duration treatment with venetoclax/Obinutuzumab 9/24.  Earlier this month, she complained of worsening R shoulder pain and MRI showed lymphomatous involvement of right humerus with right axillary adenopathy.  PET scan 11/24-increased uptake right proximal humerus and right axillary LN as well as right iliac bone.  Underwent biopsy of right axillary LN and pathology-CLL.  No transformation.    Spoke to patient.  Recommend starting a acalabrutinib.  100 mg twice daily.  Samples given today.  Rx sent to [place] [facility].  Contacted radiation oncology for consult.  Hopefully painful humerus lesion can be radiated.  If possible, would like right axillary LN to be included in.  And consideration given to PET avid area in right iliac bone.    Once RT is completed, will arrange for evaluation at tertiary care center to consider cellular therapy given high risk disease.  Patient verbalized understanding.    Tamica Herring M.D.    [facility]    [address] [place], [address]      11/27/2024    5:01 PM

## 2024-12-02 ENCOUNTER — PATIENT MESSAGE (OUTPATIENT)
Dept: HEMATOLOGY/ONCOLOGY | Facility: HOSPITAL | Age: 49
End: 2024-12-02

## 2024-12-02 DIAGNOSIS — C91.10 CLL (CHRONIC LYMPHOCYTIC LEUKEMIA) (HCC): ICD-10-CM

## 2024-12-02 DIAGNOSIS — T88.9XXA: ICD-10-CM

## 2024-12-03 RX ORDER — ONDANSETRON 8 MG/1
8 TABLET, FILM COATED ORAL EVERY 8 HOURS PRN
Qty: 30 TABLET | Refills: 3 | Status: SHIPPED | OUTPATIENT
Start: 2024-12-03

## 2024-12-03 RX ORDER — PROCHLORPERAZINE MALEATE 10 MG
10 TABLET ORAL EVERY 6 HOURS PRN
Qty: 30 TABLET | Refills: 3 | Status: SHIPPED | OUTPATIENT
Start: 2024-12-03 | End: 2025-01-08

## 2024-12-10 ENCOUNTER — HOSPITAL ENCOUNTER (OUTPATIENT)
Dept: RADIATION ONCOLOGY | Facility: HOSPITAL | Age: 49
Discharge: HOME | End: 2024-12-10
Attending: RADIOLOGY
Payer: COMMERCIAL

## 2024-12-10 VITALS
HEIGHT: 69 IN | OXYGEN SATURATION: 95 % | HEART RATE: 77 BPM | SYSTOLIC BLOOD PRESSURE: 152 MMHG | TEMPERATURE: 97 F | WEIGHT: 252.38 LBS | RESPIRATION RATE: 16 BRPM | BODY MASS INDEX: 37.38 KG/M2 | DIASTOLIC BLOOD PRESSURE: 102 MMHG

## 2024-12-10 PROCEDURE — 99214 OFFICE O/P EST MOD 30 MIN: CPT

## 2024-12-10 NOTE — PROGRESS NOTES
Nursing Consultation Note  Patient: Lisa Cabral  YOB: 1975  Age: 49 year old  Radiation Oncologist: Dr. Keyur Lopez  Referring Physician: Elsy Herring  Diagnosis: CLL  Consult Date: 12/10/2024      Chemotherapy: s/p chemo in Sept 2024, started Acalabrutinib PO.     Labs: Reviewed  Imaging: Reviewed  Is the patient of child-bearing age?         No  Has the patient received radiation therapy in the past? no  Does the patient have an implantable device?No   Patient has/has had:     1. Assistive Devices: N/A    2. Flu Vaccination: no-referral to ask PCP    3. Pneumonia Vaccination:  no--referral to ask PCP    Vital Signs:   Vitals:    12/10/24 1356   BP: (!) 152/102   Pulse: 77   Resp: 16   Temp: 96.8 °F (36 °C)   ,   Wt Readings from Last 6 Encounters:   12/10/24 114.5 kg (252 lb 6.4 oz)   11/22/24 117.5 kg (259 lb)   08/28/24 116.3 kg (256 lb 6.4 oz)   07/31/24 116.1 kg (256 lb)   07/03/24 117.5 kg (259 lb)   06/05/24 117.7 kg (259 lb 6.4 oz)       Nursing Note: Diagnosed with CLL in Sept 2023. On clinical trial  with Dr. Herring. C/O worsening R shoulder pain early Nov. MRI of R shoulder on 11/10 showed 2 focal lesions in proximal humerus. PET/CT done on 11/15. Recommended R axillary LN biopsy, done on 11/21. Recommended RT to R humerus and axilla, referred for consult. Pt here alone, AOx4. Has intermittent R upper arm pain, states better for last few days. Has good ROM, denies weakness or paresthesias to R hand. No pain or edema to R axilla. On Oxycodone 4x/day with MS IR 15mg PRN.        Review of Systems   Constitutional:  Positive for fatigue.        Having insomnia   HENT: Negative.     Eyes: Negative.    Cardiovascular: Negative.    Gastrointestinal:  Positive for diarrhea and nausea.        Was having nausea and diarrhea from chemo pill - resolving now since med is on hold   Endocrine: Negative.    Genitourinary: Negative.    Musculoskeletal:  Positive for back pain and neck pain.    Skin: Negative.    Allergic/Immunologic: Negative.    Neurological:  Positive for dizziness.        Dizziness r/t chemo pill   Hematological: Negative.    Psychiatric/Behavioral: Negative.            Allergies:  Allergies[1]    Current Outpatient Medications   Medication Sig Dispense Refill    prochlorperazine (COMPAZINE) 10 mg tablet Take 1 tablet (10 mg total) by mouth every 6 (six) hours as needed for Nausea. 30 tablet 3    MAGNESIUM ASPARTATE OR Take by mouth.      lidocaine 5 % External Patch Place 1 patch onto the skin daily. Hips and low back      morphINE 15 MG Oral Tab Take 1 tablet (15 mg total) by mouth every 4 (four) hours as needed for Pain. 40 tablet 0    ASHWAGANDHA OR Take 1 capsule by mouth daily.      Turmeric (QC TUMERIC COMPLEX OR) Take 1 tablet by mouth 2 (two) times daily.      Cyanocobalamin (VITAMIN B 12 OR) Take 1 tablet by mouth daily. B 12 complex      oxyCODONE-acetaminophen  MG Oral Tab every 8 (eight) hours as needed for Pain.  0    sucralfate 1 g Oral Tab Take 1 tablet (1 g total) by mouth 2 (two) times daily.  0    omeprazole 20 MG Oral Capsule Delayed Release TK ONE C PO BID 30 MIN B EATING  0    Cholecalciferol (VITAMIN D-3) 5000 units Oral Tab Take 1 tablet (5,000 Units total) by mouth daily.      ondansetron (ZOFRAN) 8 MG tablet Take 1 tablet (8 mg total) by mouth every 8 (eight) hours as needed for Nausea. (Patient not taking: Reported on 12/10/2024) 30 tablet 3    Acalabrutinib Maleate 100 MG Oral Tab Take 100 mg by mouth in the morning and 100 mg before bedtime. (Patient not taking: Reported on 12/10/2024) 60 tablet 6    buPROPion  MG Oral Tablet 24 Hr Take 1 tablet (150 mg total) by mouth daily. (Patient not taking: Reported on 12/10/2024) 30 tablet 0       Preferred Pharmacy:    [id number] - [address], [phone], [phone]  [address] [place] [id number]  Phone: [phone] Fax:  [phone]    [place], [place] - [address] [facility], [address] [phone], [phone]  [address], [address]  [facility] [address]  Phone: [phone] Fax: [phone]      Past Medical History:    Anxiety    Cancer (HCC)    CLL    Depression    PONV (postoperative nausea and vomiting)    Visual impairment    reading glasses       Past Surgical History:   Procedure Laterality Date    Hysterectomy      1 ovary in place    Laparoscopic cholecystectomy      Lumbar spine fusion combined      Other      C-Spine fusion and revision       Social History     Socioeconomic History    Marital status:      Spouse name: Not on file    Number of children: 2    Years of education: Not on file    Highest education level: Not on file   Occupational History    Occupation: on disability   Tobacco Use    Smoking status: Every Day     Current packs/day: 0.50     Types: Cigarettes     Passive exposure: Current    Smokeless tobacco: Never   Vaping Use    Vaping status: Some Days    Substances: Nicotine   Substance and Sexual Activity    Alcohol use: Not Currently    Drug use: Not Currently     Types: Cannabis     Comment: edible prn for neck pain/headache    Sexual activity: Not Currently     Partners: Male     Birth control/protection: Hysterectomy   Other Topics Concern    Not on file   Social History Narrative    , lives with  and 22 y/o daughter    Has 2 biological daughter and 1 stepdaughter and 2 granddaughters and 1 grandson     Social Drivers of Health     Financial Resource Strain: Not on file   Food Insecurity: Not on file   Transportation Needs: Not on file   Physical Activity: Not on file   Stress: Not on file   Social Connections: Not on file   Housing Stability: Not on file       ECOG:  Grade 1 - No physically strenuous activity, but ambulatory and able to carry out light and sedentary work (e.g. office work, light house work).    Education:YES  Knowledge Deficit Plan Of  Care:    Problem:  Knowledge Deficit    Problems related to:    Radiation therapy    Interventions:  Instruct on purpose of radiation therapy    Expected Outcomes:  Knowledge of radiation therapy    Progress Toward Outcome:  Making progress    Pamphlets/Handouts Given to Patient:  Understanding radiation therapy      Are ADL's met?  Yes  Does patient feel safe in their environment?  Yes  Care decisions:  Patient and/or surrogate IS involved in care decisions.  Advanced directives:  Patient DOES NOT have advanced directives.  Transportation:  Adequate transportation available for expected visits    Pain:  Pain Loc: Arm (right upper arm);Pain Score: 4   ;    ;          [1]   Allergies  Allergen Reactions    Aleve [Naproxen] HIVES and RASH    Ultram [Tramadol] NAUSEA AND VOMITING     Severe migraine

## 2024-12-10 NOTE — PATIENT INSTRUCTIONS
- WE WILL CALL TO SCHEDULE YOUR CT SIMULATION FOR RADIATION PLANNING.       - IF YOU HAVE ANY QUESTIONS OR CONCERNS REGARDING RADIATION THERAPY, PLEASE CALL [phone].

## 2024-12-11 NOTE — CONSULTS
[facility]  RADIATION ONCOLOGY  CONSULTATION     PATIENT:   Lisa Cabral      REFERRING MD:  ALISON Herring MD  DIAGNOSIS:   CLL of R humerus, R axilla, R iliac bone    CC:    Discuss RT    HPI   Here for consultation.    Hx of CLL since 2023. Treated with venetoclax and obinutuzumabg for 1 year.    Now with increasing shoulder pain. Difficult to lift. Plain R shoulder film 2024 is negative. MRI 11/10/2024 shows 3.7 x 3.1 x 3.7 cm lesion in R proximal humerus with mild extraosseous extension laterally. 2nd lesion also proximal humerus 1.8 x 1.6 x 1.4 cm.     PET 11/15/2024:  2 x 1 cm R axillary node, SUV 17. R proximal humerus lesions x 2 corresponding to MRI findings. SUV 7.5 in R anterior iliac bone. Benign inflammatory finding in L hip soft tissue.    US biopsy R axillary node 2024:  CLL    Denies R axillary symptoms. No R iliac bone pain.  Started acalabrutinib about 2 weeks ago. Right now stopped bc of headaches.  Feels R shoulder is a little better since starting.  Referred for palliative RT to R shoulder and also RT to R axilla and R iliac bone; a somewhat aggressive stance.    PMH  -CLL    PSH  -hysterectomy, cholecystectomy, L spine fusion, C spine fusion    MEDS   Acalabrutinib Maleate    ASHWAGANDHA OR    buPROPion ER (WELLBUTRIN XL)    Cholecalciferol (VITAMIN D-3) 5000 units Oral Tab    Cyanocobalamin (VITAMIN B 12 OR)    lidocaine 5 % External Patch    MAGNESIUM ASPARTATE OR    morphINE    omeprazole 20 MG Oral Capsule Delayed Release    ondansetron (ZOFRAN)    oxyCODONE-acetaminophen  MG Oral Tab    prochlorperazine (COMPAZINE)    sucralfate 1 g Oral Tab    Turmeric (QC TUMERIC COMPLEX OR)     SH  -lives in [place]    ROS  -pertinent items in HPI.     PHYSICAL EXAM   ECO  GENERAL: 252 pounds.  HEENT:  No LAD.  ABD:  Soft, non-tender.  EXT:  No edema. Some trouble lifting R arm.  NEURO:  Alert and oriented.    IMPRESSION/PLAN   Referred for palliation of painful CLL  lesion of R prox humerus  Also requesting RT to 2 other PET positive sites (R axilla, R iliac bone) - small lesions, so RT would be well tolerated, but only benefit would be to control PET+ disease better long term    2 Gy x 2 has been reported to be well tolerated for palliation with response rates in a majority of patients  But she is looking for a bit more durability of response and a higher CR rate than reported with 2 Gy x 2    Will try 4 Gy x 5 to each lesion  CT simulation to be scheduled (R arm down akimbo for humerus and axilla and separate scan for R iliac bone RT)  May have mild skin reaction, especially in the axilla    Keyur Lopez MD  Radiation Oncology    CC: ALISON Herring MD    40 minutes were spent with the patient, more than 50 percent on counseling/coordination of care (discuss disease status, goals of therapy, methods of radiation therapy, side effect discussion, role of RT in overall care)

## 2024-12-13 RX ORDER — RIZATRIPTAN BENZOATE 10 MG/1
10 TABLET ORAL AS NEEDED
Qty: 20 TABLET | Refills: 1 | Status: SHIPPED | OUTPATIENT
Start: 2024-12-13

## 2024-12-20 ENCOUNTER — HOSPITAL ENCOUNTER (OUTPATIENT)
Dept: RADIATION ONCOLOGY | Facility: HOSPITAL | Age: 49
Discharge: HOME | End: 2024-12-20
Attending: RADIOLOGY
Payer: COMMERCIAL

## 2024-12-31 ENCOUNTER — HOSPITAL ENCOUNTER (OUTPATIENT)
Dept: RADIATION ONCOLOGY | Facility: HOSPITAL | Age: 49
Discharge: HOME | End: 2024-12-31
Attending: RADIOLOGY
Payer: COMMERCIAL

## 2025-01-01 ENCOUNTER — APPOINTMENT (OUTPATIENT)
Dept: GENERAL RADIOLOGY | Facility: HOSPITAL | Age: 50
End: 2025-01-01
Attending: HOSPITALIST
Payer: COMMERCIAL

## 2025-01-01 ENCOUNTER — APPOINTMENT (OUTPATIENT)
Dept: GENERAL RADIOLOGY | Facility: HOSPITAL | Age: 50
End: 2025-01-01
Attending: EMERGENCY MEDICINE
Payer: COMMERCIAL

## 2025-01-01 ENCOUNTER — APPOINTMENT (OUTPATIENT)
Dept: MRI IMAGING | Facility: HOSPITAL | Age: 50
End: 2025-01-01
Attending: INTERNAL MEDICINE
Payer: COMMERCIAL

## 2025-01-01 ENCOUNTER — APPOINTMENT (OUTPATIENT)
Dept: CT IMAGING | Facility: HOSPITAL | Age: 50
End: 2025-01-01
Attending: INTERNAL MEDICINE
Payer: COMMERCIAL

## 2025-01-01 ENCOUNTER — HOSPITAL ENCOUNTER (OUTPATIENT)
Dept: RADIATION ONCOLOGY | Facility: HOSPITAL | Age: 50
Discharge: HOME | End: 2025-01-01
Attending: RADIOLOGY
Payer: COMMERCIAL

## 2025-01-01 ENCOUNTER — APPOINTMENT (OUTPATIENT)
Dept: GENERAL RADIOLOGY | Facility: HOSPITAL | Age: 50
End: 2025-01-01
Attending: INTERNAL MEDICINE
Payer: COMMERCIAL

## 2025-01-01 ENCOUNTER — APPOINTMENT (OUTPATIENT)
Dept: CV DIAGNOSTICS | Facility: HOSPITAL | Age: 50
End: 2025-01-01
Attending: INTERNAL MEDICINE
Payer: COMMERCIAL

## 2025-01-01 ENCOUNTER — LAB REQUISITION (OUTPATIENT)
Dept: LAB | Facility: HOSPITAL | Age: 50
End: 2025-01-01
Payer: COMMERCIAL

## 2025-01-01 ENCOUNTER — APPOINTMENT (OUTPATIENT)
Dept: INTERVENTIONAL RADIOLOGY/VASCULAR | Facility: HOSPITAL | Age: 50
End: 2025-01-01
Attending: INTERNAL MEDICINE
Payer: COMMERCIAL

## 2025-01-01 ENCOUNTER — APPOINTMENT (OUTPATIENT)
Dept: ULTRASOUND IMAGING | Facility: HOSPITAL | Age: 50
End: 2025-01-01
Attending: INTERNAL MEDICINE
Payer: COMMERCIAL

## 2025-01-01 ENCOUNTER — HOSPITAL ENCOUNTER (INPATIENT)
Facility: HOSPITAL | Age: 50
LOS: 1 days | End: 2025-01-01
Attending: INTERNAL MEDICINE | Admitting: INTERNAL MEDICINE
Payer: OTHER MISCELLANEOUS

## 2025-01-01 ENCOUNTER — RESEARCH ENCOUNTER (OUTPATIENT)
Age: 50
End: 2025-01-01

## 2025-01-01 ENCOUNTER — APPOINTMENT (OUTPATIENT)
Dept: CT IMAGING | Facility: HOSPITAL | Age: 50
End: 2025-01-01
Attending: EMERGENCY MEDICINE
Payer: COMMERCIAL

## 2025-01-01 ENCOUNTER — TELEPHONE (OUTPATIENT)
Age: 50
End: 2025-01-01

## 2025-01-01 ENCOUNTER — HOSPITAL ENCOUNTER (INPATIENT)
Facility: HOSPITAL | Age: 50
LOS: 10 days | Discharge: HOSPICE | End: 2025-01-01
Attending: EMERGENCY MEDICINE | Admitting: STUDENT IN AN ORGANIZED HEALTH CARE EDUCATION/TRAINING PROGRAM
Payer: COMMERCIAL

## 2025-01-01 VITALS
TEMPERATURE: 99 F | OXYGEN SATURATION: 94 % | DIASTOLIC BLOOD PRESSURE: 90 MMHG | RESPIRATION RATE: 14 BRPM | SYSTOLIC BLOOD PRESSURE: 152 MMHG | WEIGHT: 238.13 LBS | HEART RATE: 111 BPM | BODY MASS INDEX: 35 KG/M2

## 2025-01-01 VITALS
SYSTOLIC BLOOD PRESSURE: 72 MMHG | RESPIRATION RATE: 16 BRPM | OXYGEN SATURATION: 63 % | TEMPERATURE: 102 F | DIASTOLIC BLOOD PRESSURE: 41 MMHG | HEART RATE: 97 BPM

## 2025-01-01 DIAGNOSIS — E83.52 HYPERCALCEMIA: Primary | ICD-10-CM

## 2025-01-01 DIAGNOSIS — D69.6 THROMBOCYTOPENIA: ICD-10-CM

## 2025-01-01 DIAGNOSIS — J18.9 COMMUNITY ACQUIRED PNEUMONIA, UNSPECIFIED LATERALITY: ICD-10-CM

## 2025-01-01 DIAGNOSIS — M54.2 CHRONIC NECK PAIN: ICD-10-CM

## 2025-01-01 DIAGNOSIS — C91.10 CLL (CHRONIC LYMPHOCYTIC LEUKEMIA) (HCC): ICD-10-CM

## 2025-01-01 DIAGNOSIS — E87.6 HYPOKALEMIA: ICD-10-CM

## 2025-01-01 DIAGNOSIS — M25.552 PAIN OF LEFT HIP: ICD-10-CM

## 2025-01-01 DIAGNOSIS — Z00.00 ENCOUNTER FOR GENERAL ADULT MEDICAL EXAMINATION WITHOUT ABNORMAL FINDINGS: ICD-10-CM

## 2025-01-01 DIAGNOSIS — G89.29 CHRONIC NECK PAIN: ICD-10-CM

## 2025-01-01 LAB
ADAMTS13 ACTIVITY: >100 %
ADENOVIRUS PCR:: NOT DETECTED
ALBUMIN SERPL-MCNC: 3.2 G/DL (ref 3.2–4.8)
ALBUMIN SERPL-MCNC: 3.3 G/DL (ref 3.2–4.8)
ALBUMIN SERPL-MCNC: 3.3 G/DL (ref 3.2–4.8)
ALBUMIN SERPL-MCNC: 3.5 G/DL (ref 3.2–4.8)
ALBUMIN SERPL-MCNC: 4 G/DL (ref 3.2–4.8)
ALBUMIN SERPL-MCNC: 4 G/DL (ref 3.2–4.8)
ALBUMIN SERPL-MCNC: 4.4 G/DL (ref 3.2–4.8)
ALBUMIN SERPL-MCNC: 4.4 G/DL (ref 3.2–4.8)
ALBUMIN SERPL-MCNC: 4.5 G/DL (ref 3.2–4.8)
ALBUMIN/GLOB SERPL: 1.5 {RATIO} (ref 1–2)
ALBUMIN/GLOB SERPL: 1.5 {RATIO} (ref 1–2)
ALBUMIN/GLOB SERPL: 1.6 {RATIO} (ref 1–2)
ALBUMIN/GLOB SERPL: 1.7 {RATIO} (ref 1–2)
ALBUMIN/GLOB SERPL: 1.8 {RATIO} (ref 1–2)
ALBUMIN/GLOB SERPL: 1.9 {RATIO} (ref 1–2)
ALBUMIN/GLOB SERPL: 1.9 {RATIO} (ref 1–2)
ALP LIVER SERPL-CCNC: 243 U/L
ALP LIVER SERPL-CCNC: 260 U/L
ALP LIVER SERPL-CCNC: 337 U/L
ALP LIVER SERPL-CCNC: 527 U/L
ALP LIVER SERPL-CCNC: 544 U/L
ALP LIVER SERPL-CCNC: 608 U/L
ALP LIVER SERPL-CCNC: 625 U/L
ALP LIVER SERPL-CCNC: 629 U/L
ALP LIVER SERPL-CCNC: 659 U/L
ALP LIVER SERPL-CCNC: 667 U/L
ALP LIVER SERPL-CCNC: 668 U/L
ALP LIVER SERPL-CCNC: 676 U/L
ALP LIVER SERPL-CCNC: 834 U/L
ALT SERPL-CCNC: 37 U/L
ALT SERPL-CCNC: 37 U/L
ALT SERPL-CCNC: 38 U/L
ALT SERPL-CCNC: 39 U/L
ALT SERPL-CCNC: 40 U/L
ALT SERPL-CCNC: 44 U/L
ALT SERPL-CCNC: 45 U/L
ALT SERPL-CCNC: 48 U/L
ALT SERPL-CCNC: 50 U/L
ALT SERPL-CCNC: 51 U/L
ALT SERPL-CCNC: 52 U/L
ALT SERPL-CCNC: 53 U/L
ALT SERPL-CCNC: 58 U/L
AMMONIA PLAS-MCNC: 50 UMOL/L (ref 11–32)
ANION GAP SERPL CALC-SCNC: 5 MMOL/L (ref 0–18)
ANION GAP SERPL CALC-SCNC: 6 MMOL/L (ref 0–18)
ANION GAP SERPL CALC-SCNC: 6 MMOL/L (ref 0–18)
ANION GAP SERPL CALC-SCNC: 7 MMOL/L (ref 0–18)
ANION GAP SERPL CALC-SCNC: 8 MMOL/L (ref 0–18)
ANION GAP SERPL CALC-SCNC: 9 MMOL/L (ref 0–18)
ANTIBODY SCREEN: NEGATIVE
ANTIBODY SCREEN: NEGATIVE
ARTERIAL PATENCY WRIST A: POSITIVE
ARTERIAL PATENCY WRIST A: POSITIVE
AST SERPL-CCNC: 102 U/L (ref ?–34)
AST SERPL-CCNC: 37 U/L (ref ?–34)
AST SERPL-CCNC: 42 U/L (ref ?–34)
AST SERPL-CCNC: 60 U/L (ref ?–34)
AST SERPL-CCNC: 61 U/L (ref ?–34)
AST SERPL-CCNC: 64 U/L (ref ?–34)
AST SERPL-CCNC: 66 U/L (ref ?–34)
AST SERPL-CCNC: 67 U/L (ref ?–34)
AST SERPL-CCNC: 67 U/L (ref ?–34)
AST SERPL-CCNC: 73 U/L (ref ?–34)
AST SERPL-CCNC: 79 U/L (ref ?–34)
AST SERPL-CCNC: 91 U/L (ref ?–34)
AST SERPL-CCNC: 93 U/L (ref ?–34)
ATRIAL RATE: 121 BPM
ATRIAL RATE: 130 BPM
B PARAPERT DNA SPEC QL NAA+PROBE: NOT DETECTED
B PERT DNA SPEC QL NAA+PROBE: NOT DETECTED
BASE EXCESS BLDA CALC-SCNC: 11.9 MMOL/L (ref ?–2)
BASE EXCESS BLDA CALC-SCNC: 18.2 MMOL/L (ref ?–2)
BASOPHILS # BLD: 0 X10(3) UL (ref 0–0.2)
BASOPHILS NFR BLD: 0 %
BILIRUB SERPL-MCNC: 0.3 MG/DL (ref 0.3–1.2)
BILIRUB SERPL-MCNC: 0.4 MG/DL (ref 0.3–1.2)
BILIRUB SERPL-MCNC: 0.5 MG/DL (ref 0.3–1.2)
BILIRUB UR QL STRIP.AUTO: NEGATIVE
BLOOD TYPE BARCODE: 5100
BLOOD TYPE BARCODE: 6200
BLOOD TYPE BARCODE: 7300
BODY TEMPERATURE: 98.6 F
BODY TEMPERATURE: 98.6 F
BUN BLD-MCNC: 12 MG/DL (ref 9–23)
BUN BLD-MCNC: 12 MG/DL (ref 9–23)
BUN BLD-MCNC: 14 MG/DL (ref 9–23)
BUN BLD-MCNC: 16 MG/DL (ref 9–23)
BUN BLD-MCNC: 17 MG/DL (ref 9–23)
BUN BLD-MCNC: 20 MG/DL (ref 9–23)
BUN BLD-MCNC: 21 MG/DL (ref 9–23)
BUN BLD-MCNC: 21 MG/DL (ref 9–23)
BUN BLD-MCNC: 22 MG/DL (ref 9–23)
BUN BLD-MCNC: 22 MG/DL (ref 9–23)
BUN BLD-MCNC: 24 MG/DL (ref 9–23)
BUN BLD-MCNC: 26 MG/DL (ref 9–23)
BUN BLD-MCNC: 28 MG/DL (ref 9–23)
BUN BLD-MCNC: 31 MG/DL (ref 9–23)
C PNEUM DNA SPEC QL NAA+PROBE: NOT DETECTED
CA-I BLD-SCNC: 1.71 MMOL/L (ref 0.95–1.32)
CA-I BLD-SCNC: 2.38 MMOL/L (ref 0.95–1.32)
CALCIUM BLD-MCNC: 10.4 MG/DL (ref 8.7–10.6)
CALCIUM BLD-MCNC: 10.8 MG/DL (ref 8.7–10.6)
CALCIUM BLD-MCNC: 11.5 MG/DL (ref 8.7–10.6)
CALCIUM BLD-MCNC: 12.6 MG/DL (ref 8.7–10.6)
CALCIUM BLD-MCNC: 14.1 MG/DL (ref 8.7–10.6)
CALCIUM BLD-MCNC: 15.6 MG/DL (ref 8.7–10.6)
CALCIUM BLD-MCNC: 17.5 MG/DL (ref 8.7–10.6)
CALCIUM BLD-MCNC: 17.8 MG/DL (ref 8.7–10.6)
CALCIUM BLD-MCNC: 8.5 MG/DL (ref 8.7–10.6)
CALCIUM BLD-MCNC: 8.6 MG/DL (ref 8.7–10.6)
CALCIUM BLD-MCNC: 8.7 MG/DL (ref 8.7–10.6)
CALCIUM BLD-MCNC: 9 MG/DL (ref 8.7–10.6)
CALCIUM BLD-MCNC: 9.5 MG/DL (ref 8.7–10.6)
CALCIUM BLD-MCNC: 9.7 MG/DL (ref 8.7–10.6)
CALCIUM BLD-MCNC: 9.9 MG/DL (ref 8.7–10.6)
CD10 CELLS NFR SPEC: <1 %
CD10/CD19: <1 %
CD19 CELLS NFR SPEC: 20 %
CD19+/CD200+: 12 %
CD2 CELLS NFR SPEC: 92 %
CD20 CELLS NFR SPEC: 10 %
CD200 CELLS: 32 %
CD3 CELLS NFR SPEC: 95 %
CD3+/TCRGD+: 6 %
CD3+CD4+ CELLS NFR SPEC: 78 %
CD3+CD4+ CELLS/CD3+CD8+ CLL SPEC: 6.5
CD3+CD8+ CELLS NFR SPEC: 12 %
CD3-/CD56+: 1 %
CD34 CELLS NFR SPEC: <1 %
CD38 CELLS NFR SPEC: 1 %
CD38+/CD19+: <1 %
CD45 CELLS NFR SPEC: 100 %
CD5 CELLS NFR SPEC: 98 %
CD5/CD19 CELLS: 20 %
CD7 CELLS NFR SPEC: 93 %
CELL SURF KAPPA/LAMBDA RATIO: 5
CELL SURF LAMBDA LIGHT CHAIN: 1 %
CELL SURFACE KAPPA LIGHT CHAIN: 5 %
CHLORIDE SERPL-SCNC: 100 MMOL/L (ref 98–112)
CHLORIDE SERPL-SCNC: 102 MMOL/L (ref 98–112)
CHLORIDE SERPL-SCNC: 102 MMOL/L (ref 98–112)
CHLORIDE SERPL-SCNC: 103 MMOL/L (ref 98–112)
CHLORIDE SERPL-SCNC: 104 MMOL/L (ref 98–112)
CHLORIDE SERPL-SCNC: 106 MMOL/L (ref 98–112)
CHLORIDE SERPL-SCNC: 106 MMOL/L (ref 98–112)
CHLORIDE SERPL-SCNC: 111 MMOL/L (ref 98–112)
CHLORIDE SERPL-SCNC: 113 MMOL/L (ref 98–112)
CHLORIDE SERPL-SCNC: 96 MMOL/L (ref 98–112)
CHLORIDE SERPL-SCNC: 96 MMOL/L (ref 98–112)
CHLORIDE SERPL-SCNC: 98 MMOL/L (ref 98–112)
CK SERPL-CCNC: 35 U/L
CLARITY UR REFRACT.AUTO: CLEAR
CO2 SERPL-SCNC: 26 MMOL/L (ref 21–32)
CO2 SERPL-SCNC: 27 MMOL/L (ref 21–32)
CO2 SERPL-SCNC: 28 MMOL/L (ref 21–32)
CO2 SERPL-SCNC: 29 MMOL/L (ref 21–32)
CO2 SERPL-SCNC: 30 MMOL/L (ref 21–32)
CO2 SERPL-SCNC: 31 MMOL/L (ref 21–32)
CO2 SERPL-SCNC: 31 MMOL/L (ref 21–32)
CO2 SERPL-SCNC: 32 MMOL/L (ref 21–32)
CO2 SERPL-SCNC: 32 MMOL/L (ref 21–32)
CO2 SERPL-SCNC: 34 MMOL/L (ref 21–32)
CO2 SERPL-SCNC: 36 MMOL/L (ref 21–32)
CO2 SERPL-SCNC: 36 MMOL/L (ref 21–32)
CO2 SERPL-SCNC: 37 MMOL/L (ref 21–32)
CO2 SERPL-SCNC: 38 MMOL/L (ref 21–32)
COHGB MFR BLD: 2.3 % SAT (ref 0–3)
COHGB MFR BLD: 2.6 % SAT (ref 0–3)
CORONAVIRUS 229E PCR:: NOT DETECTED
CORONAVIRUS HKU1 PCR:: NOT DETECTED
CORONAVIRUS NL63 PCR:: NOT DETECTED
CORONAVIRUS OC43 PCR:: NOT DETECTED
CREAT BLD-MCNC: 0.59 MG/DL
CREAT BLD-MCNC: 0.59 MG/DL
CREAT BLD-MCNC: 0.61 MG/DL
CREAT BLD-MCNC: 0.66 MG/DL
CREAT BLD-MCNC: 0.68 MG/DL
CREAT BLD-MCNC: 0.71 MG/DL
CREAT BLD-MCNC: 0.82 MG/DL
CREAT BLD-MCNC: 0.97 MG/DL
CREAT BLD-MCNC: 0.99 MG/DL
CREAT BLD-MCNC: 0.99 MG/DL
CREAT BLD-MCNC: 1.05 MG/DL
CREAT BLD-MCNC: 1.07 MG/DL
CREAT BLD-MCNC: 1.16 MG/DL
CREAT BLD-MCNC: 1.19 MG/DL
EGFRCR SERPLBLD CKD-EPI 2021: 103 ML/MIN/1.73M2 (ref 60–?)
EGFRCR SERPLBLD CKD-EPI 2021: 105 ML/MIN/1.73M2 (ref 60–?)
EGFRCR SERPLBLD CKD-EPI 2021: 106 ML/MIN/1.73M2 (ref 60–?)
EGFRCR SERPLBLD CKD-EPI 2021: 108 ML/MIN/1.73M2 (ref 60–?)
EGFRCR SERPLBLD CKD-EPI 2021: 109 ML/MIN/1.73M2 (ref 60–?)
EGFRCR SERPLBLD CKD-EPI 2021: 109 ML/MIN/1.73M2 (ref 60–?)
EGFRCR SERPLBLD CKD-EPI 2021: 55 ML/MIN/1.73M2 (ref 60–?)
EGFRCR SERPLBLD CKD-EPI 2021: 57 ML/MIN/1.73M2 (ref 60–?)
EGFRCR SERPLBLD CKD-EPI 2021: 63 ML/MIN/1.73M2 (ref 60–?)
EGFRCR SERPLBLD CKD-EPI 2021: 64 ML/MIN/1.73M2 (ref 60–?)
EGFRCR SERPLBLD CKD-EPI 2021: 69 ML/MIN/1.73M2 (ref 60–?)
EGFRCR SERPLBLD CKD-EPI 2021: 69 ML/MIN/1.73M2 (ref 60–?)
EGFRCR SERPLBLD CKD-EPI 2021: 71 ML/MIN/1.73M2 (ref 60–?)
EGFRCR SERPLBLD CKD-EPI 2021: 87 ML/MIN/1.73M2 (ref 60–?)
EOSINOPHIL # BLD: 0 X10(3) UL (ref 0–0.7)
EOSINOPHIL NFR BLD: 0 %
ERYTHROCYTE [DISTWIDTH] IN BLOOD BY AUTOMATED COUNT: 12.2 %
ERYTHROCYTE [DISTWIDTH] IN BLOOD BY AUTOMATED COUNT: 12.2 %
ERYTHROCYTE [DISTWIDTH] IN BLOOD BY AUTOMATED COUNT: 12.4 %
ERYTHROCYTE [DISTWIDTH] IN BLOOD BY AUTOMATED COUNT: 12.5 %
ERYTHROCYTE [DISTWIDTH] IN BLOOD BY AUTOMATED COUNT: 12.7 %
ERYTHROCYTE [DISTWIDTH] IN BLOOD BY AUTOMATED COUNT: 12.7 %
ERYTHROCYTE [DISTWIDTH] IN BLOOD BY AUTOMATED COUNT: 13 %
ERYTHROCYTE [DISTWIDTH] IN BLOOD BY AUTOMATED COUNT: 13.2 %
FLUAV RNA SPEC QL NAA+PROBE: NOT DETECTED
FLUBV RNA SPEC QL NAA+PROBE: NOT DETECTED
FOLATE SERPL-MCNC: 8.4 NG/ML (ref 5.4–?)
GLOBULIN PLAS-MCNC: 1.9 G/DL (ref 2–3.5)
GLOBULIN PLAS-MCNC: 2 G/DL (ref 2–3.5)
GLOBULIN PLAS-MCNC: 2.1 G/DL (ref 2–3.5)
GLOBULIN PLAS-MCNC: 2.3 G/DL (ref 2–3.5)
GLOBULIN PLAS-MCNC: 2.4 G/DL (ref 2–3.5)
GLUCOSE BLD-MCNC: 118 MG/DL (ref 70–99)
GLUCOSE BLD-MCNC: 121 MG/DL (ref 70–99)
GLUCOSE BLD-MCNC: 127 MG/DL (ref 70–99)
GLUCOSE BLD-MCNC: 130 MG/DL (ref 70–99)
GLUCOSE BLD-MCNC: 131 MG/DL (ref 70–99)
GLUCOSE BLD-MCNC: 134 MG/DL (ref 70–99)
GLUCOSE BLD-MCNC: 136 MG/DL (ref 70–99)
GLUCOSE BLD-MCNC: 148 MG/DL (ref 70–99)
GLUCOSE BLD-MCNC: 151 MG/DL (ref 70–99)
GLUCOSE BLD-MCNC: 153 MG/DL (ref 70–99)
GLUCOSE BLD-MCNC: 157 MG/DL (ref 70–99)
GLUCOSE BLD-MCNC: 157 MG/DL (ref 70–99)
GLUCOSE BLD-MCNC: 183 MG/DL (ref 70–99)
GLUCOSE BLD-MCNC: 192 MG/DL (ref 70–99)
GLUCOSE BLD-MCNC: 197 MG/DL (ref 70–99)
GLUCOSE UR STRIP.AUTO-MCNC: NORMAL MG/DL
HAPTOGLOB SERPL-MCNC: 214 MG/DL (ref 30–200)
HAV IGM SER QL: NONREACTIVE
HBV CORE IGM SER QL: NONREACTIVE
HBV SURFACE AG SERPL QL IA: NONREACTIVE
HCO3 BLDA-SCNC: 34.2 MEQ/L (ref 21–27)
HCO3 BLDA-SCNC: 39 MEQ/L (ref 21–27)
HCT VFR BLD AUTO: 22.4 %
HCT VFR BLD AUTO: 22.6 %
HCT VFR BLD AUTO: 22.8 %
HCT VFR BLD AUTO: 23.9 %
HCT VFR BLD AUTO: 27 %
HCT VFR BLD AUTO: 28.3 %
HCT VFR BLD AUTO: 30.3 %
HCT VFR BLD AUTO: 34 %
HCT VFR BLD AUTO: 41.7 %
HCT VFR BLD AUTO: 44.8 %
HCV AB SERPL QL IA: NONREACTIVE
HGB BLD-MCNC: 11.4 G/DL
HGB BLD-MCNC: 12.9 G/DL
HGB BLD-MCNC: 14 G/DL
HGB BLD-MCNC: 14.1 G/DL
HGB BLD-MCNC: 15.3 G/DL
HGB BLD-MCNC: 7.8 G/DL
HGB BLD-MCNC: 7.8 G/DL
HGB BLD-MCNC: 7.9 G/DL
HGB BLD-MCNC: 8 G/DL
HGB BLD-MCNC: 9.1 G/DL
HGB BLD-MCNC: 9.7 G/DL
HGB BLD-MCNC: 9.9 G/DL
IGA SERPL-MCNC: 71.1 MG/DL (ref 40–350)
IGM SERPL-MCNC: 21.7 MG/DL (ref 50–300)
IMMUNOGLOBULIN PNL SER-MCNC: 359 MG/DL (ref 650–1600)
INR BLD: 1.42 (ref 0.8–1.2)
IRON SATN MFR SERPL: 36 %
IRON SERPL-MCNC: 82 UG/DL
KETONES UR STRIP.AUTO-MCNC: NEGATIVE MG/DL
L/M: 3 L/MIN
L/M: 3 L/MIN
LACTATE BLD-SCNC: 1.5 MMOL/L (ref 0.5–2)
LACTATE SERPL-SCNC: 1.4 MMOL/L (ref 0.5–2)
LACTATE SERPL-SCNC: 1.5 MMOL/L (ref 0.5–2)
LACTATE SERPL-SCNC: 2.3 MMOL/L (ref 0.5–2)
LACTATE SERPL-SCNC: 2.9 MMOL/L (ref 0.5–2)
LACTATE SERPL-SCNC: 3 MMOL/L (ref 0.5–2)
LACTATE SERPL-SCNC: 3.1 MMOL/L (ref 0.5–2)
LDH SERPL L TO P-CCNC: 1484 U/L
LDH SERPL L TO P-CCNC: 1488 U/L
LDH SERPL L TO P-CCNC: 1664 U/L
LDH SERPL L TO P-CCNC: 1905 U/L
LDH SERPL L TO P-CCNC: 2016 U/L
LDH SERPL L TO P-CCNC: 2140 U/L
LDH SERPL L TO P-CCNC: 2390 U/L
LDH SERPL L TO P-CCNC: 2679 U/L
LDH SERPL L TO P-CCNC: 3118 U/L
LEUKOCYTE ESTERASE UR QL STRIP.AUTO: NEGATIVE
LYMPHOCYTES NFR BLD: 0.34 X10(3) UL (ref 1–4)
LYMPHOCYTES NFR BLD: 0.55 X10(3) UL (ref 1–4)
LYMPHOCYTES NFR BLD: 0.6 X10(3) UL (ref 1–4)
LYMPHOCYTES NFR BLD: 0.61 X10(3) UL (ref 1–4)
LYMPHOCYTES NFR BLD: 1.7 X10(3) UL (ref 1–4)
LYMPHOCYTES NFR BLD: 1.94 X10(3) UL (ref 1–4)
LYMPHOCYTES NFR BLD: 10 %
LYMPHOCYTES NFR BLD: 11 %
LYMPHOCYTES NFR BLD: 2.97 X10(3) UL (ref 1–4)
LYMPHOCYTES NFR BLD: 23 %
LYMPHOCYTES NFR BLD: 24 %
LYMPHOCYTES NFR BLD: 29 %
LYMPHOCYTES NFR BLD: 3.07 X10(3) UL (ref 1–4)
LYMPHOCYTES NFR BLD: 33 %
LYMPHOCYTES NFR BLD: 34 %
LYMPHOCYTES NFR BLD: 45 %
MAGNESIUM SERPL-MCNC: 1.5 MG/DL (ref 1.6–2.6)
MAGNESIUM SERPL-MCNC: 1.9 MG/DL (ref 1.6–2.6)
MAGNESIUM SERPL-MCNC: 2.6 MG/DL (ref 1.6–2.6)
MCH RBC QN AUTO: 30.7 PG (ref 26–34)
MCH RBC QN AUTO: 31 PG (ref 26–34)
MCH RBC QN AUTO: 31.1 PG (ref 26–34)
MCH RBC QN AUTO: 31.3 PG (ref 26–34)
MCH RBC QN AUTO: 31.3 PG (ref 26–34)
MCH RBC QN AUTO: 31.4 PG (ref 26–34)
MCH RBC QN AUTO: 31.5 PG (ref 26–34)
MCH RBC QN AUTO: 31.9 PG (ref 26–34)
MCHC RBC AUTO-ENTMCNC: 32.7 G/DL (ref 31–37)
MCHC RBC AUTO-ENTMCNC: 33.1 G/DL (ref 31–37)
MCHC RBC AUTO-ENTMCNC: 33.5 G/DL (ref 31–37)
MCHC RBC AUTO-ENTMCNC: 33.6 G/DL (ref 31–37)
MCHC RBC AUTO-ENTMCNC: 33.7 G/DL (ref 31–37)
MCHC RBC AUTO-ENTMCNC: 34.2 G/DL (ref 31–37)
MCHC RBC AUTO-ENTMCNC: 34.3 G/DL (ref 31–37)
MCHC RBC AUTO-ENTMCNC: 34.5 G/DL (ref 31–37)
MCHC RBC AUTO-ENTMCNC: 34.8 G/DL (ref 31–37)
MCHC RBC AUTO-ENTMCNC: 35.1 G/DL (ref 31–37)
MCV RBC AUTO: 88.7 FL
MCV RBC AUTO: 90 FL
MCV RBC AUTO: 90.8 FL
MCV RBC AUTO: 91.6 FL
MCV RBC AUTO: 92.2 FL
MCV RBC AUTO: 92.5 FL
MCV RBC AUTO: 93 FL
MCV RBC AUTO: 93.3 FL
MCV RBC AUTO: 93.9 FL
MCV RBC AUTO: 95.3 FL
METAMYELOCYTES # BLD: 0.02 X10(3) UL
METAMYELOCYTES # BLD: 0.05 X10(3) UL
METAMYELOCYTES # BLD: 0.18 X10(3) UL
METAMYELOCYTES NFR BLD: 1 %
METAMYELOCYTES NFR BLD: 1 %
METAMYELOCYTES NFR BLD: 2 %
METAPNEUMOVIRUS PCR:: NOT DETECTED
METHGB MFR BLD: 0.5 % SAT (ref 0.4–1.5)
METHGB MFR BLD: 0.8 % SAT (ref 0.4–1.5)
MONOCYTES # BLD: 0 X10(3) UL (ref 0.1–1)
MONOCYTES # BLD: 0 X10(3) UL (ref 0.1–1)
MONOCYTES # BLD: 0.02 X10(3) UL (ref 0.1–1)
MONOCYTES # BLD: 0.05 X10(3) UL (ref 0.1–1)
MONOCYTES # BLD: 0.13 X10(3) UL (ref 0.1–1)
MONOCYTES # BLD: 0.47 X10(3) UL (ref 0.1–1)
MONOCYTES # BLD: 0.53 X10(3) UL (ref 0.1–1)
MONOCYTES # BLD: 1.53 X10(3) UL (ref 0.1–1)
MONOCYTES NFR BLD: 0 %
MONOCYTES NFR BLD: 0 %
MONOCYTES NFR BLD: 2 %
MONOCYTES NFR BLD: 3 %
MONOCYTES NFR BLD: 5 %
MONOCYTES NFR BLD: 9 %
MORPHOLOGY: NORMAL
MYCOPLASMA PNEUMONIA PCR:: NOT DETECTED
MYELOCYTES # BLD: 0.05 X10(3) UL
MYELOCYTES # BLD: 0.09 X10(3) UL
MYELOCYTES NFR BLD: 1 %
MYELOCYTES NFR BLD: 2 %
NEUTROPHILS # BLD AUTO: 0.54 X10 (3) UL (ref 1.5–7.7)
NEUTROPHILS # BLD AUTO: 1.44 X10 (3) UL (ref 1.5–7.7)
NEUTROPHILS # BLD AUTO: 1.57 X10 (3) UL (ref 1.5–7.7)
NEUTROPHILS # BLD AUTO: 1.7 X10 (3) UL (ref 1.5–7.7)
NEUTROPHILS # BLD AUTO: 12.32 X10 (3) UL (ref 1.5–7.7)
NEUTROPHILS # BLD AUTO: 13.29 X10 (3) UL (ref 1.5–7.7)
NEUTROPHILS # BLD AUTO: 2.46 X10 (3) UL (ref 1.5–7.7)
NEUTROPHILS # BLD AUTO: 4.2 X10 (3) UL (ref 1.5–7.7)
NEUTROPHILS NFR BLD: 33 %
NEUTROPHILS NFR BLD: 49 %
NEUTROPHILS NFR BLD: 51 %
NEUTROPHILS NFR BLD: 53 %
NEUTROPHILS NFR BLD: 57 %
NEUTROPHILS NFR BLD: 64 %
NEUTROPHILS NFR BLD: 66 %
NEUTROPHILS NFR BLD: 69 %
NEUTS BAND NFR BLD: 19 %
NEUTS BAND NFR BLD: 2 %
NEUTS BAND NFR BLD: 20 %
NEUTS BAND NFR BLD: 22 %
NEUTS BAND NFR BLD: 24 %
NEUTS BAND NFR BLD: 24 %
NEUTS BAND NFR BLD: 7 %
NEUTS HYPERSEG # BLD: 0.64 X10(3) UL (ref 1.5–7.7)
NEUTS HYPERSEG # BLD: 1.49 X10(3) UL (ref 1.5–7.7)
NEUTS HYPERSEG # BLD: 1.73 X10(3) UL (ref 1.5–7.7)
NEUTS HYPERSEG # BLD: 1.85 X10(3) UL (ref 1.5–7.7)
NEUTS HYPERSEG # BLD: 13.77 X10(3) UL (ref 1.5–7.7)
NEUTS HYPERSEG # BLD: 14.96 X10(3) UL (ref 1.5–7.7)
NEUTS HYPERSEG # BLD: 3.5 X10(3) UL (ref 1.5–7.7)
NEUTS HYPERSEG # BLD: 5.58 X10(3) UL (ref 1.5–7.7)
NITRITE UR QL STRIP.AUTO: NEGATIVE
NRBC BLD MANUAL-RTO: 1 %
NRBC BLD MANUAL-RTO: 2 %
OSMOLALITY SERPL CALC.SUM OF ELEC: 291 MOSM/KG (ref 275–295)
OSMOLALITY SERPL CALC.SUM OF ELEC: 293 MOSM/KG (ref 275–295)
OSMOLALITY SERPL CALC.SUM OF ELEC: 294 MOSM/KG (ref 275–295)
OSMOLALITY SERPL CALC.SUM OF ELEC: 295 MOSM/KG (ref 275–295)
OSMOLALITY SERPL CALC.SUM OF ELEC: 296 MOSM/KG (ref 275–295)
OSMOLALITY SERPL CALC.SUM OF ELEC: 296 MOSM/KG (ref 275–295)
OSMOLALITY SERPL CALC.SUM OF ELEC: 298 MOSM/KG (ref 275–295)
OSMOLALITY SERPL CALC.SUM OF ELEC: 298 MOSM/KG (ref 275–295)
OSMOLALITY SERPL CALC.SUM OF ELEC: 300 MOSM/KG (ref 275–295)
OSMOLALITY SERPL CALC.SUM OF ELEC: 301 MOSM/KG (ref 275–295)
OSMOLALITY SERPL CALC.SUM OF ELEC: 302 MOSM/KG (ref 275–295)
OSMOLALITY SERPL CALC.SUM OF ELEC: 309 MOSM/KG (ref 275–295)
OSMOLALITY SERPL CALC.SUM OF ELEC: 313 MOSM/KG (ref 275–295)
OSMOLALITY SERPL CALC.SUM OF ELEC: 325 MOSM/KG (ref 275–295)
OXYHGB MFR BLDA: 93.3 % (ref 92–100)
OXYHGB MFR BLDA: 95.2 % (ref 92–100)
P AXIS: 37 DEGREES
P AXIS: 71 DEGREES
P-R INTERVAL: 144 MS
P-R INTERVAL: 154 MS
PARAINFLUENZA 1 PCR:: NOT DETECTED
PARAINFLUENZA 2 PCR:: NOT DETECTED
PARAINFLUENZA 3 PCR:: NOT DETECTED
PARAINFLUENZA 4 PCR:: NOT DETECTED
PCO2 BLDA: 43 MM HG (ref 35–45)
PCO2 BLDA: 51 MM HG (ref 35–45)
PH BLDA: 7.53 [PH] (ref 7.35–7.45)
PH BLDA: 7.54 [PH] (ref 7.35–7.45)
PH UR STRIP.AUTO: 6 [PH] (ref 5–8)
PHOSPHATE SERPL-MCNC: 1.9 MG/DL (ref 2.4–5.1)
PHOSPHATE SERPL-MCNC: 2.2 MG/DL (ref 2.4–5.1)
PHOSPHATE SERPL-MCNC: 2.6 MG/DL (ref 2.4–5.1)
PHOSPHATE SERPL-MCNC: 3.1 MG/DL (ref 2.4–5.1)
PLATELET # BLD AUTO: 10 10(3)UL (ref 150–450)
PLATELET # BLD AUTO: 12 10(3)UL (ref 150–450)
PLATELET # BLD AUTO: 12 10(3)UL (ref 150–450)
PLATELET # BLD AUTO: 15 10(3)UL (ref 150–450)
PLATELET # BLD AUTO: 17 10(3)UL (ref 150–450)
PLATELET # BLD AUTO: 21 10(3)UL (ref 150–450)
PLATELET # BLD AUTO: 51 10(3)UL (ref 150–450)
PLATELET # BLD AUTO: 6 10(3)UL (ref 150–450)
PLATELET # BLD AUTO: 74 10(3)UL (ref 150–450)
PLATELET # BLD AUTO: 8 10(3)UL (ref 150–450)
PLATELET MORPHOLOGY: NORMAL
PLATELETS.RETICULATED NFR BLD AUTO: 10.8 % (ref 0–7)
PLATELETS.RETICULATED NFR BLD AUTO: 11 % (ref 0–7)
PLATELETS.RETICULATED NFR BLD AUTO: 11.2 % (ref 0–7)
PLATELETS.RETICULATED NFR BLD AUTO: 12.5 % (ref 0–7)
PLATELETS.RETICULATED NFR BLD AUTO: 12.6 % (ref 0–7)
PLATELETS.RETICULATED NFR BLD AUTO: 5.2 % (ref 0–7)
PLATELETS.RETICULATED NFR BLD AUTO: 6 % (ref 0–7)
PLATELETS.RETICULATED NFR BLD AUTO: 6.5 % (ref 0–7)
PLATELETS.RETICULATED NFR BLD AUTO: 7.6 % (ref 0–7)
PLATELETS.RETICULATED NFR BLD AUTO: 8.7 % (ref 0–7)
PO2 BLDA: 73 MM HG (ref 80–100)
PO2 BLDA: 79 MM HG (ref 80–100)
POTASSIUM BLD-SCNC: 3.7 MMOL/L (ref 3.6–5.1)
POTASSIUM SERPL-SCNC: 2.8 MMOL/L (ref 3.5–5.1)
POTASSIUM SERPL-SCNC: 2.8 MMOL/L (ref 3.5–5.1)
POTASSIUM SERPL-SCNC: 2.9 MMOL/L (ref 3.5–5.1)
POTASSIUM SERPL-SCNC: 3.1 MMOL/L (ref 3.5–5.1)
POTASSIUM SERPL-SCNC: 3.2 MMOL/L (ref 3.5–5.1)
POTASSIUM SERPL-SCNC: 3.4 MMOL/L (ref 3.5–5.1)
POTASSIUM SERPL-SCNC: 3.4 MMOL/L (ref 3.5–5.1)
POTASSIUM SERPL-SCNC: 3.6 MMOL/L (ref 3.5–5.1)
POTASSIUM SERPL-SCNC: 3.8 MMOL/L (ref 3.5–5.1)
POTASSIUM SERPL-SCNC: 3.9 MMOL/L (ref 3.5–5.1)
POTASSIUM SERPL-SCNC: 3.9 MMOL/L (ref 3.5–5.1)
PROCALCITONIN SERPL-MCNC: 0.43 NG/ML (ref ?–0.05)
PROMYELOCYTES # BLD: 0.09 X10(3) UL
PROMYELOCYTES NFR BLD: 1 %
PROT SERPL-MCNC: 5.1 G/DL (ref 5.7–8.2)
PROT SERPL-MCNC: 5.2 G/DL (ref 5.7–8.2)
PROT SERPL-MCNC: 5.2 G/DL (ref 5.7–8.2)
PROT SERPL-MCNC: 5.3 G/DL (ref 5.7–8.2)
PROT SERPL-MCNC: 5.3 G/DL (ref 5.7–8.2)
PROT SERPL-MCNC: 5.4 G/DL (ref 5.7–8.2)
PROT SERPL-MCNC: 5.5 G/DL (ref 5.7–8.2)
PROT SERPL-MCNC: 5.5 G/DL (ref 5.7–8.2)
PROT SERPL-MCNC: 5.8 G/DL (ref 5.7–8.2)
PROT SERPL-MCNC: 6.1 G/DL (ref 5.7–8.2)
PROT SERPL-MCNC: 6.3 G/DL (ref 5.7–8.2)
PROT SERPL-MCNC: 6.7 G/DL (ref 5.7–8.2)
PROT SERPL-MCNC: 6.8 G/DL (ref 5.7–8.2)
PROT UR STRIP.AUTO-MCNC: 20 MG/DL
PROTHROMBIN TIME: 17.5 SECONDS (ref 11.6–14.8)
PTH-INTACT SERPL-MCNC: <6.3 PG/ML (ref 18.5–88)
Q-T INTERVAL: 306 MS
Q-T INTERVAL: 320 MS
QRS DURATION: 100 MS
QRS DURATION: 90 MS
QTC CALCULATION (BEZET): 450 MS
QTC CALCULATION (BEZET): 454 MS
R AXIS: -2 DEGREES
R AXIS: 31 DEGREES
RBC # BLD AUTO: 2.49 X10(6)UL
RBC # BLD AUTO: 2.49 X10(6)UL
RBC # BLD AUTO: 2.57 X10(6)UL
RBC # BLD AUTO: 2.57 X10(6)UL
RBC # BLD AUTO: 2.93 X10(6)UL
RBC # BLD AUTO: 3.09 X10(6)UL
RBC # BLD AUTO: 3.18 X10(6)UL
RBC # BLD AUTO: 3.62 X10(6)UL
RBC # BLD AUTO: 4.51 X10(6)UL
RBC # BLD AUTO: 4.8 X10(6)UL
RH BLOOD TYPE: NEGATIVE
RHINOVIRUS/ENTERO PCR:: NOT DETECTED
RSV RNA SPEC QL NAA+PROBE: NOT DETECTED
SARS-COV-2 RNA NPH QL NAA+NON-PROBE: NOT DETECTED
SODIUM BLD-SCNC: 142 MMOL/L (ref 135–145)
SODIUM SERPL-SCNC: 139 MMOL/L (ref 136–145)
SODIUM SERPL-SCNC: 139 MMOL/L (ref 136–145)
SODIUM SERPL-SCNC: 140 MMOL/L (ref 136–145)
SODIUM SERPL-SCNC: 140 MMOL/L (ref 136–145)
SODIUM SERPL-SCNC: 141 MMOL/L (ref 136–145)
SODIUM SERPL-SCNC: 142 MMOL/L (ref 136–145)
SODIUM SERPL-SCNC: 143 MMOL/L (ref 136–145)
SODIUM SERPL-SCNC: 146 MMOL/L (ref 136–145)
SODIUM SERPL-SCNC: 147 MMOL/L (ref 136–145)
SODIUM SERPL-SCNC: 152 MMOL/L (ref 136–145)
SP GR UR STRIP.AUTO: 1.02 (ref 1–1.03)
T AXIS: -1 DEGREES
T AXIS: 36 DEGREES
TCR G-D CELLS NFR SPEC: 6 %
TOTAL CELLS COUNTED BLD: 100
TOTAL CELLS COUNTED BLD: 50
TOTAL IRON BINDING CAPACITY: 230 UG/DL (ref 250–425)
TRANSFERRIN SERPL-MCNC: 160 MG/DL (ref 250–380)
TROPONIN I SERPL HS-MCNC: 27 NG/L
UNIT VOLUME: 228 ML
UNIT VOLUME: 273 ML
UNIT VOLUME: 309 ML
URATE SERPL-MCNC: 1 MG/DL
URATE SERPL-MCNC: 1.1 MG/DL
URATE SERPL-MCNC: 1.3 MG/DL
URATE SERPL-MCNC: 1.5 MG/DL
URATE SERPL-MCNC: 1.9 MG/DL
URATE SERPL-MCNC: 2.4 MG/DL
UROBILINOGEN UR STRIP.AUTO-MCNC: NORMAL MG/DL
VENTRICULAR RATE: 121 BPM
VENTRICULAR RATE: 130 BPM
VIT B12 SERPL-MCNC: 425 PG/ML (ref 211–911)
VIT D+METAB SERPL-MCNC: 37.3 NG/ML (ref 30–100)
WBC # BLD AUTO: 1 X10(3) UL (ref 4–11)
WBC # BLD AUTO: 10.5 X10(3) UL (ref 4–11)
WBC # BLD AUTO: 17 X10(3) UL (ref 4–11)
WBC # BLD AUTO: 17.6 X10(3) UL (ref 4–11)
WBC # BLD AUTO: 2.1 X10(3) UL (ref 4–11)
WBC # BLD AUTO: 2.3 X10(3) UL (ref 4–11)
WBC # BLD AUTO: 2.6 X10(3) UL (ref 4–11)
WBC # BLD AUTO: 6.6 X10(3) UL (ref 4–11)
WBC # BLD AUTO: 7.9 X10(3) UL (ref 4–11)
WBC # BLD AUTO: 9.3 X10(3) UL (ref 4–11)

## 2025-01-01 PROCEDURE — 99233 SBSQ HOSP IP/OBS HIGH 50: CPT | Performed by: INTERNAL MEDICINE

## 2025-01-01 PROCEDURE — 99223 1ST HOSP IP/OBS HIGH 75: CPT | Performed by: INTERNAL MEDICINE

## 2025-01-01 PROCEDURE — 07DH3ZX EXTRACTION OF RIGHT INGUINAL LYMPHATIC, PERCUTANEOUS APPROACH, DIAGNOSTIC: ICD-10-PCS | Performed by: RADIOLOGY

## 2025-01-01 PROCEDURE — 99232 SBSQ HOSP IP/OBS MODERATE 35: CPT | Performed by: INTERNAL MEDICINE

## 2025-01-01 PROCEDURE — 99232 SBSQ HOSP IP/OBS MODERATE 35: CPT | Performed by: HOSPITALIST

## 2025-01-01 PROCEDURE — 02HV33Z INSERTION OF INFUSION DEVICE INTO SUPERIOR VENA CAVA, PERCUTANEOUS APPROACH: ICD-10-PCS | Performed by: RADIOLOGY

## 2025-01-01 PROCEDURE — 30233R1 TRANSFUSION OF NONAUTOLOGOUS PLATELETS INTO PERIPHERAL VEIN, PERCUTANEOUS APPROACH: ICD-10-PCS | Performed by: INTERNAL MEDICINE

## 2025-01-01 PROCEDURE — 99233 SBSQ HOSP IP/OBS HIGH 50: CPT | Performed by: NURSE PRACTITIONER

## 2025-01-01 PROCEDURE — 70450 CT HEAD/BRAIN W/O DYE: CPT | Performed by: INTERNAL MEDICINE

## 2025-01-01 PROCEDURE — 71045 X-RAY EXAM CHEST 1 VIEW: CPT | Performed by: INTERNAL MEDICINE

## 2025-01-01 PROCEDURE — 3E04305 INTRODUCTION OF OTHER ANTINEOPLASTIC INTO CENTRAL VEIN, PERCUTANEOUS APPROACH: ICD-10-PCS | Performed by: INTERNAL MEDICINE

## 2025-01-01 PROCEDURE — 99223 1ST HOSP IP/OBS HIGH 75: CPT | Performed by: HOSPITALIST

## 2025-01-01 PROCEDURE — G0316 PROLNG IP/OBS E/M EA ADDL 15 MIN: HCPCS | Performed by: NURSE PRACTITIONER

## 2025-01-01 PROCEDURE — 95816 EEG AWAKE AND DROWSY: CPT | Performed by: OTHER

## 2025-01-01 PROCEDURE — 99232 SBSQ HOSP IP/OBS MODERATE 35: CPT

## 2025-01-01 PROCEDURE — 99497 ADVNCD CARE PLAN 30 MIN: CPT | Performed by: NURSE PRACTITIONER

## 2025-01-01 PROCEDURE — 93306 TTE W/DOPPLER COMPLETE: CPT | Performed by: INTERNAL MEDICINE

## 2025-01-01 PROCEDURE — 76942 ECHO GUIDE FOR BIOPSY: CPT | Performed by: INTERNAL MEDICINE

## 2025-01-01 PROCEDURE — 99223 1ST HOSP IP/OBS HIGH 75: CPT | Performed by: OTHER

## 2025-01-01 PROCEDURE — 99239 HOSP IP/OBS DSCHRG MGMT >30: CPT | Performed by: INTERNAL MEDICINE

## 2025-01-01 PROCEDURE — 71045 X-RAY EXAM CHEST 1 VIEW: CPT | Performed by: EMERGENCY MEDICINE

## 2025-01-01 PROCEDURE — 74177 CT ABD & PELVIS W/CONTRAST: CPT | Performed by: EMERGENCY MEDICINE

## 2025-01-01 PROCEDURE — 38505 NEEDLE BIOPSY LYMPH NODES: CPT | Performed by: INTERNAL MEDICINE

## 2025-01-01 PROCEDURE — 99233 SBSQ HOSP IP/OBS HIGH 50: CPT | Performed by: OTHER

## 2025-01-01 PROCEDURE — 70553 MRI BRAIN STEM W/O & W/DYE: CPT | Performed by: INTERNAL MEDICINE

## 2025-01-01 PROCEDURE — B5181ZA FLUOROSCOPY OF SUPERIOR VENA CAVA USING LOW OSMOLAR CONTRAST, GUIDANCE: ICD-10-PCS | Performed by: RADIOLOGY

## 2025-01-01 PROCEDURE — 99497 ADVNCD CARE PLAN 30 MIN: CPT | Performed by: INTERNAL MEDICINE

## 2025-01-01 PROCEDURE — 71275 CT ANGIOGRAPHY CHEST: CPT | Performed by: EMERGENCY MEDICINE

## 2025-01-01 PROCEDURE — 99233 SBSQ HOSP IP/OBS HIGH 50: CPT | Performed by: STUDENT IN AN ORGANIZED HEALTH CARE EDUCATION/TRAINING PROGRAM

## 2025-01-01 PROCEDURE — 99221 1ST HOSP IP/OBS SF/LOW 40: CPT | Performed by: NURSE PRACTITIONER

## 2025-01-01 PROCEDURE — 71045 X-RAY EXAM CHEST 1 VIEW: CPT | Performed by: HOSPITALIST

## 2025-01-01 RX ORDER — DIAZEPAM 2 MG/1
5 TABLET ORAL EVERY 6 HOURS PRN
Status: CANCELLED | OUTPATIENT
Start: 2025-01-01

## 2025-01-01 RX ORDER — ONDANSETRON 2 MG/ML
4 INJECTION INTRAMUSCULAR; INTRAVENOUS EVERY 6 HOURS PRN
Status: DISCONTINUED | OUTPATIENT
Start: 2025-01-01 | End: 2025-01-01 | Stop reason: HOSPADM

## 2025-01-01 RX ORDER — ENOXAPARIN SODIUM 100 MG/ML
40 INJECTION SUBCUTANEOUS DAILY
Status: DISCONTINUED | OUTPATIENT
Start: 2025-01-01 | End: 2025-01-01

## 2025-01-01 RX ORDER — BENZONATATE 100 MG/1
200 CAPSULE ORAL 3 TIMES DAILY PRN
Status: DISCONTINUED | OUTPATIENT
Start: 2025-01-01 | End: 2025-01-01

## 2025-01-01 RX ORDER — SODIUM CHLORIDE 9 MG/ML
INJECTION, SOLUTION INTRAVENOUS CONTINUOUS
Status: DISCONTINUED | OUTPATIENT
Start: 2025-01-01 | End: 2025-01-01

## 2025-01-01 RX ORDER — DIAZEPAM 10 MG/2ML
5 INJECTION, SOLUTION INTRAMUSCULAR; INTRAVENOUS EVERY 6 HOURS PRN
Status: DISCONTINUED | OUTPATIENT
Start: 2025-01-01 | End: 2025-01-01

## 2025-01-01 RX ORDER — OLANZAPINE 5 MG/1
2.5 TABLET, ORALLY DISINTEGRATING ORAL 2 TIMES DAILY PRN
Status: CANCELLED | OUTPATIENT
Start: 2025-01-01

## 2025-01-01 RX ORDER — SODIUM CHLORIDE 450 MG/100ML
INJECTION, SOLUTION INTRAVENOUS CONTINUOUS
Status: DISCONTINUED | OUTPATIENT
Start: 2025-01-01 | End: 2025-01-01

## 2025-01-01 RX ORDER — NICOTINE POLACRILEX 4 MG
30 LOZENGE BUCCAL
Status: DISCONTINUED | OUTPATIENT
Start: 2025-01-01 | End: 2025-01-01 | Stop reason: HOSPADM

## 2025-01-01 RX ORDER — ALLOPURINOL 300 MG/1
300 TABLET ORAL DAILY
Status: DISCONTINUED | OUTPATIENT
Start: 2025-01-01 | End: 2025-01-01

## 2025-01-01 RX ORDER — ACETAMINOPHEN 325 MG/1
650 TABLET ORAL EVERY 24 HOURS
Status: COMPLETED | OUTPATIENT
Start: 2025-01-01 | End: 2025-01-01

## 2025-01-01 RX ORDER — SENNOSIDES 8.6 MG
8.6 TABLET ORAL 2 TIMES DAILY
Status: DISCONTINUED | OUTPATIENT
Start: 2025-01-01 | End: 2025-01-01

## 2025-01-01 RX ORDER — POTASSIUM CHLORIDE 14.9 MG/ML
20 INJECTION INTRAVENOUS ONCE
Status: COMPLETED | OUTPATIENT
Start: 2025-01-01 | End: 2025-01-01

## 2025-01-01 RX ORDER — LORAZEPAM 2 MG/ML
0.5 INJECTION INTRAMUSCULAR EVERY 6 HOURS PRN
Status: DISCONTINUED | OUTPATIENT
Start: 2025-01-01 | End: 2025-01-01

## 2025-01-01 RX ORDER — HYDROMORPHONE HYDROCHLORIDE 1 MG/ML
INJECTION, SOLUTION INTRAMUSCULAR; INTRAVENOUS; SUBCUTANEOUS
Status: COMPLETED
Start: 2025-01-01 | End: 2025-01-01

## 2025-01-01 RX ORDER — PREDNISONE 50 MG/1
100 TABLET ORAL
Status: DISCONTINUED | OUTPATIENT
Start: 2025-01-01 | End: 2025-01-01

## 2025-01-01 RX ORDER — HYDROMORPHONE HYDROCHLORIDE 1 MG/ML
0.4 INJECTION, SOLUTION INTRAMUSCULAR; INTRAVENOUS; SUBCUTANEOUS EVERY 2 HOUR PRN
Status: DISCONTINUED | OUTPATIENT
Start: 2025-01-01 | End: 2025-01-01

## 2025-01-01 RX ORDER — PROCHLORPERAZINE EDISYLATE 5 MG/ML
10 INJECTION INTRAMUSCULAR; INTRAVENOUS EVERY 6 HOURS PRN
Status: DISCONTINUED | OUTPATIENT
Start: 2025-01-01 | End: 2025-01-01

## 2025-01-01 RX ORDER — FENTANYL 12.5 UG/1
1 PATCH TRANSDERMAL
Status: DISCONTINUED | OUTPATIENT
Start: 2025-01-01 | End: 2025-01-01

## 2025-01-01 RX ORDER — MORPHINE SULFATE 4 MG/ML
4 INJECTION, SOLUTION INTRAMUSCULAR; INTRAVENOUS EVERY 2 HOUR PRN
Status: DISCONTINUED | OUTPATIENT
Start: 2025-01-01 | End: 2025-01-01

## 2025-01-01 RX ORDER — SENNOSIDES 8.6 MG
17.2 TABLET ORAL NIGHTLY PRN
Status: DISCONTINUED | OUTPATIENT
Start: 2025-01-01 | End: 2025-01-01

## 2025-01-01 RX ORDER — DEXAMETHASONE SODIUM PHOSPHATE 4 MG/ML
10 VIAL (ML) INJECTION ONCE
Status: COMPLETED | OUTPATIENT
Start: 2025-01-01 | End: 2025-01-01

## 2025-01-01 RX ORDER — ACETAMINOPHEN 500 MG
500 TABLET ORAL EVERY 4 HOURS PRN
Status: DISCONTINUED | OUTPATIENT
Start: 2025-01-01 | End: 2025-01-01

## 2025-01-01 RX ORDER — OXYCODONE AND ACETAMINOPHEN 10; 325 MG/1; MG/1
1 TABLET ORAL EVERY 6 HOURS PRN
Status: DISCONTINUED | OUTPATIENT
Start: 2025-01-01 | End: 2025-01-01

## 2025-01-01 RX ORDER — HALOPERIDOL 5 MG/ML
2 INJECTION INTRAMUSCULAR
Status: DISCONTINUED | OUTPATIENT
Start: 2025-01-01 | End: 2025-01-01

## 2025-01-01 RX ORDER — HALOPERIDOL 5 MG/ML
2 INJECTION INTRAMUSCULAR EVERY 6 HOURS PRN
Status: DISCONTINUED | OUTPATIENT
Start: 2025-01-01 | End: 2025-01-01

## 2025-01-01 RX ORDER — HYDROMORPHONE HYDROCHLORIDE 1 MG/ML
0.5 INJECTION, SOLUTION INTRAMUSCULAR; INTRAVENOUS; SUBCUTANEOUS ONCE
Status: COMPLETED | OUTPATIENT
Start: 2025-01-01 | End: 2025-01-01

## 2025-01-01 RX ORDER — OXYCODONE AND ACETAMINOPHEN 5; 325 MG/1; MG/1
1 TABLET ORAL EVERY 6 HOURS
Status: DISCONTINUED | OUTPATIENT
Start: 2025-01-01 | End: 2025-01-01

## 2025-01-01 RX ORDER — OLANZAPINE 5 MG/1
2.5 TABLET, ORALLY DISINTEGRATING ORAL 2 TIMES DAILY PRN
Status: DISCONTINUED | OUTPATIENT
Start: 2025-01-01 | End: 2025-01-01

## 2025-01-01 RX ORDER — TRAZODONE HYDROCHLORIDE 50 MG/1
50 TABLET ORAL NIGHTLY
Status: CANCELLED | OUTPATIENT
Start: 2025-01-01

## 2025-01-01 RX ORDER — HYDROMORPHONE HYDROCHLORIDE 1 MG/ML
1 INJECTION, SOLUTION INTRAMUSCULAR; INTRAVENOUS; SUBCUTANEOUS
Status: DISCONTINUED | OUTPATIENT
Start: 2025-01-01 | End: 2025-01-01

## 2025-01-01 RX ORDER — FUROSEMIDE 10 MG/ML
40 INJECTION INTRAMUSCULAR; INTRAVENOUS EVERY 8 HOURS PRN
Status: DISCONTINUED | OUTPATIENT
Start: 2025-01-01 | End: 2025-01-01

## 2025-01-01 RX ORDER — ALLOPURINOL 300 MG/1
300 TABLET ORAL 2 TIMES DAILY
Status: DISCONTINUED | OUTPATIENT
Start: 2025-01-01 | End: 2025-01-01

## 2025-01-01 RX ORDER — POLYETHYLENE GLYCOL 3350 17 G/17G
17 POWDER, FOR SOLUTION ORAL DAILY PRN
Status: DISCONTINUED | OUTPATIENT
Start: 2025-01-01 | End: 2025-01-01

## 2025-01-01 RX ORDER — LIDOCAINE HYDROCHLORIDE 10 MG/ML
INJECTION, SOLUTION INFILTRATION; PERINEURAL
Status: COMPLETED
Start: 2025-01-01 | End: 2025-01-01

## 2025-01-01 RX ORDER — POTASSIUM CHLORIDE 1500 MG/1
40 TABLET, EXTENDED RELEASE ORAL ONCE
Status: COMPLETED | OUTPATIENT
Start: 2025-01-01 | End: 2025-01-01

## 2025-01-01 RX ORDER — DIAZEPAM 2 MG/1
5 TABLET ORAL EVERY 8 HOURS PRN
Status: DISCONTINUED | OUTPATIENT
Start: 2025-01-01 | End: 2025-01-01

## 2025-01-01 RX ORDER — OLANZAPINE 5 MG/1
5 TABLET, ORALLY DISINTEGRATING ORAL NIGHTLY
Status: DISCONTINUED | OUTPATIENT
Start: 2025-01-01 | End: 2025-01-01

## 2025-01-01 RX ORDER — DIPHENHYDRAMINE HCL 25 MG
25 CAPSULE ORAL EVERY 24 HOURS
Status: COMPLETED | OUTPATIENT
Start: 2025-01-01 | End: 2025-01-01

## 2025-01-01 RX ORDER — NICOTINE 21 MG/24HR
1 PATCH, TRANSDERMAL 24 HOURS TRANSDERMAL DAILY
Status: DISCONTINUED | OUTPATIENT
Start: 2025-01-01 | End: 2025-01-01

## 2025-01-01 RX ORDER — METHYLPREDNISOLONE SODIUM SUCCINATE 125 MG/2ML
125 INJECTION INTRAMUSCULAR; INTRAVENOUS ONCE AS NEEDED
Status: ACTIVE | OUTPATIENT
Start: 2025-01-01 | End: 2025-01-01

## 2025-01-01 RX ORDER — POTASSIUM CHLORIDE 1500 MG/1
40 TABLET, EXTENDED RELEASE ORAL ONCE
Status: DISCONTINUED | OUTPATIENT
Start: 2025-01-01 | End: 2025-01-01

## 2025-01-01 RX ORDER — HYDROMORPHONE HYDROCHLORIDE 1 MG/ML
0.2 INJECTION, SOLUTION INTRAMUSCULAR; INTRAVENOUS; SUBCUTANEOUS EVERY 5 MIN PRN
Status: DISCONTINUED | OUTPATIENT
Start: 2025-01-01 | End: 2025-01-01 | Stop reason: HOSPADM

## 2025-01-01 RX ORDER — RIZATRIPTAN BENZOATE 5 MG/1
5 TABLET, ORALLY DISINTEGRATING ORAL AS NEEDED
Status: DISCONTINUED | OUTPATIENT
Start: 2025-01-01 | End: 2025-01-01

## 2025-01-01 RX ORDER — RIZATRIPTAN BENZOATE 5 MG/1
5 TABLET, ORALLY DISINTEGRATING ORAL AS NEEDED
Status: CANCELLED | OUTPATIENT
Start: 2025-01-01

## 2025-01-01 RX ORDER — SODIUM CHLORIDE, SODIUM LACTATE, POTASSIUM CHLORIDE, CALCIUM CHLORIDE 600; 310; 30; 20 MG/100ML; MG/100ML; MG/100ML; MG/100ML
INJECTION, SOLUTION INTRAVENOUS CONTINUOUS
Status: DISCONTINUED | OUTPATIENT
Start: 2025-01-01 | End: 2025-01-01 | Stop reason: HOSPADM

## 2025-01-01 RX ORDER — KETOROLAC TROMETHAMINE 15 MG/ML
15 INJECTION, SOLUTION INTRAMUSCULAR; INTRAVENOUS EVERY 6 HOURS PRN
Status: DISCONTINUED | OUTPATIENT
Start: 2025-01-01 | End: 2025-01-01

## 2025-01-01 RX ORDER — DIPHENHYDRAMINE HYDROCHLORIDE 50 MG/ML
12.5 INJECTION, SOLUTION INTRAMUSCULAR; INTRAVENOUS EVERY 4 HOURS PRN
Status: DISCONTINUED | OUTPATIENT
Start: 2025-01-01 | End: 2025-01-01

## 2025-01-01 RX ORDER — POLYETHYLENE GLYCOL 3350 17 G/17G
17 POWDER, FOR SOLUTION ORAL DAILY
Status: DISCONTINUED | OUTPATIENT
Start: 2025-01-01 | End: 2025-01-01

## 2025-01-01 RX ORDER — PREDNISONE 50 MG/1
100 TABLET ORAL ONCE
Status: DISCONTINUED | OUTPATIENT
Start: 2025-01-01 | End: 2025-01-01

## 2025-01-01 RX ORDER — HYDROCODONE BITARTRATE AND ACETAMINOPHEN 5; 325 MG/1; MG/1
2 TABLET ORAL ONCE AS NEEDED
Status: DISCONTINUED | OUTPATIENT
Start: 2025-01-01 | End: 2025-01-01 | Stop reason: HOSPADM

## 2025-01-01 RX ORDER — NICOTINE POLACRILEX 4 MG
15 LOZENGE BUCCAL
Status: DISCONTINUED | OUTPATIENT
Start: 2025-01-01 | End: 2025-01-01 | Stop reason: HOSPADM

## 2025-01-01 RX ORDER — QUETIAPINE FUMARATE 25 MG/1
50 TABLET, FILM COATED ORAL 2 TIMES DAILY PRN
Status: DISCONTINUED | OUTPATIENT
Start: 2025-01-01 | End: 2025-01-01

## 2025-01-01 RX ORDER — ONDANSETRON 2 MG/ML
4 INJECTION INTRAMUSCULAR; INTRAVENOUS EVERY 6 HOURS PRN
Status: DISCONTINUED | OUTPATIENT
Start: 2025-01-01 | End: 2025-01-01

## 2025-01-01 RX ORDER — GLYCOPYRROLATE 0.2 MG/ML
0.4 INJECTION, SOLUTION INTRAMUSCULAR; INTRAVENOUS
Status: DISCONTINUED | OUTPATIENT
Start: 2025-01-01 | End: 2025-01-01

## 2025-01-01 RX ORDER — POTASSIUM CHLORIDE 1500 MG/1
40 TABLET, EXTENDED RELEASE ORAL EVERY 4 HOURS
Status: DISPENSED | OUTPATIENT
Start: 2025-01-01 | End: 2025-01-01

## 2025-01-01 RX ORDER — DIAZEPAM 2 MG/1
5 TABLET ORAL ONCE
Status: COMPLETED | OUTPATIENT
Start: 2025-01-01 | End: 2025-01-01

## 2025-01-01 RX ORDER — ATROPINE SULFATE 10 MG/ML
2 SOLUTION/ DROPS OPHTHALMIC EVERY 2 HOUR PRN
Status: DISCONTINUED | OUTPATIENT
Start: 2025-01-01 | End: 2025-01-01

## 2025-01-01 RX ORDER — OXYCODONE AND ACETAMINOPHEN 5; 325 MG/1; MG/1
1 TABLET ORAL EVERY 4 HOURS PRN
Status: DISCONTINUED | OUTPATIENT
Start: 2025-01-01 | End: 2025-01-01

## 2025-01-01 RX ORDER — AMLODIPINE BESYLATE 5 MG/1
10 TABLET ORAL DAILY
Status: DISCONTINUED | OUTPATIENT
Start: 2025-01-01 | End: 2025-01-01

## 2025-01-01 RX ORDER — HYDROMORPHONE HYDROCHLORIDE 1 MG/ML
INJECTION, SOLUTION INTRAMUSCULAR; INTRAVENOUS; SUBCUTANEOUS
Status: DISCONTINUED
Start: 2025-01-01 | End: 2025-01-01 | Stop reason: WASHOUT

## 2025-01-01 RX ORDER — LABETALOL HYDROCHLORIDE 5 MG/ML
10 INJECTION, SOLUTION INTRAVENOUS EVERY 4 HOURS PRN
Status: CANCELLED | OUTPATIENT
Start: 2025-01-01

## 2025-01-01 RX ORDER — PANTOPRAZOLE SODIUM 40 MG/1
40 TABLET, DELAYED RELEASE ORAL
Status: DISCONTINUED | OUTPATIENT
Start: 2025-01-01 | End: 2025-01-01

## 2025-01-01 RX ORDER — LABETALOL HYDROCHLORIDE 5 MG/ML
10 INJECTION, SOLUTION INTRAVENOUS EVERY 4 HOURS PRN
Status: DISCONTINUED | OUTPATIENT
Start: 2025-01-01 | End: 2025-01-01

## 2025-01-01 RX ORDER — PANTOPRAZOLE SODIUM 20 MG/1
20 TABLET, DELAYED RELEASE ORAL
Status: DISCONTINUED | OUTPATIENT
Start: 2025-01-01 | End: 2025-01-01

## 2025-01-01 RX ORDER — BISACODYL 10 MG
10 SUPPOSITORY, RECTAL RECTAL
Status: DISCONTINUED | OUTPATIENT
Start: 2025-01-01 | End: 2025-01-01

## 2025-01-01 RX ORDER — LORAZEPAM 2 MG/ML
INJECTION INTRAMUSCULAR
Status: DISPENSED
Start: 2025-01-01 | End: 2025-01-01

## 2025-01-01 RX ORDER — METHOCARBAMOL 500 MG/1
500 TABLET, FILM COATED ORAL 3 TIMES DAILY PRN
Status: DISCONTINUED | OUTPATIENT
Start: 2025-01-01 | End: 2025-01-01

## 2025-01-01 RX ORDER — SODIUM CHLORIDE 9 MG/ML
INJECTION, SOLUTION INTRAVENOUS ONCE
Status: COMPLETED | OUTPATIENT
Start: 2025-01-01 | End: 2025-01-01

## 2025-01-01 RX ORDER — AMLODIPINE BESYLATE 5 MG/1
5 TABLET ORAL ONCE AS NEEDED
Status: ACTIVE | OUTPATIENT
Start: 2025-01-01 | End: 2025-01-01

## 2025-01-01 RX ORDER — CALCITONIN SALMON 200 [USP'U]/ML
4 INJECTION, SOLUTION INTRAMUSCULAR; SUBCUTANEOUS EVERY 12 HOURS
Status: COMPLETED | OUTPATIENT
Start: 2025-01-01 | End: 2025-01-01

## 2025-01-01 RX ORDER — DIAZEPAM 2 MG/1
5 TABLET ORAL EVERY 6 HOURS PRN
Status: DISCONTINUED | OUTPATIENT
Start: 2025-01-01 | End: 2025-01-01

## 2025-01-01 RX ORDER — ACETAMINOPHEN 10 MG/ML
1000 INJECTION, SOLUTION INTRAVENOUS EVERY 6 HOURS PRN
Status: DISCONTINUED | OUTPATIENT
Start: 2025-01-01 | End: 2025-01-01

## 2025-01-01 RX ORDER — DIPHENHYDRAMINE HYDROCHLORIDE 50 MG/ML
25 INJECTION, SOLUTION INTRAMUSCULAR; INTRAVENOUS ONCE
Status: COMPLETED | OUTPATIENT
Start: 2025-01-01 | End: 2025-01-01

## 2025-01-01 RX ORDER — NALOXONE HYDROCHLORIDE 0.4 MG/ML
0.08 INJECTION, SOLUTION INTRAMUSCULAR; INTRAVENOUS; SUBCUTANEOUS
Status: DISCONTINUED | OUTPATIENT
Start: 2025-01-01 | End: 2025-01-01

## 2025-01-01 RX ORDER — HYDROMORPHONE HYDROCHLORIDE 1 MG/ML
0.2 INJECTION, SOLUTION INTRAMUSCULAR; INTRAVENOUS; SUBCUTANEOUS EVERY 2 HOUR PRN
Status: DISCONTINUED | OUTPATIENT
Start: 2025-01-01 | End: 2025-01-01

## 2025-01-01 RX ORDER — HYDROCODONE BITARTRATE AND ACETAMINOPHEN 5; 325 MG/1; MG/1
1 TABLET ORAL ONCE AS NEEDED
Status: DISCONTINUED | OUTPATIENT
Start: 2025-01-01 | End: 2025-01-01 | Stop reason: HOSPADM

## 2025-01-01 RX ORDER — ACETAMINOPHEN 325 MG/1
650 TABLET ORAL ONCE AS NEEDED
Status: ACTIVE | OUTPATIENT
Start: 2025-01-01 | End: 2025-01-01

## 2025-01-01 RX ORDER — DOXORUBICIN HYDROCHLORIDE 2 MG/ML
50 INJECTION, SOLUTION INTRAVENOUS EVERY 24 HOURS
Status: COMPLETED | OUTPATIENT
Start: 2025-01-01 | End: 2025-01-01

## 2025-01-01 RX ORDER — HYDROMORPHONE HYDROCHLORIDE 1 MG/ML
0.5 INJECTION, SOLUTION INTRAMUSCULAR; INTRAVENOUS; SUBCUTANEOUS
Status: DISCONTINUED | OUTPATIENT
Start: 2025-01-01 | End: 2025-01-01

## 2025-01-01 RX ORDER — SODIUM PHOSPHATE, DIBASIC AND SODIUM PHOSPHATE, MONOBASIC 7; 19 G/230ML; G/230ML
1 ENEMA RECTAL ONCE AS NEEDED
Status: DISCONTINUED | OUTPATIENT
Start: 2025-01-01 | End: 2025-01-01

## 2025-01-01 RX ORDER — DEXTROSE MONOHYDRATE 25 G/50ML
50 INJECTION, SOLUTION INTRAVENOUS
Status: DISCONTINUED | OUTPATIENT
Start: 2025-01-01 | End: 2025-01-01 | Stop reason: HOSPADM

## 2025-01-01 RX ORDER — TRAZODONE HYDROCHLORIDE 50 MG/1
50 TABLET ORAL NIGHTLY
Status: DISCONTINUED | OUTPATIENT
Start: 2025-01-01 | End: 2025-01-01

## 2025-01-01 RX ORDER — CALCITONIN SALMON 200 [USP'U]/ML
4 INJECTION, SOLUTION INTRAMUSCULAR; SUBCUTANEOUS 2 TIMES DAILY
Status: ACTIVE | OUTPATIENT
Start: 2025-01-01 | End: 2025-01-01

## 2025-01-01 RX ORDER — DEXAMETHASONE SODIUM PHOSPHATE 4 MG/ML
6 VIAL (ML) INJECTION EVERY 8 HOURS
Status: DISCONTINUED | OUTPATIENT
Start: 2025-01-01 | End: 2025-01-01

## 2025-01-01 RX ORDER — MORPHINE SULFATE 2 MG/ML
1 INJECTION, SOLUTION INTRAMUSCULAR; INTRAVENOUS EVERY 2 HOUR PRN
Status: DISCONTINUED | OUTPATIENT
Start: 2025-01-01 | End: 2025-01-01

## 2025-01-01 RX ORDER — HYDROMORPHONE HYDROCHLORIDE 1 MG/ML
0.4 INJECTION, SOLUTION INTRAMUSCULAR; INTRAVENOUS; SUBCUTANEOUS EVERY 5 MIN PRN
Status: DISCONTINUED | OUTPATIENT
Start: 2025-01-01 | End: 2025-01-01 | Stop reason: HOSPADM

## 2025-01-01 RX ORDER — ACETAMINOPHEN 10 MG/ML
INJECTION, SOLUTION INTRAVENOUS
Status: COMPLETED
Start: 2025-01-01 | End: 2025-01-01

## 2025-01-01 RX ORDER — SCOPOLAMINE 1 MG/3D
1 PATCH, EXTENDED RELEASE TRANSDERMAL
Status: DISCONTINUED | OUTPATIENT
Start: 2025-01-01 | End: 2025-01-01

## 2025-01-01 RX ORDER — PROCHLORPERAZINE EDISYLATE 5 MG/ML
5 INJECTION INTRAMUSCULAR; INTRAVENOUS EVERY 8 HOURS PRN
Status: DISCONTINUED | OUTPATIENT
Start: 2025-01-01 | End: 2025-01-01 | Stop reason: HOSPADM

## 2025-01-01 RX ORDER — ACETAMINOPHEN 500 MG
1000 TABLET ORAL ONCE AS NEEDED
Status: DISCONTINUED | OUTPATIENT
Start: 2025-01-01 | End: 2025-01-01 | Stop reason: HOSPADM

## 2025-01-01 RX ORDER — DIPHENHYDRAMINE HYDROCHLORIDE 50 MG/ML
50 INJECTION, SOLUTION INTRAMUSCULAR; INTRAVENOUS ONCE AS NEEDED
Status: ACTIVE | OUTPATIENT
Start: 2025-01-01 | End: 2025-01-01

## 2025-01-01 RX ORDER — OXYCODONE AND ACETAMINOPHEN 10; 325 MG/1; MG/1
2 TABLET ORAL EVERY 6 HOURS PRN
Status: DISCONTINUED | OUTPATIENT
Start: 2025-01-01 | End: 2025-01-01

## 2025-01-01 RX ORDER — GADOTERATE MEGLUMINE 376.9 MG/ML
30 INJECTION INTRAVENOUS
Status: COMPLETED | OUTPATIENT
Start: 2025-01-01 | End: 2025-01-01

## 2025-01-01 RX ORDER — HALOPERIDOL 5 MG/ML
1 INJECTION INTRAMUSCULAR
Status: DISCONTINUED | OUTPATIENT
Start: 2025-01-01 | End: 2025-01-01

## 2025-01-01 RX ORDER — DIAZEPAM 10 MG/2ML
7.5 INJECTION, SOLUTION INTRAMUSCULAR; INTRAVENOUS EVERY 4 HOURS
Status: DISCONTINUED | OUTPATIENT
Start: 2025-01-01 | End: 2025-01-01

## 2025-01-01 RX ORDER — BUPROPION HYDROCHLORIDE 150 MG/1
150 TABLET ORAL DAILY
Status: DISCONTINUED | OUTPATIENT
Start: 2025-01-01 | End: 2025-01-01

## 2025-01-01 RX ORDER — KETOROLAC TROMETHAMINE 30 MG/ML
30 INJECTION, SOLUTION INTRAMUSCULAR; INTRAVENOUS EVERY 6 HOURS PRN
Status: DISCONTINUED | OUTPATIENT
Start: 2025-01-01 | End: 2025-01-01

## 2025-01-01 RX ORDER — OXYCODONE HCL 10 MG/1
10 TABLET, FILM COATED, EXTENDED RELEASE ORAL EVERY 12 HOURS
Status: CANCELLED | OUTPATIENT
Start: 2025-01-01

## 2025-01-01 RX ORDER — OLANZAPINE 5 MG/1
5 TABLET, ORALLY DISINTEGRATING ORAL NIGHTLY
Status: CANCELLED | OUTPATIENT
Start: 2025-01-01

## 2025-01-01 RX ORDER — ACETAMINOPHEN 325 MG/1
650 TABLET ORAL ONCE
Status: DISCONTINUED | OUTPATIENT
Start: 2025-01-01 | End: 2025-01-01

## 2025-01-01 RX ORDER — NALOXONE HYDROCHLORIDE 0.4 MG/ML
0.08 INJECTION, SOLUTION INTRAMUSCULAR; INTRAVENOUS; SUBCUTANEOUS AS NEEDED
Status: DISCONTINUED | OUTPATIENT
Start: 2025-01-01 | End: 2025-01-01 | Stop reason: HOSPADM

## 2025-01-01 RX ORDER — METHOCARBAMOL 500 MG/1
500 TABLET, FILM COATED ORAL EVERY 6 HOURS PRN
Status: DISPENSED | OUTPATIENT
Start: 2025-01-01 | End: 2025-01-01

## 2025-01-01 RX ORDER — HYDROMORPHONE HYDROCHLORIDE 1 MG/ML
0.8 INJECTION, SOLUTION INTRAMUSCULAR; INTRAVENOUS; SUBCUTANEOUS EVERY 2 HOUR PRN
Status: DISCONTINUED | OUTPATIENT
Start: 2025-01-01 | End: 2025-01-01

## 2025-01-01 RX ORDER — FENTANYL 50 UG/1
1 PATCH TRANSDERMAL
Status: DISCONTINUED | OUTPATIENT
Start: 2025-01-01 | End: 2025-01-01

## 2025-01-01 RX ORDER — HYDROMORPHONE HYDROCHLORIDE 1 MG/ML
0.6 INJECTION, SOLUTION INTRAMUSCULAR; INTRAVENOUS; SUBCUTANEOUS EVERY 5 MIN PRN
Status: DISCONTINUED | OUTPATIENT
Start: 2025-01-01 | End: 2025-01-01 | Stop reason: HOSPADM

## 2025-01-01 RX ORDER — PROCHLORPERAZINE EDISYLATE 5 MG/ML
10 INJECTION INTRAMUSCULAR; INTRAVENOUS EVERY 6 HOURS PRN
Status: CANCELLED | OUTPATIENT
Start: 2025-01-01

## 2025-01-01 RX ORDER — LORAZEPAM 2 MG/ML
1 INJECTION INTRAMUSCULAR ONCE
Status: COMPLETED | OUTPATIENT
Start: 2025-01-01 | End: 2025-01-01

## 2025-01-01 RX ORDER — AMLODIPINE BESYLATE 5 MG/1
5 TABLET ORAL DAILY
Status: DISCONTINUED | OUTPATIENT
Start: 2025-01-01 | End: 2025-01-01

## 2025-01-01 RX ORDER — LABETALOL HYDROCHLORIDE 5 MG/ML
5 INJECTION, SOLUTION INTRAVENOUS EVERY 5 MIN PRN
Status: DISCONTINUED | OUTPATIENT
Start: 2025-01-01 | End: 2025-01-01 | Stop reason: HOSPADM

## 2025-01-01 RX ORDER — MORPHINE SULFATE 2 MG/ML
2 INJECTION, SOLUTION INTRAMUSCULAR; INTRAVENOUS EVERY 2 HOUR PRN
Status: DISCONTINUED | OUTPATIENT
Start: 2025-01-01 | End: 2025-01-01

## 2025-01-01 RX ORDER — SODIUM CHLORIDE 9 MG/ML
1000 INJECTION, SOLUTION INTRAVENOUS ONCE
Status: DISCONTINUED | OUTPATIENT
Start: 2025-01-01 | End: 2025-01-01 | Stop reason: ALTCHOICE

## 2025-01-01 RX ORDER — HYDRALAZINE HYDROCHLORIDE 20 MG/ML
10 INJECTION INTRAMUSCULAR; INTRAVENOUS EVERY 6 HOURS PRN
Status: DISCONTINUED | OUTPATIENT
Start: 2025-01-01 | End: 2025-01-01

## 2025-01-01 RX ORDER — ACETAMINOPHEN 10 MG/ML
1000 INJECTION, SOLUTION INTRAVENOUS EVERY 6 HOURS PRN
Status: CANCELLED | OUTPATIENT
Start: 2025-01-01

## 2025-01-01 RX ORDER — PROCHLORPERAZINE EDISYLATE 5 MG/ML
5 INJECTION INTRAMUSCULAR; INTRAVENOUS EVERY 8 HOURS PRN
Status: DISCONTINUED | OUTPATIENT
Start: 2025-01-01 | End: 2025-01-01

## 2025-01-01 RX ORDER — HALOPERIDOL 5 MG/ML
4 INJECTION INTRAMUSCULAR
Status: DISCONTINUED | OUTPATIENT
Start: 2025-01-01 | End: 2025-01-01

## 2025-01-02 DIAGNOSIS — E66.812 CLASS 2 OBESITY DUE TO EXCESS CALORIES WITHOUT SERIOUS COMORBIDITY WITH BODY MASS INDEX (BMI) OF 38.0 TO 38.9 IN ADULT: ICD-10-CM

## 2025-01-02 DIAGNOSIS — E66.09 CLASS 2 OBESITY DUE TO EXCESS CALORIES WITHOUT SERIOUS COMORBIDITY WITH BODY MASS INDEX (BMI) OF 38.0 TO 38.9 IN ADULT: ICD-10-CM

## 2025-01-02 DIAGNOSIS — F33.0 MILD EPISODE OF RECURRENT MAJOR DEPRESSIVE DISORDER (HCC): ICD-10-CM

## 2025-01-02 DIAGNOSIS — Z72.0 TOBACCO USE: ICD-10-CM

## 2025-01-02 PROCEDURE — 77387 GUIDANCE FOR RADJ TX DLVR: CPT | Performed by: RADIOLOGY

## 2025-01-02 PROCEDURE — 77412 RADIATION TX DELIVERY LVL 3: CPT | Performed by: RADIOLOGY

## 2025-01-03 PROCEDURE — 77412 RADIATION TX DELIVERY LVL 3: CPT | Performed by: RADIOLOGY

## 2025-01-03 PROCEDURE — 77387 GUIDANCE FOR RADJ TX DLVR: CPT | Performed by: RADIOLOGY

## 2025-01-06 PROCEDURE — 77387 GUIDANCE FOR RADJ TX DLVR: CPT | Performed by: RADIOLOGY

## 2025-01-06 PROCEDURE — 77412 RADIATION TX DELIVERY LVL 3: CPT | Performed by: RADIOLOGY

## 2025-01-07 ENCOUNTER — DOCUMENTATION ONLY (OUTPATIENT)
Dept: RADIATION ONCOLOGY | Facility: HOSPITAL | Age: 50
End: 2025-01-07

## 2025-01-07 ENCOUNTER — HOSPITAL ENCOUNTER (OUTPATIENT)
Dept: RADIATION ONCOLOGY | Facility: HOSPITAL | Age: 50
Discharge: HOME | End: 2025-01-07
Attending: RADIOLOGY
Payer: COMMERCIAL

## 2025-01-07 VITALS
DIASTOLIC BLOOD PRESSURE: 115 MMHG | TEMPERATURE: 98 F | BODY MASS INDEX: 37 KG/M2 | RESPIRATION RATE: 18 BRPM | WEIGHT: 248.19 LBS | SYSTOLIC BLOOD PRESSURE: 165 MMHG | HEART RATE: 91 BPM | OXYGEN SATURATION: 98 %

## 2025-01-07 DIAGNOSIS — C91.10 CLL (CHRONIC LYMPHOCYTIC LEUKEMIA) (HCC): Primary | ICD-10-CM

## 2025-01-07 PROCEDURE — 77387 GUIDANCE FOR RADJ TX DLVR: CPT | Performed by: RADIOLOGY

## 2025-01-07 PROCEDURE — 77336 RADIATION PHYSICS CONSULT: CPT | Performed by: RADIOLOGY

## 2025-01-07 PROCEDURE — 77412 RADIATION TX DELIVERY LVL 3: CPT | Performed by: RADIOLOGY

## 2025-01-07 NOTE — PROGRESS NOTES
[facility] Radiation Treatment Management Note 1-5    Patient:  Lisa Cabral  Age:  49 year old  Visit Diagnosis:  No diagnosis found.  Primary Rad/Onc:  Dr. Keyur Lopez    Site Delivered Dose (cGy) Prescribed Dose (cGy) Fraction #   R ILIAC BONE 2000 2000 5/5   R SHOULDER BONE 2000 2000 5/5     First treatment date:  12/31/24  Concurrent chemotherapy: N/A        11/22/2024    11:03 AM 12/10/2024     1:56 PM 1/7/2025    12:54 PM   Oncology Vitals   Height  5' 9\"    Height  175 cm    Weight  252 lb 6.4 oz 248 lb 3.2 oz   Weight  114.488 kg 112.583 kg   BSA (m2)  2.28 m2 2.26 m2   BMI  37.27 kg/m2 36.65 kg/m2   /84 152/102 165/115   Pulse  77 91   Resp  16 18   Temp  96.8 °F (36 °C) 97.7 °F (36.5 °C)   SpO2  95 % 98 %   Pain Score  4 7        Toxicities:  Fatigue Grade 2= Fatigue not relieved by rest limiting instrumental ADL  Bone pain Grade 1= Mild pain  Gait disturbance Grade 0= None  Muscle weakness Grade 0= None     Nursing Note:  Pt reports she has diarrhea  Has 3-6 episodes of diarrhea a day for past month  Has decreased appetite  States arm pain was better, but is bothering her today  Uses oxycodone-acetaminophen 4x/day   Has back pain- due for med when she gets home    Wt Readings from Last 6 Encounters:   01/07/25 112.6 kg (248 lb 3.2 oz)   12/10/24 114.5 kg (252 lb 6.4 oz)   11/22/24 117.5 kg (259 lb)   08/28/24 116.3 kg (256 lb 6.4 oz)   07/31/24 116.1 kg (256 lb)   07/03/24 117.5 kg (259 lb)       Antonia RAZO RN MD NOTE   Reviewed and agree with RN note above.  Setup imaging reviewed in [facility] and approved.    S:  -rt shoulder not sure if better or not, comes and goes    O:  -no changes    A/P:  -f/u in 3 mo  -leukemic shoulder should feel better by 3-4 weeks post RT      Keyur Lopez MD  Radiation Oncology

## 2025-01-07 NOTE — PATIENT INSTRUCTIONS
- WE WILL CALL TO SCHEDULE YOUR FOLLOW-UP APPOINTMENT WITH DR. LABOY IN 3 MONTHS        - CALL [phone] IF YOU HAVE ANY QUESTIONS/CONCERNS REGARDING RADIATION THERAPY

## 2025-01-08 DIAGNOSIS — C91.10 CLL (CHRONIC LYMPHOCYTIC LEUKEMIA) (HCC): ICD-10-CM

## 2025-01-08 DIAGNOSIS — R11.0 NAUSEA: Primary | ICD-10-CM

## 2025-01-08 RX ORDER — BUPROPION HYDROCHLORIDE 150 MG/1
150 TABLET ORAL DAILY
Qty: 30 TABLET | Refills: 0 | OUTPATIENT
Start: 2025-01-08

## 2025-01-08 RX ORDER — PROCHLORPERAZINE MALEATE 10 MG
10 TABLET ORAL EVERY 6 HOURS PRN
Qty: 30 TABLET | Refills: 3 | Status: SHIPPED | OUTPATIENT
Start: 2025-01-08

## 2025-01-13 NOTE — PROGRESS NOTES
[facility] RADIATION ONCOLOGY  TREATMENT SUMMARY    PATIENT:  Lisa MENARD Cabral    REFERRING MD: ALISON Herring MD  DIAGNOSIS:  CLL    HISTORY:  Dx with CLL in 9/2023. Developed symptomatic R humeral disease after about 1 year of systemic therapy. PET showed uptake in the R humerus, but also in the R axilla and R iliac bone. Referred for RT to all 3 sites.    DOSE DELIVERED:    R humerus + axilla   2000 cGy in 5 fractions   18 MV photons   3DCRT   12/31/2024 to 1/7/2025     R iliac bone   2000 cGy in 5 fractions   18 MV photons   3DCRT   12/31/2024 to 1/7/2025     Concurrent systemic Rx  No  IGRT     Yes    RT was well tolerated. R humeral pain improved somewhat.    PLAN:  F/u 3 mo    Keyur Lopez M.D.  Radiation Oncology

## 2025-01-27 NOTE — PROGRESS NOTES
Cancer Center Progress Note      Patient Name:  Lisa Cabral  YOB: 1975  Medical Record Number:  [id number]    Date of visit: 1/28/2025    CHIEF COMPLAINT: Relapsed CLL    ONCOLOGY HISTORY:    CLL diagnosed 9/23.  Obinutuzumab/venetoclax 10/23-9/24.  Relapsed CLL diagnosed 11/24.  Acalabrutinib 11/24-  RT to right humerus/axilla/right iliac bone 12/24-1/25.    HPI:     49 year old female that I have followed since 9/23 for CLL that was diagnosed after she underwent workup of cervical lymphadenopathy and leukocytosis noted in the ER.  In retrospect, high WBC since 2015.  FISH-deletion 17p with 42% nuclei positive for T p53 gene deletion.  PET 9/23-elevated uptake in bilateral neck, axilla and pelvic LN.  Cervical LN biopsy-CLL/SLL.  CT-lymphadenopathy in mediastinal/hilar region.  Also LN's supraclavicular, axillary, retroperitoneal, iliac areas.      Enrolled in clinical trial  and received 12 months of Obinutuzumab/venetoclax from 10/23-9/24.      In 11/24, she underwent right shoulder MRI for intractable pain.  This showed lymphomatous involvement of the proximal right humerus with extraosseous extension.  PET scan 11/24-worsening uptake involving right proximal humerus, right axillary LN and right iliac bone.  She underwent biopsy of right axillary LN 11/24 and this showed CLL, no evidence of transformation.  Acalabrutinib started 11/24.  Completed RT to right humerus, axilla and right iliac bone 1/25.  Presents for evaluation.    Pain better with RT. Had headache with Acalabrutinib so held and resumed. Then nauseated all the time and diarrhea, fatigue so only taking it once daily. More body ache.     SOCIAL HISTORY:    .  Current smoker. 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  Neurologic:  headache grade 1  Respiratory: no cough, SOB or hemoptysis  Cardiovascular: no chest pain, ankle  swelling, JARAMILLO  GI: Nausea grade 1. Bloating gr 1  Musculoskeletal: Muscle spasms.  Dermatologic: no alopecia, rash, pruritis  : no hematuria, dysuria or frequency  Psych: Depression grade 1.  Heme: no easy bruising or bleeding     ALLERGIES:    Allergies   Allergen Reactions    Aleve [Naproxen] HIVES and RASH    Ultram [Tramadol] NAUSEA AND VOMITING     Severe migraine       MEDICATIONS:    Current Outpatient Medications   Medication Sig Dispense Refill    Acetaminophen-Caffeine (EXCEDRIN TENSION HEADACHE) 500-65 MG Oral Tab Take by mouth.      prochlorperazine (COMPAZINE) 10 mg tablet Take 1 tablet (10 mg total) by mouth every 6 (six) hours as needed for Nausea. 30 tablet 3    Rizatriptan Benzoate 10 MG Oral Tab Take 1 tablet (10 mg total) by mouth as needed for Migraine. may repeat dose after 2 hours. No more than 20 mg in 24 hours. 20 tablet 1    ondansetron (ZOFRAN) 8 MG tablet Take 1 tablet (8 mg total) by mouth every 8 (eight) hours as needed for Nausea. 30 tablet 3    Acalabrutinib Maleate 100 MG Oral Tab Take 100 mg by mouth in the morning and 100 mg before bedtime. (Patient taking differently: Take 100 mg by mouth in the morning and 100 mg before bedtime. Mostly only PM dose .) 60 tablet 6    MAGNESIUM ASPARTATE OR Take by mouth.      buPROPion  MG Oral Tablet 24 Hr Take 1 tablet (150 mg total) by mouth daily. 30 tablet 0    lidocaine 5 % External Patch Place 1 patch onto the skin daily. Hips and low back      morphINE 15 MG Oral Tab Take 1 tablet (15 mg total) by mouth every 4 (four) hours as needed for Pain. 40 tablet 0    ASHWAGANDHA OR Take 1 capsule by mouth daily.      Turmeric (QC TUMERIC COMPLEX OR) Take 1 tablet by mouth 2 (two) times daily.      Cyanocobalamin (VITAMIN B 12 OR) Take 1 tablet by mouth daily. B 12 complex      oxyCODONE-acetaminophen  MG Oral Tab every 8 (eight) hours as needed for Pain.  0    sucralfate 1 g Oral Tab Take 1 tablet (1 g total) by mouth 2 (two) times  daily.  0    omeprazole 20 MG Oral Capsule Delayed Release TK ONE C PO BID 30 MIN B EATING  0    Cholecalciferol (VITAMIN D-3) 5000 units Oral Tab Take 1 tablet (5,000 Units total) by mouth daily.         VITALS:  Height: 175.3 cm (5' 9.02\") (1053)  Weight: 113.2 kg (249 lb 9.6 oz) (1053)  BSA (Calculated - sq m): 2.27 sq meters (1053)  Pulse: 95 (1053)  BP: 154/97 (1053)  Temp: 98 °F (36.7 °C) (1053)  Do Not Use - Resp Rate: --  SpO2: 97 % (1053)    PS:  ECO    PHYSICAL EXAM:    General: alert and oriented x 3, not in acute distress.  HEENT: YASMANI, oropharynx  clear.  Neck: No palpable lymphadenopathy in neck or axillary area.    CVS: S1S2, regular  Rhythm, no murmurs.   Lungs: Clear to auscultation and percussion.  No axillary adenopathy.  Abdomen: No hepato-splenomegaly.  Unable to palpate spleen.  Extremities:  No edema.  CNS: no focal deficit    Emotional well being: Patient's emotional well being was assessed.  No issues requiring acute psychosocial intervention were identified.     LABS:     Recent Results (from the past 24 hours)   CBC With Differential With Platelet    Collection Time: 25 10:42 AM   Result Value Ref Range    WBC 8.6 4.0 - 11.0 x10(3) uL    RBC 4.69 3.80 - 5.30 x10(6)uL    HGB 15.0 12.0 - 16.0 g/dL    HCT 44.6 35.0 - 48.0 %    .0 150.0 - 450.0 10(3)uL    MCV 95.1 80.0 - 100.0 fL    MCH 32.0 26.0 - 34.0 pg    MCHC 33.6 31.0 - 37.0 g/dL    RDW 13.5 %    Neutrophil Absolute Prelim 6.87 1.50 - 7.70 x10 (3) uL    Neutrophil Absolute 6.87 1.50 - 7.70 x10(3) uL    Lymphocyte Absolute 1.11 1.00 - 4.00 x10(3) uL    Monocyte Absolute 0.49 0.10 - 1.00 x10(3) uL    Eosinophil Absolute 0.05 0.00 - 0.70 x10(3) uL    Basophil Absolute 0.04 0.00 - 0.20 x10(3) uL    Immature Granulocyte Absolute 0.03 0.00 - 1.00 x10(3) uL    Neutrophil % 80.0 %    Lymphocyte % 12.9 %    Monocyte % 5.7 %    Eosinophil % 0.6 %    Basophil % 0.5 %    Immature  Granulocyte % 0.3 %   Comp Metabolic Panel (14)    Collection Time: 01/28/25 10:42 AM   Result Value Ref Range    Glucose 115 (H) 70 - 99 mg/dL    Sodium 141 136 - 145 mmol/L    Potassium 3.8 3.5 - 5.1 mmol/L    Chloride 103 98 - 112 mmol/L    CO2 28.0 21.0 - 32.0 mmol/L    Anion Gap 10 0 - 18 mmol/L    BUN 8 (L) 9 - 23 mg/dL    Creatinine 0.86 0.55 - 1.02 mg/dL    Calcium, Total 9.6 8.7 - 10.6 mg/dL    Calculated Osmolality 291 275 - 295 mOsm/kg    eGFR-Cr 82 >=60 mL/min/1.73m2    AST 42 (H) <34 U/L    ALT 55 (H) 10 - 49 U/L    Alkaline Phosphatase 144 (H) 39 - 100 U/L    Bilirubin, Total 0.4 0.3 - 1.2 mg/dL    Total Protein 7.5 5.7 - 8.2 g/dL    Albumin 5.0 (H) 3.2 - 4.8 g/dL    Globulin  2.5 2.0 - 3.5 g/dL    A/G Ratio 2.0 1.0 - 2.0    Patient Fasting for CMP? Patient not present    LDH    Collection Time: 01/28/25 10:42 AM   Result Value Ref Range     120 - 246 U/L   Uric Acid    Collection Time: 01/28/25 10:42 AM   Result Value Ref Range    Uric Acid 4.4 3.1 - 7.8 mg/dL         ASSESSMENT AND PLAN:     # Relapsed CLL: diag 9/23 on peripheral blood flow and biopsy of R cervical LN.  Aponte stage I.   CLL IPI: 7  Cytogenetics-del 17p  IGHV unmutated  Elevated beta-2 microglobulin.    Enrolled in clinical trial .  Received Obinutuzumab/venetoclax from 9/23-10/24.     Relapsed disease diagnosed 11/24.  Acalabrutinib started.  Received palliative RT to right humerus.  Recommend CT scan.  PET scan 4/25.  [facility]  Patient will let us know if she wishes to proceed.    # Medication induced diarrhea: Due to BTKi.  Supportive care.  If CT stable, will consider switching medication.    # Elevated liver enzymes: Due to medication, repeat labs 2 weeks.    ORDERS PLACED:    Return in about 1 month (around 2/28/2025) for Labs 2 weeks. MD Mimi 4 weeks.    Tamica Herring M.D.    [facility]    [facility]  [address]  [place], [address]    1/28/2025

## 2025-01-28 ENCOUNTER — OFFICE VISIT (OUTPATIENT)
Age: 50
End: 2025-01-28
Attending: INTERNAL MEDICINE
Payer: COMMERCIAL

## 2025-01-28 VITALS
WEIGHT: 249.63 LBS | OXYGEN SATURATION: 97 % | HEART RATE: 95 BPM | TEMPERATURE: 98 F | BODY MASS INDEX: 36.97 KG/M2 | HEIGHT: 69.02 IN | DIASTOLIC BLOOD PRESSURE: 97 MMHG | RESPIRATION RATE: 18 BRPM | SYSTOLIC BLOOD PRESSURE: 154 MMHG

## 2025-01-28 DIAGNOSIS — C91.10 CLL (CHRONIC LYMPHOCYTIC LEUKEMIA) (HCC): ICD-10-CM

## 2025-01-28 DIAGNOSIS — R59.0 CERVICAL LYMPHADENOPATHY: Primary | ICD-10-CM

## 2025-01-28 DIAGNOSIS — T88.9XXD: ICD-10-CM

## 2025-01-28 DIAGNOSIS — R59.0 AXILLARY LYMPHADENOPATHY: ICD-10-CM

## 2025-01-28 LAB
ALBUMIN SERPL-MCNC: 5 G/DL (ref 3.2–4.8)
ALBUMIN/GLOB SERPL: 2 {RATIO} (ref 1–2)
ALP LIVER SERPL-CCNC: 144 U/L
ALT SERPL-CCNC: 55 U/L
ANION GAP SERPL CALC-SCNC: 10 MMOL/L (ref 0–18)
AST SERPL-CCNC: 42 U/L (ref ?–34)
BASOPHILS # BLD AUTO: 0.04 X10(3) UL (ref 0–0.2)
BASOPHILS NFR BLD AUTO: 0.5 %
BILIRUB SERPL-MCNC: 0.4 MG/DL (ref 0.3–1.2)
BUN BLD-MCNC: 8 MG/DL (ref 9–23)
CALCIUM BLD-MCNC: 9.6 MG/DL (ref 8.7–10.6)
CHLORIDE SERPL-SCNC: 103 MMOL/L (ref 98–112)
CO2 SERPL-SCNC: 28 MMOL/L (ref 21–32)
CREAT BLD-MCNC: 0.86 MG/DL
EGFRCR SERPLBLD CKD-EPI 2021: 82 ML/MIN/1.73M2 (ref 60–?)
EOSINOPHIL # BLD AUTO: 0.05 X10(3) UL (ref 0–0.7)
EOSINOPHIL NFR BLD AUTO: 0.6 %
ERYTHROCYTE [DISTWIDTH] IN BLOOD BY AUTOMATED COUNT: 13.5 %
GLOBULIN PLAS-MCNC: 2.5 G/DL (ref 2–3.5)
GLUCOSE BLD-MCNC: 115 MG/DL (ref 70–99)
HCT VFR BLD AUTO: 44.6 %
HGB BLD-MCNC: 15 G/DL
IMM GRANULOCYTES # BLD AUTO: 0.03 X10(3) UL (ref 0–1)
IMM GRANULOCYTES NFR BLD: 0.3 %
LDH SERPL L TO P-CCNC: 209 U/L
LYMPHOCYTES # BLD AUTO: 1.11 X10(3) UL (ref 1–4)
LYMPHOCYTES NFR BLD AUTO: 12.9 %
MCH RBC QN AUTO: 32 PG (ref 26–34)
MCHC RBC AUTO-ENTMCNC: 33.6 G/DL (ref 31–37)
MCV RBC AUTO: 95.1 FL
MONOCYTES # BLD AUTO: 0.49 X10(3) UL (ref 0.1–1)
MONOCYTES NFR BLD AUTO: 5.7 %
NEUTROPHILS # BLD AUTO: 6.87 X10 (3) UL (ref 1.5–7.7)
NEUTROPHILS # BLD AUTO: 6.87 X10(3) UL (ref 1.5–7.7)
NEUTROPHILS NFR BLD AUTO: 80 %
OSMOLALITY SERPL CALC.SUM OF ELEC: 291 MOSM/KG (ref 275–295)
PLATELET # BLD AUTO: 165 10(3)UL (ref 150–450)
POTASSIUM SERPL-SCNC: 3.8 MMOL/L (ref 3.5–5.1)
PROT SERPL-MCNC: 7.5 G/DL (ref 5.7–8.2)
RBC # BLD AUTO: 4.69 X10(6)UL
SODIUM SERPL-SCNC: 141 MMOL/L (ref 136–145)
URATE SERPL-MCNC: 4.4 MG/DL
WBC # BLD AUTO: 8.6 X10(3) UL (ref 4–11)

## 2025-01-28 RX ORDER — ACETAMINOPHEN/CAFFEINE 500MG-65MG
TABLET ORAL
COMMUNITY

## 2025-01-28 NOTE — PROGRESS NOTES
Education Record    Learner:  Patient    Disease / Diagnosis: relapsed CLL   Alacabrutinib     Barriers / Limitations:  None   Comments:    Method:  Discussion   Comments:    General Topics:  Plan of care reviewed   Comments:    Outcome:  Shows understanding   Completed XRT to right iliac bone, right humerous/axilla on 1/7/25. Pain in those areas has lessened. .    Cont with her normal spine pain, and worse than normal, some days trouble walking, joint pain, and all over body aching.   Taking acalabrutinib mostly only pm dose, has diarrhea, at times uncontrollable. Imodium did not help.   Cont with migraines, sl less when taking daily vs BID.   Nausea all the time, so tired all the time.   Occ feels SOB.

## 2025-01-30 ENCOUNTER — TELEPHONE (OUTPATIENT)
Age: 50
End: 2025-01-30

## 2025-01-30 DIAGNOSIS — C91.12 CLL (CHRONIC LYMPHOID LEUKEMIA) IN RELAPSE (HCC): ICD-10-CM

## 2025-01-30 NOTE — TELEPHONE ENCOUNTER
Contacted [id number] pharmacy, confirmed electronic pharmacy, head quarters in [place].   Faxed current med list, and last clinical note as requested.   Fax. [phone]

## 2025-02-07 ENCOUNTER — APPOINTMENT (OUTPATIENT)
Age: 50
End: 2025-02-07
Attending: INTERNAL MEDICINE
Payer: COMMERCIAL

## 2025-02-14 ENCOUNTER — HOSPITAL ENCOUNTER (OUTPATIENT)
Dept: CT IMAGING | Facility: HOSPITAL | Age: 50
Discharge: HOME | End: 2025-02-14
Attending: INTERNAL MEDICINE
Payer: COMMERCIAL

## 2025-02-14 DIAGNOSIS — R59.0 AXILLARY LYMPHADENOPATHY: ICD-10-CM

## 2025-02-14 DIAGNOSIS — R59.0 CERVICAL LYMPHADENOPATHY: ICD-10-CM

## 2025-02-14 DIAGNOSIS — C91.10 CLL (CHRONIC LYMPHOCYTIC LEUKEMIA) (HCC): ICD-10-CM

## 2025-02-14 PROCEDURE — 71260 CT THORAX DX C+: CPT | Performed by: INTERNAL MEDICINE

## 2025-02-14 PROCEDURE — 74177 CT ABD & PELVIS W/CONTRAST: CPT | Performed by: INTERNAL MEDICINE

## 2025-02-24 NOTE — PROGRESS NOTES
Cancer Center Progress Note      Patient Name:  Lisa Cabral  YOB: 1975  Medical Record Number:  [id number]    Date of visit: 1/28/2025    CHIEF COMPLAINT: Relapsed CLL    ONCOLOGY HISTORY:    CLL diagnosed 9/23.  Obinutuzumab/venetoclax 10/23-9/24.  Relapsed CLL diagnosed 11/24.  Acalabrutinib 11/24-  RT to right humerus/axilla/right iliac bone 12/24-1/25.    HPI:     49 year old female that I have followed since 9/23 for CLL that was diagnosed after she underwent workup of cervical lymphadenopathy and leukocytosis noted in the ER.  In retrospect, high WBC since 2015.  FISH-deletion 17p with 42% nuclei positive for T p53 gene deletion.  PET 9/23-elevated uptake in bilateral neck, axilla and pelvic LN.  Cervical LN biopsy-CLL/SLL.  CT-lymphadenopathy in mediastinal/hilar region.  Also LN's supraclavicular, axillary, retroperitoneal, iliac areas.      Enrolled in clinical trial  and received 12 months of Obinutuzumab/venetoclax from 10/23-9/24.      In 11/24, she underwent right shoulder MRI for intractable pain.  This showed lymphomatous involvement of the proximal right humerus with extraosseous extension.  PET scan 11/24-worsening uptake involving right proximal humerus, right axillary LN and right iliac bone.  She underwent biopsy of right axillary LN 11/24 and this showed CLL, no evidence of transformation.  Acalabrutinib started 11/24.  Completed RT to right humerus, axilla and right iliac bone 1/25.  Presents for evaluation.    Pain better with RT. Had headache with Acalabrutinib so held and resumed. Then nauseated all the time and diarrhea, fatigue so only taking it once daily. More body ache.     SOCIAL HISTORY:    .  Current smoker. 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  Neurologic:  headache grade 1  Respiratory: no cough, SOB or hemoptysis  Cardiovascular: no chest pain, ankle  swelling, JARAMILLO  GI: Nausea grade 1. Bloating gr 1  Musculoskeletal: Muscle spasms.  Dermatologic: no alopecia, rash, pruritis  : no hematuria, dysuria or frequency  Psych: Depression grade 1.  Heme: no easy bruising or bleeding     ALLERGIES:    Allergies   Allergen Reactions    Aleve [Naproxen] HIVES and RASH    Ultram [Tramadol] NAUSEA AND VOMITING     Severe migraine       MEDICATIONS:    Current Outpatient Medications   Medication Sig Dispense Refill    Acalabrutinib Maleate 100 MG Oral Tab Take 100 mg by mouth in the morning and 100 mg before bedtime. 60 tablet 6    Acetaminophen-Caffeine (EXCEDRIN TENSION HEADACHE) 500-65 MG Oral Tab Take by mouth.      prochlorperazine (COMPAZINE) 10 mg tablet Take 1 tablet (10 mg total) by mouth every 6 (six) hours as needed for Nausea. 30 tablet 3    Rizatriptan Benzoate 10 MG Oral Tab Take 1 tablet (10 mg total) by mouth as needed for Migraine. may repeat dose after 2 hours. No more than 20 mg in 24 hours. 20 tablet 1    ondansetron (ZOFRAN) 8 MG tablet Take 1 tablet (8 mg total) by mouth every 8 (eight) hours as needed for Nausea. 30 tablet 3    MAGNESIUM ASPARTATE OR Take by mouth.      buPROPion  MG Oral Tablet 24 Hr Take 1 tablet (150 mg total) by mouth daily. 30 tablet 0    lidocaine 5 % External Patch Place 1 patch onto the skin daily. Hips and low back      morphINE 15 MG Oral Tab Take 1 tablet (15 mg total) by mouth every 4 (four) hours as needed for Pain. 40 tablet 0    ASHWAGANDHA OR Take 1 capsule by mouth daily.      Turmeric (QC TUMERIC COMPLEX OR) Take 1 tablet by mouth 2 (two) times daily.      Cyanocobalamin (VITAMIN B 12 OR) Take 1 tablet by mouth daily. B 12 complex      oxyCODONE-acetaminophen  MG Oral Tab every 8 (eight) hours as needed for Pain.  0    sucralfate 1 g Oral Tab Take 1 tablet (1 g total) by mouth 2 (two) times daily.  0    omeprazole 20 MG Oral Capsule Delayed Release TK ONE C PO BID 30 MIN B EATING  0    Cholecalciferol  (VITAMIN D-3) 5000 units Oral Tab Take 1 tablet (5,000 Units total) by mouth daily.         VITALS:  Height: --  Weight: --  BSA (Calculated - sq m): --  Pulse: --  BP: --  Temp: --  Do Not Use - Resp Rate: --  SpO2: --    PS:  ECO    PHYSICAL EXAM:    General: alert and oriented x 3, not in acute distress.  HEENT: YASMANI, oropharynx  clear.  Neck: No palpable lymphadenopathy in neck or axillary area.    CVS: S1S2, regular  Rhythm, no murmurs.   Lungs: Clear to auscultation and percussion.  No axillary adenopathy.  Abdomen: No hepato-splenomegaly.  Unable to palpate spleen.  Extremities:  No edema.  CNS: no focal deficit    Emotional well being: Patient's emotional well being was assessed.  No issues requiring acute psychosocial intervention were identified.     LABS:     No results found for this or any previous visit (from the past 24 hours).        ASSESSMENT AND PLAN:     # Relapsed CLL: diag  on peripheral blood flow and biopsy of R cervical LN.  Aponte stage I.   CLL IPI: 7  Cytogenetics-del 17p  IGHV unmutated  Elevated beta-2 microglobulin.    Enrolled in clinical trial .  Received Obinutuzumab/venetoclax from -10/24.     Relapsed disease diagnosed .  Acalabrutinib started.  Received palliative RT to right humerus.  Recommend CT scan.  PET scan .  [facility].  Patient will let us know if she wishes to proceed.    # Medication induced diarrhea: Due to BTKi.  Supportive care.  If CT stable, will consider switching medication.    # Elevated liver enzymes: Due to medication, repeat labs 2 weeks.    ORDERS PLACED:    No follow-ups on file.    Tamica Herring M.D.    [facility]    [facility]  [address] [place] [address] [place], [address]    2025

## 2025-02-25 ENCOUNTER — APPOINTMENT (OUTPATIENT)
Age: 50
End: 2025-02-25
Attending: INTERNAL MEDICINE
Payer: COMMERCIAL

## 2025-03-02 ENCOUNTER — HOSPITAL ENCOUNTER (EMERGENCY)
Facility: HOSPITAL | Age: 50
Discharge: HOME | End: 2025-03-02
Attending: EMERGENCY MEDICINE
Payer: COMMERCIAL

## 2025-03-02 ENCOUNTER — APPOINTMENT (OUTPATIENT)
Dept: CT IMAGING | Facility: HOSPITAL | Age: 50
End: 2025-03-02
Attending: EMERGENCY MEDICINE
Payer: COMMERCIAL

## 2025-03-02 VITALS
TEMPERATURE: 98 F | OXYGEN SATURATION: 96 % | SYSTOLIC BLOOD PRESSURE: 104 MMHG | WEIGHT: 238 LBS | RESPIRATION RATE: 16 BRPM | HEART RATE: 78 BPM | DIASTOLIC BLOOD PRESSURE: 80 MMHG | BODY MASS INDEX: 35 KG/M2

## 2025-03-02 DIAGNOSIS — M62.838 SPASM OF MUSCLE: ICD-10-CM

## 2025-03-02 DIAGNOSIS — M54.2 NECK PAIN: Primary | ICD-10-CM

## 2025-03-02 PROCEDURE — 99284 EMERGENCY DEPT VISIT MOD MDM: CPT

## 2025-03-02 PROCEDURE — 72125 CT NECK SPINE W/O DYE: CPT | Performed by: EMERGENCY MEDICINE

## 2025-03-02 PROCEDURE — 96374 THER/PROPH/DIAG INJ IV PUSH: CPT

## 2025-03-02 PROCEDURE — 96376 TX/PRO/DX INJ SAME DRUG ADON: CPT

## 2025-03-02 PROCEDURE — 96375 TX/PRO/DX INJ NEW DRUG ADDON: CPT

## 2025-03-02 RX ORDER — TIZANIDINE HYDROCHLORIDE 2 MG/1
2 CAPSULE, GELATIN COATED ORAL 3 TIMES DAILY
Qty: 30 CAPSULE | Refills: 0 | Status: SHIPPED | OUTPATIENT
Start: 2025-03-02

## 2025-03-02 RX ORDER — HYDROMORPHONE HYDROCHLORIDE 1 MG/ML
0.5 INJECTION, SOLUTION INTRAMUSCULAR; INTRAVENOUS; SUBCUTANEOUS ONCE
Status: COMPLETED | OUTPATIENT
Start: 2025-03-02 | End: 2025-03-02

## 2025-03-02 RX ORDER — ONDANSETRON 2 MG/ML
4 INJECTION INTRAMUSCULAR; INTRAVENOUS ONCE
Status: COMPLETED | OUTPATIENT
Start: 2025-03-02 | End: 2025-03-02

## 2025-03-02 RX ORDER — MORPHINE SULFATE 4 MG/ML
4 INJECTION, SOLUTION INTRAMUSCULAR; INTRAVENOUS ONCE
Status: COMPLETED | OUTPATIENT
Start: 2025-03-02 | End: 2025-03-02

## 2025-03-02 RX ORDER — PREDNISONE 20 MG/1
40 TABLET ORAL DAILY
Qty: 8 TABLET | Refills: 0 | Status: SHIPPED | OUTPATIENT
Start: 2025-03-02 | End: 2025-03-06

## 2025-03-02 RX ORDER — DEXAMETHASONE SODIUM PHOSPHATE 10 MG/ML
10 INJECTION, SOLUTION INTRAMUSCULAR; INTRAVENOUS ONCE
Status: COMPLETED | OUTPATIENT
Start: 2025-03-02 | End: 2025-03-02

## 2025-03-02 NOTE — ED PROVIDER NOTES
Patient Seen in: [facility] Emergency Department      History     Chief Complaint   Patient presents with    Neck Pain     Stated Complaint: Neck pain, no known injury    Subjective:   HPI      Patient is a 49-year-old woman here for evaluation neck pain.  Started yesterday and worsened.  Involves the left side radiates to her left shoulder.    No falls no trauma.  Does have a history of neck and back pain, she is pain specialist for it.  She has had ablations done for neck pain before and she is overdue for it.  Unfortunately some of her care for her neck and back was delayed because she is diagnosed with CLL  And recently had a relapse.  She completed radiation therapy.    No numbness no weakness    Objective:     Past Medical History:    Anxiety    Cancer (HCC)    CLL    Depression    PONV (postoperative nausea and vomiting)    Visual impairment    reading glasses              Past Surgical History:   Procedure Laterality Date    Hysterectomy      1 ovary in place    Laparoscopic cholecystectomy      Lumbar spine fusion combined      Other      C-Spine fusion and revision                Social History     Socioeconomic History    Marital status:     Number of children: 2   Occupational History    Occupation: on disability   Tobacco Use    Smoking status: Every Day     Current packs/day: 0.50     Types: Cigarettes     Passive exposure: Current    Smokeless tobacco: Never   Vaping Use    Vaping status: Some Days    Substances: Nicotine   Substance and Sexual Activity    Alcohol use: Not Currently    Drug use: Not Currently     Types: Cannabis     Comment: edible prn for neck pain/headache    Sexual activity: Not Currently     Partners: Male     Birth control/protection: Hysterectomy   Social History Narrative    , lives with  and 20 y/o daughter    Has 2 biological daughter and 1 stepdaughter and 2 granddaughters and 1 grandson                  Physical Exam     ED Triage Vitals  [03/02/25 1151]   /85   Pulse 100   Resp 18   Temp 97.8 °F (36.6 °C)   Temp src Temporal   SpO2 94 %   O2 Device None (Room air)       Current Vitals:   Vital Signs  BP: 104/80  Pulse: 78  Resp: 16  Temp: 97.8 °F (36.6 °C)  Temp src: Temporal    Oxygen Therapy  SpO2: 96 %  O2 Device: None (Room air)        Physical Exam  Physical Exam   Constitutional: Awake, alert, well appearing  Head: Normocephalic and atraumatic.   Eyes: Conjunctivae are normal. Pupils are equal, round, and reactive to light.   Neck: Normal range of motion. No JVD  Cardiovascular: Normal rate, regular rhythm  Pulmonary/Chest: Normal effort.  No accessory muscle use.  No cyanosis.  Abdominal: Soft. Not distended.  Neurological: Pt is alert and oriented to person, place, and time. no cranial nerve deficits. Speech fluent      5-5 strength bilateral upper extremities at the hands, biceps flexion, extension shoulder shrug.  Extremities warm well-perfused palpable pulses.  No sensation deficits  No midline C-spine tenderness      ED Course     Labs Reviewed   RAINBOW DRAW LAVENDER   RAINBOW DRAW LIGHT GREEN              Outpatient blood work reviewed.  CBC, CMP done a month ago was fine.    Outpatient PET scan done late November 2024 did not have any uptake at the neck.  CT chest abdomen pelvis done 2/14/2025 showed favorable response    Interviewing outpatient oncology visit, she unfortunately does have bony involvement and has been referred to [place] for second opinion for treatment.      CT SPINE CERVICAL (CPT=72125)    Result Date: 3/2/2025  CONCLUSION:  No acute abnormality in the cervical spine.  Prominent upper mediastinal lymph nodes are likely reactive.  Correlate clinically.   LOCATION:  [facility]   Dictated by (CST): Slim Cruz MD on 3/02/2025 at 1:30 PM     Finalized by (CST): Slim Cruz MD on 3/02/2025 at 1:37 PM          Medications   dexAMETHasone PF (Decadron) 10 MG/ML injection 10 mg (10 mg Intravenous Given  3/2/25 1249)   morphINE PF 4 MG/ML injection 4 mg (4 mg Intravenous Given 3/2/25 1245)   ondansetron (Zofran) 4 MG/2ML injection 4 mg (4 mg Intravenous Given 3/2/25 1245)   HYDROmorphone (Dilaudid) 1 MG/ML injection 0.5 mg (0.5 mg Intravenous Given 3/2/25 1322)   HYDROmorphone (Dilaudid) 1 MG/ML injection 0.5 mg (0.5 mg Intravenous Given 3/2/25 1449)         Patient did feel better afterwards.       MDM          Differential diagnoses considered: Cervical strain, cervical enteropathy, bony malignancy all considered    -Given outpatient imaging and reassuring CT scan today doubt bony involvement of her CLL in the neck.  -No neurological deficits at present    -Aggressive analgesia given here and will send her home with a steroid Dosepak and muscle relaxers    -Needs to touch base with her pain specialist tomorrow, it seems that she is \"overdue\" for an ablation.      I visualized the radiology studies, my independent interpretation: No displaced fractures noted on CT scan of cervical spine      *Comorbidities contributing to the complexity of decision making:  CLL with bony mets  *External charts reviewed:  several as described above  *Additional sources of history: n/a    Shared decision making was done by: patient, myself.          Medical Decision Making      Disposition and Plan     Clinical Impression:  1. Neck pain    2. Spasm of muscle         Disposition:  Discharge  3/2/2025  2:54 pm    Follow-up:  Ashley Cross DO  [address] [address] [phone]    Follow up            Medications Prescribed:  Current Discharge Medication List        START taking these medications    Details   tiZANidine HCl 2 MG Oral Cap Take 1 capsule (2 mg total) by mouth 3 (three) times daily.  Qty: 30 capsule, Refills: 0      predniSONE 20 MG Oral Tab Take 2 tablets (40 mg total) by mouth daily for 4 days.  Qty: 8 tablet, Refills: 0                 Supplementary Documentation:

## 2025-03-02 NOTE — ED INITIAL ASSESSMENT (HPI)
Pt presenting to the ED c/o neck pain. Pain started yesterday and has been increasing. Started on the lower left side of her head and radiates down her neck. Pt denies injury, states pain came on spontaneously. Endorses some nausea

## 2025-03-04 ENCOUNTER — RESEARCH ENCOUNTER (OUTPATIENT)
Age: 50
End: 2025-03-04

## 2025-03-04 NOTE — PROGRESS NOTES
Phone Call Documentation    Patent called and LVM on research RN line asking for a call back. Called patient back but reached voicemail. LVMTCB.

## 2025-03-05 ENCOUNTER — HOSPITAL ENCOUNTER (EMERGENCY)
Facility: HOSPITAL | Age: 50
Discharge: HOME | End: 2025-03-06
Attending: EMERGENCY MEDICINE
Payer: COMMERCIAL

## 2025-03-05 DIAGNOSIS — M54.2 NECK PAIN: Primary | ICD-10-CM

## 2025-03-05 DIAGNOSIS — M62.838 SPASM OF MUSCLE: ICD-10-CM

## 2025-03-05 PROCEDURE — 99284 EMERGENCY DEPT VISIT MOD MDM: CPT

## 2025-03-05 PROCEDURE — 99285 EMERGENCY DEPT VISIT HI MDM: CPT

## 2025-03-05 PROCEDURE — 93010 ELECTROCARDIOGRAM REPORT: CPT

## 2025-03-05 RX ORDER — OXYCODONE HYDROCHLORIDE 10 MG/1
10 TABLET, FILM COATED, EXTENDED RELEASE ORAL 2 TIMES DAILY PRN
Status: ON HOLD | COMMUNITY
Start: 2025-03-03

## 2025-03-05 RX ORDER — METHOCARBAMOL 750 MG/1
TABLET, FILM COATED ORAL
Status: ON HOLD | COMMUNITY
Start: 2025-03-03

## 2025-03-06 ENCOUNTER — HOSPITAL ENCOUNTER (EMERGENCY)
Facility: HOSPITAL | Age: 50
Discharge: HOME | End: 2025-03-07
Attending: EMERGENCY MEDICINE
Payer: COMMERCIAL

## 2025-03-06 VITALS
DIASTOLIC BLOOD PRESSURE: 73 MMHG | SYSTOLIC BLOOD PRESSURE: 119 MMHG | RESPIRATION RATE: 18 BRPM | HEART RATE: 85 BPM | OXYGEN SATURATION: 95 % | TEMPERATURE: 98 F

## 2025-03-06 DIAGNOSIS — M43.6 TORTICOLLIS: Primary | ICD-10-CM

## 2025-03-06 DIAGNOSIS — M62.830 SPASM OF BACK MUSCLES: ICD-10-CM

## 2025-03-06 LAB
ALBUMIN SERPL-MCNC: 4.9 G/DL (ref 3.2–4.8)
ALBUMIN/GLOB SERPL: 2 {RATIO} (ref 1–2)
ALP LIVER SERPL-CCNC: 231 U/L
ALT SERPL-CCNC: 41 U/L
ANION GAP SERPL CALC-SCNC: 9 MMOL/L (ref 0–18)
AST SERPL-CCNC: 19 U/L (ref ?–34)
ATRIAL RATE: 77 BPM
BASOPHILS # BLD AUTO: 0.03 X10(3) UL (ref 0–0.2)
BASOPHILS NFR BLD AUTO: 0.3 %
BILIRUB SERPL-MCNC: 0.3 MG/DL (ref 0.3–1.2)
BUN BLD-MCNC: 13 MG/DL (ref 9–23)
CALCIUM BLD-MCNC: 9.5 MG/DL (ref 8.7–10.6)
CHLORIDE SERPL-SCNC: 102 MMOL/L (ref 98–112)
CO2 SERPL-SCNC: 29 MMOL/L (ref 21–32)
CREAT BLD-MCNC: 0.9 MG/DL
EGFRCR SERPLBLD CKD-EPI 2021: 78 ML/MIN/1.73M2 (ref 60–?)
EOSINOPHIL # BLD AUTO: 0.01 X10(3) UL (ref 0–0.7)
EOSINOPHIL NFR BLD AUTO: 0.1 %
ERYTHROCYTE [DISTWIDTH] IN BLOOD BY AUTOMATED COUNT: 12.7 %
GLOBULIN PLAS-MCNC: 2.5 G/DL (ref 2–3.5)
GLUCOSE BLD-MCNC: 108 MG/DL (ref 70–99)
HCT VFR BLD AUTO: 42.9 %
HGB BLD-MCNC: 14.8 G/DL
IMM GRANULOCYTES # BLD AUTO: 0.19 X10(3) UL (ref 0–1)
IMM GRANULOCYTES NFR BLD: 1.6 %
LYMPHOCYTES # BLD AUTO: 1.08 X10(3) UL (ref 1–4)
LYMPHOCYTES NFR BLD AUTO: 9.2 %
MCH RBC QN AUTO: 32 PG (ref 26–34)
MCHC RBC AUTO-ENTMCNC: 34.5 G/DL (ref 31–37)
MCV RBC AUTO: 92.7 FL
MONOCYTES # BLD AUTO: 0.86 X10(3) UL (ref 0.1–1)
MONOCYTES NFR BLD AUTO: 7.3 %
NEUTROPHILS # BLD AUTO: 9.55 X10 (3) UL (ref 1.5–7.7)
NEUTROPHILS # BLD AUTO: 9.55 X10(3) UL (ref 1.5–7.7)
NEUTROPHILS NFR BLD AUTO: 81.5 %
OSMOLALITY SERPL CALC.SUM OF ELEC: 291 MOSM/KG (ref 275–295)
P AXIS: 47 DEGREES
P-R INTERVAL: 146 MS
PLATELET # BLD AUTO: 142 10(3)UL (ref 150–450)
POTASSIUM SERPL-SCNC: 3.6 MMOL/L (ref 3.5–5.1)
PROT SERPL-MCNC: 7.4 G/DL (ref 5.7–8.2)
Q-T INTERVAL: 380 MS
QRS DURATION: 86 MS
QTC CALCULATION (BEZET): 430 MS
R AXIS: -4 DEGREES
RBC # BLD AUTO: 4.63 X10(6)UL
SODIUM SERPL-SCNC: 140 MMOL/L (ref 136–145)
T AXIS: 18 DEGREES
VENTRICULAR RATE: 77 BPM
WBC # BLD AUTO: 11.7 X10(3) UL (ref 4–11)

## 2025-03-06 PROCEDURE — 96375 TX/PRO/DX INJ NEW DRUG ADDON: CPT

## 2025-03-06 PROCEDURE — 85025 COMPLETE CBC W/AUTO DIFF WBC: CPT | Performed by: EMERGENCY MEDICINE

## 2025-03-06 PROCEDURE — 96374 THER/PROPH/DIAG INJ IV PUSH: CPT

## 2025-03-06 PROCEDURE — 99284 EMERGENCY DEPT VISIT MOD MDM: CPT

## 2025-03-06 PROCEDURE — 96376 TX/PRO/DX INJ SAME DRUG ADON: CPT

## 2025-03-06 PROCEDURE — 93005 ELECTROCARDIOGRAM TRACING: CPT

## 2025-03-06 PROCEDURE — 80053 COMPREHEN METABOLIC PANEL: CPT | Performed by: EMERGENCY MEDICINE

## 2025-03-06 PROCEDURE — 96361 HYDRATE IV INFUSION ADD-ON: CPT

## 2025-03-06 RX ORDER — HYDROMORPHONE HYDROCHLORIDE 1 MG/ML
INJECTION, SOLUTION INTRAMUSCULAR; INTRAVENOUS; SUBCUTANEOUS EVERY 30 MIN PRN
Status: DISCONTINUED | OUTPATIENT
Start: 2025-03-06 | End: 2025-03-06

## 2025-03-06 RX ORDER — ONDANSETRON 2 MG/ML
4 INJECTION INTRAMUSCULAR; INTRAVENOUS ONCE
Status: COMPLETED | OUTPATIENT
Start: 2025-03-06 | End: 2025-03-06

## 2025-03-06 RX ORDER — DEXAMETHASONE SODIUM PHOSPHATE 10 MG/ML
10 INJECTION, SOLUTION INTRAMUSCULAR; INTRAVENOUS ONCE
Status: COMPLETED | OUTPATIENT
Start: 2025-03-06 | End: 2025-03-06

## 2025-03-06 RX ORDER — OXYCODONE AND ACETAMINOPHEN 5; 325 MG/1; MG/1
2 TABLET ORAL EVERY 4 HOURS PRN
Status: DISCONTINUED | OUTPATIENT
Start: 2025-03-06 | End: 2025-03-06

## 2025-03-06 RX ORDER — ACETAMINOPHEN 500 MG
1000 TABLET ORAL ONCE
Status: COMPLETED | OUTPATIENT
Start: 2025-03-06 | End: 2025-03-06

## 2025-03-06 RX ORDER — DIAZEPAM 10 MG/2ML
2.5 INJECTION, SOLUTION INTRAMUSCULAR; INTRAVENOUS ONCE
Status: COMPLETED | OUTPATIENT
Start: 2025-03-06 | End: 2025-03-06

## 2025-03-06 RX ORDER — DIAZEPAM 10 MG/2ML
INJECTION, SOLUTION INTRAMUSCULAR; INTRAVENOUS EVERY 30 MIN PRN
Status: DISCONTINUED | OUTPATIENT
Start: 2025-03-06 | End: 2025-03-07

## 2025-03-06 RX ORDER — DIAZEPAM 10 MG/2ML
2.5 INJECTION, SOLUTION INTRAMUSCULAR; INTRAVENOUS ONCE
Status: DISCONTINUED | OUTPATIENT
Start: 2025-03-06 | End: 2025-03-06

## 2025-03-06 RX ORDER — ONDANSETRON 2 MG/ML
4 INJECTION INTRAMUSCULAR; INTRAVENOUS ONCE
Status: DISCONTINUED | OUTPATIENT
Start: 2025-03-06 | End: 2025-03-07

## 2025-03-06 RX ORDER — OXYCODONE HCL 10 MG/1
10 TABLET, FILM COATED, EXTENDED RELEASE ORAL ONCE
Status: COMPLETED | OUTPATIENT
Start: 2025-03-06 | End: 2025-03-06

## 2025-03-06 RX ORDER — HYDROMORPHONE HYDROCHLORIDE 1 MG/ML
INJECTION, SOLUTION INTRAMUSCULAR; INTRAVENOUS; SUBCUTANEOUS EVERY 30 MIN PRN
Status: DISCONTINUED | OUTPATIENT
Start: 2025-03-06 | End: 2025-03-07

## 2025-03-06 RX ORDER — GABAPENTIN 300 MG/1
300 CAPSULE ORAL ONCE
Status: DISCONTINUED | OUTPATIENT
Start: 2025-03-06 | End: 2025-03-07

## 2025-03-06 RX ORDER — METOCLOPRAMIDE HYDROCHLORIDE 5 MG/ML
10 INJECTION INTRAMUSCULAR; INTRAVENOUS ONCE
Status: COMPLETED | OUTPATIENT
Start: 2025-03-06 | End: 2025-03-06

## 2025-03-06 RX ORDER — HYDROMORPHONE HYDROCHLORIDE 1 MG/ML
1 INJECTION, SOLUTION INTRAMUSCULAR; INTRAVENOUS; SUBCUTANEOUS ONCE
Status: COMPLETED | OUTPATIENT
Start: 2025-03-06 | End: 2025-03-06

## 2025-03-06 RX ORDER — HYDROMORPHONE HYDROCHLORIDE 1 MG/ML
0.5 INJECTION, SOLUTION INTRAMUSCULAR; INTRAVENOUS; SUBCUTANEOUS ONCE
Status: COMPLETED | OUTPATIENT
Start: 2025-03-06 | End: 2025-03-06

## 2025-03-06 RX ORDER — ORPHENADRINE CITRATE 30 MG/ML
30 INJECTION INTRAMUSCULAR; INTRAVENOUS EVERY 12 HOURS
Status: DISCONTINUED | OUTPATIENT
Start: 2025-03-06 | End: 2025-03-06

## 2025-03-06 NOTE — ED INITIAL ASSESSMENT (HPI)
Patient here for neck pain that starting getting worse over the day. Was here for the same Sunday. Wearing a neck brace to help with the \"zapping\" pain that she'll get if she doesn't wear it. Last dose of oxy ar 0900 which she state didn't do anything for the pain.

## 2025-03-06 NOTE — ED PROVIDER NOTES
Patient Seen in: [facility] Emergency Department      History     Chief Complaint   Patient presents with    Pain     Stated Complaint: pain- was here sunday for same    Subjective:   HPI      Patient is a 49-year-old female presenting to the ED with neck pain.  The patient was recently seen in the ED for similar complaint.  She is currently wearing a [place] J collar as she states that she has it from previous surgery.  She reports that she feels this makes her more comfortable.  She is followed by pain management for chronic \"spine pain.\"  This can involve the neck as well as lower back.  Patient states that this is a Workmen's Comp. issue and she was post be scheduled for an ablation which typically improves her pain however this was not yet approved.  She was recently seen in the ED and followed up with her pain management specialist who has put her on OxyContin as well as immediate release Percocet for breakthrough pain.  The patient also has a history of CLL.  She has no weakness or loss of sensation.  No bowel or bladder incontinence.  Symptoms can be slightly more left-sided, occasionally radiating down the left arm.  She had a recent CT scan performed on her last ED visit.  No injury or trauma.  Pain is severe, constant, worse with movement.  This has been going on for a couple of weeks.  No fevers or chills.    Objective:     Past Medical History:    Anxiety    Cancer (HCC)    CLL    Depression    PONV (postoperative nausea and vomiting)    Visual impairment    reading glasses              Past Surgical History:   Procedure Laterality Date    Hysterectomy      1 ovary in place    Laparoscopic cholecystectomy      Lumbar spine fusion combined      Other      C-Spine fusion and revision                Social History     Socioeconomic History    Marital status:     Number of children: 2   Occupational History    Occupation: on disability   Tobacco Use    Smoking status: Every Day     Current  packs/day: 1.00     Types: Cigarettes     Passive exposure: Current    Smokeless tobacco: Never   Vaping Use    Vaping status: Some Days    Substances: Nicotine   Substance and Sexual Activity    Alcohol use: Not Currently    Drug use: Not Currently     Types: Cannabis     Comment: edible prn for neck pain/headache    Sexual activity: Not Currently     Partners: Male     Birth control/protection: Hysterectomy   Social History Narrative    , lives with  and 20 y/o daughter    Has 2 biological daughter and 1 stepdaughter and 2 granddaughters and 1 grandson                  Physical Exam     ED Triage Vitals [03/05/25 2019]   BP (!) 182/124   Pulse 97   Resp 18   Temp 98.4 °F (36.9 °C)   Temp src Oral   SpO2 93 %   O2 Device None (Room air)       Current Vitals:   Vital Signs  BP: 119/73  Pulse: 85  Resp: 18  Temp: 98.4 °F (36.9 °C)  Temp src: Oral  MAP (mmHg): 86    Oxygen Therapy  SpO2: 95 %  O2 Device: None (Room air)  O2 Flow Rate (L/min): 2 L/min        Physical Exam  Vitals and nursing note reviewed.   Constitutional:       General: She is not in acute distress.     Appearance: Normal appearance. She is well-developed. She is not ill-appearing or toxic-appearing.      Comments: Patient appears anxious   HENT:      Head: Normocephalic and atraumatic.      Right Ear: External ear normal.      Left Ear: External ear normal.      Mouth/Throat:      Mouth: Mucous membranes are moist.      Pharynx: Oropharynx is clear.   Eyes:      Conjunctiva/sclera: Conjunctivae normal.   Neck:      Comments: [place] J collar was removed for exam.  The patient does have diffuse tenderness midline as well as mostly left paraspinal with palpable spasm.  Decreased range of motion due to pain  Cardiovascular:      Rate and Rhythm: Normal rate and regular rhythm.      Heart sounds: Normal heart sounds.   Pulmonary:      Effort: Pulmonary effort is normal.      Breath sounds: Normal breath sounds.   Abdominal:      General:  Abdomen is flat. Bowel sounds are normal. There is no distension.      Tenderness: There is no abdominal tenderness.   Musculoskeletal:      Right lower leg: No edema.      Left lower leg: No edema.   Skin:     General: Skin is warm.      Capillary Refill: Capillary refill takes less than 2 seconds.      Findings: No rash.   Neurological:      General: No focal deficit present.      Mental Status: She is alert and oriented to person, place, and time.      Sensory: No sensory deficit.      Motor: No weakness.             ED Course     Labs Reviewed   COMP METABOLIC PANEL (14) - Abnormal; Notable for the following components:       Result Value    Glucose 108 (*)     Alkaline Phosphatase 231 (*)     Albumin 4.9 (*)     All other components within normal limits   CBC WITH DIFFERENTIAL WITH PLATELET - Abnormal; Notable for the following components:    WBC 11.7 (*)     .0 (*)     Neutrophil Absolute Prelim 9.55 (*)     Neutrophil Absolute 9.55 (*)     All other components within normal limits     EKG    Rate, intervals and axes as noted on EKG Report.  Rate: 77  Rhythm: Sinus Rhythm  Reading: Normal                       MDM      History obtained from patient.     Differential diagnosis includes cervical muscle spasm, cervical radiculopathy, disc herniation, no injury or trauma to suggest fracture with recent CT scan performed as well.  There are no red flags present at this time.  No fevers or chills to suggest infectious etiology.    Previous records reviewed.  Patient has NSAID allergy.  She was recently given steroids and previous ED visit.  She was also given morphine and Dilaudid.  The patient states she is currently taking a \"muscle relaxant\" at home in addition to her Percocet and OxyContin.  CT results were also reviewed from recent visit.    CT SPINE CERVICAL (CPT=72125)    Result Date: 3/2/2025  PROCEDURE:  CT SPINE CERVICAL (CPT=72125)  COMPARISON:  [facility] , CT, CT CHEST+ABDOMEN+PELVIS(ALL CNTRST  ONLY)(AGD=[id number]), 2/14/2025, 8:51 AM.  INDICATIONS:  Neck pain, no known injury  TECHNIQUE:  Noncontrast CT scanning of the cervical spine is performed from the skull base through C7.  Multiplanar reconstructions are generated.  Dose reduction techniques were used. Dose information is transmitted to the ACR (American College of Radiology) NRDR (National Radiology Data Registry) which includes the Dose Index Registry.  PATIENT STATED HISTORY: (As transcribed by Technologist)  Patient states neck pain, no injury.    FINDINGS:  Cervical spine demonstrates normal vertebral body height and alignment.  Anterior discectomy with fusion changes from C3 through C6 are noted.  No evidence of hardware failure.  No acute fractures are identified.  Mild multilevel degenerative changes are present.  The spinal canal is patent.  Soft tissues of the neck demonstrate a right thyroid nodule measuring 1.1 x 0.9 cm.  The lungs  are grossly unremarkable.  Prominent upper mediastinal lymph nodes are noted.            CONCLUSION:  No acute abnormality in the cervical spine.  Prominent upper mediastinal lymph nodes are likely reactive.  Correlate clinically.   LOCATION:  [facility]   Dictated by (CST): Slim Cruz MD on 3/02/2025 at 1:30 PM     Finalized by (CST): Slim Cruz MD on 3/02/2025 at 1:37 PM           Testing considered and ordered includes CBC and CMP.  Labs were not obtained on previous visit.  IV was established for pain management.    I reviewed all results.  CBC reviewed with WBC of 11.7, platelet count of 142, slight neutrophilic shift.  This could be due to recent IV steroid provided in ED as patient has not had any infectious symptoms.  CMP unremarkable other than alkaline phosphatase 231.      Interventions in care included pain management with Dilaudid, additional dose of Decadron, as well as Norflex.  Patient did have nausea, Zofran was given.  Ultimately she was given her extended release oxycodone in addition  to Percocet which is her prescribed outpatient medication.  Her pain did improve.  We discussed contacting her oncologist as well as pain management physician this morning for close outpatient follow-up as well as further discussion regarding patient's pain management and consideration of additional outpatient testing and imaging such as MRI.  Return to ED immediately if any red flags develop or if symptoms worsen or new symptoms develop.  If patient does develop any symptoms such as weakness, loss of sensation, bowel or bladder incontinence then patient would require emergent MRI testing.  Patient is hoping that she can be approved for her ablation in the near future she states that she believes this will help her symptoms.  Comfortable discharge plan.              Medical Decision Making      Disposition and Plan     Clinical Impression:  1. Neck pain    2. Spasm of muscle         Disposition:  Discharge  3/6/2025  3:25 am    Follow-up:  Ashley Cross DO  [address] [place] [id number]  [phone]    Schedule an appointment as soon as possible for a visit in 2 day(s)      [facility] Emergency Department  [address] [facility] [address]  [phone]  Follow up  IF SYMPTOMS WORSEN, PERSIST, OR NEW SYMPTOMS Idris Moeller  [address] [id number] [phone]    Schedule an appointment as soon as possible for a visit in 1 day(s)            Medications Prescribed:  Discharge Medication List as of 3/6/2025  3:56 AM              Supplementary Documentation:

## 2025-03-06 NOTE — ED NOTES
Pt left immediately after medication administration and without discharge paperwork. Pt stated, \"its just going to say what it said on Sunday, to follow up with my doctor.\"    Pt's  to drive patient home

## 2025-03-07 ENCOUNTER — OFFICE VISIT (OUTPATIENT)
Age: 50
End: 2025-03-07
Attending: INTERNAL MEDICINE
Payer: COMMERCIAL

## 2025-03-07 VITALS
HEIGHT: 69 IN | SYSTOLIC BLOOD PRESSURE: 124 MMHG | OXYGEN SATURATION: 93 % | HEART RATE: 80 BPM | WEIGHT: 238 LBS | RESPIRATION RATE: 18 BRPM | DIASTOLIC BLOOD PRESSURE: 83 MMHG | BODY MASS INDEX: 35.25 KG/M2 | TEMPERATURE: 97 F

## 2025-03-07 VITALS
SYSTOLIC BLOOD PRESSURE: 149 MMHG | TEMPERATURE: 98 F | OXYGEN SATURATION: 95 % | DIASTOLIC BLOOD PRESSURE: 98 MMHG | RESPIRATION RATE: 18 BRPM | HEART RATE: 113 BPM

## 2025-03-07 DIAGNOSIS — C91.10 CLL (CHRONIC LYMPHOCYTIC LEUKEMIA) (HCC): Primary | ICD-10-CM

## 2025-03-07 DIAGNOSIS — T88.9XXD: ICD-10-CM

## 2025-03-07 DIAGNOSIS — R59.0 CERVICAL LYMPHADENOPATHY: ICD-10-CM

## 2025-03-07 PROCEDURE — 96376 TX/PRO/DX INJ SAME DRUG ADON: CPT

## 2025-03-07 RX ORDER — DIAZEPAM 5 MG/1
5 TABLET ORAL ONCE
Status: COMPLETED | OUTPATIENT
Start: 2025-03-07 | End: 2025-03-07

## 2025-03-07 RX ORDER — DIAZEPAM 5 MG/1
5 TABLET ORAL EVERY 8 HOURS PRN
Qty: 20 TABLET | Refills: 0 | Status: ON HOLD | OUTPATIENT
Start: 2025-03-07 | End: 2025-03-14

## 2025-03-07 NOTE — PROGRESS NOTES
Education Record    Learner:  Patient    Disease / Diagnosis:CLL     Barriers / Limitations:  None   Comments:    Method:  Discussion   Comments:    General Topics:  Plan of care reviewed   Comments:    Outcome:  Shows unde  Pt with multiple ED visits for neck pain. Reports it is a \"throb then aftershock\" to neck. unbearable  Order for MRI is in the chart, needs to be scheduled.   Absolutely miserable with HA, neck pain today pain in back and hips. Meds were adjusted by ED and her pain doctor.  Emotional support offered.    Off the acalabrutinib  Had consultation with Dr Vasquez in the [facility].

## 2025-03-07 NOTE — DISCHARGE INSTRUCTIONS
No heavy lifting, pushing, pulling, twisting motions.  No lifting more than 5-10 pounds at a time.  Slow gentle motions.  Gentle massage and stretching of the affected areas.  Warm pack, heating pad, or thermacare to sore areas.  Drink lots of water.  Eat regular meals especially with your medications.  Use the medications at home as prescribed.  Okay to take the oxycodone every 5 hours for the next few days.  Please remember narcotics and Valium can both make you lightheaded.  Try to space these out by a few hours.  No driving dangerous activity or important decisions after taking these.  Return for any problems

## 2025-03-07 NOTE — PROGRESS NOTES
Center Progress Note      Patient Name:  Lisa Cabral  YOB: 1975  Medical Record Number:  [id number]    Date of visit: 3/7/2025    CHIEF COMPLAINT: Relapsed CLL    ONCOLOGY HISTORY:    CLL diagnosed 9/23.  Obinutuzumab/venetoclax 10/23-9/24.  Relapsed CLL diagnosed 11/24.  Acalabrutinib 11/24-3/25  RT to right humerus/axilla/right iliac bone 12/24-1/25.    HPI:     49 year old female that I have followed since 9/23 for CLL that was diagnosed after she underwent workup of cervical lymphadenopathy and leukocytosis noted in the ER.  In retrospect, high WBC since 2015.  FISH-deletion 17p with 42% nuclei positive for T p53 gene deletion.  PET 9/23-elevated uptake in bilateral neck, axilla and pelvic LN.  Cervical LN biopsy-CLL/SLL.  CT-lymphadenopathy in mediastinal/hilar region.  Also LN's supraclavicular, axillary, retroperitoneal, iliac areas.      Enrolled in clinical trial  and received 12 months of Obinutuzumab/venetoclax from 10/23-9/24.      In 11/24, she underwent right shoulder MRI for intractable pain.  This showed lymphomatous involvement of the proximal right humerus with extraosseous extension.  PET scan 11/24-worsening uptake involving right proximal humerus, right axillary LN and right iliac bone.  She underwent biopsy of right axillary LN 11/24 and this showed CLL, no evidence of transformation.  Acalabrutinib started 11/24.  Completed RT to right humerus, axilla and right iliac bone 1/25.      Acalabrutinib started 11/24.  Went to [facility] for second opinion and medication has been on hold 3/25.    Has been struggling with severe headache, neck pain, electrical shooting pain down the arm.  Does not feel any lymphadenopathy.  2 ER visits for the same.  Has been working with her pain specialist to adjust narcotics.    SOCIAL HISTORY:    .  Current smoker. 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  2 daughters 25, 21    ROS:     Constitutional:  Fatigue grade 2.  Neurologic:  headache grade 1  Respiratory: no cough, SOB or hemoptysis  Cardiovascular: no chest pain, ankle swelling, JARAMILLO  GI: Nausea grade 1. Bloating gr 1  Musculoskeletal: Muscle spasms.  Dermatologic: no alopecia, rash, pruritis  : no hematuria, dysuria or frequency  Psych: Depression grade 1.  Heme: no easy bruising or bleeding     ALLERGIES:    Allergies   Allergen Reactions    Aleve [Naproxen] HIVES and RASH    Ultram [Tramadol] NAUSEA AND VOMITING     Severe migraine       MEDICATIONS:    Current Outpatient Medications   Medication Sig Dispense Refill    diazePAM 5 MG Oral Tab Take 1 tablet (5 mg total) by mouth every 8 (eight) hours as needed (muscle spasm). 20 tablet 0    OXYCONTIN 10 MG Oral Tablet Extended Release 12 hour Abuse-Deterrent Take 1 tablet (10 mg total) by mouth 2 (two) times daily as needed for Pain.      methocarbamol 750 MG Oral Tab TAKE 1 TABLET BY MOUTH DAILY AT NIGHTTIME      tiZANidine HCl 2 MG Oral Cap Take 1 capsule (2 mg total) by mouth 3 (three) times daily. 30 capsule 0    Acalabrutinib Maleate 100 MG Oral Tab Take 100 mg by mouth in the morning and 100 mg before bedtime. (Patient not taking: Reported on 3/7/2025) 60 tablet 6    Acetaminophen-Caffeine (EXCEDRIN TENSION HEADACHE) 500-65 MG Oral Tab Take by mouth.      prochlorperazine (COMPAZINE) 10 mg tablet Take 1 tablet (10 mg total) by mouth every 6 (six) hours as needed for Nausea. 30 tablet 3    Rizatriptan Benzoate 10 MG Oral Tab Take 1 tablet (10 mg total) by mouth as needed for Migraine. may repeat dose after 2 hours. No more than 20 mg in 24 hours. 20 tablet 1    ondansetron (ZOFRAN) 8 MG tablet Take 1 tablet (8 mg total) by mouth every 8 (eight) hours as needed for Nausea. 30 tablet 3    MAGNESIUM ASPARTATE OR Take by mouth.      buPROPion  MG Oral Tablet 24 Hr Take 1 tablet (150 mg total) by mouth daily. 30 tablet 0    lidocaine 5 % External Patch Place 1 patch onto the skin daily. Hips and  low back      morphINE 15 MG Oral Tab Take 1 tablet (15 mg total) by mouth every 4 (four) hours as needed for Pain. 40 tablet 0    ASHWAGANDHA OR Take 1 capsule by mouth daily.      Turmeric (QC TUMERIC COMPLEX OR) Take 1 tablet by mouth 2 (two) times daily.      Cyanocobalamin (VITAMIN B 12 OR) Take 1 tablet by mouth daily. B 12 complex      oxyCODONE-acetaminophen  MG Oral Tab every 8 (eight) hours as needed for Pain.  0    sucralfate 1 g Oral Tab Take 1 tablet (1 g total) by mouth 2 (two) times daily.  0    omeprazole 20 MG Oral Capsule Delayed Release TK ONE C PO BID 30 MIN B EATING  0    Cholecalciferol (VITAMIN D-3) 5000 units Oral Tab Take 1 tablet (5,000 Units total) by mouth daily.         VITALS:  Height: 175.3 cm (5' 9\") (2019)  Weight: 108 kg (238 lb) (2019)  BSA (Calculated - sq m): 2.22 sq meters (2019)  Pulse: 113 ( 1102)  BP: 149/98 ( 110)  Temp: 98 °F (36.7 °C) (1102)  Do Not Use - Resp Rate: --  SpO2: 95 % (1102)    PS:  ECO    PHYSICAL EXAM:    General: alert and oriented x 3, wearing c-collar.  HEENT: YASMANI, oropharynx  clear.  Neck: Did not palpate.  CVS: S1S2, regular  Rhythm, no murmurs.   Lungs: Clear to auscultation and percussion.  No axillary adenopathy.  Abdomen:  Extremities:  No edema.  CNS: no focal deficit    Emotional well being: Patient's emotional well being was assessed.  No issues requiring acute psychosocial intervention were identified.     LABS:     No results found for this or any previous visit (from the past 24 hours).      ASSESSMENT AND PLAN:     # Relapsed CLL: diag  on peripheral blood flow and biopsy of R cervical LN.  Aponte stage I.   CLL IPI: 7  Cytogenetics-del 17p  IGHV unmutated  Elevated beta-2 microglobulin.    Enrolled in clinical trial .  Received Obinutuzumab/venetoclax from -10/24.     Relapsed disease diagnosed .  Acalabrutinib started .  Received palliative RT to right humerus.      CT  C/A/P 2/25 does not show any active disease.  CT neck is okay also.  Recommend PET scan.    Sent to [facility] for second opinion with the give her the option of either trying Zanubrutinib or holding medication.  She opted for the latter.  Acalabrutinib held last week.  Will make a decision based on PET scan.  High risk for recurrence given deletion 17P.    # Severe neck pain: Appears musculoskeletal, recommend MRI spine.      ORDERS PLACED:        Tamica Herring M.D.    [facility]    [facility]  [address] [place] [address] [place], [address]    3/7/2025

## 2025-03-07 NOTE — ED INITIAL ASSESSMENT (HPI)
Pt to ER with c/o headache and neck pain since Saturday. Pain medications \"not helping\" Pt presents with c-collar in placed from home. Pt denies falling, injuries, accidents.   Pt was sent here last night. Pt also states \"this is the third time seen this week.\"  Pt AO x 4, following directions appropriately.

## 2025-03-07 NOTE — ED PROVIDER NOTES
Patient Seen in: [facility] Emergency Department      History     Chief Complaint   Patient presents with    Headache     Stated Complaint: headache and neck pain, seen yesterday for same    Subjective:   HPI      Patient started to have neck pain on Saturday of this week.  It has become more and more severe and tighter.  This is her third ED visit.  Patient started to wear neck brace which she has had before.  She has a history of spinal issues, chronic pain meds.  Recent diagnosis of CLL.  - Experienced neck pain starting on Saturday, with pain radiating to the hand and back of the head.  - Pain described as severe, causing crying and significant discomfort.  - Pain initially started on the left side.  - Has a history of neck surgeries.  - Wears a collar since Sunday, which provides some relief but causes extra pain when neck muscles are engaged.  - Has been to the ER twice before this visit.  - Pain level was 5-6 upon leaving the ER, but increased by 2-3 PM on the day of the visit.  - Allergic to Aleve and advised against taking ibuprofen due to itching.  - Suffers from migraines, with current headache being one of the worst but not the worst ever experienced.  - Headache is not noise-sensitive and is within the range of previous migraines.    Objective:     Past Medical History:    Anxiety    Cancer (HCC)    CLL    Cervical radiculopathy    CLL (chronic lymphocytic leukemia) (HCC)    Depression    PONV (postoperative nausea and vomiting)    Visual impairment    reading glasses              Past Surgical History:   Procedure Laterality Date    Hysterectomy      1 ovary in place    Laparoscopic cholecystectomy      Lumbar spine fusion combined      Other      C-Spine fusion and revision                Social History     Socioeconomic History    Marital status:     Number of children: 2   Occupational History    Occupation: on disability   Tobacco Use    Smoking status: Every Day     Current  packs/day: 1.00     Types: Cigarettes     Passive exposure: Current    Smokeless tobacco: Never   Vaping Use    Vaping status: Some Days    Substances: Nicotine   Substance and Sexual Activity    Alcohol use: Not Currently    Drug use: Not Currently     Types: Cannabis     Comment: edible prn for neck pain/headache    Sexual activity: Not Currently     Partners: Male     Birth control/protection: Hysterectomy   Social History Narrative    , lives with  and 20 y/o daughter    Has 2 biological daughter and 1 stepdaughter and 2 granddaughters and 1 grandson                  Physical Exam     ED Triage Vitals [03/06/25 2019]   BP (!) 174/110   Pulse 96   Resp 18   Temp 97.1 °F (36.2 °C)   Temp src Temporal   SpO2 96 %   O2 Device None (Room air)       Current Vitals:   Vital Signs  BP: 124/83  Pulse: 80  Resp: 18  Temp: 97.1 °F (36.2 °C)  Temp src: Temporal  MAP (mmHg): (!) 131    Oxygen Therapy  SpO2: 93 %  O2 Device: None (Room air)        Physical Exam  General: Well-developed, well-nourished, comfortable, no acute distress  Head and neck: Normocephalic, atraumatic, significant muscle spasm throughout the trapezius.  Tympanic membrane's clear bilaterally.  Cardiovascular: Regular rate and rhythm, normal S1 and S2, no obvious murmurs, rubs, or gallops   Lungs: Clear to auscultation bilaterally with equal breath sounds, no wheezing, no rhonchi   Abdomen: Positive bowel sounds,  soft, no tenderness, no distension, no rebound, no guarding   Extremities: No cyanosis, no clubbing, no edema   Musculoskeletal: No tenderness, swelling, deformity   Skin: No significant lesions   Neuro: Grossly intact to patient's baseline; distal motor and sensory intact.    Pulses intact.    ED Course   Labs Reviewed - No data to display differential diagnosis includes muscle spasm, torticollis, among other processes                MDM      49-year-old here for third visit for torticollis.  She does get some relief while in the  emergency department but at home feels the pain meds are wearing off by the next day.  She has an appointment with her pain specialist on Monday.  Patient given alto meds.  None of these seem to help except for IV Dilaudid.  Patient received an additional 2 doses of Dilaudid.  We discussed the risks and benefits of admission, that hospital admission would not be an IV narcotics, likely only oral.  Patient will go home after the third dose, try to manage for as long as she can at home tomorrow.  She understands that she should try to move the neck is much as possible even though it is  Aftercare instructions reviewed all questions answered      Medical Decision Making      Disposition and Plan     Clinical Impression:  1. Torticollis         Disposition:  There is no disposition on file for this visit.  There is no disposition time on file for this visit.    Follow-up:  Ashley Cross DO  [address] [place] [id number]  [phone]    Follow up            Medications Prescribed:  Current Discharge Medication List              Supplementary Documentation:

## 2025-03-08 ENCOUNTER — HOSPITAL ENCOUNTER (EMERGENCY)
Facility: HOSPITAL | Age: 50
Discharge: HOME | End: 2025-03-09
Attending: EMERGENCY MEDICINE
Payer: COMMERCIAL

## 2025-03-08 ENCOUNTER — APPOINTMENT (OUTPATIENT)
Dept: CT IMAGING | Facility: HOSPITAL | Age: 50
End: 2025-03-08
Attending: EMERGENCY MEDICINE
Payer: COMMERCIAL

## 2025-03-08 VITALS
HEART RATE: 80 BPM | WEIGHT: 238 LBS | BODY MASS INDEX: 35.25 KG/M2 | HEIGHT: 69 IN | TEMPERATURE: 99 F | SYSTOLIC BLOOD PRESSURE: 168 MMHG | OXYGEN SATURATION: 95 % | RESPIRATION RATE: 17 BRPM | DIASTOLIC BLOOD PRESSURE: 96 MMHG

## 2025-03-08 DIAGNOSIS — M54.2 NECK PAIN: Primary | ICD-10-CM

## 2025-03-08 LAB
ALBUMIN SERPL-MCNC: 5 G/DL (ref 3.2–4.8)
ALBUMIN/GLOB SERPL: 1.9 {RATIO} (ref 1–2)
ALP LIVER SERPL-CCNC: 341 U/L
ALT SERPL-CCNC: 30 U/L
ANION GAP SERPL CALC-SCNC: 9 MMOL/L (ref 0–18)
AST SERPL-CCNC: 19 U/L (ref ?–34)
BASOPHILS # BLD: 0 X10(3) UL (ref 0–0.2)
BASOPHILS NFR BLD: 0 %
BILIRUB SERPL-MCNC: 0.3 MG/DL (ref 0.3–1.2)
BUN BLD-MCNC: 13 MG/DL (ref 9–23)
CALCIUM BLD-MCNC: 10.8 MG/DL (ref 8.7–10.6)
CHLORIDE SERPL-SCNC: 98 MMOL/L (ref 98–112)
CO2 SERPL-SCNC: 29 MMOL/L (ref 21–32)
CREAT BLD-MCNC: 0.91 MG/DL
EGFRCR SERPLBLD CKD-EPI 2021: 77 ML/MIN/1.73M2 (ref 60–?)
EOSINOPHIL # BLD: 0 X10(3) UL (ref 0–0.7)
EOSINOPHIL NFR BLD: 0 %
ERYTHROCYTE [DISTWIDTH] IN BLOOD BY AUTOMATED COUNT: 12.5 %
FLUAV + FLUBV RNA SPEC NAA+PROBE: NEGATIVE
FLUAV + FLUBV RNA SPEC NAA+PROBE: NEGATIVE
GLOBULIN PLAS-MCNC: 2.7 G/DL (ref 2–3.5)
GLUCOSE BLD-MCNC: 156 MG/DL (ref 70–99)
HCT VFR BLD AUTO: 42.9 %
HGB BLD-MCNC: 14.7 G/DL
LYMPHOCYTES NFR BLD: 12 %
LYMPHOCYTES NFR BLD: 2.5 X10(3) UL (ref 1–4)
MCH RBC QN AUTO: 31.7 PG (ref 26–34)
MCHC RBC AUTO-ENTMCNC: 34.3 G/DL (ref 31–37)
MCV RBC AUTO: 92.5 FL
MONOCYTES # BLD: 1.87 X10(3) UL (ref 0.1–1)
MONOCYTES NFR BLD: 9 %
MORPHOLOGY: NORMAL
NEUTROPHILS # BLD AUTO: 17.5 X10 (3) UL (ref 1.5–7.7)
NEUTROPHILS NFR BLD: 76 %
NEUTS BAND NFR BLD: 3 %
NEUTS HYPERSEG # BLD: 16.43 X10(3) UL (ref 1.5–7.7)
OSMOLALITY SERPL CALC.SUM OF ELEC: 285 MOSM/KG (ref 275–295)
PLATELET # BLD AUTO: 176 10(3)UL (ref 150–450)
PLATELET MORPHOLOGY: NORMAL
POTASSIUM SERPL-SCNC: 4.1 MMOL/L (ref 3.5–5.1)
PROT SERPL-MCNC: 7.7 G/DL (ref 5.7–8.2)
RBC # BLD AUTO: 4.64 X10(6)UL
RSV RNA SPEC NAA+PROBE: NEGATIVE
SARS-COV-2 RNA RESP QL NAA+PROBE: NOT DETECTED
SODIUM SERPL-SCNC: 136 MMOL/L (ref 136–145)
TOTAL CELLS COUNTED BLD: 100
WBC # BLD AUTO: 20.8 X10(3) UL (ref 4–11)

## 2025-03-08 PROCEDURE — 96376 TX/PRO/DX INJ SAME DRUG ADON: CPT

## 2025-03-08 PROCEDURE — 99285 EMERGENCY DEPT VISIT HI MDM: CPT

## 2025-03-08 PROCEDURE — 85007 BL SMEAR W/DIFF WBC COUNT: CPT | Performed by: EMERGENCY MEDICINE

## 2025-03-08 PROCEDURE — 96374 THER/PROPH/DIAG INJ IV PUSH: CPT

## 2025-03-08 PROCEDURE — 85025 COMPLETE CBC W/AUTO DIFF WBC: CPT | Performed by: EMERGENCY MEDICINE

## 2025-03-08 PROCEDURE — 0241U SARS-COV-2/FLU A AND B/RSV BY PCR (GENEXPERT): CPT | Performed by: EMERGENCY MEDICINE

## 2025-03-08 PROCEDURE — 85027 COMPLETE CBC AUTOMATED: CPT | Performed by: EMERGENCY MEDICINE

## 2025-03-08 PROCEDURE — 70498 CT ANGIOGRAPHY NECK: CPT | Performed by: EMERGENCY MEDICINE

## 2025-03-08 PROCEDURE — 96375 TX/PRO/DX INJ NEW DRUG ADDON: CPT

## 2025-03-08 PROCEDURE — 80053 COMPREHEN METABOLIC PANEL: CPT | Performed by: EMERGENCY MEDICINE

## 2025-03-08 RX ORDER — MORPHINE SULFATE 4 MG/ML
4 INJECTION, SOLUTION INTRAMUSCULAR; INTRAVENOUS EVERY 30 MIN PRN
Status: DISCONTINUED | OUTPATIENT
Start: 2025-03-08 | End: 2025-03-08

## 2025-03-08 RX ORDER — ONDANSETRON 2 MG/ML
4 INJECTION INTRAMUSCULAR; INTRAVENOUS ONCE
Status: COMPLETED | OUTPATIENT
Start: 2025-03-08 | End: 2025-03-08

## 2025-03-08 RX ORDER — HYDROMORPHONE HYDROCHLORIDE 1 MG/ML
0.5 INJECTION, SOLUTION INTRAMUSCULAR; INTRAVENOUS; SUBCUTANEOUS EVERY 30 MIN PRN
Status: DISCONTINUED | OUTPATIENT
Start: 2025-03-08 | End: 2025-03-09

## 2025-03-09 ENCOUNTER — HOSPITAL ENCOUNTER (OUTPATIENT)
Dept: MRI IMAGING | Age: 50
End: 2025-03-09
Attending: INTERNAL MEDICINE
Payer: COMMERCIAL

## 2025-03-09 ENCOUNTER — HOSPITAL ENCOUNTER (OUTPATIENT)
Dept: MRI IMAGING | Age: 50
Discharge: HOME | End: 2025-03-09
Attending: INTERNAL MEDICINE
Payer: COMMERCIAL

## 2025-03-09 DIAGNOSIS — R59.0 CERVICAL LYMPHADENOPATHY: ICD-10-CM

## 2025-03-09 DIAGNOSIS — C91.10 CLL (CHRONIC LYMPHOCYTIC LEUKEMIA) (HCC): ICD-10-CM

## 2025-03-09 DIAGNOSIS — M54.2 NECK PAIN, ACUTE: ICD-10-CM

## 2025-03-09 PROCEDURE — A9575 INJ GADOTERATE MEGLUMI 0.1ML: HCPCS | Performed by: INTERNAL MEDICINE

## 2025-03-09 PROCEDURE — 72156 MRI NECK SPINE W/O & W/DYE: CPT | Performed by: INTERNAL MEDICINE

## 2025-03-09 RX ORDER — GADOTERATE MEGLUMINE 376.9 MG/ML
20 INJECTION INTRAVENOUS
Status: COMPLETED | OUTPATIENT
Start: 2025-03-09 | End: 2025-03-09

## 2025-03-09 RX ADMIN — GADOTERATE MEGLUMINE 20 ML: 376.9 INJECTION INTRAVENOUS at 15:00:00

## 2025-03-09 NOTE — ED QUICK NOTES
On arrival into room, pt had removed and replaced her c-collar from home. Pt walked with steady gait.

## 2025-03-09 NOTE — ED PROVIDER NOTES
Patient Seen in: [facility] Emergency Department      History     Chief Complaint   Patient presents with    Pain     Stated Complaint: c/o pain to head, neck, hips, \"4th visit in a week, pain meds not working.\"    Subjective:   HPI      49-year-old female presenting with pain.  Patient is complaining of pain all over her body started couple weeks ago with the back of her neck where she has chronic pain seems to have moved to her jaw moved to her head moves her hips she has no fevers chills no other exacerbating factors or associated symptoms    Objective:     Past Medical History:    Anxiety    Cancer (HCC)    CLL    Cervical radiculopathy    CLL (chronic lymphocytic leukemia) (HCC)    Depression    PONV (postoperative nausea and vomiting)    Visual impairment    reading glasses              Past Surgical History:   Procedure Laterality Date    Hysterectomy      1 ovary in place    Laparoscopic cholecystectomy      Lumbar spine fusion combined      Other      C-Spine fusion and revision                Social History     Socioeconomic History    Marital status:     Number of children: 2   Occupational History    Occupation: on disability   Tobacco Use    Smoking status: Every Day     Current packs/day: 1.00     Types: Cigarettes     Passive exposure: Current    Smokeless tobacco: Never   Vaping Use    Vaping status: Some Days    Substances: Nicotine   Substance and Sexual Activity    Alcohol use: Not Currently    Drug use: Not Currently     Types: Cannabis     Comment: edible prn for neck pain/headache    Sexual activity: Not Currently     Partners: Male     Birth control/protection: Hysterectomy   Social History Narrative    , lives with  and 20 y/o daughter    Has 2 biological daughter and 1 stepdaughter and 2 granddaughters and 1 grandson                  Physical Exam     ED Triage Vitals [03/08/25 1928]   BP (!) 156/96   Pulse 106   Resp 26   Temp 98.6 °F (37 °C)   Temp src    SpO2  94 %   O2 Device None (Room air)       Current Vitals:   Vital Signs  BP: (!) 168/96  Pulse: 81  Resp: 14  Temp: 98.6 °F (37 °C)  MAP (mmHg): (!) 114    Oxygen Therapy  SpO2: 94 %  O2 Device: None (Room air)        Physical Exam  Awake alert patient appears uncomfortable HEENT exam is normal lungs were clear cardiovascular exam shows a regular rhythm abdomen soft and nontender extremities no Cyanosis or edema no focal neurologic deficits    ED Course     Labs Reviewed   COMP METABOLIC PANEL (14) - Abnormal; Notable for the following components:       Result Value    Glucose 156 (*)     Calcium, Total 10.8 (*)     Alkaline Phosphatase 341 (*)     Albumin 5.0 (*)     All other components within normal limits   CBC WITH DIFFERENTIAL WITH PLATELET - Abnormal; Notable for the following components:    WBC 20.8 (*)     Neutrophil Absolute Prelim 17.50 (*)     All other components within normal limits   MANUAL DIFFERENTIAL - Abnormal; Notable for the following components:    Neutrophil Absolute Manual 16.43 (*)     Monocyte Absolute Manual 1.87 (*)     All other components within normal limits   SARS-COV-2/FLU A AND B/RSV BY PCR ([employer]) - Normal    Narrative:     This test is intended for the qualitative detection and differentiation of SARS-CoV-2, influenza A, influenza B, and respiratory syncytial virus (RSV) viral RNA in nasopharyngeal or nares swabs from individuals suspected of respiratory viral infection consistent with COVID-19 by their healthcare provider. Signs and symptoms of respiratory viral infection due to SARS-CoV-2, influenza, and RSV can be similar.    Test performed using the Xpert Xpress SARS-CoV-2/FLU/RSV (real time RT-PCR)  assay on the [employer] instrument, [employer], [place] [address].   This test is being used under the Food and Drug Administration's Emergency Use Authorization.    The authorized Fact Sheet for Healthcare Providers for this assay is available upon request from the laboratory.             Differential diagnosis includes vertebral artery dissection, flu       MDM              Medical Decision Making  49-year-old female presenting to the emergency department for pain.  IV established cardiac monitor shows a sinus rhythm pulse ox with no signs of hypoxia CBC shows elevated white cell count which I attribute to her CLL metabolic panel within acceptable limits COVID test negative independent interpretation by ED physician of CTA head of neck shows no vertebral artery dissection.  I gave the patient the option of inpatient therapy which I did recommend she says that she would prefer to go home respiratory she understands risk benefits this includes this decision she is scheduled for an MRI of her cervical spine tomorrow and see her pain doctor on Monday she is to return to the emergency department for worsening symptoms other complaints  The patient was screened and evaluated during this visit.  As a treating physician attending to the patient, I determined, within reasonable clinical confidence and prior to discharge, that an emergency medical condition was not or was no longer present.  There was no indication for further evaluation, treatment or admission on an emergency basis.    The usual and customary discharge instructions were discussed given the patient's ER course.  We discussed signs and symptoms that should prompt the patient's immediate return to the emergency department.  Reasonable over-the-counter and prescription treatment options and physician follow-up plan was discussed.  Patient was discharged home in good condition  This note was prepared using Dragon Medical voice recognition dictation software.  As a result errors may occur.  When identified to these areas have been corrected.  While every attempt is made to correct errors during dictation discrepancies may still exist.  Please contact if there are any errors    Amount and/or Complexity of Data Reviewed  Labs: ordered.  Decision-making details documented in ED Course.  Radiology: ordered and independent interpretation performed. Decision-making details documented in ED Course.  ECG/medicine tests: ordered and independent interpretation performed. Decision-making details documented in ED Course.        Disposition and Plan     Clinical Impression:  1. Neck pain         Disposition:  There is no disposition on file for this visit.  There is no disposition time on file for this visit.    Follow-up:  No follow-up provider specified.        Medications Prescribed:  Current Discharge Medication List              Supplementary Documentation:

## 2025-03-09 NOTE — ED INITIAL ASSESSMENT (HPI)
PT PRESENTED TO THE ED C/O ACUTE ON CHRONIC NECK/HEAD/HIP PAIN X 1 WEEK. DENIES ANY RECENT TRAUMA/INJURY. S/P CERVICAL SPINE SX 2014 S/P REVISION 2017 S/P LUMBAR SPINE 2015. PT HAS AN APPT MONDAY TO SEE HER PAIN SPECIALIST FOR PAIN MANAGEMENT.

## 2025-03-15 PROBLEM — D69.6 THROMBOCYTOPENIA: Status: ACTIVE | Noted: 2025-03-15

## 2025-03-15 PROBLEM — G89.29 CHRONIC NECK PAIN: Status: ACTIVE | Noted: 2025-01-01

## 2025-03-15 PROBLEM — J18.9 COMMUNITY ACQUIRED PNEUMONIA, UNSPECIFIED LATERALITY: Status: ACTIVE | Noted: 2025-03-15

## 2025-03-15 PROBLEM — D69.6 THROMBOCYTOPENIA: Status: ACTIVE | Noted: 2025-01-01

## 2025-03-15 PROBLEM — J18.9 COMMUNITY ACQUIRED PNEUMONIA, UNSPECIFIED LATERALITY: Status: ACTIVE | Noted: 2025-01-01

## 2025-03-15 PROBLEM — M54.2 CHRONIC NECK PAIN: Status: ACTIVE | Noted: 2025-03-15

## 2025-03-15 PROBLEM — M25.552 PAIN OF LEFT HIP: Status: ACTIVE | Noted: 2025-03-15

## 2025-03-15 PROBLEM — E83.52 HYPERCALCEMIA: Status: ACTIVE | Noted: 2025-01-01

## 2025-03-15 PROBLEM — E83.52 HYPERCALCEMIA: Status: ACTIVE | Noted: 2025-03-15

## 2025-03-15 PROBLEM — M25.552 PAIN OF LEFT HIP: Status: ACTIVE | Noted: 2025-01-01

## 2025-03-15 PROBLEM — E87.6 HYPOKALEMIA: Status: ACTIVE | Noted: 2025-03-15

## 2025-03-15 PROBLEM — M54.2 CHRONIC NECK PAIN: Status: ACTIVE | Noted: 2025-01-01

## 2025-03-15 PROBLEM — E87.6 HYPOKALEMIA: Status: ACTIVE | Noted: 2025-01-01

## 2025-03-15 PROBLEM — G89.29 CHRONIC NECK PAIN: Status: ACTIVE | Noted: 2025-03-15

## 2025-03-15 NOTE — ED PROVIDER NOTES
Patient Seen in: [facility] Emergency Department      History     Chief Complaint   Patient presents with    Pain     Stated Complaint: pain everywhere x 2 weeks. hx of CLL. 5th visit since 03/02    Subjective:   HPI      50-year-old female history of CLL not currently on treatment, chronic left-sided neck pain history of cervical fusion in the past presents to ED for the fifth time in 2 weeks for left-sided neck pain, and now new complaint of left hip/groin pain.  She also reports chest discomfort earlier today and mild shortness of breath, with a cough over the last 10 days.  She was seen on 3/8/2025 labs done leukocytosis thought to be related to CLL, GEN expert negative, CTA carotids negative.  Denies fevers, chills.   states that she went to the pain doctor on Monday and they increased her oxycodone to 10 mg and also put her on OxyContin, she arrives to ED requesting more pain medicine for her left neck pain despite taking this prior to arrival with an 89% room air saturation.    Objective:     Past Medical History:    Anxiety    Cancer (HCC)    CLL    Cervical radiculopathy    CLL (chronic lymphocytic leukemia) (HCC)    Depression    PONV (postoperative nausea and vomiting)    Visual impairment    reading glasses              Past Surgical History:   Procedure Laterality Date    Hysterectomy      1 ovary in place    Laparoscopic cholecystectomy      Lumbar spine fusion combined      Other      C-Spine fusion and revision                Social History     Socioeconomic History    Marital status:     Number of children: 2   Occupational History    Occupation: on disability   Tobacco Use    Smoking status: Every Day     Current packs/day: 1.00     Types: Cigarettes     Passive exposure: Current    Smokeless tobacco: Never   Vaping Use    Vaping status: Some Days    Substances: Nicotine   Substance and Sexual Activity    Alcohol use: Not Currently    Drug use: Not Currently     Types: Cannabis      Comment: edible prn for neck pain/headache    Sexual activity: Not Currently     Partners: Male     Birth control/protection: Hysterectomy   Social History Narrative    , lives with  and 20 y/o daughter    Has 2 biological daughter and 1 stepdaughter and 2 granddaughters and 1 grandson                  Physical Exam     ED Triage Vitals   BP 03/15/25 1115 (!) 127/91   Pulse 03/15/25 1113 (!) 136   Resp 03/15/25 1115 20   Temp 03/15/25 1113 97 °F (36.1 °C)   Temp src 03/15/25 1113 Temporal   SpO2 03/15/25 1113 92 %   O2 Device 03/15/25 1113 None (Room air)       Current Vitals:   Vital Signs  BP: (!) 156/105  Pulse: 105  Resp: 19  Temp: 98.1 °F (36.7 °C)  Temp src: Temporal    Oxygen Therapy  SpO2: 96 %  O2 Device: Nasal cannula  O2 Flow Rate (L/min): 2 L/min        Physical Exam  Vital signs reviewed.  Nursing note reviewed.  Constitutional: Alert, in pain  Head: Normocephalic, atraumatic  Mouth: Moist  Neck: Left cervical paraspinal tender palpation.  Eyes: Extraocular muscles intact, pupils equal  Cardiovascular: Tachycardic rate, regular rhythm  Pulmonary: Effort normal, breath sounds normal, 89% room air  Abdomen: Soft, nontender nondistended.  Left inguinal region lymphadenopathy noted and tender in this area.  Skin: Warm and dry  Musculoskeletal range of motion grossly normal all extremities  Neuro: Alert, at baseline, no focal neuro deficit.  Moves all extremities against gravity  Psych: Mood normal          ED Course     Labs Reviewed   CBC WITH DIFFERENTIAL WITH PLATELET - Abnormal; Notable for the following components:       Result Value    WBC 17.6 (*)     PLT 74.0 (*)     Immature Platelet Fraction 8.7 (*)     Neutrophil Absolute Prelim 13.29 (*)     All other components within normal limits   BASIC METABOLIC PANEL (8) - Abnormal; Notable for the following components:    Glucose 192 (*)     Potassium 3.4 (*)     Chloride 96 (*)     CO2 34.0 (*)     Creatinine 1.19 (*)     Calcium, Total  17.8 (*)     Calculated Osmolality 296 (*)     eGFR-Cr 55 (*)     All other components within normal limits   MANUAL DIFFERENTIAL - Abnormal; Notable for the following components:    Neutrophil Absolute Manual 14.96 (*)     Metamyelocyte Absolute Manual 0.18 (*)     All other components within normal limits   PTH, INTACT - Abnormal; Notable for the following components:    Pth Intact <6.3 (*)     All other components within normal limits   CALCIUM, IONIZED - Abnormal; Notable for the following components:    Ionized Calcium 2.38 (*)     All other components within normal limits   PROCALCITONIN - Abnormal; Notable for the following components:    Procalcitonin 0.43 (*)     All other components within normal limits   TROPONIN I HIGH SENSITIVITY - Normal   ALBUMIN SERUM - Normal   MAGNESIUM - Normal   VITAMIN D, 25-HYDROXY - Normal   VITAMIN D    Narrative:     The following orders were created for panel order Vitamin D.  Procedure                               Abnormality         Status                     ---------                               -----------         ------                     Vitamin D, 25-Hydroxy[[id number]]        Normal              Final result                 Please view results for these tests on the individual orders.   LACTIC ACID, PLASMA   BLOOD CULTURE   BLOOD CULTURE   RESPIRATORY FLU EXPAND PANEL + COVID-19     EKG    Rate, intervals and axes as noted on EKG Report.  Rate: 121  Rhythm: Sinus tachycardia  Reading: No STEMI                CTA CHEST + CT ABD (W) + CT PEL (W) (CPT=71275/19983)    Result Date: 3/15/2025  PROCEDURE:  CTA CHEST + CT ABD (W) + CT PEL (W) (CPT=71275/55123)  COMPARISON:  [facility] , CT, CT CHEST+ABDOMEN+PELVIS(ALL CNTRST ONLY)(CPT=71260/47699), 2/14/2025, 8:51 AM.  INDICATIONS:  tachy, SOB, L groin pain. hx CLL. 5 ED visits this week.  TECHNIQUE:  CT of the chest (with CTA imaging), abdomen, and pelvis were obtained post- injection of non-ionic intravenous contrast  material. Multi-planar reformatted/3-D images were created to optimize visualization of vascular anatomy.  Dose reduction techniques were used. Dose information is transmitted to the ACR (American College of Radiology) NRDR (National Radiology Data Registry) which includes the Dose Index Registry.  PATIENT STATED HISTORY:(As transcribed by Technologist)  tachycardia, SOB and left groin pain.  History of CLL.  5 ED visits this week.    CONTRAST USED:  100cc of Isovue 370  FINDINGS:   CHEST:   Heart size is within normal limits.  There is no pleural or pericardial effusion.  The base of the neck is unremarkable.  There is no pulmonary embolism to the first subsegmental arterial level.  Motion artifact limits assessment of the lungs.  Central airways appear patent.  Probable left greater than right basilar atelectasis present.  Consolidation is difficult to exclude at the left lung base.  Progressive lymphadenopathy is seen within the chest.  A large right paratracheal node measures 2.9 x 2.2 cm, previously approximately 9 mm in diameter.  2.6 x 1.9 cm right hilar lymph node previously measured 0.9 x 1.3 cm.  Numerous other smaller nodes are present.  A left axillary lymph node measures 1.5 x 1.6 cm.   ABDOMEN/PELVIS:  The liver, spleen, pancreas, adrenal glands and kidneys have an essentially unremarkable postcontrast appearance.  No free fluid is present.  No dilated loops of small bowel are seen.  Portions of the bowel are decompressed, limiting assessment.  Lack of  oral contrast limits assessment of the bowel.  There is a normal-appearing appendix.  There is pelvic lymphadenopathy present.  A right inguinal lymph node measures up to the 2.6 x 1.6 cm a left inguinal lymph node measures 1.9 x 2.9 cm.  There is a left iliac lymph node measuring 2.6 x 1.6 cm.  The urinary bladder is mildly distended and has an otherwise unremarkable appearance.  Uterus and ovaries are not identified on this exam.  Bony structures  appear overall stable with postsurgical changes present.               CONCLUSION:  Progressive lymphadenopathy is seen within the visualized chest and pelvis.  Please see above for further details.  No evidence of pulmonary embolism to the first subsegmental arterial level.  Airspace opacities within the lung bases may represent areas of atelectasis.  Consolidation is difficult to exclude.   LOCATION:  [facility]   Dictated by (CST): Marcus Mondragon MD on 3/15/2025 at 3:40 PM     Finalized by (CST): Marcus Mondragon MD on 3/15/2025 at 3:48 PM       XR CHEST AP PORTABLE  (CPT=71045)    Result Date: 3/15/2025  PROCEDURE:  XR CHEST AP PORTABLE  (CPT=71045)  TECHNIQUE:  AP chest radiograph was obtained.  COMPARISON:  [facility] , XR, XR CHEST AP PORTABLE  (CPT=71045), 9/13/2023, 1:51 AM.  INDICATIONS:  pain everywhere x 2 weeks. hx of CLL. 5th visit since 03/02  PATIENT STATED HISTORY: (As transcribed by Technologist)  Patient states neck pain, low back pain, productive cough, and difficulty breathing with exertion. Symptoms for 2-3 weeks.    FINDINGS:  Postoperative changes of the cervical spine.  Mild bibasilar airspace disease is favored to represent atelectasis, however, a superimposed infectious process cannot be excluded.  There is minimal bronchial wall thickening which could represent  bronchitis in the appropriate clinical setting.  Minimal blunting of left costophrenic angle.  No measurable pneumothorax.            CONCLUSION:  See above.   LOCATION:  [facility]      Dictated by (CST): Stromberg, LeRoy, MD on 3/15/2025 at 12:51 PM     Finalized by (CST): Stromberg, LeRoy, MD on 3/15/2025 at 12:52 PM       MRI SPINE CERVICAL (W+WO) (CPT=72156)    Result Date: 3/14/2025  PROCEDURE: MRI SPINE CERVICAL (W+WO) (CPT=72156)  COMPARISON: None.  INDICATIONS: M54.2 Neck pain, acute R59.0 Cervical lymphadenopathy C91.10 CLL (chronic lymphocytic leukemia) (HCC)  TECHNIQUE: A variety of imaging planes and parameters were utilized for  visualization of suspected pathology prior to and after intravenous gadolinium injection.    FINDINGS:  COMMENT: Overall examination quality is mild-to-moderately compromised by patient motion artifact. ALIGNMENT: The anatomic cervical lordosis is preserved. BONES: Postoperative changes of C4-C7 anterior cervical discectomy and fusion.  Resultant susceptibility artifacts limit evaluation.  No acute cervical spine fracture.  No gross suspicious marrow replacing foci throughout the cervical spine. CORD: Mild volume loss and bilateral paramedian T2 bright signal abnormality involving the cervical cord at C5-C6 with no associated postcontrast enhancement.  Cervical cord otherwise demonstrates preserved caliber and signal. CRANIOCERVICAL AREA: Normal foramen magnum without Chiari malformation.  PARASPINAL AREA: No visible mass.  OTHER: There is no visible swelling of the prevertebral soft tissues.  Peripherally dark signal cm right thyroid nodule.  Scattered prominent and mildly enlarged bilateral multilevel cervical lymph nodes.  These include a reference 1.6 cm short axis right supraclavicular node.  CERVICAL DISC LEVELS: C2-C3: Mild disc desiccation and degeneration with slight uncovertebral joint hypertrophy/facet arthropathy.  No spinal canal or neural foraminal stenosis. C3-C4: Posterior disc-osteophyte complex with bilateral uncovertebral joint hypertrophy/facet arthropathy as well as a right paracentral disc protrusion.  Mild-to-moderate spinal canal with mild-to-moderate right and mild left neural foraminal stenosis. C4-C5: Postoperative level is suboptimally evaluated given adjacent susceptibility artifacts.  No high-grade spinal canal or neural foraminal stenosis. C5-C6: Postoperative level is suboptimally evaluated given adjacent susceptibility artifacts.  No high-grade spinal canal or neural foraminal stenosis. C6-C7: Postoperative level is suboptimally evaluated given adjacent susceptibility artifacts.  No  high-grade spinal canal or neural foraminal stenosis. C7-T1: Right greater than left uncovertebral joint hypertrophy and facet arthropathy, which results in mild right neural foraminal stenosis with no other significant neural compromise.         CONCLUSION:   1. Postoperative changes of C4-C7 anterior cervical discectomy and fusion.  Resultant susceptibility artifacts limit evaluation.  2. Bilateral paramedian T2 bright signal abnormality and volume loss involving the cervical cord at C5-C6, but with no associated postcontrast enhancement.  These findings are most compatible with myelomalacia.  3. C3-C4:  Mild-to-moderate spinal canal with mild-to-moderate right and mild left neural foraminal stenosis. 4. C7-T1:  Mild right neural foraminal stenosis.  5. Partially imaged prominent and mildly enlarged bilateral multilevel cervical lymph nodes.  These likely relate to the patient's history of chronic lymphocytic leukemia and please correlate with prior dedicated imaging.  6. A 1.1 cm right thyroid nodule.  Thyroid nodules less than 1.5 cm on cross-sectional imaging typically require no additional follow-up.  7. Lesser incidental findings as above.   elm-remote  Dictated by (CST): Neftaly Gant MD on 3/14/2025 at 7:55 AM     Finalized by (CST): Neftaly Gant MD on 3/14/2025 at 8:03 AM          CTA CAROTID ARTERIES (CPT=70498)    Result Date: 3/8/2025  PROCEDURE:  CTA CAROTID ARTERIES (CPT=70498)  COMPARISON:  None.  INDICATIONS:  c/o pain to head, neck, hips, 4th visit in a week, pain meds not working.  TECHNIQUE:  Contrast-enhanced multislice CT angiography of the neck vasculature from AP window to Sella was performed using nonionic contrast. 3D volume rendering images were obtained by the technologist as well as the radiologist on an independent workstation. Multiplanar 3D reformatted imaging including multiplanar MIP images were obtained. Curved planar reformats were performed through the carotid and vertebral  arteries. All measurements obtained in this exam were performed using NASCET criteria. Dose reduction techniques were used. Dose information is transmitted to the ACR (American College of Radiology) NRDR (National Radiology Data Registry) which includes the Dose Index Registry.   PATIENT STATED HISTORY:(As transcribed by Technologist)  Head and neck pain, hips   CONTRAST USED:  75cc of Isovue 370  FINDINGS:  AORTIC ARCH:                       Mild ectasia of the ascending aorta to 40 mm. INNOMINATE ARTERY:                       No evidence of aneurysm or stenosis.  RIGHT SUBCLAVIAN ARTERY:    No hemodynamically significant stenosis or dissection. COMMON CAROTID:    No hemodynamically significant stenosis or dissection. CAROTID BULB:    No hemodynamically significant stenosis or dissection. INTERNAL CAROTID:    No hemodynamically significant stenosis or dissection. EXTERNAL CAROTID:    No hemodynamically significant stenosis or dissection. VERTEBRAL:    No hemodynamically significant stenosis or dissection.  LEFT SUBCLAVIAN ARTERY:    No hemodynamically significant stenosis or dissection. COMMON CAROTID:    No hemodynamically significant stenosis or dissection. CAROTID BULB:                 No hemodynamically significant stenosis or dissection. INTERNAL CAROTID:   No hemodynamically significant stenosis or dissection. EXTERNAL CAROTID:   No hemodynamically significant stenosis or dissection. VERTEBRAL:   No hemodynamically significant stenosis or dissection.  BONES:    No bony lesion or fracture.  LUNG APICES:    No visible pulmonary or pleural disease.  OTHER:  There is a hypoattenuating thyroid nodule likely cystic in the right thyroid lobe measuring 11 mm. The visualized soft tissues of the neck are also unremarkable.             CONCLUSION:  No evidence of high-grade stenosis or dissection involving the carotid or vertebral arteries.   LOCATION:  [facility]   Dictated by (CST): Kennedy Orozco MD on 3/08/2025 at 11:03  PM     Finalized by (CST): Kennedy Orozco MD on 3/08/2025 at 11:06 PM       CT SPINE CERVICAL (CPT=72125)    Result Date: 3/2/2025  PROCEDURE:  CT SPINE CERVICAL (CPT=72125)  COMPARISON:  [facility] , CT, CT CHEST+ABDOMEN+PELVIS(ALL CNTRST ONLY)(CPT=71260/46107), 2/14/2025, 8:51 AM.  INDICATIONS:  Neck pain, no known injury  TECHNIQUE:  Noncontrast CT scanning of the cervical spine is performed from the skull base through C7.  Multiplanar reconstructions are generated.  Dose reduction techniques were used. Dose information is transmitted to the ACR (American College of Radiology) NRDR (National Radiology Data Registry) which includes the Dose Index Registry.  PATIENT STATED HISTORY: (As transcribed by Technologist)  Patient states neck pain, no injury.    FINDINGS:  Cervical spine demonstrates normal vertebral body height and alignment.  Anterior discectomy with fusion changes from C3 through C6 are noted.  No evidence of hardware failure.  No acute fractures are identified.  Mild multilevel degenerative changes are present.  The spinal canal is patent.  Soft tissues of the neck demonstrate a right thyroid nodule measuring 1.1 x 0.9 cm.  The lungs  are grossly unremarkable.  Prominent upper mediastinal lymph nodes are noted.            CONCLUSION:  No acute abnormality in the cervical spine.  Prominent upper mediastinal lymph nodes are likely reactive.  Correlate clinically.   LOCATION:  [facility]   Dictated by (CST): Slim Cruz MD on 3/02/2025 at 1:30 PM     Finalized by (CST): Slim Cruz MD on 3/02/2025 at 1:37 PM       CT CHEST+ABDOMEN+PELVIS(ALL CNTRST ONLY)(CPT=71260/31452)    Result Date: 2/14/2025  PROCEDURE:  CT CHEST+ABDOMEN+PELVIS(ALL CNTRST ONLY)(CPT=71260/11597)  COMPARISON:  [facility] , CT, CT CHEST+ABDOMEN+PELVIS(ALL CNTRST ONLY)(CPT=71260/11031), 10/13/2023, 7:16 AM.  [facility] , [place], PET STANDARD BODY SCAN (ONCOLOGY) (CPT=78815), 11/15/2024, 9:48 AM.  [facility] , CT, CT ABDOMEN+PELVIS(CONTRAST ONLY)(CPT=74177),  8/20/2024, 8:44 AM.  INDICATIONS:  R59.0 Axillary lymphadenopathy R59.0 Cervical lymphadenopathy C91.10 CLL (chronic lymphocytic leukemia) (HCC)  TECHNIQUE:  IV contrast-enhanced scanning through the chest, abdomen, and pelvis was performed.  Dose reduction techniques were used. Dose information is transmitted to the ACR (American College of Radiology) NRDR (National Radiology Data Registry) which  includes the Dose Index Registry.  PATIENT STATED HISTORY:(As transcribed by Technologist)  Disease surveilance   CONTRAST USED:  100cc of Isovue 370  FINDINGS:   CHEST:  LUNGS:  Stable.  There is an occasional tiny pulmonary nodule.  The largest measures 0.3 cm in the posterior left upper lobe.  Series 3, image 56. Mild scarring and bronchiectasis in the right middle lobe and lingula.  MEDIASTINUM:  There are a few scattered small lymph nodes.  These have markedly decreased in size.  None are individually enlarged by size criteria.  These have markedly decreased in size.  A more prominent is measured 2.0 x 0.6 cm.  Series 2, image 56.   Partial imaging of the thyroid gland.  Small nodule on the right appears unchanged.  1.2 cm versus prior 1.3 cm. NILAM:  There are a few scattered small lymph nodes.  None are individually enlarged by size criteria.  A more prominent is measured at 1.3 x 0.9 cm on the right. CARDIAC:  Stable.  No pericardial effusion.  No significant coronary calcifications. PLEURA:  Stable.  No pleural effusion. CHEST WALL:  Bilateral axillary lymph nodes have markedly decreased in size.  None are enlarged by size criteria.  Normal and fatty axillary lymph nodes are currently visualized symmetrically. AORTA:  Stable.  Dilatation of the ascending segment to a diameter 4.2 cm.  Corresponding descending diameter 2.5 cm. VASCULATURE:  Stable.  Smooth tapering.  ABDOMEN/PELVIS: LIVER:  Stable.  Fatty infiltration with an area of focal sparing adjacent to the gallbladder fossa. BILIARY:  Stable.  Surgically  absent gallbladder.  No biliary dilatation. PANCREAS:  Stable.  Uniform parenchyma.  No ductal dilatation. SPLEEN:  Stable.  Not enlarged.  Bipolar size measured 12.1 cm. KIDNEYS:  Stable.  Normal anatomic positions.  No hydronephrosis or perinephric stranding.  Uniform parenchymal enhancement. ADRENALS:  Stable.  Not enlarged. AORTA:  Stable.  Smooth tapering.  Patent celiac artery, SMA and ARIANA.  Patent splenic vein and portal vein. RETROPERITONEUM:  Stable.  No adenopathy. BOWEL/MESENTERY:  Stable.  Normal bowel caliber.  Moderate colonic diverticulosis.  No acute diverticulitis.  Moderate to large amount of stool scattered throughout the colon.  No ascites.  Normal appendix. ABDOMINAL WALL:  Stable.  Small fat containing umbilical hernia. URINARY BLADDER:  Partially filled.  Smooth contour.  No wall thickening or calculus within. PELVIC NODES:  Stable.  A few scattered small lymph nodes.  None are enlarged by size criteria.  A more prominent is left obturator.  1.3 x 0.8 cm, series 2, image 270. Remeasured on the prior in a similar manner at 1.1 x 0.8 cm. PELVIC ORGANS:  Stable.  Surgically absent uterus. BONES:  Stable.  Prior L4-L5-S1 hardware fusion and laminectomy changes.  Partial imaging of lower cervical plate and screw fixation.  In the nonfused segments, scattered up to moderate degenerative changes.  Maintained vertebral body heights.  No subluxation.             CONCLUSION:  1. Resolution of enlarged lymph nodes in the chest, abdomen and pelvis compared to 10/13/2023. 2. No new thoracic abnormality. 3. Stable small right thyroid nodule. 4. Stable dilatation of the ascending thoracic aorta. 5. A few scattered nonenlarged pelvic lymph nodes appear unchanged compared to 8/20/2024. 6. Uncomplicated colonic diverticulosis. 7. Hepatic steatosis.  8. No new abdominal-pelvic abnormality. 9. Details as above.     LOCATION:  [place]    Dictated by (CST): Fabian Lomeli MD on 2/14/2025 at 11:53 AM     Finalized  by (CST): Fabian Lomeli MD on 2/14/2025 at 12:08 PM             [facility]      Medical Decision Making:    Differential diagnosis before testing includes potential life threatening diagnosis which is a medical condition that poses a threat to life/function.     Comorbidities that add complexity to management:    I reviewed prior external ED notes including ( ) and (pertinent findings).     Discussions of management with:     Social [facility] of health care:     I personally reviewed the radiographs and my independent interpretation includes no lung consolidations, no pneumothorax.     Shared decision making:    Admission disposition: 3/15/2025  2:23 PM       Leukocytosis 17, however was 20 less than a week ago, thought related to CLL.  Reviewed patient MRI neck on 3/9, degenerative changes but no significant issues.  Given report of chest pain, shortness of breath, tachycardia, 89% room air and left groin/hip pain with lymphadenopathy palpated, CTA chest abdomen pelvis ordered.  Diffuse lymphadenopathy enlarged from prior noted, no PE.    Hypercalcemia noted 17.8, previous was 10 less than a week ago.  Discussed with nephrology, they requested to 50 cc IV fluids/hour, they ordered calcitonin and Zometa.  Potassium 3.4, given replacement.    Discussed with hematology, Dr. Santos, he will see patient also given enlarged lymphadenopathy compared to prior.    Given cough, tachycardia, hypoxia, leukocytosis and inability rule out consolidations on CT chest will give empiric antibiotics for pneumonia.  Procalcitonin, complete respiratory panel pending.  Blood cultures drawn.  IV Rocephin and azithromycin ordered.  Medical Decision Making      Disposition and Plan     Clinical Impression:  1. Hypercalcemia    2. Hypokalemia    3. Pain of left hip    4. Chronic neck pain    5. CLL (chronic lymphocytic leukemia) (HCC)    6. Thrombocytopenia    7. Community acquired pneumonia, unspecified laterality          Disposition:  Admit  3/15/2025  2:23 pm    Follow-up:  No follow-up provider specified.        Medications Prescribed:  Current Discharge Medication List              Supplementary Documentation:         Hospital Problems       Present on Admission  Date Reviewed: 3/8/2025            ICD-10-CM Noted POA    * (Principal) Hypercalcemia E83.52 3/15/2025 Unknown

## 2025-03-15 NOTE — ED INITIAL ASSESSMENT (HPI)
C/o of pain to head, neck, back and arms that has been on going over the last two weeks. With congested cough and KAJAL with exertion   No known injury or fall.

## 2025-03-15 NOTE — ED QUICK NOTES
Orders for admission, patient is aware of plan and ready to go upstairs. Any questions, please call ED RN Awais at extension [phone].     Patient Covid vaccination status: Unvaccinated     COVID Test Ordered in ED: $$$$Respiratory Flu Expanded Panel + Covid-19$$$$    COVID Suspicion at Admission: N/A    Running Infusions:    sodium chloride 250 mL/hr at 03/15/25 1653        Mental Status/LOC at time of transport: x3    Other pertinent information:   CIWA score: N/A   NIH score:  N/A

## 2025-03-15 NOTE — H&P
[facility]     Lisa Cabral Patient Status:  Emergency    3/12/1975 MRN [id number]   Location [facility] EMERGENCY DEPARTMENT Attending eDborah Caraballo DO   Hosp Day # 0 PCP Ashley Cross DO     Chief Complaint: neck pain    Subjective:    History of Present Illness:     Lisa Cabral is a 50 year old female with history of chronic left-sided neck pain, CLL presents emergency room with worsening neck pain.  Patient complains that she has had a cough over the last 2 weeks that is nonproductive.  No fevers, chills, nausea, vomiting, diarrhea or constipation.  Patient takes oxycodone for her chronic neck pain and her pain doctor recently increased her to 10 mg.  No dizziness, lightheadedness, or syncope.    History/Other:    Past Medical History:  Past Medical History:    Anxiety    Cancer (HCC)    CLL    Cervical radiculopathy    CLL (chronic lymphocytic leukemia) (HCC)    Depression    PONV (postoperative nausea and vomiting)    Visual impairment    reading glasses     Past Surgical History:   Past Surgical History:   Procedure Laterality Date    Hysterectomy      1 ovary in place    Laparoscopic cholecystectomy      Lumbar spine fusion combined      Other      C-Spine fusion and revision      Family History:   Family History   Problem Relation Age of Onset    High Blood Pressure Father     High Blood Pressure Mother     Heart Disease Maternal Grandfather     Cancer Paternal Grandfather     Cancer Paternal Aunt         breast    Breast Cancer Paternal Aunt     Colon Cancer Maternal Uncle     Cancer Maternal Uncle         cancer     Social History:    reports that she has been smoking cigarettes. She has been exposed to tobacco smoke. She has never used smokeless tobacco. She reports that she does not currently use alcohol. She reports that she does not currently use drugs after having used the following drugs: Cannabis.     Allergies: Allergies[1]    Medications:   Medications Ordered Prior to Encounter[2]    Review of Systems:   A comprehensive review of systems was completed.    Pertinent positives and negatives noted in the HPI.    Objective:   Physical Exam:    BP (!) 127/91   Pulse 115   Temp 97 °F (36.1 °C) (Temporal)   Resp 20   SpO2 95%   General: No acute distress, Alert  Respiratory: No rhonchi, no wheezes  Cardiovascular: S1, S2. Regular rate and rhythm  Abdomen: Soft, Non-tender, non-distended, positive bowel sounds  Neuro: No new focal deficits  Extremities: No edema      Results:    Labs:      Labs Last 24 Hours:    Recent Labs   Lab 03/08/25  2130 03/15/25  1313   RBC 4.64 4.80   HGB 14.7 15.3   HCT 42.9 44.8   MCV 92.5 93.3   MCH 31.7 31.9   MCHC 34.3 34.2   RDW 12.5 12.2   NEPRELIM 17.50* 13.29*   WBC 20.8* 17.6*   .0 74.0*       Recent Labs   Lab 03/08/25  2130 03/15/25  1313   * 192*   BUN 13 21   CREATSERUM 0.91 1.19*   EGFRCR 77 55*   CA 10.8* 17.8*   ALB 5.0* 4.5    139   K 4.1 3.4*   CL 98 96*   CO2 29.0 34.0*   ALKPHO 341*  --    AST 19  --    ALT 30  --    BILT 0.3  --    TP 7.7  --        Estimated Glomerular Filtration Rate: 55 mL/min/1.73m2 (A) (result from lab).    Lab Results   Component Value Date    INR 0.94 10/24/2023    INR 0.93 09/27/2023       Recent Labs   Lab 03/15/25  1313   TROPHS 27       No results for input(s): \"TROP\", \"PBNP\" in the last 168 hours.    No results for input(s): \"PCT\" in the last 168 hours.    Imaging: Imaging data reviewed in Epic.    Assessment & Plan:      # Pneumonia  - Will continue on ceftriaxone and azithromycin  - Blood cultures pending    # Hypercalcemia  - Pt got zoledronic acid and calcitonin.  - continue IVF  - nephrology on consult.    # CLL  - will consult oncology for her CLL meds    # Chronic neck pain  - will continue on percocet  -continue on muscle relaxants    # Hip pain  -CT abdomen pelvis negative for anything involving bony structures.    # Hypokalemia  - will replace per  protocol.    # Leukocytosis  - secondary to CLL    # Migraines  - Will contin ue home meds    # Depression  - will continue on wellbutrin.  Plan of care discussed with patient at bedside.    Polo Blanco,     Supplementary Documentation:     The 21st Century Cures Act makes medical notes like these available to patients in the interest of transparency. Please be advised this is a medical document. Medical documents are intended to carry relevant information, facts as evident, and the clinical opinion of the practitioner. The medical note is intended as peer to peer communication and may appear blunt or direct. It is written in medical language and may contain abbreviations or verbiage that are unfamiliar.            [1]   Allergies  Allergen Reactions    Aleve [Naproxen] HIVES and RASH    Ultram [Tramadol] NAUSEA AND VOMITING     Severe migraine   [2]   Current Facility-Administered Medications on File Prior to Encounter   Medication Dose Route Frequency Provider Last Rate Last Admin    [COMPLETED] gadoterate meglumine (Dotarem) 10 MMOL/20ML injection 20 mL  20 mL Intravenous ONCE PRN Elsy Herring MD   20 mL at 03/09/25 1500    [COMPLETED] ondansetron (Zofran) 4 MG/2ML injection 4 mg  4 mg Intravenous Once Zach Adhikari MD   4 mg at 03/08/25 2138    [COMPLETED] iopamidol 76% (ISOVUE-370) injection for power injector  75 mL Intravenous ONCE PRN Zach Adhikari MD   75 mL at 03/08/25 2229    [COMPLETED] diazePAM (Valium) tab 5 mg  5 mg Oral Once Danay Zepeda MD   5 mg at 03/07/25 0042    [COMPLETED] dexAMETHasone PF (Decadron) 10 MG/ML injection 10 mg  10 mg Intravenous Once Vanesa Schmidt DO   10 mg at 03/06/25 0029    [COMPLETED] HYDROmorphone (Dilaudid) 1 MG/ML injection 1 mg  1 mg Intravenous Once Vanesa Schmidt DO   1 mg at 03/06/25 0030    [COMPLETED] ondansetron (Zofran) 4 MG/2ML injection 4 mg  4 mg Intravenous Once Vanesa Schmidt DO   4 mg at 03/06/25 0040     [COMPLETED] oxyCODONE ER (OxyCONTIN ER) 12 hr tab 10 mg  10 mg Oral Once Vaensa Schmidt L DO   10 mg at 03/06/25 0352    [COMPLETED] diazepam (Valium) 5 mg/mL injection 2.5 mg  2.5 mg Intravenous Once Danay Zepeda MD   2.5 mg at 03/06/25 2152    [COMPLETED] acetaminophen (Tylenol Extra Strength) tab 1,000 mg  1,000 mg Oral Once Danay Zepeda MD   1,000 mg at 03/06/25 2148    [COMPLETED] metoclopramide (Reglan) 5 mg/mL injection 10 mg  10 mg Intravenous Once Danay Zepeda MD   10 mg at 03/06/25 2201    [COMPLETED] HYDROmorphone (Dilaudid) 1 MG/ML injection 0.5 mg  0.5 mg Intravenous Once Danay Zepeda MD   0.5 mg at 03/06/25 2223    [COMPLETED] sodium chloride 0.9 % IV bolus 500 mL  500 mL Intravenous Once Danay Zepeda MD   Stopped at 03/07/25 0032    [COMPLETED] dexAMETHasone PF (Decadron) 10 MG/ML injection 10 mg  10 mg Intravenous Once Brice Rojo MD   10 mg at 03/02/25 1249    [COMPLETED] morphINE PF 4 MG/ML injection 4 mg  4 mg Intravenous Once Brice Rojo MD   4 mg at 03/02/25 1245    [COMPLETED] ondansetron (Zofran) 4 MG/2ML injection 4 mg  4 mg Intravenous Once Brice Rojo MD   4 mg at 03/02/25 1245    [COMPLETED] HYDROmorphone (Dilaudid) 1 MG/ML injection 0.5 mg  0.5 mg Intravenous Once Brice Rojo MD   0.5 mg at 03/02/25 1322    [COMPLETED] HYDROmorphone (Dilaudid) 1 MG/ML injection 0.5 mg  0.5 mg Intravenous Once Brice Rojo MD   0.5 mg at 03/02/25 1449    [COMPLETED] iopamidol 76% (ISOVUE-370) injection for power injector  100 mL Intravenous ONCE PRN Elsy Herring MD   100 mL at 02/14/25 0919     Current Outpatient Medications on File Prior to Encounter   Medication Sig Dispense Refill    methocarbamol 750 MG Oral Tab TAKE 1 TABLET BY MOUTH DAILY AT NIGHTTIME      Acalabrutinib Maleate 100 MG Oral Tab Take 100 mg by mouth in the morning and 100 mg before bedtime. (Patient not taking: Reported on 3/7/2025) 60 tablet 6    prochlorperazine (COMPAZINE) 10 mg tablet Take 1  tablet (10 mg total) by mouth every 6 (six) hours as needed for Nausea. 30 tablet 3    Rizatriptan Benzoate 10 MG Oral Tab Take 1 tablet (10 mg total) by mouth as needed for Migraine. may repeat dose after 2 hours. No more than 20 mg in 24 hours. 20 tablet 1    ondansetron (ZOFRAN) 8 MG tablet Take 1 tablet (8 mg total) by mouth every 8 (eight) hours as needed for Nausea. 30 tablet 3    buPROPion  MG Oral Tablet 24 Hr Take 1 tablet (150 mg total) by mouth daily. 30 tablet 0    oxyCODONE-acetaminophen  MG Oral Tab every 8 (eight) hours as needed for Pain.  0    sucralfate 1 g Oral Tab Take 1 tablet (1 g total) by mouth 2 (two) times daily.  0    omeprazole 20 MG Oral Capsule Delayed Release TK ONE C PO BID 30 MIN B EATING  0    [] diazePAM 5 MG Oral Tab Take 1 tablet (5 mg total) by mouth every 8 (eight) hours as needed (muscle spasm). 20 tablet 0    OXYCONTIN 10 MG Oral Tablet Extended Release 12 hour Abuse-Deterrent Take 1 tablet (10 mg total) by mouth 2 (two) times daily as needed for Pain.      tiZANidine HCl 2 MG Oral Cap Take 1 capsule (2 mg total) by mouth 3 (three) times daily. (Patient not taking: Reported on 3/15/2025) 30 capsule 0    [] predniSONE 20 MG Oral Tab Take 2 tablets (40 mg total) by mouth daily for 4 days. 8 tablet 0    Acetaminophen-Caffeine (EXCEDRIN TENSION HEADACHE) 500-65 MG Oral Tab Take by mouth.      MAGNESIUM ASPARTATE OR Take by mouth.      lidocaine 5 % External Patch Place 1 patch onto the skin daily. Hips and low back      morphINE 15 MG Oral Tab Take 1 tablet (15 mg total) by mouth every 4 (four) hours as needed for Pain. (Patient not taking: Reported on 3/15/2025) 40 tablet 0    ASHWAGANDHA OR Take 1 capsule by mouth daily.      Turmeric (QC TUMERIC COMPLEX OR) Take 1 tablet by mouth 2 (two) times daily.      Cyanocobalamin (VITAMIN B 12 OR) Take 1 tablet by mouth daily. B 12 complex      Cholecalciferol (VITAMIN D-3) 5000 units Oral Tab Take 1 tablet  (5,000 Units total) by mouth daily.

## 2025-03-16 NOTE — CONSULTS
[facility]  Report of Consultation    Lisa Cabral Patient Status:  Inpatient    3/12/1975 MRN [id number]   Location [facility] 5NW-A Attending Polo Blanco*   Hosp Day # 1 PCP Ashley Cross DO     Reason for Consultation:  hypercalcemia    History of Present Illness:  Lisa Cabral is a 50 year old female with history of CLL who presented with worsening neck pain. Nephrology consulted for hypercalcemia.     She has been having chronic pain that has been worsening. She presented yesterday due to left hip pain. Has been having some shortness of breath as well. In the ER, noted to have serum calcium of 17.8, serum creatinine 1.19, low iPTH and iCal of 2.38. Started on NS and given calcitonin and zometa. She is altered and agitated this morning. Calcium improved this morning.    History:  Past Medical History:    Anxiety    Cancer (HCC)    CLL    Cervical radiculopathy    CLL (chronic lymphocytic leukemia) (HCC)    Depression    PONV (postoperative nausea and vomiting)    Visual impairment    reading glasses     Past Surgical History:   Procedure Laterality Date    Hysterectomy      1 ovary in place    Laparoscopic cholecystectomy      Lumbar spine fusion combined      Other      C-Spine fusion and revision     Family History   Problem Relation Age of Onset    High Blood Pressure Father     High Blood Pressure Mother     Heart Disease Maternal Grandfather     Cancer Paternal Grandfather     Cancer Paternal Aunt         breast    Breast Cancer Paternal Aunt     Colon Cancer Maternal Uncle     Cancer Maternal Uncle         cancer     Denies family history of kidney disease.    reports that she has been smoking cigarettes. She has been exposed to tobacco smoke. She has never used smokeless tobacco. She reports that she does not currently use alcohol. She reports that she does not currently use drugs after having used the following drugs:  Cannabis.    Allergies:  Allergies[1]    Medications:    Current Facility-Administered Medications:     LORazepam (Ativan) 2 mg/mL injection, , ,     nicotine (Nicoderm CQ) 21 MG/24HR patch 1 patch, 1 patch, Transdermal, Daily    hydrALAzine (Apresoline) 20 mg/mL injection 10 mg, 10 mg, Intravenous, Q6H PRN    fentaNYL (Duragesic) 50 MCG/HR patch 1 patch, 1 patch, Transdermal, Q72H    fentaNYL (Sublimaze) 50 mcg/mL injection 50 mcg, 50 mcg, Intravenous, Q3H PRN    sodium chloride 0.9% infusion 1,000 mL, 1,000 mL, Intravenous, Once    sodium chloride 0.9% infusion, , Intravenous, Continuous    buPROPion ER (Wellbutrin XL) 24 hr tab 150 mg, 150 mg, Oral, Daily    diazePAM (Valium) tab 5 mg, 5 mg, Oral, Q8H PRN    lidocaine-menthol 4-1 % patch 1 patch, 1 patch, Transdermal, Daily PRN    methocarbamol (Robaxin) tab 500 mg, 500 mg, Oral, TID PRN    pantoprazole (Protonix) DR tab 20 mg, 20 mg, Oral, QAM AC    rizatriptan (Maxalt-MLT) disintegrating tab 5 mg, 5 mg, Oral, PRN    melatonin tab 3 mg, 3 mg, Oral, Nightly PRN    enoxaparin (Lovenox) 40 MG/0.4ML SUBQ injection 40 mg, 40 mg, Subcutaneous, Daily    acetaminophen (Tylenol Extra Strength) tab 500 mg, 500 mg, Oral, Q4H PRN    ondansetron (Zofran) 4 MG/2ML injection 4 mg, 4 mg, Intravenous, Q6H PRN    prochlorperazine (Compazine) 10 MG/2ML injection 5 mg, 5 mg, Intravenous, Q8H PRN    polyethylene glycol (PEG 3350) (Miralax) 17 g oral packet 17 g, 17 g, Oral, Daily PRN    sennosides (Senokot) tab 17.2 mg, 17.2 mg, Oral, Nightly PRN    bisacodyl (Dulcolax) 10 MG rectal suppository 10 mg, 10 mg, Rectal, Daily PRN    fleet enema (Fleet) rectal enema 133 mL, 1 enema, Rectal, Once PRN    benzonatate (Tessalon) cap 200 mg, 200 mg, Oral, TID PRN    guaiFENesin (Robitussin) 100 MG/5 ML oral liquid 200 mg, 200 mg, Oral, Q4H PRN    oxyCODONE-acetaminophen (Percocet)  MG per tab 1 tablet, 1 tablet, Oral, Q6H PRN **OR** oxyCODONE-acetaminophen (Percocet)  MG per tab 2  tablet, 2 tablet, Oral, Q6H PRN    HYDROmorphone (Dilaudid) 1 MG/ML injection 0.2 mg, 0.2 mg, Intravenous, Q2H PRN **OR** HYDROmorphone (Dilaudid) 1 MG/ML injection 0.4 mg, 0.4 mg, Intravenous, Q2H PRN **OR** HYDROmorphone (Dilaudid) 1 MG/ML injection 0.8 mg, 0.8 mg, Intravenous, Q2H PRN  No current outpatient medications on file.       Review of Systems:  Unable to obtain d/t patient mental status    Physical Exam:  BP (!) 159/107 (BP Location: Left arm)   Pulse (!) 132   Temp 97.8 °F (36.6 °C) (Temporal)   Resp 15   Wt 232 lb 9.4 oz (105.5 kg)   SpO2 94%   BMI 34.35 kg/m²   Temp (24hrs), Av °F (36.7 °C), Min:97 °F (36.1 °C), Max:98.7 °F (37.1 °C)       Intake/Output Summary (Last 24 hours) at 3/16/2025 1103  Last data filed at 3/16/2025 0400  Gross per 24 hour   Intake --   Output 700 ml   Net -700 ml     Wt Readings from Last 3 Encounters:   03/15/25 232 lb 9.4 oz (105.5 kg)   25 238 lb (108 kg)   25 238 lb (108 kg)     General: awake  HEENT: No scleral icterus, MMM  Neck: Supple, no GRACIE or thyromegaly  Cardiac: Regular rate and rhythm, S1, S2 normal  Lungs: Decreased breath sounds at the bases bilaterally.   Abdomen: Soft, non-tender.   Extremities: Without clubbing, cyanosis; no edema  Neurologic: moving all extremities  Skin: Warm and dry, no rashes      Laboratory Data:  Lab Results   Component Value Date    WBC 17.0 2025    HGB 14.0 2025    HCT 41.7 2025    PLT 51.0 2025    CREATSERUM 0.99 2025    BUN 20 2025     2025    K 3.1 2025    K 3.1 2025     2025    CO2 38.0 2025     2025    CA 15.6 2025    ALB 4.4 2025    ALKPHO 260 2025    BILT 0.4 2025    TP 6.7 2025    AST 42 2025    ALT 44 2025    MG 2.6 03/15/2025       Imaging:  All imaging studies reviewed.    Assessment / Plan:    1) Severe Hypercalcemia: non-PTH mediated hypercalcemia in setting of high  risk, likely relapsed CLL. S/p zometa and calcitonin and on IVF. Will need to continue aggressive IVF as tolerated - increase back up to 200mL/hr.     2) CLL: Diagnosed in 2023, follows with Heme/Onc as outpatient. Has high risk mutations. Has been on treatment holiday recently. CT with current lymphadenopathy. Plan for left inguinal biopsy per Heme.     3) Bone pain: likely 2/2 hypercalcemia and recurrent CLL. Pain management per primary    4) Hypokalemia: replace per protocol     Thank you for allowing me to participate in this patient's care. Please feel free to call me with any questions or concerns.     Reva Smalls MD  03/16/25       [1]   Allergies  Allergen Reactions    Aleve [Naproxen] HIVES and RASH    Ultram [Tramadol] NAUSEA AND VOMITING     Severe migraine

## 2025-03-16 NOTE — PROGRESS NOTES
NURSING ADMISSION NOTE      Patient admitted via Cart  Oriented to room. 526  Safety precautions initiated.  Bed in low position.  Call light in reach.    Received pt A&Ox4.  on 2L-3L. No tele. Lovenox for VTE prophylaxis. Tolerating diet. Voids via Purewick/BRP/Bedpan also retaining - Bladder scan Q6. Straight cath. Up 2 assist walker. Meds per MAR. Leg pain mostly but Pain all over. Plan of care continues, no further needs at this time.      Pt was smoking in room- MD aware. Nicotine patch ordered- pt refused.     Pt had urinated on floor several times. Also was retaining when bladder scanned was 760. Straight cath- 700.     Repeatedly getting up unsteady on feet without assistance. Constantly pulling off O2, pulse ox, purewick, IV, crying, moaning, yelling. Slept 1-2 hours pain meds given didn't seem to help majority of the night. Sepsis BPA fired. MD aware.     BP was high- MD aware reduced IVF from 250 to 125 mL/hr & hydralazine given.

## 2025-03-16 NOTE — PLAN OF CARE
Patient AAOx1-2. Generalized confusion. 3L NC. Tele added due to increased HR, ST. EKG completed. Hydralazine PRN. Lovenox. Voids in bathroom/retaining. Straight cath x1. Ongoing uncontrolled pain in AM- fentanyl patch applied to left arm. IV push fentanyl given. PO percocet. IV toradol added- improvement. IVF- 0.9@200 per nephrology. Regular diet, decreased appetite. Assist x2/walker. Walked to bathroom. Bed alarm. Sitter at bedside. Sepsis BPA fired- md aware. Order received for lymph node biopsy. Security called to bedside due to finding vape and cigarettes- sent to security. Patient rounded on routinely. Patient updated on plan of care.    Critical K and Ca, nephrology paged. K replaced [id number].      Problem: Patient/Family Goals  Goal: Patient/Family Long Term Goal  Description: Patient's Long Term Goal: Discharge home  Interventions:- Follow plan of care- See additional Care Plan goals for specific interventions  Outcome: Progressing  Goal: Patient/Family Short Term Goal  Description: Patient's Short Term Goal: Improved pain  Interventions: - fentanyl patch, fentanyl push, percocet, adjusting as tolerated  - See additional Care Plan goals for specific interventions  Outcome: Progressing

## 2025-03-16 NOTE — CONSULTS
Hematology/Oncology Initial Consultation Note    Patient Name: Lisa Cabral  Medical Record Number: [id number]    YOB: 1975   Date of Consultation: 3/16/2025   Physician requesting consultation: Dr Caraballo    Reason for Consultation:  Lisa Cabral was seen today for the diagnosis of CLL    Oncologic History:  CLL diagnosed 9/23.  Obinutuzumab/venetoclax 10/23-9/24.  Relapsed CLL diagnosed 11/24.  Acalabrutinib 11/24-3/25  RT to right humerus/axilla/right iliac bone 12/24-1/25.    On treatment holiday from acalabrutinib     History of Present Illness:      51 y/o F PMH CLL on treatment holiday admitted for hypercalcemia    - A&Ox2 today, not to date. Altered overnight, pulling at [employer], needing sitter  - has had frequent ER visits in last few days  - CTA C/A/P with recurrent lymphadenopathy in chest and pelvis  - called  Erik who is not sure where her acalabrutinib pills are, but will look  - being treated for hypercalcemia; s/p calcitonin and zometa with downtrending calcium levels      Past Medical History:  Past Medical History:    Anxiety    Cancer (HCC)    CLL    Cervical radiculopathy    CLL (chronic lymphocytic leukemia) (HCC)    Depression    PONV (postoperative nausea and vomiting)    Visual impairment    reading glasses       Past Surgical History:   Procedure Laterality Date    Hysterectomy      1 ovary in place    Laparoscopic cholecystectomy      Lumbar spine fusion combined      Other      C-Spine fusion and revision       Home Medications:  Medications Ordered Prior to Encounter[1]    Current Inpatient Medications:  Inpatient Meds:   LORazepam        nicotine  1 patch Transdermal Daily    potassium chloride  40 mEq Oral Once    fentaNYL  1 patch Transdermal Q72H    fentaNYL  50 mcg Intravenous Once    sodium chloride  1,000 mL Intravenous Once    buPROPion ER  150 mg Oral Daily    pantoprazole  20 mg Oral QAM AC    enoxaparin  40 mg Subcutaneous Daily      sodium chloride 125  mL/hr at 03/16/25 0644       PRN Meds:    LORazepam    hydrALAzine    diazePAM    lidocaine-menthol    methocarbamol    rizatriptan    melatonin    acetaminophen    ondansetron    prochlorperazine    polyethylene glycol (PEG 3350)    sennosides    bisacodyl    fleet enema    benzonatate    guaiFENesin    oxyCODONE-acetaminophen **OR** oxyCODONE-acetaminophen    HYDROmorphone **OR** HYDROmorphone **OR** HYDROmorphone    Allergies:   Allergies[2]    Psychosocial History:  Social History     Social History Narrative    , lives with  and 20 y/o daughter    Has 2 biological daughter and 1 stepdaughter and 2 granddaughters and 1 grandson     Social History     Socioeconomic History    Marital status:     Number of children: 2   Occupational History    Occupation: on disability   Tobacco Use    Smoking status: Every Day     Current packs/day: 1.00     Types: Cigarettes     Passive exposure: Current    Smokeless tobacco: Never   Vaping Use    Vaping status: Some Days    Substances: Nicotine   Substance and Sexual Activity    Alcohol use: Not Currently    Drug use: Not Currently     Types: Cannabis     Comment: edible prn for neck pain/headache    Sexual activity: Not Currently     Partners: Male     Birth control/protection: Hysterectomy   Social History Narrative    , lives with  and 20 y/o daughter    Has 2 biological daughter and 1 stepdaughter and 2 granddaughters and 1 grandson     Social Drivers of Health     Food Insecurity: No Food Insecurity (3/15/2025)    NCSS - Food Insecurity     Worried About Running Out of Food in the Last Year: No     Ran Out of Food in the Last Year: No   Transportation Needs: No Transportation Needs (3/15/2025)    NCSS - Transportation     Lack of Transportation: No   Housing Stability: Not At Risk (3/15/2025)    NCSS - Housing/Utilities     Has Housing: Yes     Worried About Losing Housing: No     Unable to Get Utilities: No       Family Medical  History:  Family History   Problem Relation Age of Onset    High Blood Pressure Father     High Blood Pressure Mother     Heart Disease Maternal Grandfather     Cancer Paternal Grandfather     Cancer Paternal Aunt         breast    Breast Cancer Paternal Aunt     Colon Cancer Maternal Uncle     Cancer Maternal Uncle         cancer       Review of Systems:  A 10-point ROS was done with pertinent positives and negative per the HPI    Vital Signs:  Height: --  Weight: 105.5 kg (232 lb 9.4 oz) (03/15 1954)  BSA (Calculated - sq m): --  Pulse: 116 (03/16 0542)  BP: 173/95 (03/16 0625)  Temp: 98.5 °F (36.9 °C) (03/16 0542)  Do Not Use - Resp Rate: --  SpO2: 94 % (03/16 0745)      Wt Readings from Last 6 Encounters:   03/15/25 105.5 kg (232 lb 9.4 oz)   03/08/25 108 kg (238 lb)   03/06/25 108 kg (238 lb)   03/02/25 108 kg (238 lb)   01/28/25 113.2 kg (249 lb 9.6 oz)   01/07/25 112.6 kg (248 lb 3.2 oz)         Physical Examination:  General: Patient altered  Psych: Mood and affect are appropriate  Eyes: EOMI, PERRL  ENT: Oropharynx is clear, no adenopathy  CV: nlS1/S2, no LE edema  Respiratory: CTAb, non-labored respirations  GI/Abd: Soft, non-tender   Neurological: A&Ox2  Lymphatics: Palpable inguinal lymphadenopathy    Laboratory:  Recent Labs   Lab 03/15/25  1313 03/16/25  0701   WBC 17.6* 17.0*   HGB 15.3 14.0   HCT 44.8 41.7   PLT 74.0* 51.0*   MCV 93.3 92.5   RDW 12.2 12.2   NEPRELIM 13.29* 12.32*       Recent Labs   Lab 03/15/25  1313 03/15/25  2051 03/16/25  0701    141 143   K 3.4* 2.8* 3.1*  3.1*   CL 96* 96* 100   CO2 34.0* 37.0* 38.0*   BUN 21 22 20   CREATSERUM 1.19* 1.05* 0.99   * 153* 131*   CA 17.8* 17.5* 15.6*   TP  --  6.8 6.7   ALB 4.5 4.4 4.4   ALKPHO  --  243* 260*   AST  --  37* 42*   ALT  --  40 44   BILT  --  0.4 0.4       No results for input(s): \"PT\", \"INR\", \"PTT\", \"FIB\" in the last 168 hours.    Impression & Plan:     Relapsed CLL  - high risk - unmutated IGHV and del(17p)  -  discussed with spouse likely has recurrent CLL  - plan for L inguinal biopsy to confirm  - after biopsy, can plan to restart acalabrutinib inpt if spouse able to locate    Hypercalcemia  - per nephrology; s/p calcitonin and zometa with improving calcium levels. This is likely causing her AMS    Will continue to follow    Aubrey Santos MD  [facility]           [1]   Current Facility-Administered Medications on File Prior to Encounter   Medication Dose Route Frequency Provider Last Rate Last Admin    [COMPLETED] gadoterate meglumine (Dotarem) 10 MMOL/20ML injection 20 mL  20 mL Intravenous ONCE PRN Elsy Herring MD   20 mL at 03/09/25 1500    [COMPLETED] ondansetron (Zofran) 4 MG/2ML injection 4 mg  4 mg Intravenous Once Zach Adhikari MD   4 mg at 03/08/25 2138    [COMPLETED] iopamidol 76% (ISOVUE-370) injection for power injector  75 mL Intravenous ONCE PRN Zach Adhikari MD   75 mL at 03/08/25 2229    [COMPLETED] diazePAM (Valium) tab 5 mg  5 mg Oral Once Danay Zepeda MD   5 mg at 03/07/25 0042    [COMPLETED] dexAMETHasone PF (Decadron) 10 MG/ML injection 10 mg  10 mg Intravenous Once Vanesa Schmidt DO   10 mg at 03/06/25 0029    [COMPLETED] HYDROmorphone (Dilaudid) 1 MG/ML injection 1 mg  1 mg Intravenous Once Vanesa Schmidt DO   1 mg at 03/06/25 0030    [COMPLETED] ondansetron (Zofran) 4 MG/2ML injection 4 mg  4 mg Intravenous Once Vanesa Schmidt DO   4 mg at 03/06/25 0040    [COMPLETED] oxyCODONE ER (OxyCONTIN ER) 12 hr tab 10 mg  10 mg Oral Once Vanesa Schmidt DO   10 mg at 03/06/25 0352    [COMPLETED] diazepam (Valium) 5 mg/mL injection 2.5 mg  2.5 mg Intravenous Once Danay Zepeda MD   2.5 mg at 03/06/25 2152    [COMPLETED] acetaminophen (Tylenol Extra Strength) tab 1,000 mg  1,000 mg Oral Once Danay Zepeda MD   1,000 mg at 03/06/25 2148    [COMPLETED] metoclopramide (Reglan) 5 mg/mL injection 10 mg  10 mg Intravenous Once Katelyn  MD Danay   10 mg at 03/06/25 2201    [COMPLETED] HYDROmorphone (Dilaudid) 1 MG/ML injection 0.5 mg  0.5 mg Intravenous Once Danay Zepeda MD   0.5 mg at 03/06/25 2223    [COMPLETED] sodium chloride 0.9 % IV bolus 500 mL  500 mL Intravenous Once Danay Zepeda MD   Stopped at 03/07/25 0032    [COMPLETED] dexAMETHasone PF (Decadron) 10 MG/ML injection 10 mg  10 mg Intravenous Once Brice Rojo MD   10 mg at 03/02/25 1249    [COMPLETED] morphINE PF 4 MG/ML injection 4 mg  4 mg Intravenous Once Brice Rojo MD   4 mg at 03/02/25 1245    [COMPLETED] ondansetron (Zofran) 4 MG/2ML injection 4 mg  4 mg Intravenous Once Brice Rojo MD   4 mg at 03/02/25 1245    [COMPLETED] HYDROmorphone (Dilaudid) 1 MG/ML injection 0.5 mg  0.5 mg Intravenous Once Brice Rojo MD   0.5 mg at 03/02/25 1322    [COMPLETED] HYDROmorphone (Dilaudid) 1 MG/ML injection 0.5 mg  0.5 mg Intravenous Once Brice Rojo MD   0.5 mg at 03/02/25 1449    [COMPLETED] iopamidol 76% (ISOVUE-370) injection for power injector  100 mL Intravenous ONCE PRN Elsy Herring MD   100 mL at 02/14/25 0919     Current Outpatient Medications on File Prior to Encounter   Medication Sig Dispense Refill    OXYCONTIN 10 MG Oral Tablet Extended Release 12 hour Abuse-Deterrent Take 1 tablet (10 mg total) by mouth 2 (two) times daily as needed for Pain.      methocarbamol 750 MG Oral Tab TAKE 1 TABLET BY MOUTH DAILY AT NIGHTTIME      Acalabrutinib Maleate 100 MG Oral Tab Take 100 mg by mouth in the morning and 100 mg before bedtime. (Patient not taking: Reported on 3/7/2025) 60 tablet 6    Acetaminophen-Caffeine (EXCEDRIN TENSION HEADACHE) 500-65 MG Oral Tab Take by mouth.      prochlorperazine (COMPAZINE) 10 mg tablet Take 1 tablet (10 mg total) by mouth every 6 (six) hours as needed for Nausea. 30 tablet 3    Rizatriptan Benzoate 10 MG Oral Tab Take 1 tablet (10 mg total) by mouth as needed for Migraine. may repeat dose after 2 hours. No more than 20 mg in 24  hours. 20 tablet 1    ondansetron (ZOFRAN) 8 MG tablet Take 1 tablet (8 mg total) by mouth every 8 (eight) hours as needed for Nausea. 30 tablet 3    lidocaine 5 % External Patch Place 1 patch onto the skin daily. Hips and low back      ASHWAGANDHA OR Take 1 capsule by mouth daily.      Turmeric (QC TUMERIC COMPLEX OR) Take 1 tablet by mouth 2 (two) times daily.      Cyanocobalamin (VITAMIN B 12 OR) Take 1 tablet by mouth daily. B 12 complex      sucralfate 1 g Oral Tab Take 1 tablet (1 g total) by mouth 2 (two) times daily.  0    omeprazole 20 MG Oral Capsule Delayed Release TK ONE C PO BID 30 MIN B EATING  0    Cholecalciferol (VITAMIN D-3) 5000 units Oral Tab Take 1 tablet (5,000 Units total) by mouth daily.      [] diazePAM 5 MG Oral Tab Take 1 tablet (5 mg total) by mouth every 8 (eight) hours as needed (muscle spasm). (Patient taking differently: Take 1 tablet (5 mg total) by mouth every 8 (eight) hours as needed (muscle spasm). Hasn't taken in a while) 20 tablet 0    tiZANidine HCl 2 MG Oral Cap Take 1 capsule (2 mg total) by mouth 3 (three) times daily. (Patient not taking: Reported on 3/15/2025) 30 capsule 0    [] predniSONE 20 MG Oral Tab Take 2 tablets (40 mg total) by mouth daily for 4 days. (Patient not taking: Reported on 3/15/2025) 8 tablet 0    MAGNESIUM ASPARTATE OR Take by mouth. (Patient not taking: Reported on 3/15/2025)      buPROPion  MG Oral Tablet 24 Hr Take 1 tablet (150 mg total) by mouth daily. (Patient not taking: Reported on 3/15/2025) 30 tablet 0    morphINE 15 MG Oral Tab Take 1 tablet (15 mg total) by mouth every 4 (four) hours as needed for Pain. (Patient not taking: Reported on 3/15/2025) 40 tablet 0    oxyCODONE-acetaminophen  MG Oral Tab every 8 (eight) hours as needed for Pain. (Patient not taking: Reported on 3/15/2025)  0   [2]   Allergies  Allergen Reactions    Aleve [Naproxen] HIVES and RASH    Ultram [Tramadol] NAUSEA AND VOMITING     Severe  migraine

## 2025-03-16 NOTE — PROGRESS NOTES
[facility]   part of [facility]     Hospitalist Progress Note     Lisa Cabral Patient Status:  Inpatient    3/12/1975 MRN [id number]   Location [facility] 5NW-A Attending Polo Blanco*   Hosp Day # 1 PCP Ashley Cross DO     Chief Complaint: Pain all over    Subjective:     Patient seen examined at bedside.  Patient writhing in pain trying to get up to move but patient's mental status is altered.  No fevers overnight.  States that the morphine and the Dilaudid do not seem to be touching her pain.    Objective:    Review of Systems:   A comprehensive review of systems was completed; pertinent positive and negatives stated in subjective.    Vital signs:  Temp:  [97 °F (36.1 °C)-98.7 °F (37.1 °C)] 97.8 °F (36.6 °C)  Pulse:  [104-136] 132  Resp:  [15-22] 15  BP: (127-185)/() 159/107  SpO2:  [91 %-96 %] 94 %    Physical Exam:    General: No acute distress  Respiratory: No wheezes, no rhonchi  Cardiovascular: S1, S2, regular rate and rhythm  Abdomen: Soft, Non-tender, non-distended, positive bowel sounds  Neuro: No new focal deficits.   Extremities: No edema      Diagnostic Data:    Labs:  Recent Labs   Lab 03/15/25  1313 25  0701   WBC 17.6* 17.0*   HGB 15.3 14.0   MCV 93.3 92.5   PLT 74.0* 51.0*   BAND 19 24       Recent Labs   Lab 03/15/25  1313 03/15/25  2051 25  0701   * 153* 131*   BUN 21 22 20   CREATSERUM 1.19* 1.05* 0.99   CA 17.8* 17.5* 15.6*   ALB 4.5 4.4 4.4    141 143   K 3.4* 2.8* 3.1*  3.1*   CL 96* 96* 100   CO2 34.0* 37.0* 38.0*   ALKPHO  --  243* 260*   AST  --  37* 42*   ALT  --  40 44   BILT  --  0.4 0.4   TP  --  6.8 6.7       Estimated Glomerular Filtration Rate: 69 mL/min/1.73m2 (result from lab).    Recent Labs   Lab 03/15/25  1313   TROPHS 27       No results for input(s): \"PTP\", \"INR\" in the last 168 hours.               Microbiology    No results found for this visit on 03/15/25.      Imaging: Reviewed in  Epic.    Medications:    LORazepam        nicotine  1 patch Transdermal Daily    fentaNYL  1 patch Transdermal Q72H    sodium chloride  1,000 mL Intravenous Once    buPROPion ER  150 mg Oral Daily    pantoprazole  20 mg Oral QAM AC    enoxaparin  40 mg Subcutaneous Daily       Assessment & Plan:      # Pneumonia  - Will continue on ceftriaxone and azithromycin  - Blood cultures pending     # Hypercalcemia  - Pt got zoledronic acid and calcitonin.  - continue IVF  -Calcium decreased to 15.6  - nephrology on consult.     # CLL  - will consult oncology for her CLL meds  - plan is for lymph node biopsy to confirm recurrent CLL  - might be causing the pain she is having all over her body.  -Started fentanyl patch today  -Will start fentanyl IV pushes every 3 hours as needed.     # Chronic neck pain  - will continue on percocet  -continue on muscle relaxants     # Hip pain  -CT abdomen pelvis negative for anything involving bony structures.     # Hypokalemia  - will replace per protocol.     # Leukocytosis  - secondary to CLL     # Migraines  - Will contin ue home meds     # Depression  - will continue on wellbutrin.      Polo Blanco, DO    Supplementary Documentation:     Quality:  DVT Mechanical Prophylaxis:   SCDs,    DVT Pharmacologic Prophylaxis   Medication    enoxaparin (Lovenox) 40 MG/0.4ML SUBQ injection 40 mg                Code Status: Not on file  Hagan: No urinary catheter in place  Hagan Duration (in days):   Central line:    MOE:     Discharge is dependent on: clinical improvement  At this point Ms. Cabral is expected to be discharge to: Home    The 21st Century Cures Act makes medical notes like these available to patients in the interest of transparency. Please be advised this is a medical document. Medical documents are intended to carry relevant information, facts as evident, and the clinical opinion of the practitioner. The medical note is intended as peer to peer communication and may  appear blunt or direct. It is written in medical language and may contain abbreviations or verbiage that are unfamiliar.

## 2025-03-17 ENCOUNTER — NURSE ONLY (OUTPATIENT)
Dept: ELECTROPHYSIOLOGY | Facility: HOSPITAL | Age: 50
End: 2025-03-17
Attending: INTERNAL MEDICINE
Payer: COMMERCIAL

## 2025-03-17 PROBLEM — N17.9 ACUTE KIDNEY INJURY: Status: ACTIVE | Noted: 2025-03-17

## 2025-03-17 PROBLEM — N17.9 ACUTE KIDNEY INJURY: Status: ACTIVE | Noted: 2025-01-01

## 2025-03-17 PROBLEM — Z51.5 PALLIATIVE CARE ENCOUNTER: Status: ACTIVE | Noted: 2024-01-17

## 2025-03-17 NOTE — CONSULTS
[facility]   part of [facility]  Palliative Care Initial Consult Note    Lisa Cabral Patient Status:  Inpatient    3/12/1975 MRN [id number]   Location [facility] 7NE-A Attending Rubia Santos MD   Hosp Day # 2 PCP Ashley Cross DO     Date of Consult: 3/17/2025  Patient seen at: [facility] Inpatient    Reason for Consultation: Dr. Santos requested a consult for uncontrolled symptoms.      Subjective     History of Present Illness: Lisa Cabral is a 50 year old female with a history of chronic neck and back pain, CLL diagnosed in  on treatment holiday, completed RT to right humerus, axilla and right iliac bone  who was admitted on 3/15/2025 for  neck pain. Work up in our hospital revealed hypercalcemia s/p zometa and calcitonin on IVF, CT scan with current lymphadenopathy, hypokalemia and confusion, thrombocytopenia, neutrophilia, elevated LDH, planning for lymph node biopsy due to concern there is transformation to high grade lymphoma.  Management for metabolic abnormalities.     History was obtained from Epic and / Erik .   Today is day #2 of hospitalization.   This is the first hospitalization in the past 6 months.  3 ED visits prior to admission.     Onc history  She has seen palliative care in the past outpatient.  Enrolled in clinical trial  and started Obinutuzumab/ venetoclax 10/25/23 for 12 months.  Relapsed disease diagnosed . PET scan -worsening uptake involving right proximal humerus, right axillary LN and right iliac bone. She underwent biopsy of right axillary LN  and this showed CLL, no evidence of transformation. Acalabrutinib started. Went to  for second opinion and treatment held 3/2025.     When I entered the room, the patient was unable to participate in a symptom management assessment. Her / Erik and [facility] were present at bedside.      Review of Systems: TRENA         Bowel Movement    No data found in the last 1  encounters.       Wt Readings from Last 6 Encounters:   03/16/25 233 lb 4.8 oz (105.8 kg)   03/08/25 238 lb (108 kg)   03/06/25 238 lb (108 kg)   03/02/25 238 lb (108 kg)   01/28/25 249 lb 9.6 oz (113.2 kg)   01/07/25 248 lb 3.2 oz (112.6 kg)        Palliative Care Social History:   Marital Status:   Children: Yes  Living Situation Prior to Admit: Home with family   Is Patient Confused: Yes  Occupational History: previous teacher and  CNA    Substance History:   reports that she has been smoking cigarettes. She has been exposed to tobacco smoke. She has never used smokeless tobacco.  reports that she does not currently use alcohol.  reports that she does not currently use drugs after having used the following drugs: Cannabis.      Spiritual Assessment:   Evangelical - Parish Not Listed    Past Medical History/Past Surgical History:       Medical History: obtained from [employer]  Past Medical History:    Anxiety    Cancer (HCC)    CLL    Cervical radiculopathy    CLL (chronic lymphocytic leukemia) (HCC)    Depression    PONV (postoperative nausea and vomiting)    Visual impairment    reading glasses     Past Surgical History:   Procedure Laterality Date    Hysterectomy      1 ovary in place    Laparoscopic cholecystectomy      Lumbar spine fusion combined      Other      C-Spine fusion and revision       Family History: obtained from [employer]  Family History   Problem Relation Age of Onset    High Blood Pressure Father     High Blood Pressure Mother     Heart Disease Maternal Grandfather     Cancer Paternal Grandfather     Cancer Paternal Aunt         breast    Breast Cancer Paternal Aunt     Colon Cancer Maternal Uncle     Cancer Maternal Uncle         cancer       Allergies:  Allergies[1]    Medications:     Current Facility-Administered Medications:     calcitonin (Miacalcin) 200 Units/mL injection 424 Units, 4 Units/kg, Subcutaneous, BID    nicotine (Nicoderm CQ) 21 MG/24HR patch 1 patch, 1 patch, Transdermal,  Daily    oxyCODONE-acetaminophen (Percocet) 5-325 MG per tab 1 tablet, 1 tablet, Oral, Q4H PRN    labetalol (Trandate) 5 mg/mL injection 10 mg, 10 mg, Intravenous, Q4H PRN    haloperidol lactate (Haldol) 5 MG/ML injection 2 mg, 2 mg, Intravenous, Q6H PRN    HYDROmorphone (Dilaudid) 1 MG/ML injection 0.5 mg, 0.5 mg, Intravenous, Q3H PRN **OR** HYDROmorphone (Dilaudid) 1 MG/ML injection 1 mg, 1 mg, Intravenous, Q3H PRN    acetaminophen (Ofirmev) 10 mg/mL infusion premix 1,000 mg, 1,000 mg, Intravenous, Q6H PRN    pantoprazole (Protonix) 40 mg in sodium chloride 0.9% PF 10 mL IV push, 40 mg, Intravenous, Q24H    dexamethasone (Decadron) 4 MG/ML injection 6 mg, 6 mg, Intravenous, Q8H    sodium chloride 0.9% infusion 1,000 mL, 1,000 mL, Intravenous, Once    sodium chloride 0.9% infusion, , Intravenous, Continuous    diazePAM (Valium) tab 5 mg, 5 mg, Oral, Q8H PRN    lidocaine-menthol 4-1 % patch 1 patch, 1 patch, Transdermal, Daily PRN    methocarbamol (Robaxin) tab 500 mg, 500 mg, Oral, TID PRN    rizatriptan (Maxalt-MLT) disintegrating tab 5 mg, 5 mg, Oral, PRN    melatonin tab 3 mg, 3 mg, Oral, Nightly PRN    enoxaparin (Lovenox) 40 MG/0.4ML SUBQ injection 40 mg, 40 mg, Subcutaneous, Daily    acetaminophen (Tylenol Extra Strength) tab 500 mg, 500 mg, Oral, Q4H PRN    ondansetron (Zofran) 4 MG/2ML injection 4 mg, 4 mg, Intravenous, Q6H PRN    polyethylene glycol (PEG 3350) (Miralax) 17 g oral packet 17 g, 17 g, Oral, Daily PRN    sennosides (Senokot) tab 17.2 mg, 17.2 mg, Oral, Nightly PRN    bisacodyl (Dulcolax) 10 MG rectal suppository 10 mg, 10 mg, Rectal, Daily PRN    fleet enema (Fleet) rectal enema 133 mL, 1 enema, Rectal, Once PRN    benzonatate (Tessalon) cap 200 mg, 200 mg, Oral, TID PRN    guaiFENesin (Robitussin) 100 MG/5 ML oral liquid 200 mg, 200 mg, Oral, Q4H PRN    Functional Status History: A week prior to admission she was independent  ADLs: bathing or showering, dressing, getting in and out of bed or  a chair, walking, using the toilet, and eating  - LOW  1 - 3 performance deficits   IADLs: use the phone, shop for groceries, meal preparation, manage medicines, clean living area, use transportation by self, manage money  - MODERATE  3 - 5 performance deficits      Palliative Performance Scale:     (pt/family reported) 60%    Current: 35%  % Ambulation Activity Level Self-Care Intake Consciousness   100 Full  Normal  No Disease Full Normal Full   90 Full  Normal  Some Disease Full Normal Full   80 Full  Normal w/effort  Some Disease Full Normal or reduced Full   70 Reduced  Can't Perform Job  Some Disease Full Normal or reduced Full   60 Reduced  Can't Perform Hobby   Significant Disease Occ Assist Normal or reduced Full or confused   50 Mainly sit/lie Can't do any work  Extensive Disease Partial Assist Normal or reduced Full or confused   40 Mainly in bed Can't do any work  Extensive Disease Mainly Assist Normal or reduced Full or confused   30 Bed Bound Can't do any work  Extensive Disease Max Assist  Total Care Reduced  Drowsy/confused   20 Bed Bound Can't do any work  Extensive Disease Max Assist  Total Care Minimal  Drowsy/confused   10 Bed Bound Can't do any work  Extensive Disease Max Assist  Total Care Mouth Care  Drowsy/confused   0 Death        Objective      Vital Signs:  Blood pressure (!) 154/94, pulse 113, temperature 99.4 °F (37.4 °C), temperature source Axillary, resp. rate 25, weight 233 lb 4.8 oz (105.8 kg), SpO2 95%, not currently breastfeeding.  Body mass index is 34.45 kg/m².  Present Level of pain: TRENA  Non-verbal signs of pain present: moaning spontaneously, eyes closed, no focal pain point when examined.     Physical Exam:  General: Lethargic. In no apparent respiratory distress. Body habitus Obese   HEENT:   Dry MM   Cardiac: tachycardic  Lungs: Normal effort, on nasal canula when not removed by patient.   Abdomen: Soft, non-tender, non-distended, normal bowel sounds X 4 quadrants    Extremities: no pitting LE edema present  Neurologic: lethargic and not oriented to  person, place, time, situation.   Psychiatric: Mood delirious   Skin: Warm and dry.    Hematology:  Lab Results   Component Value Date    WBC 17.0 (H) 03/16/2025    HGB 14.0 03/16/2025    HCT 41.7 03/16/2025    PLT 51.0 (L) 03/16/2025       Coags:  Lab Results   Component Value Date    INR 1.42 (H) 03/17/2025       Chemistry:  Lab Results   Component Value Date    CREATSERUM 1.16 (H) 03/17/2025    BUN 26 (H) 03/17/2025     (H) 03/17/2025    K 3.2 (L) 03/17/2025    K 3.2 (L) 03/17/2025     03/17/2025    CO2 36.0 (H) 03/17/2025     (H) 03/17/2025    CA 12.6 (H) 03/17/2025    ALB 4.0 03/17/2025    ALKPHO 527 (H) 03/17/2025    BILT 0.5 03/17/2025    TP 6.1 03/17/2025    AST 93 (H) 03/17/2025    ALT 37 03/17/2025    MG 2.6 03/15/2025    PHOS 2.7 06/05/2024       Imaging:  CT BRAIN OR HEAD (CPT=70450)    Result Date: 3/17/2025  PROCEDURE:  CT BRAIN OR HEAD (00566)  COMPARISON:  [facility] , CT, CTA CAROTID ARTERIES (CPT=70498), 3/08/2025, 10:20 PM.  [facility] , CT, CT BRAIN OR HEAD (99072), 10/30/2023, 8:50 PM.  INDICATIONS:  altered mental status  TECHNIQUE:  Noncontrast CT scanning is performed through the brain. Dose reduction techniques were used. Dose information is transmitted to the ACR (American College of Radiology) NRDR (National Radiology Data Registry) which includes the Dose Index Registry.  PATIENT STATED HISTORY: (As transcribed by Technologist)  AMS   FINDINGS: Examination is significantly limited by motion artifact.  No hemorrhage or extra-axial fluid collection. Lucencies in the deep periventricular white matter are likely sequelae of chronic small vessel ischemic disease. Ventricles and sulci are appropriate for the patient's age. No mass effect. Visualized portions of paranasal sinuses are unremarkable. Visualized portions of the mastoid air cells are unremarkable. Visualized portions of the orbits are  unremarkable. IMPRESSION: Motion artifact limits examination significantly.  Minimal high density in sulci is more than likely artifactual from motion.  No evidence of an acute intracranial abnormality.    LOCATION:  [id number]   Dictated by (CST): Jourdan Shine MD on 3/17/2025 at 10:50 AM     Finalized by (CST): Jourdan Shine MD on 3/17/2025 at 10:54 AM       XR CHEST AP PORTABLE  (CPT=71045)    Result Date: 3/16/2025  CONCLUSION:  Stable heart size and pulmonary vascularity.  There is discoid atelectasis in the retrocardiac left lung base which appears stable.  Discoid atelectasis right mid lung appears stable.  Tortuous thoracic aorta unchanged.  Plate and screw fixation lower cervical spine.   LOCATION:  [facility]      Dictated by (CST): Asia Barnes MD on 3/16/2025 at 8:01 AM     Finalized by (CST): Asia Barnes MD on 3/16/2025 at 8:01 AM       CTA CHEST + CT ABD (W) + CT PEL (W) (CPT=71275/63510)    Result Date: 3/15/2025  CONCLUSION:  Progressive lymphadenopathy is seen within the visualized chest and pelvis.  Please see above for further details.  No evidence of pulmonary embolism to the first subsegmental arterial level.  Airspace opacities within the lung bases may represent areas of atelectasis.  Consolidation is difficult to exclude.   LOCATION:  [facility]   Dictated by (CST): Marcus Mondragon MD on 3/15/2025 at 3:40 PM     Finalized by (CST): Marcus Mondragon MD on 3/15/2025 at 3:48 PM       XR CHEST AP PORTABLE  (CPT=71045)    Result Date: 3/15/2025  CONCLUSION:  See above.   LOCATION:  [facility]      Dictated by (CST): Stromberg, LeRoy, MD on 3/15/2025 at 12:51 PM     Finalized by (CST): Stromberg, LeRoy, MD on 3/15/2025 at 12:52 PM        Summary of Discussion      I discussed reason for palliative care consultation with her / Erik and MIL at the bedside.     I informed the patient/family that having palliative care support does not limit medical treatment options or decisions to those who wish to  continue curative or restorative medical therapies. I discussed the benefits of palliative care to include assistance with arising symptom management needs, an extra layer of support, to ensure GOC are respected throughout healthcare continuum.      Prognostic awareness/understanding related to her new clinical situation is unclear to the . He is aware she may have cancer progression. He is not clear if her symptoms are not cancer related, what is the cause of her altered mental status?    He is aware her hypercalcemia is improving thus low suspicion to the cause of her confusion/ delirium.   Erik shared she had her routine spine ablation about 3 weeks ago.  Her chronic pain medications are given to her from Erik. Her neck pain had been escalating and she was due for an injection but insurance was no approving it.   We discussed her need for ongoing pain medications due to the chronicity of pain med use although do not want to escalate her medication requirements or give medication without knowing a true source of severe pain.  Most recently she was taking oxycontin ER 10 po BID and Oxycodone IR 10 mg every 4 hours as needed. Daily  mg. She had been given Valium out patient although Erik had not given it to her when she started declining physically.  Valium discontinued here to minimize potential medication side effects.  She remains on Dilaudid IV as needed every 3 hours, calling out has slightly improved over the afternoon hours although altered mental status persists.     Hopes/goals:   Erik hopes to understand the cause to the change in Lisa's clinical condition.   We discussed the lymph node biopsy and the possible difficulty for Lisa to cooperate given her altered mental status.     HC POA - surrogate Erik / .   POLST FORM deferred at this time.       Principal Problem:    Hypercalcemia  Active Problems:    CLL (chronic lymphocytic leukemia) (HCC)    Hypokalemia    Pain of left  hip    Chronic neck pain    Thrombocytopenia    Community acquired pneumonia, unspecified laterality    Acute kidney injury  Palliative Care encounter    Assessment and Recommendations      Goals of care:   Continue medical evaluation to determine symptom causes.   Continue to evaluate symptoms, supportive care for delirium symptoms. Pain medication available IV as needed to be given cautiously as to not worsen her mentation or respiratory status. She is unable to currently take in oral pain medications.       Discussed today's visit with Dr. Santos and Fatuma RN.     Palliative Care Follow Up: Palliative care team will follow up peripherally and clinically support following further testing if symptoms are cancer related.    Palliative care follow up outpatient: Established with [facility] .    Thank you for allowing Palliative Care services to participate in the care of Lisa Cabral.    A total of 55 minutes were spent on this consult, which included all of the following: chart review, direct face to face contact, history taking, physical examination, advanced care planning,  counseling and documentation.     Charisse Aguirre, LETTY  3/17/2025  11:56 AM  Palliative Care Services    The 21st Century Cures Act makes medical notes like these available to patients in the interest of transparency. Please be advised this is a medical document. Medical documents are intended to carry relevant information, facts as evident, and the clinical opinion of the practitioner. The medical note is intended as peer to peer communication and may appear blunt or direct. It is written in medical language and may contain abbreviations or verbiage that are unfamiliar.          [1]   Allergies  Allergen Reactions    Aleve [Naproxen] HIVES and RASH    Ultram [Tramadol] NAUSEA AND VOMITING     Severe migraine

## 2025-03-17 NOTE — PROGRESS NOTES
[facility]  Nephrology Progress Note    Lisa Cabral Patient Status:  Inpatient    3/12/1975 MRN [id number]   Location [facility] 7NE-A Attending Rubia Santos MD   Hosp Day # 2 PCP Ashley Cross,      Subjective:  Awake, confused and moaning    Objective:  Vital signs: Blood pressure (!) 154/94, pulse 113, temperature 99.4 °F (37.4 °C), temperature source Axillary, resp. rate 25, weight 233 lb 4.8 oz (105.8 kg), SpO2 97%, not currently breastfeeding.  General: Awake, confused  HEENT: Moist mucous membranes.   Respiratory: Clear to auscultation bilaterally.    Cardiovascular: S1, S2.  Regular rate and rhythm.    Abdomen: Soft, nontender, nondistended.    Neurologic: Confused, does not answer questions   Musculoskeletal: No swelling noted.  Integument: No lesions. No erythema.    Current Facility-Administered Medications   Medication Dose Route Frequency    calcitonin (Miacalcin) 200 Units/mL injection 424 Units  4 Units/kg Subcutaneous BID    nicotine (Nicoderm CQ) 21 MG/24HR patch 1 patch  1 patch Transdermal Daily    oxyCODONE-acetaminophen (Percocet) 5-325 MG per tab 1 tablet  1 tablet Oral Q4H PRN    labetalol (Trandate) 5 mg/mL injection 10 mg  10 mg Intravenous Q4H PRN    haloperidol lactate (Haldol) 5 MG/ML injection 2 mg  2 mg Intravenous Q6H PRN    ketorolac (Toradol) 15 MG/ML injection 15 mg  15 mg Intravenous Q6H PRN    HYDROmorphone (Dilaudid) 1 MG/ML injection 0.5 mg  0.5 mg Intravenous Q3H PRN    Or    HYDROmorphone (Dilaudid) 1 MG/ML injection 1 mg  1 mg Intravenous Q3H PRN    acetaminophen (Ofirmev) 10 mg/mL infusion premix 1,000 mg  1,000 mg Intravenous Q6H PRN    pantoprazole (Protonix) 40 mg in sodium chloride 0.9% PF 10 mL IV push  40 mg Intravenous Q24H    dexamethasone (Decadron) 4 MG/ML injection 6 mg  6 mg Intravenous Q8H    sodium chloride 0.9% infusion 1,000 mL  1,000 mL Intravenous Once    sodium chloride 0.9% infusion   Intravenous Continuous    diazePAM (Valium)  tab 5 mg  5 mg Oral Q8H PRN    lidocaine-menthol 4-1 % patch 1 patch  1 patch Transdermal Daily PRN    methocarbamol (Robaxin) tab 500 mg  500 mg Oral TID PRN    rizatriptan (Maxalt-MLT) disintegrating tab 5 mg  5 mg Oral PRN    melatonin tab 3 mg  3 mg Oral Nightly PRN    enoxaparin (Lovenox) 40 MG/0.4ML SUBQ injection 40 mg  40 mg Subcutaneous Daily    acetaminophen (Tylenol Extra Strength) tab 500 mg  500 mg Oral Q4H PRN    ondansetron (Zofran) 4 MG/2ML injection 4 mg  4 mg Intravenous Q6H PRN    polyethylene glycol (PEG 3350) (Miralax) 17 g oral packet 17 g  17 g Oral Daily PRN    sennosides (Senokot) tab 17.2 mg  17.2 mg Oral Nightly PRN    bisacodyl (Dulcolax) 10 MG rectal suppository 10 mg  10 mg Rectal Daily PRN    fleet enema (Fleet) rectal enema 133 mL  1 enema Rectal Once PRN    benzonatate (Tessalon) cap 200 mg  200 mg Oral TID PRN    guaiFENesin (Robitussin) 100 MG/5 ML oral liquid 200 mg  200 mg Oral Q4H PRN       Recent Labs     03/15/25  1313 03/16/25  0701   WBC 17.6* 17.0*   HGB 15.3 14.0   MCV 93.3 92.5   PLT 74.0* 51.0*   BAND 19 24       Recent Labs     03/15/25  1313 03/15/25  2051 03/16/25  0701 03/16/25  1620 03/17/25  0209    141 143 143 146*   K 3.4* 2.8* 3.1*  3.1* 2.8* 3.2*  3.2*   CL 96* 96* 100 98 104   CO2 34.0* 37.0* 38.0* 36.0* 36.0*   BUN 21 22 20 22 26*   CREATSERUM 1.19* 1.05* 0.99 0.97 1.16*   CA 17.8* 17.5* 15.6* 14.1* 12.6*   MG 2.6  --   --   --   --        Recent Labs     03/15/25  1313 03/15/25  2051 03/16/25  0701 03/16/25  1620 03/17/25  0209   ALT  --  40 44 38 37   AST  --  37* 42* 64* 93*   ALB 4.5 4.4 4.4 4.0 4.0   LDH  --   --   --  1,488*  --          Assessment/Plan:  1) Severe Hypercalcemia: non-PTH mediated hypercalcemia in setting of high risk CLL and possible transformation to high-grade lymphoma. S/p zometa and calcitonin and currently on IVF. Calcium improving, continue IVF. Follow serum calcium BID.     2) Acute kidney injury: baseline serum  creatinine ~0.7-0.9 mg/dL. Acute kidney injury likely 2/2 hypercalcemia and contrast associated nephropathy. Continue IVF and follow renal function.      3) CLL: Diagnosed in 2023, follows with Heme/Onc as outpatient. Has high risk mutations. Had been on treatment holiday recently. CT with current lymphadenopathy. Plan for left inguinal biopsy per Heme. May start [facility] inpatient.      4) Bone pain: likely 2/2 hypercalcemia and bony progression of CLL. Pain management per primary     5) Hypokalemia: replace per protocol      Thank you for allowing me to participate in this patient's care. Please feel free to call me with any questions or concerns.     Reva Smalls MD  03/17/25

## 2025-03-17 NOTE — PHYSICAL THERAPY NOTE
Order received, chart reviewed. Communicated with RN, pt not appropriate for therapy today, uncontrolled pain, lethargic. Will hold and follow as appropriate.     Lisa Daley, PT, DPT  03/17/25

## 2025-03-17 NOTE — DIETARY NOTE
[facility]   part of [facility]    NUTRITION ASSESSMENT    Pt does not meet malnutrition criteria at this time.      NUTRITION INTERVENTION:      Meal and Snacks - Monitor and encourage adequate PO intake.   Medical Food Supplements - Ensure Plus High Protein TID, Ensure Clear Once, and Magic Cup TID. Rationale/use for oral supplements discussed.  Vitamin and Mineral Supplements - adding Multivitamin with minerals  Coordination of Nutrition Care - SLP consult prior to diet advancement.      PATIENT STATUS: 03/17/25 Pt is a 51 y/o F admitted for head, neck and back pain w KAJAL. Reviewed pt chart d/t at risk consult. RN in room at time of attempted visit trying to get pt to tolerate juice and jello. She is very confused and does not follow most commands. Pt continuously yelling out in pain and communicating very minimally. Pt is heard yelling, moaning and crying. \"Ow\" and \"Please help me\" repeated every minute or so. She has been unable to tolerate much po intake since admit 3/15. Family arriving as this note is being typed. Tolerating some jello once family arrived.  agreeable to Vanilla Ensure or Apple Ensure Clear to help get extra nutrients. Pt keeps trying to take off oxygen. Cranberry juice brought to pt. Will monitor po and ons intake as well as need for alternative nutrition in case po intake does not improve. All questions answered at this time.    PMH: CLL, LUKE, Hypercalcemia, Class 2 Obesity      ANTHROPOMETRICS:  Ht:  5'9\"  Wt: 105.8 kg (233 lb 4.8 oz).   BMI: Body mass index is 34.45 kg/m².  IBW: 65.9 kg 145 lbs      WEIGHT HISTORY:   Weight loss: Yes, Non-severe Wt loss of 15 lbs, 6%%, over 2 months     Wt Readings from Last 10 Encounters:   03/16/25 105.8 kg (233 lb 4.8 oz)   03/08/25 108 kg (238 lb)   03/06/25 108 kg (238 lb)   03/02/25 108 kg (238 lb)   01/28/25 113.2 kg (249 lb 9.6 oz)   01/07/25 112.6 kg (248 lb 3.2 oz)   12/10/24 114.5 kg (252 lb 6.4 oz)   11/22/24 117.5 kg (259 lb)    08/28/24 116.3 kg (256 lb 6.4 oz)   07/31/24 116.1 kg (256 lb)        NUTRITION:  Diet:       Procedures    Regular/General diet Is Patient on Accuchecks? No      Food Allergies: No  Cultural/Ethnic/Taoist Preferences Addressed: Yes    Percent Meals Eaten (last 3 days)       None            GI system review: WNL Last BM Date:  (days ago - per pt)  Skin and wounds: WNL    NUTRITION RELATED PHYSICAL FINDINGS:     1. Body Fat/Muscle Mass: no wasting noted / well nourished     2. Fluid Accumulation: none    NUTRITION PRESCRIPTION:  65.9 kg 145 lbs IBW  Calories: 2938-2597 calories/day (25-30 kcal/kg)  Protein:  grams protein/day (1.5-2.0 grams protein per kg)  Fluid: ~1 ml/kcal or per MD discretion    NUTRITION DIAGNOSIS/PROBLEM:  Inadequate energy intake related to inability to take or tolerate as evidenced by documented/reported insufficient oral intake      MONITOR AND EVALUATE/NUTRITION GOALS:  PO intake of 75% of meals TID - New  PO intake of 75% of oral nutrition supplement/s - New      MEDICATIONS:  Decadron, Protonix, KCl 40 mEq    GTT: NaCl 200 ml/hr    LABS:  Na 146, K 3.2, Ca 12.6    Pt is at High nutrition risk    Claudette Short  Dietetic Intern

## 2025-03-17 NOTE — PROGRESS NOTES
[facility]  Progress Note    Lisa Cabral Patient Status:  Inpatient    3/12/1975 MRN [id number]   Location [facility] 7NE-A Attending Rubia Santos MD   Hosp Day # 2 PCP Ashley Cross, DO       SUBJECTIVE:    Still confused, moaning and in pain.  Appears to recognize me but could not answer questions meaningfully.    OBJECTIVE:    Vitals:    25 0000 25 0005 25 0403 25 0800   BP:  (!) 179/104 (!) 183/97 (!) 154/94   BP Location:  Left arm Right arm Left arm   Pulse: (!) 121 (!) 122 119 113   Resp: (!) 31 (!) 37 21 25   Temp:  98.7 °F (37.1 °C) 98.2 °F (36.8 °C) 99.4 °F (37.4 °C)   TempSrc:  Axillary Axillary Axillary   SpO2: 95% 94% 93% 97%   Weight:           Wt Readings from Last 1 Encounters:   25 105.8 kg (233 lb 4.8 oz)       LABS:  Recent Labs   Lab 03/15/25  1313 25  0701   RBC 4.80 4.51   HGB 15.3 14.0   HCT 44.8 41.7   MCV 93.3 92.5   MCH 31.9 31.0   MCHC 34.2 33.6   RDW 12.2 12.2   NEPRELIM 13.29* 12.32*   WBC 17.6* 17.0*   PLT 74.0* 51.0*         Recent Labs   Lab 25  0701 25  1620 25  0209   * 134* 136*   BUN 20 22 26*   CREATSERUM 0.99 0.97 1.16*   EGFRCR 69 71 57*   CA 15.6* 14.1* 12.6*   ALB 4.4 4.0 4.0    143 146*   K 3.1*  3.1* 2.8* 3.2*  3.2*    98 104   CO2 38.0* 36.0* 36.0*   ALKPHO 260* 337* 527*   AST 42* 64* 93*   ALT 44 38 37   BILT 0.4 0.4 0.5   TP 6.7 6.3 6.1       MEDICATIONS:     potassium chloride  40 mEq Intravenous Once    nicotine  1 patch Transdermal Daily    oxyCODONE-acetaminophen  1 tablet Oral Q6H    pantoprazole  40 mg Intravenous Q24H    dexamethasone  6 mg Intravenous Q8H    sodium chloride  1,000 mL Intravenous Once    buPROPion ER  150 mg Oral Daily    enoxaparin  40 mg Subcutaneous Daily       oxyCODONE-acetaminophen    labetalol    haloperidol lactate    [DISCONTINUED] ketorolac **OR** ketorolac    HYDROmorphone **OR** HYDROmorphone    acetaminophen     diazePAM    lidocaine-menthol    methocarbamol    rizatriptan    melatonin    acetaminophen    ondansetron    polyethylene glycol (PEG 3350)    sennosides    bisacodyl    fleet enema    benzonatate    guaiFENesin    PHYSICAL EXAM:    General: Restless and agitated, moaning.  Pain on pressure everywhere.  Chest: Clear to auscultation.  Heart: Regular rate and rhythm.   Abdomen: Soft, non tender with good bowel sounds.  Extremities: No edema.  Neurological: Grossly intact.     RADIOLOGY:     ASSESSMENT/PLAN:    # Confusion: Still confused and agitated.  Will consider brain imaging once more stable but likely related to hypercalcemia.  Calcium is better.      # Hypercalcemia: Received zoledronic acid, on fluids and steroids.  Received calcitonin, will give few more doses.  Calcium is improving.  Clinical picture suggests transformation of CLL.    # CLL: High risk disease, initially diag 9/23.  Was on clinical trial and received fixed duration treatment with Obinutuzumab/venetoclax since 10/23-9/24.  Progression diagnosed on lymph node biopsy 11/24.  Received Acalabrutinib from 11/24-3/25.  Stopped due to poor tolerance.  Now imaging shows worsening lymphadenopathy.  LDH has gone from normal to 1488.  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      # Thrombocytopenia: Probably due to bone marrow involvement.    # Neutrophilia: Probably due to steroids.    # Generalized pain: Likely due to bony progression of CLL.  Continue narcotics.  On steroids which should help.    Spoke to  Erik via telephone.  Clinical picture is suggestive of transformation to high-grade lymphoma.  LDH has gone up from normal to almost 1500.  Calcium went from normal when I saw her 10 days back to 17.8 this admission.  Agree with lymph node biopsy but feels strongly that we need to start inpatient treatment with [facility].  If MS improves tomorrow, will put central line and start treatment inpatient.   Spoke to  at length.  Prognosis is poor with transformation.     Tamica Herring M.D.    [facility]   [address] [place], [address]    3/17/2025  8:36 AM

## 2025-03-17 NOTE — PLAN OF CARE
Assumed care at 0700  Patient drowsy, oriented x1-2. Follows some commands  Head CT, EEG, echo, and ABGs completed  R groin lymph node biopsy done. Bandaid clean, intact  Dilaudid and IV tylenol given for pain  Moaning/yelling out in pain throughout day  Palliative care consulted  Refusing to eat for most of day. Minimal PO intake  IV fluids infusing  Straight cath x2    Plan of care discussed with patients  and aunt at bedside

## 2025-03-17 NOTE — PROCEDURES
[facility]   part of [facility]  Procedure Note    Lisa Cabral Patient Status:  Inpatient    3/12/1975 MRN [id number]   Location [facility] 7NE-A Attending Rubia Santos MD   Hosp Day # 2 PCP Ashley Cross DO     Procedure: US guided lymph node biopsy    Pre-Procedure Diagnosis:  inguinal lymphadenopathy    Post-Procedure Diagnosis: same    Anesthesia:  Local    Findings:  solid cores    Specimens: 3 18 g cores    Blood Loss:  <5 ml    Tourniquet Time: n/a  Complications:  None  Drains:  none    Secondary Diagnosis:  none    Dm Fonseca MD  3/17/2025

## 2025-03-17 NOTE — PROGRESS NOTES
Received pt drowsy, awakens and is able to state name and birthday. Yelling out. Constantly pulling at equipment - sitter at bedside.  on 3-4L. ST on tele. Receiving continuous IVF. Hypertensive - PRN hydralazine provided. Reviewed case with hospitalist - labetalol ordered with parameters. Pain management reviewed, see orders. Transfer order placed for cardiac telemetry.     2146: Pt lethargic, unable to follow commands and take PO. Oxycodone held. MD made aware.     Pt retaining urine - straight cath with removal of 850cc.     Pharmacy called to address potassium order as pt unable to take PO. Recommended by pharmacist to redraw K+ and replaced per protocol. Report given to ALEXIS Jj. Pt transferred with all belongings to room 7611. Vape pen found in pt's room under bed, given to charge RN on floor.     2319: phone call with Dr. Tobin - wanting update on pt. MD trying to figure out plan of care for pain management. MD made aware about vape pen.

## 2025-03-17 NOTE — PROGRESS NOTES
Called by nurse.  Pt with high pain though also intermittently lethargic.  High needs, transferred to CTU.  DC transdermal and IV fentanyl.  IV decadron started.  PRN dilaudid and haldol ordered.  Cont. Toradol PRN. Discussed with nurse, pt unlikely to be able to use PCA and given lethargy not safe for continuous infusion.  Scheduled PO percocet (hold if sedated) with PO PRN as well.    Consider MS contin if pain persists.    Martin Tobin MD

## 2025-03-17 NOTE — OCCUPATIONAL THERAPY NOTE
Order received, chart reviewed. Communicated with RN, pt not appropriate for therapy today, uncontrolled pain, lethargic. Will hold and follow as appropriate.

## 2025-03-17 NOTE — PROGRESS NOTES
[facility]   part of [facility]     Hospitalist Progress Note     Lisa Cabral Patient Status:  Inpatient    3/12/1975 MRN [id number]   [facility] 7NE-A Attending Rubia Santos MD   Hosp Day # 2 PCP Ashley Cross DO     Chief Complaint: Neck pain, left hip/groin pain, cough, JARAMILLO, hypoxic on arrival to the ED    Subjective:     Patient is lethargic. Opens eyes briefly to verbal and tactile stimuli but screams in pain with minimal movement.     Objective:    Review of Systems:   A comprehensive review of systems was completed; pertinent positive and negatives stated in subjective.    Vital signs:  Temp:  [98 °F (36.7 °C)-99.4 °F (37.4 °C)] 99.4 °F (37.4 °C)  Pulse:  [113-129] 113  Resp:  [16-37] 25  BP: (154-183)/() 154/94  SpO2:  [85 %-97 %] 95 %    Physical Exam:    General: lethargic. Moans in pain with minimal movement  Respiratory: No wheezes, no rhonchi  Cardiovascular: S1, S2, regular rhythm, tachycardic  Abdomen: Soft, non-distended, positive bowel sounds, tender to palpation   Neuro: lethargic. Able to state her name, Moves all 4 extremities to pain. Non focal exam  Extremities: No edema    Diagnostic Data:    Labs:  Recent Labs   Lab 03/15/25  1313 25  0701 25  0950   WBC 17.6* 17.0*  --    HGB 15.3 14.0  --    MCV 93.3 92.5  --    PLT 74.0* 51.0*  --    BAND 19 24  --    INR  --   --  1.42*       Recent Labs   Lab 25  0701 25  1620 25  0209   * 134* 136*   BUN 20 22 26*   CREATSERUM 0.99 0.97 1.16*   CA 15.6* 14.1* 12.6*   ALB 4.4 4.0 4.0    143 146*   K 3.1*  3.1* 2.8* 3.2*  3.2*    98 104   CO2 38.0* 36.0* 36.0*   ALKPHO 260* 337* 527*   AST 42* 64* 93*   ALT 44 38 37   BILT 0.4 0.4 0.5   TP 6.7 6.3 6.1       Estimated Glomerular Filtration Rate: 57 mL/min/1.73m2 (A) (result from lab).    Recent Labs   Lab 03/15/25  1313   TROPHS 27       Recent Labs   Lab 25  0950   PTP 17.5*   INR 1.42*                   Microbiology    Hospital Encounter on 03/15/25   1. Blood Culture     Status: None (Preliminary result)    Collection Time: 03/15/25  4:45 PM    Specimen: Blood,peripheral   Result Value Ref Range    Blood Culture Result No Growth 1 Day N/A         Imaging: Reviewed in Epic.    Medications:    calcitonin  4 Units/kg Subcutaneous BID    nicotine  1 patch Transdermal Daily    pantoprazole  40 mg Intravenous Q24H    dexamethasone  6 mg Intravenous Q8H    sodium chloride  1,000 mL Intravenous Once    enoxaparin  40 mg Subcutaneous Daily       Assessment & Plan:      #Metabolic encephalopathy   Multifactorial from hypercalcemia, hypernatremia, metabolic alkalosis, related to meds? ABG 3/16 with pH 7.54, PCO2 51  -repeat ABG, check EEG, CT brain   -If negative, would monitor for now and can consider MR brain pending course   -Check B12, folic acid     #Severe hypercalcemia  S/p zometa and calcitonin   -Nephrology following  -IVF per nephrology     #Acute hypoxic respiratory failure suspect 2/2 atelectasis   CTA chest 3/15 with airspace opacities within the lung bases atelectasis vs. Consolidation. Negative for PE. Procal 0.43.  -Received Abx in the ED. They were not continued during admission.   -Repeat CXR 3/16 with discoid atelectasis in the retrocardiac lung base which is stable. And also stable right mid lung atelectasis.   -IS when able     #SIRS with lactic acidosis   -Chronic leukocytosis. Sinus tachycardia could be related to metabolic derangements, hypovolemia. Pneumonia less likely given stable/reassuring repeat CXR 3/16    #Sinus tachycardia  PE ruled out. Related to pain?   -Check TTE     #CLL with concern of transformation to high -grade lymphoma  Imaging with worsening LAD. LDH normal to 1488 on admission.   Heme following  -LN biopsy pending  -Likely needs inpatient treatment with [facility]    #Thrombocytopenia  2/2 bone marrow involvement     #Generalized pain suspected due to bony progression of  CLL  -Decadron 6 mg every 8 hours   -Consult palliative care for symptom management     #Hypokalemia - supplement per protocol   #Hypernatremia - IVF per nephrology     #CKD stage III - trend     #Metabolic alkalosis suspect 2/2 hypercalcemia and renal bicarb reabsorption    #Acute urinary retention   Requiring ISC for now will continue. Monitor     #Elevated ALP, AST  Some chronicity. Related to chemo vs. CLL. Trend     #Low PO intake  -Per spouse, patient has not eaten in 8 days. Very unlike her.   She likely will not tolerate DHT placement at this time. Discussed with  this will be re-evaluated in next 24 - 48 hours if patient can tolerate DHT vs. TPN pending clinical course.     #Obesity, BMI 34.45    Rubia Santos MD    Supplementary Documentation:     Quality:  DVT Mechanical Prophylaxis:   SCDs,    DVT Pharmacologic Prophylaxis   Medication    enoxaparin (Lovenox) 40 MG/0.4ML SUBQ injection 40 mg                Code Status: Not on file  Hagan: No urinary catheter in place  Hagan Duration (in days):   Central line:    MOE:     Discharge is dependent on: course  At this point Ms. Cabral is expected to be discharge to: unclear    The 21st Century Cures Act makes medical notes like these available to patients in the interest of transparency. Please be advised this is a medical document. Medical documents are intended to carry relevant information, facts as evident, and the clinical opinion of the practitioner. The medical note is intended as peer to peer communication and may appear blunt or direct. It is written in medical language and may contain abbreviations or verbiage that are unfamiliar.

## 2025-03-17 NOTE — PAYOR COMM NOTE
--------------  ADMISSION REVIEW     Payor: [employer] [facility] PPO  Subscriber #:  [id number]  Authorization Number: [id number]    Admit date: 3/15/25  Admit time:  6:35 PM     Patient Seen in: [facility] Emergency Department    History   Stated Complaint: pain everywhere x 2 weeks. hx of CLL. 5th visit since 03/02    50-year-old female history of CLL not currently on treatment, chronic left-sided neck pain history of cervical fusion in the past presents to ED for the fifth time in 2 weeks for left-sided neck pain, and now new complaint of left hip/groin pain.  She also reports chest discomfort earlier today and mild shortness of breath, with a cough over the last 10 days.  She was seen on 3/8/2025 labs done leukocytosis thought to be related to CLL, GEN expert negative, CTA carotids negative.  Denies fevers, chills.   states that she went to the pain doctor on Monday and they increased her oxycodone to 10 mg and also put her on OxyContin, she arrives to ED requesting more pain medicine for her left neck pain despite taking this prior to arrival with an 89% room air saturation.    Past Medical History:    Anxiety    Cancer (HCC)    CLL    Cervical radiculopathy    CLL (chronic lymphocytic leukemia) (HCC)    Depression     Past Surgical History:   Procedure Laterality Date    Hysterectomy      1 ovary in place    Laparoscopic cholecystectomy      Lumbar spine fusion combined      Other      C-Spine fusion and revision     Physical Exam     ED Triage Vitals   BP 03/15/25 1115 (!) 127/91   Pulse 03/15/25 1113 (!) 136   Resp 03/15/25 1115 20   Temp 03/15/25 1113 97 °F (36.1 °C)   Temp src 03/15/25 1113 Temporal   SpO2 03/15/25 1113 92 %   O2 Device 03/15/25 1113 None (Room air)     Current Vitals:   Vital Signs  BP: (!) 156/105  Pulse: 105  Resp: 19  Temp: 98.1 °F (36.7 °C)  Temp src: Temporal    Oxygen Therapy  SpO2: 96 %  O2 Device: Nasal cannula  O2 Flow Rate (L/min): 2 L/min    Physical Exam  Vital  signs reviewed.  Nursing note reviewed.  Constitutional: Alert, in pain  Head: Normocephalic, atraumatic  Mouth: Moist  Neck: Left cervical paraspinal tender palpation.  Eyes: Extraocular muscles intact, pupils equal  Cardiovascular: Tachycardic rate, regular rhythm  Pulmonary: Effort normal, breath sounds normal, 89% room air  Abdomen: Soft, nontender nondistended.  Left inguinal region lymphadenopathy noted and tender in this area.  Skin: Warm and dry  Musculoskeletal range of motion grossly normal all extremities  Neuro: Alert, at baseline, no focal neuro deficit.  Moves all extremities against gravity  Psych: Mood normal    Labs Reviewed   CBC WITH DIFFERENTIAL WITH PLATELET - Abnormal; Notable for the following components:       Result Value    WBC 17.6 (*)     PLT 74.0 (*)     Immature Platelet Fraction 8.7 (*)     Neutrophil Absolute Prelim 13.29 (*)     All other components within normal limits   BASIC METABOLIC PANEL (8) - Abnormal; Notable for the following components:    Glucose 192 (*)     Potassium 3.4 (*)     Chloride 96 (*)     CO2 34.0 (*)     Creatinine 1.19 (*)     Calcium, Total 17.8 (*)     Calculated Osmolality 296 (*)     eGFR-Cr 55 (*)     All other components within normal limits   MANUAL DIFFERENTIAL - Abnormal; Notable for the following components:    Neutrophil Absolute Manual 14.96 (*)     Metamyelocyte Absolute Manual 0.18 (*)     All other components within normal limits   PTH, INTACT - Abnormal; Notable for the following components:    Pth Intact <6.3 (*)     All other components within normal limits   CALCIUM, IONIZED - Abnormal; Notable for the following components:    Ionized Calcium 2.38 (*)     All other components within normal limits   PROCALCITONIN - Abnormal; Notable for the following components:    Procalcitonin 0.43 (*)     All other components within normal limits   TROPONIN I HIGH SENSITIVITY - Normal   ALBUMIN SERUM - Normal   MAGNESIUM - Normal   VITAMIN D, 25-HYDROXY -  Normal   VITAMIN D   LACTIC ACID, PLASMA   BLOOD CULTURE   BLOOD CULTURE   RESPIRATORY FLU EXPAND PANEL + COVID-19       Sinus tachycardia  Reading: No STEMI    CTA CHEST + CT ABD (W) + CT PEL    Progressive lymphadenopathy is seen within the visualized chest and pelvis.   No evidence of pulmonary embolism to the first subsegmental arterial level.  Airspace opacities within the lung bases may represent areas of atelectasis.  Consolidation is difficult to exclude.     XR CHEST AP PORTABLE   Postoperative changes of the cervical spine.  Mild bibasilar airspace disease is favored to represent atelectasis, however, a superimposed infectious process cannot be excluded.  There is minimal bronchial wall thickening which could represent  bronchitis in the appropriate clinical setting.  Minimal blunting of left costophrenic angle.  No measurable pneumothorax.            MRI SPINE CERVICAL  Result Date: 3/14/2025    1. Postoperative changes of C4-C7 anterior cervical discectomy and fusion.  Resultant susceptibility artifacts limit evaluation.  2. Bilateral paramedian T2 bright signal abnormality and volume loss involving the cervical cord at C5-C6, but with no associated postcontrast enhancement.  These findings are most compatible with myelomalacia.  3. C3-C4:  Mild-to-moderate spinal canal with mild-to-moderate right and mild left neural foraminal stenosis. 4. C7-T1:  Mild right neural foraminal stenosis.  5. Partially imaged prominent and mildly enlarged bilateral multilevel cervical lymph nodes.  These likely relate to the patient's history of chronic lymphocytic leukemia and please correlate with prior dedicated imaging.  6. A 1.1 cm right thyroid nodule.  Thyroid nodules less than 1.5 cm on cross-sectional imaging typically require no additional follow-up.         MDM    I personally reviewed the radiographs and my independent interpretation includes no lung consolidations, no pneumothorax.     Leukocytosis 17,  however was 20 less than a week ago, thought related to CLL.  Reviewed patient MRI neck on 3/9, degenerative changes but no significant issues.  Given report of chest pain, shortness of breath, tachycardia, 89% room air and left groin/hip pain with lymphadenopathy palpated, CTA chest abdomen pelvis ordered.  Diffuse lymphadenopathy enlarged from prior noted, no PE.    Hypercalcemia noted 17.8, previous was 10 less than a week ago.  Discussed with nephrology, they requested to 50 cc IV fluids/hour, they ordered calcitonin and Zometa.  Potassium 3.4, given replacement.    Discussed with hematology, Dr. Santos, he will see patient also given enlarged lymphadenopathy compared to prior.    Given cough, tachycardia, hypoxia, leukocytosis and inability rule out consolidations on CT chest will give empiric antibiotics for pneumonia.  Procalcitonin, complete respiratory panel pending.  Blood cultures drawn.  IV Rocephin and azithromycin ordered.    Disposition and Plan     Clinical Impression:  1. Hypercalcemia    2. Hypokalemia    3. Pain of left hip    4. Chronic neck pain    5. CLL (chronic lymphocytic leukemia) (HCC)    6. Thrombocytopenia    7. Community acquired pneumonia, unspecified laterality          History and Physical     Lisa Cabral is a 50 year old female with history of chronic left-sided neck pain, CLL presents emergency room with worsening neck pain.  Patient complains that she has had a cough over the last 2 weeks that is nonproductive.  No fevers, chills, nausea, vomiting, diarrhea or constipation.  Patient takes oxycodone for her chronic neck pain and her pain doctor recently increased her to 10 mg.  No dizziness, lightheadedness, or syncope.     Physical Exam:    BP (!) 127/91   Pulse 115   Temp 97 °F (36.1 °C) (Temporal)   Resp 20   SpO2 95%   General: No acute distress, Alert  Respiratory: No rhonchi, no wheezes  Cardiovascular: S1, S2. Regular rate and rhythm  Abdomen: Soft, Non-tender,  non-distended, positive bowel sounds  Neuro: No new focal deficits  Extremities: No edema       Lab 03/08/25  2130 03/15/25  1313   RBC 4.64 4.80   HGB 14.7 15.3   HCT 42.9 44.8   MCV 92.5 93.3   MCH 31.7 31.9   MCHC 34.3 34.2   RDW 12.5 12.2   NEPRELIM 17.50* 13.29*   WBC 20.8* 17.6*   .0 74.0*      * 192*   BUN 13 21   CREATSERUM 0.91 1.19*   EGFRCR 77 55*   CA 10.8* 17.8*   ALB 5.0* 4.5    139   K 4.1 3.4*   CL 98 96*   CO2 29.0 34.0*       Assessment & Plan:  # Pneumonia  - Will continue on ceftriaxone and azithromycin  - Blood cultures pending     # Hypercalcemia  - Pt got zoledronic acid and calcitonin.  - continue IVF  - nephrology on consult.     # CLL  - will consult oncology for her CLL meds     # Chronic neck pain  - will continue on percocet  -continue on muscle relaxants     # Hip pain  -CT abdomen pelvis negative for anything involving bony structures.     # Hypokalemia  - will replace per protocol.     # Leukocytosis  - secondary to CLL     # Migraines  - Will contin ue home meds     # Depression  - will continue on wellbutrin.      3/16:     HEME/ONC:    Oncologic History:  CLL diagnosed 9/23.  Obinutuzumab/venetoclax 10/23-9/24.  Relapsed CLL diagnosed 11/24.  Acalabrutinib 11/24-3/25  RT to right humerus/axilla/right iliac bone 12/24-1/25.     On treatment holiday from acalabrutinib      History of Present Illness:       49 y/o F PMH CLL on treatment holiday admitted for hypercalcemia     - A&Ox2 today, not to date. Altered overnight, pulling at [employer], needing sitter  - has had frequent ER visits in last few days  - CTA C/A/P with recurrent lymphadenopathy in chest and pelvis  - called  Erik who is not sure where her acalabrutinib pills are, but will look  - being treated for hypercalcemia; s/p calcitonin and zometa with downtrending calcium levels    Scheduled Medications    LORazepam          nicotine  1 patch Transdermal Daily    potassium chloride  40 mEq Oral Once     fentaNYL  1 patch Transdermal Q72H    fentaNYL  50 mcg Intravenous Once    sodium chloride  1,000 mL Intravenous Once    buPROPion ER  150 mg Oral Daily    pantoprazole  20 mg Oral QAM AC    enoxaparin  40 mg Subcutaneous Daily         Medication Infusions    sodium chloride 125 mL/hr at 03/16/25 0644          Physical Examination:  General: Patient altered  Psych: Mood and affect are appropriate  Eyes: EOMI, PERRL  ENT: Oropharynx is clear, no adenopathy  CV: nlS1/S2, no LE edema  Respiratory: CTAb, non-labored respirations  GI/Abd: Soft, non-tender   Neurological: A&Ox2  Lymphatics: Palpable inguinal lymphadenopathy     Lab 03/15/25  1313 03/16/25  0701   WBC 17.6* 17.0*   HGB 15.3 14.0   HCT 44.8 41.7   PLT 74.0* 51.0*   MCV 93.3 92.5   RDW 12.2 12.2   NEPRELIM 13.29* 12.32*      Lab 03/15/25  1313 03/15/25  2051 03/16/25  0701    141 143   K 3.4* 2.8* 3.1*  3.1*   CL 96* 96* 100   CO2 34.0* 37.0* 38.0*   BUN 21 22 20   CREATSERUM 1.19* 1.05* 0.99   * 153* 131*   CA 17.8* 17.5* 15.6*   TP  --  6.8 6.7   ALB 4.5 4.4 4.4   ALKPHO  --  243* 260*   AST  --  37* 42*   ALT  --  40 44   BILT  --  0.4 0.4        Impression & Plan:      Relapsed CLL  - high risk - unmutated IGHV and del(17p)  - discussed with spouse likely has recurrent CLL  - plan for L inguinal biopsy to confirm  - after biopsy, can plan to restart acalabrutinib inpt if spouse able to locate     Hypercalcemia  - per nephrology; s/p calcitonin and zometa with improving calcium levels. This is likely causing her AMS      RENAL:    Lisa Cabral is a 50 year old female with history of CLL who presented with worsening neck pain. Nephrology consulted for hypercalcemia.      She has been having chronic pain that has been worsening. She presented yesterday due to left hip pain. Has been having some shortness of breath as well. In the ER, noted to have serum calcium of 17.8, serum creatinine 1.19, low iPTH and iCal of 2.38. Started on NS and  given calcitonin and zometa. She is altered and agitated this morning. Calcium improved this morning.    Current Facility-Administered Medications:     LORazepam (Ativan) 2 mg/mL injection, , ,     nicotine (Nicoderm CQ) 21 MG/24HR patch 1 patch, 1 patch, Transdermal, Daily    hydrALAzine (Apresoline) 20 mg/mL injection 10 mg, 10 mg, Intravenous, Q6H PRN    fentaNYL (Duragesic) 50 MCG/HR patch 1 patch, 1 patch, Transdermal, Q72H    fentaNYL (Sublimaze) 50 mcg/mL injection 50 mcg, 50 mcg, Intravenous, Q3H PRN    sodium chloride 0.9% infusion 1,000 mL, 1,000 mL, Intravenous, Once    sodium chloride 0.9% infusion, , Intravenous, Continuous    buPROPion ER (Wellbutrin XL) 24 hr tab 150 mg, 150 mg, Oral, Daily    diazePAM (Valium) tab 5 mg, 5 mg, Oral, Q8H PRN    lidocaine-menthol 4-1 % patch 1 patch, 1 patch, Transdermal, Daily PRN    methocarbamol (Robaxin) tab 500 mg, 500 mg, Oral, TID PRN    pantoprazole (Protonix) DR tab 20 mg, 20 mg, Oral, QAM AC    rizatriptan (Maxalt-MLT) disintegrating tab 5 mg, 5 mg, Oral, PRN    melatonin tab 3 mg, 3 mg, Oral, Nightly PRN    enoxaparin (Lovenox) 40 MG/0.4ML SUBQ injection 40 mg, 40 mg, Subcutaneous, Daily    Physical Exam:  BP (!) 159/107 (BP Location: Left arm)   Pulse (!) 132   Temp 97.8 °F (36.6 °C) (Temporal)   Resp 15   Wt 232 lb 9.4 oz (105.5 kg)   SpO2 94%   BMI 34.35 kg/m²   Temp (24hrs), Av °F (36.7 °C), Min:97 °F (36.1 °C), Max:98.7 °F (37.1 °C)    General: awake  HEENT: No scleral icterus, MMM  Neck: Supple, no GRACIE or thyromegaly  Cardiac: Regular rate and rhythm, S1, S2 normal  Lungs: Decreased breath sounds at the bases bilaterally.   Abdomen: Soft, non-tender.   Extremities: Without clubbing, cyanosis; no edema  Neurologic: moving all extremities  Skin: Warm and dry, no rashes    Assessment / Plan:     1) Severe Hypercalcemia: non-PTH mediated hypercalcemia in setting of high risk, likely relapsed CLL. S/p zometa and calcitonin and on IVF. Will need to  continue aggressive IVF as tolerated - increase back up to 200mL/hr.      2) CLL: Diagnosed in 2023, follows with Heme/Onc as outpatient. Has high risk mutations. Has been on treatment holiday recently. CT with current lymphadenopathy. Plan for left inguinal biopsy per Heme.      3) Bone pain: likely 2/2 hypercalcemia and recurrent CLL. Pain management per primary     4) Hypokalemia: replace per protocol     MEDICATIONS ADMINISTERED IN LAST 1 DAY:  acetaminophen (Ofirmev) 10 mg/mL infusion premix 1,000 mg       Date Action Dose Route User    3/17/2025 0835 New Bag 1,000 mg Intravenous Ama Carolina RN    3/17/2025 0059 New Bag 1,000 mg Intravenous Parviz Gomez RN          calcitonin (Miacalcin) 200 Units/mL injection 424 Units       Date Action Dose Route User    3/17/2025 1040 Given 424 Units Subcutaneous (Right Lower Abdomen) Ama Carolina RN          dexamethasone (Decadron) 4 MG/ML injection 10 mg       Date Action Dose Route User    3/16/2025 2345 Given 10 mg Intravenous Parviz Gomez RN          dexamethasone (Decadron) 4 MG/ML injection 6 mg       Date Action Dose Route User    3/17/2025 0828 Given 6 mg Intravenous Ama Carolnia RN          diazePAM (Valium) tab 5 mg       Date Action Dose Route User    3/16/2025 1749 Given 5 mg Oral Rosalind Owens RN          enoxaparin (Lovenox) 40 MG/0.4ML SUBQ injection 40 mg       Date Action Dose Route User    3/17/2025 0833 Given 40 mg Subcutaneous (Left Lower Abdomen) Ama Carolina RN          fentaNYL (Sublimaze) 50 mcg/mL injection 50 mcg       Date Action Dose Route User    3/16/2025 1825 Given 50 mcg Intravenous Rosalind Owens RN    3/16/2025 1622 Given 50 mcg Intravenous Rosalind Ownes RN          hydrALAzine (Apresoline) 20 mg/mL injection 10 mg       Date Action Dose Route User    3/16/2025 2002 Given 10 mg Intravenous Raeann Zeng RN    3/16/2025 1337 Given 10 mg Intravenous Rosalind Owens RN          HYDROmorphone (Dilaudid)  1 MG/ML injection 1 mg       Date Action Dose Route User    3/17/2025 1204 Given 1 mg Intravenous Ama Carolina RN    3/17/2025 0642 Given 1 mg Intravenous Parviz Gomez RN    3/17/2025 0248 Given 1 mg Intravenous Parviz Gomez RN    3/16/2025 2331 Given 1 mg Intravenous Parviz Gomez RN          ketorolac (Toradol) 15 MG/ML injection 15 mg       Date Action Dose Route User    3/17/2025 1040 Given 15 mg Intravenous Ama Carolina RN    3/17/2025 0415 Given 15 mg Intravenous Jason, Parviz Concepcion RN          ketorolac (Toradol) 30 MG/ML injection 30 mg       Date Action Dose Route User    3/16/2025 2002 Given 30 mg Intravenous Raeann Zeng RN    3/16/2025 1337 Given 30 mg Intravenous Rosalind Owens RN          labetalol (Trandate) 5 mg/mL injection 10 mg       Date Action Dose Route User    3/17/2025 0415 Given 10 mg Intravenous Parviz Gomez RN          methocarbamol (Robaxin) tab 500 mg       Date Action Dose Route User    3/16/2025 1825 Given 500 mg Oral Rosalind Owens RN     oxyCODONE-acetaminophen (Percocet)  MG per tab 2 tablet       Date Action Dose Route User    3/16/2025 1749 Given 2 tablet Oral Rosalind Owens RN          pantoprazole (Protonix) 40 mg in sodium chloride 0.9% PF 10 mL IV push       Date Action Dose Route User    3/16/2025 2345 Given 40 mg Intravenous Parviz Gomez RN          potassium chloride 40 mEq in 250mL sodium chloride 0.9% IVPB premix       Date Action Dose Route User    3/17/2025 0440 New Bag 40 mEq Intravenous Parviz Gomez RN          potassium chloride (Klor-Con M20) tab 40 mEq       Date Action Dose Route User    3/16/2025 1825 Given 40 mEq Oral Rosalind Owens RN          sodium chloride 0.9% infusion       Date Action Dose Route User    3/17/2025 1205 New Bag (none) Intravenous Ama Carolina RN    3/17/2025 0636 New Bag (none) Intravenous Parviz Gomez, ALEXIS    3/16/2025  2055 New Bag (none) Intravenous Raeann Zeng RN    3/16/2025 1615 New Bag (none) Intravenous Kenneth Wagoner, RN            Vitals (last day)       Date/Time Temp Pulse Resp BP SpO2 Weight O2 Device O2 Flow Rate (L/min) [facility]    03/17/25 1103 -- -- -- -- 95 % -- Nasal cannula 3 L/min EI    03/17/25 0800 99.4 °F (37.4 °C) 113 25 154/94 97 % -- Nasal cannula 3 L/min HT    03/17/25 0403 98.2 °F (36.8 °C) 119 21 183/97 93 % -- Nasal cannula 3 L/min AS    03/17/25 0005 98.7 °F (37.1 °C) 122 37 179/104 94 % -- Nasal cannula 3 L/min AS    03/17/25 0000 -- 121 31 -- 95 % -- -- --     03/16/25 2318 98.5 °F (36.9 °C) 127 22 179/104 94 % 233 lb 4.8 oz (105.8 kg) Nasal cannula 3 L/min AS    03/16/25 2212 -- 127 -- -- 95 % -- -- -- AS    03/16/25 2039 -- -- -- -- 96 % -- Nasal cannula 3 L/min ZS    03/16/25 2036 -- 129 -- 174/82 85 % -- -- -- AS    03/16/25 1925 98.8 °F (37.1 °C) 123 23 182/111 96 % -- Nasal cannula 3 L/min AS    03/16/25 1808 -- -- -- -- -- -- Nasal cannula 3 L/min MS    03/16/25 1705 -- 127 -- 169/100 97 % -- -- --     03/16/25 1705 -- -- -- -- -- -- Nasal cannula -- MS    03/16/25 1607 98 °F (36.7 °C) 127 16 178/122 89 % -- Nasal cannula 4 L/min MS    03/16/25 1500 -- 124 -- -- 92 % -- Nasal cannula 4 L/min MS    03/16/25 1301 98.1 °F (36.7 °C) 127 17 163/92 94 % -- Nasal cannula 3 L/min IS    03/16/25 0900 97.8 °F (36.6 °C) 132 15 159/107 94 % -- Nasal cannula 3 L/min MS    03/16/25 0745 -- -- -- -- 94 % -- Nasal cannula 3 L/min DG    03/16/25 0625 -- -- -- 173/95 91 % -- Nasal cannula 3 L/min BC    03/16/25 0542 98.5 °F (36.9 °C) 116 22 185/113 -- -- Nasal cannula 2 L/min LA    03/16/25 0337 -- 110 -- -- 93 % -- -- -- LA       03/15/25 1954 98.7 °F (37.1 °C) 106 22 178/99 Abnormal  -- 232 lb 9.4 oz (105.5 kg) Nasal cannula 2 L/min LA   03/15/25 1823 -- 104 -- 147/99 Abnormal  93 % -- Nasal cannula 2 L/min NIKA   03/15/25 1727 98.1 °F (36.7 °C) -- -- -- -- -- -- -- NIKA   03/15/25 1712 -- 105 19 156/105  Abnormal  96 % -- Nasal cannula 2 L/min NIKA   03/15/25 1309 -- 115 20 -- 95 % -- Nasal cannula 2 L/min CEE   03/15/25 1115 -- -- 20 127/91 Abnormal  -- -- -- -- JR   03/15/25 1113 97 °F (36.1 °C) 136 Abnormal  -- -- 92 % -- None (Room air) --

## 2025-03-17 NOTE — PLAN OF CARE
Pt alert on arrival. Pt a&ox1. Intermittent lethargy noted. Pt unable to rate pain, CPOT (non-intubated) assessed, PRN pain medication given. Follows commands. RIOS. On 3L NC. ST on tele sustaining 120s. Hypertensive PRNs given.     See MAR and flowsheets for additional information.

## 2025-03-18 ENCOUNTER — TELEPHONE (OUTPATIENT)
Age: 50
End: 2025-03-18

## 2025-03-18 ENCOUNTER — ANESTHESIA (OUTPATIENT)
Dept: MRI IMAGING | Facility: HOSPITAL | Age: 50
End: 2025-03-18
Payer: COMMERCIAL

## 2025-03-18 ENCOUNTER — ANESTHESIA EVENT (OUTPATIENT)
Dept: MRI IMAGING | Facility: HOSPITAL | Age: 50
End: 2025-03-18
Payer: COMMERCIAL

## 2025-03-18 PROBLEM — G93.40 ENCEPHALOPATHY ACUTE: Status: ACTIVE | Noted: 2025-03-18

## 2025-03-18 PROBLEM — G93.40 ENCEPHALOPATHY ACUTE: Status: ACTIVE | Noted: 2025-01-01

## 2025-03-18 PROBLEM — E87.0 HYPERNATREMIA: Status: ACTIVE | Noted: 2025-01-01

## 2025-03-18 PROBLEM — R41.0 DELIRIUM: Status: ACTIVE | Noted: 2025-03-18

## 2025-03-18 PROBLEM — R52 INTRACTABLE PAIN: Status: ACTIVE | Noted: 2025-03-18

## 2025-03-18 PROBLEM — R52 INTRACTABLE PAIN: Status: ACTIVE | Noted: 2025-01-01

## 2025-03-18 PROBLEM — G89.3 CANCER-RELATED PAIN: Status: ACTIVE | Noted: 2025-01-01

## 2025-03-18 PROBLEM — R41.0 DELIRIUM: Status: ACTIVE | Noted: 2025-01-01

## 2025-03-18 PROBLEM — G89.3 CANCER-RELATED PAIN: Status: ACTIVE | Noted: 2025-03-18

## 2025-03-18 PROBLEM — E87.0 HYPERNATREMIA: Status: ACTIVE | Noted: 2025-03-18

## 2025-03-18 RX ORDER — PHENYLEPHRINE HCL 10 MG/ML
VIAL (ML) INJECTION AS NEEDED
Status: DISCONTINUED | OUTPATIENT
Start: 2025-03-18 | End: 2025-03-18 | Stop reason: SURG

## 2025-03-18 RX ORDER — METOCLOPRAMIDE HYDROCHLORIDE 5 MG/ML
INJECTION INTRAMUSCULAR; INTRAVENOUS AS NEEDED
Status: DISCONTINUED | OUTPATIENT
Start: 2025-03-18 | End: 2025-03-18 | Stop reason: SURG

## 2025-03-18 RX ORDER — SODIUM CHLORIDE, SODIUM LACTATE, POTASSIUM CHLORIDE, CALCIUM CHLORIDE 600; 310; 30; 20 MG/100ML; MG/100ML; MG/100ML; MG/100ML
INJECTION, SOLUTION INTRAVENOUS CONTINUOUS PRN
Status: DISCONTINUED | OUTPATIENT
Start: 2025-03-18 | End: 2025-03-18 | Stop reason: SURG

## 2025-03-18 RX ORDER — ONDANSETRON 2 MG/ML
INJECTION INTRAMUSCULAR; INTRAVENOUS AS NEEDED
Status: DISCONTINUED | OUTPATIENT
Start: 2025-03-18 | End: 2025-03-18 | Stop reason: SURG

## 2025-03-18 RX ADMIN — SODIUM CHLORIDE, SODIUM LACTATE, POTASSIUM CHLORIDE, CALCIUM CHLORIDE: 600; 310; 30; 20 INJECTION, SOLUTION INTRAVENOUS at 16:44:00

## 2025-03-18 RX ADMIN — METOCLOPRAMIDE HYDROCHLORIDE 5 MG: 5 INJECTION INTRAMUSCULAR; INTRAVENOUS at 17:16:00

## 2025-03-18 RX ADMIN — PHENYLEPHRINE HCL 100 MCG: 10 MG/ML VIAL (ML) INJECTION at 17:03:00

## 2025-03-18 RX ADMIN — PHENYLEPHRINE HCL 100 MCG: 10 MG/ML VIAL (ML) INJECTION at 16:56:00

## 2025-03-18 RX ADMIN — PHENYLEPHRINE HCL 100 MCG: 10 MG/ML VIAL (ML) INJECTION at 17:06:00

## 2025-03-18 RX ADMIN — ONDANSETRON 4 MG: 2 INJECTION INTRAMUSCULAR; INTRAVENOUS at 17:19:00

## 2025-03-18 RX ADMIN — METOCLOPRAMIDE HYDROCHLORIDE 5 MG: 5 INJECTION INTRAMUSCULAR; INTRAVENOUS at 17:19:00

## 2025-03-18 NOTE — PROGRESS NOTES
[facility]   part of [facility]  Palliative Care Follow Up    Lisa Cabral Patient Status:  Inpatient    3/12/1975 MRN [id number]   Location [facility] 7NE-A Attending Rubia Santos MD   Hosp Day # 3 PCP Ashley Cross, DO   [id number]    Date of visit:  3/18/2025  Day 3 of hospitalization.     Palliative care consult requested for support with uncontrolled symptoms.     Summary:         HPI:  Lisa Cabral is a 50 year old female with a history of chronic neck and back pain, CLL diagnosed in  on treatment holiday, completed RT to right humerus, axilla and right iliac bone  who was admitted on 3/15/2025 for  neck pain. Work up in our hospital revealed hypercalcemia s/p zometa and calcitonin on IVF, CT scan with current lymphadenopathy, hypokalemia and confusion, thrombocytopenia, neutrophilia, elevated LDH, planning for lymph node biopsy due to concern there is transformation to high grade lymphoma.  Management for metabolic abnormalities.  Sees pain specialist.      Interval Events: Ongoing severe agitation and pain requirements. Neurology consulted and MRI brain pending.  - S/p LN bx 3/17, path pending  - LDH increasing (3118 today, up from 1488 on 3/16), CBC, CMP reviewed  - Case d/w RN who reports pt screaming out within 5 mins of 1mg IV Dilaudid, removing monitoring equipment, now with sitter at bedside  - Plan for MRI brain w anesthesia this afternoon d/t ongoing acute agitation/AMS    SUBJECTIVE  Review of Systems - Palliative Care Symptom Assessment     I visited Lisa meléndez sitter at bedside. She is moaning constantly, restless in bed. Occasionally following commands.    Pain:  Reporting pain. She held up her RUE, and also nodded \"yes\" when asked if pain in chest and abd area, then moans and unable to continue ROS  24 hour pain requirements (8641-8110):  6.5mg IV Dilaudid + 10mg Percocet = 143 OME  IV Hydromorphone 0.5mg x1 at 1805 on 3/17    + IV Hydromorphone 1mg    [id number] 12:04 PM       [id number]  3:01 PM       [id number]  6:05 PM       [id number]  9:17 PM       [id number]  1:05 AM       [id number]  3:54 AM       [id number]  6:51 AM        Agitation: Ongoing - severe, received Haldol 2mg at 2258 and at 1140 today     Last BM:    Bowel Movement    No data found in the last 1 encounters.            OBJECTIVE     Hematology:   Lab Results   Component Value Date    WBC 10.5 03/18/2025    HGB 11.4 (L) 03/18/2025    HCT 34.0 (L) 03/18/2025    PLT 21.0 (L) 03/18/2025       Chemistry:  Lab Results   Component Value Date    CREATSERUM 0.99 03/18/2025    BUN 31 (H) 03/18/2025     (H) 03/18/2025    K 3.2 (L) 03/18/2025    K 3.2 (L) 03/18/2025     (H) 03/18/2025    CO2 32.0 03/18/2025     (H) 03/18/2025    CA 10.8 (H) 03/18/2025    ALB 3.5 03/18/2025    ALKPHO 625 (H) 03/18/2025    BILT 0.4 03/18/2025    TP 5.5 (L) 03/18/2025    AST 67 (H) 03/18/2025    ALT 39 03/18/2025    MG 2.6 03/15/2025    PHOS 2.7 06/05/2024       Imaging:  US BIOPSY CORE LYMPH NODE, RIGHT (CPT=76942/50166)    Result Date: 3/17/2025  CONCLUSION:  Uneventful ultrasound guided right inguinal lymph node core biopsy.  The patient was instructed to obtain follow up care and biopsy results from the referring physician.   LOCATION:  [facility]     Dictated by (CST): Dm Fonseca MD on 3/17/2025 at 6:37 PM     Finalized by (CST): Dm Fonseca MD on 3/17/2025 at 6:38 PM       CT BRAIN OR HEAD (CPT=70450)    Result Date: 3/17/2025  PROCEDURE:  CT BRAIN OR HEAD (87414)  COMPARISON:  [facility] , CT, CTA CAROTID ARTERIES (CPT=70498), 3/08/2025, 10:20 PM.  [facility] , CT, CT BRAIN OR HEAD (98670), 10/30/2023, 8:50 PM.  INDICATIONS:  altered mental status  TECHNIQUE:  Noncontrast CT scanning is performed through the brain. Dose reduction techniques were used. Dose information is transmitted to the ACR (American College of Radiology) NRDR (National Radiology Data Registry) which includes the Dose Index Registry.  PATIENT STATED  HISTORY: (As transcribed by Technologist)  AMS   FINDINGS: Examination is significantly limited by motion artifact.  No hemorrhage or extra-axial fluid collection. Lucencies in the deep periventricular white matter are likely sequelae of chronic small vessel ischemic disease. Ventricles and sulci are appropriate for the patient's age. No mass effect. Visualized portions of paranasal sinuses are unremarkable. Visualized portions of the mastoid air cells are unremarkable. Visualized portions of the orbits are unremarkable. IMPRESSION: Motion artifact limits examination significantly.  Minimal high density in sulci is more than likely artifactual from motion.  No evidence of an acute intracranial abnormality.    LOCATION:  [id number]   Dictated by (CST): Jourdan Shine MD on 3/17/2025 at 10:50 AM     Finalized by (CST): Jourdan Shine MD on 3/17/2025 at 10:54 AM        Vital Signs:  Blood pressure (!) 166/92, pulse (!) 138, temperature 98 °F (36.7 °C), temperature source Oral, resp. rate 22, weight 233 lb 4.8 oz (105.8 kg), SpO2 96%, not currently breastfeeding.  Body mass index is 34.45 kg/m².    Physical Exam:     GENERAL: Signfiicantly agitated, restless, thrashing in bed w eyes closed, intermittently engaged for brief moments. Sitter at bedside.  CARDIAC: tachycardic with HR 130s per tele  RESPIRATORY: no increased effort at rest.  ABDOMEN: reports pain on light palpation  EXTREMITIES:  RUE ttp, guarding when attempting to lift her arm.   - not sensitive to light or sharp touch elsewhere on her body  NEUROLOGIC: TRENA - does not answer orientation questions  SKIN: Warm and dry.     Palliative Performance Scale: 30%   Palliative Performance Scale   % Ambulation Activity Level Self-Care Intake Consciousness   100 Full Normal Full   Normal Full   90 Full No disease  Normal Full Normal Full   80 Full Some disease  Normal w/effort Full Normal or  Reduced Full   70 Reduced Some disease  Can't perform job Full Normal  or   Reduced Full   60 Reduced Significant disease  Can't perform hobby Occasional  Assist Normal or   Reduced Full or confused   50 Mainly sit/lie Extensive Disease  Can't do any work Partial Assist Normal or Reduced Full or confused   40 Mainly in bed Extensive Disease  Can't do any work Mainly Assist Normal or Reduced Full or confused   30 Bedbound Extensive Disease  Can't do any work Max Assist  Total Care Reduced Drowsy/confused   20 Bedbound Extensive Disease  Can't do any work Max Assist  Total Care Minimal Drowsy/confused   10 Bedbound/coma Extensive Disease  Can't do any work  coma  Max Assist  Total Care Mouth care Drowsy or coma   0 Death       Palliative Care Assessment:     Goals of Care Discussion: Focus of visit is on symptoms.  - I updated her  by phone. He stated Lisa was not interested in taking systemic chemo but was open to oral treatments with CLL diagnosis. He is unsure what she would say about her current situation. He hopes for her to be comfortable w regard to pain and have relief of agitation as they await biopsy results, MRI brain, and further input from oncology.    Emotional support provided.    Problem List:       Principal Problem:    Hypercalcemia  Active Problems:    CLL (chronic lymphocytic leukemia) ([facility])    Palliative care encounter    Hypokalemia    Pain of left hip    Chronic neck pain    Thrombocytopenia    Community acquired pneumonia, unspecified laterality    Acute kidney injury    Hypernatremia    Intractable pain    Encephalopathy acute    Cancer-related pain    Delirium        Palliative Care Recommendations/Plan:         Symptoms:    Pain - ?cancer-related, RUE - source unclear  Start Dilaudid PCA  Continuous: 0.2mg / hr  PCA: none, pt unable  Clinician Bolus 0.4mg q30min PRN  Lidocaine patch  IV Dexamethasone - unclear if helping w presumed bone pain    Delirium w agitation: acute, hyperactive. Medication vs infectious vs pain component?  Start Olanzapine 5mg  ODT nightly and 2.5mg BID PRN. Haldol DC'd per primary.  Stop Nicotine patch  Will d/w oncology re possibility of dexamethasone contributing given timeline  Await MRI brain w anesthesia    Goals of Care:    Await results of LN biopsy - oncology input reviewed.   MRI brain anticipated this afternoon.      Code Status: No Order.  Not addressed    HCPOA: Spouse Erik    Disposition:  Pending clinical course.      Medical Decision Making (MDM) for Palliative Care   Problems Addressed:  High Details: CLL with concern for conversion, encephalopathy, respiratory failure, hypercalcemia  Data Reviewed & Analyzed:  External notes reviewed from each unique source:    Results reviewed from each unique test: 2  Unique tests ordered:    Select all that apply: independent interpretation of test performed by other healthcare provider, discussion of management or test interpretation with external healthcare provider, assessment requiring an independent historian  Details: Reviewed CMP, CBC  D/w spouse  Discussed management throughout the day with hospitalist, oncology, neurology service.  Risk of Complications from DIagnostic Testing & Treatment:  High Details: Management of parenteral opioids - continuous dilaudid infusion; managing meds for agitation  Calculated MDM Level: High       Reviewed the above with patient's RN, primary, oncology.    Thank you for inviting Palliative Care to participate in the care of Lisa Cabral and family.       Will follow.      LETTY Padilla  Palliative Care    3/18/2025  3:33 PM    The 21st Century Cures Act makes medical notes like these available to patients in the interest of transparency. Please be advised this is a medical document. Medical documents are intended to carry relevant information, facts as evident, and the clinical opinion of the practitioner. The medical note is intended as a peer to peer communication, and may appear blunt or direct. It is written in medical language and may  contain abbreviations or verbiage that are unfamiliar.

## 2025-03-18 NOTE — PHYSICAL THERAPY NOTE
Order received, chart reviewed. Communicated with RN. Pt not appropriate for therapy at this time. Will hold and follow as clinically appropriate.     Lisa Daley, PT, DPT  03/18/25

## 2025-03-18 NOTE — CONSULTS
[facility]   NEUROLOGY   CONSULT NOTE    Admission date: 3/15/2025  Reason for Consult:   Chief Complaint:   Chief Complaint   Patient presents with    Pain   ________________________________________________________________    History     History of Presenting Illness  50 year old female with history of chronic lymphocytic leukemia with multiple skeletal involvement admitted on 315 with complaints of neck pain, was noted to have significant hypercalcemia of 17.8 over the last 24 hours patient became quite agitated, confused, started complaining of a lot of pain everywhere and became less responsive to commands.  When seen, patient was awake, responsive, seem to be in pain, moving all 4 extremities spontaneously and to painful stimulation but not following commands consistently.    History obtained from nursing staff, chart review.    Past Medical History:    Anxiety    Cancer (HCC)    CLL    Cervical radiculopathy    CLL (chronic lymphocytic leukemia) (HCC)    Depression    PONV (postoperative nausea and vomiting)    Visual impairment    reading glasses     Past Surgical History:   Procedure Laterality Date    Hysterectomy      1 ovary in place    Laparoscopic cholecystectomy      Lumbar spine fusion combined      Other      C-Spine fusion and revision     Social History     Socioeconomic History    Marital status:     Number of children: 2   Occupational History    Occupation: on disability   Tobacco Use    Smoking status: Every Day     Current packs/day: 1.00     Types: Cigarettes     Passive exposure: Current    Smokeless tobacco: Never   Vaping Use    Vaping status: Some Days    Substances: Nicotine   Substance and Sexual Activity    Alcohol use: Not Currently    Drug use: Not Currently     Types: Cannabis     Comment: edible prn for neck pain/headache    Sexual activity: Not Currently     Partners: Male     Birth control/protection: Hysterectomy   Social History Narrative    ,  lives with  and 20 y/o daughter    Has 2 biological daughter and 1 stepdaughter and 2 granddaughters and 1 grandson     Social Drivers of Health     Food Insecurity: No Food Insecurity (3/15/2025)    NCSS - Food Insecurity     Worried About Running Out of Food in the Last Year: No     Ran Out of Food in the Last Year: No   Transportation Needs: No Transportation Needs (3/15/2025)    NCSS - Transportation     Lack of Transportation: No   Housing Stability: Not At Risk (3/15/2025)    NCSS - Housing/Utilities     Has Housing: Yes     Worried About Losing Housing: No     Unable to Get Utilities: No     Family History   Problem Relation Age of Onset    High Blood Pressure Father     High Blood Pressure Mother     Heart Disease Maternal Grandfather     Cancer Paternal Grandfather     Cancer Paternal Aunt         breast    Breast Cancer Paternal Aunt     Colon Cancer Maternal Uncle     Cancer Maternal Uncle         cancer     Allergies Allergies[1]    Home Meds  Current Outpatient Medications   Medication Instructions    Acalabrutinib Maleate 100 mg, Oral, 2 times daily    Acetaminophen-Caffeine (EXCEDRIN TENSION HEADACHE) 500-65 MG Oral Tab Take by mouth.    ASHWAGANDHA OR 1 capsule, Daily    buPROPion ER (WELLBUTRIN XL) 150 mg, Oral, Daily    Cholecalciferol (VITAMIN D-3) 5000 units Oral Tab 1 tablet, Daily    Cyanocobalamin (VITAMIN B 12 OR) 1 tablet, Daily    lidocaine 5 % External Patch 1 patch, Every 24 hours    MAGNESIUM ASPARTATE OR Take by mouth.    methocarbamol 750 MG Oral Tab TAKE 1 TABLET BY MOUTH DAILY AT NIGHTTIME    morphINE 15 mg, Oral, Every 4 hours PRN    omeprazole 20 MG Oral Capsule Delayed Release TK ONE C PO BID 30 MIN B EATING    ondansetron (ZOFRAN) 8 mg, Oral, Every 8 hours PRN    oxyCODONE-acetaminophen  MG Oral Tab every 8 (eight) hours as needed for Pain.    OxyCONTIN 10 mg, 2 times daily PRN    prochlorperazine (COMPAZINE) 10 mg, Oral, Every 6 hours PRN    Rizatriptan Benzoate  (MAXALT) 10 mg, Oral, As needed, may repeat dose after 2 hours. No more than 20 mg in 24 hours.    sucralfate 1 g Oral Tab Take 1 tablet (1 g total) by mouth 2 (two) times daily.    tiZANidine HCl (ZANAFLEX) 2 mg, Oral, 3 times daily    Turmeric (QC TUMERIC COMPLEX OR) 1 tablet, 2 times daily     Scheduled Meds:   potassium chloride  40 mEq Intravenous Once    calcitonin  4 Units/kg Subcutaneous BID    nicotine  1 patch Transdermal Daily    pantoprazole  40 mg Intravenous Q24H    dexamethasone  6 mg Intravenous Q8H    sodium chloride  1,000 mL Intravenous Once    [Held by provider] enoxaparin  40 mg Subcutaneous Daily     Continuous Infusions:   sodium chloride 200 mL/hr at 03/18/25 1051     PRN Meds:  oxyCODONE-acetaminophen    labetalol    haloperidol lactate    HYDROmorphone **OR** HYDROmorphone    acetaminophen    lidocaine-menthol    methocarbamol    rizatriptan    melatonin    acetaminophen    ondansetron    polyethylene glycol (PEG 3350)    sennosides    bisacodyl    fleet enema    benzonatate    guaiFENesin    OBJECTIVE   VITAL SIGNS:   Temp:  [97.7 °F (36.5 °C)-99.8 °F (37.7 °C)] 98 °F (36.7 °C)  Pulse:  [122-138] 138  Resp:  [18-74] 22  BP: (151-178)/() 166/92  SpO2:  [92 %-96 %] 96 %    PHYSICAL EXAM:    NEUROLOGIC:    Mental Status: Awake, reasonably alert, seems to be in pain, agitated, confused, disoriented, inconsistently following commands.  No obvious aphasia.  Cranial nerves: PERRL.  Doll's IV movement present, corneal reflex present, face symmetric, hearing grossly normal, tongue midline.    Motor: Drift:  Absent; Motor exam is at least antigravity in all 4 extremities.    Sensation: Intact to painful stimulation bilaterally  Cerebellar: Finger-To-Nose test: Unable to perform.      LABORATORY DATA:  Last 24 hour labs were reviewed in detail.  Recent Labs   Lab 03/17/25  0209 03/17/25  1754 03/18/25  0556   * 147* 152*   K 3.2*  3.2* 3.6 3.2*  3.2*    111 113*   CO2 36.0* 28.0  32.0   * 157* 183*   BUN 26* 28* 31*   CREATSERUM 1.16* 1.07* 0.99     Recent Labs   Lab 03/15/25  1313 03/16/25  0701 03/18/25  0556   WBC 17.6* 17.0* 10.5   HGB 15.3 14.0 11.4*   PLT 74.0* 51.0* 21.0*     Recent Labs   Lab 03/17/25  0209 03/17/25  1754 03/18/25  0556   ALT 37 37 39   AST 93* 91* 67*     Recent Labs   Lab 03/15/25  1313   MG 2.6       Radiology:    US BIOPSY CORE LYMPH NODE, RIGHT (CPT=76942/86666)    Result Date: 3/17/2025  CONCLUSION:  Uneventful ultrasound guided right inguinal lymph node core biopsy.  The patient was instructed to obtain follow up care and biopsy results from the referring physician.   LOCATION:  [place]     Dictated by (CST): Dm Fonseca MD on 3/17/2025 at 6:37 PM     Finalized by (CST): Dm Fonseca MD on 3/17/2025 at 6:38 PM      ASSESSMENT/PLAN   50 year old female with:    Acute encephalopathy, most likely multifactorial, possibly contributed by marked hypercalcemia also.  Advise MRI of the brain with and without contrast, EEG, ammonia level.  Patient has marked agitation at this time.  Recommend Seroquel instead of as needed haloperidol.  Will start Seroquel 25 mg twice daily.  Generalized body ache of unclear etiology, possibly related to cancer.  Advise to check CPK.  Hypercalcemia.  Advise correction.  Thrombocytopenia.  Hematology/oncology recommendations.    Principal Problem:    Hypercalcemia  Active Problems:    CLL (chronic lymphocytic leukemia) ([facility])    Palliative care encounter    Hypokalemia    Pain of left hip    Chronic neck pain    Thrombocytopenia    Community acquired pneumonia, unspecified laterality    Acute kidney injury    Hypernatremia       Eliud Pearl MD  Neurohospitalist  [facility]    Disclaimer: This record was dictated using Dragon software. There may be errors due to voice recognition problems that were not realized and corrected during the completion of the note.           [1]   Allergies  Allergen Reactions     Aleve [Naproxen] HIVES and RASH    Ultram [Tramadol] NAUSEA AND VOMITING     Severe migraine

## 2025-03-18 NOTE — CM/SW NOTE
Care Progression Note:  Length of stay: 3  GMLOS:   Avoidable Delays:   Code Status: Full    Acute Medical Issue/Factors:   Hypercalcemia - nephrology following   S/p zometa and calcitonin and currently on IVF   Confusion, agitation, pain, acute encephalopathy- Neuro consulted and MRI w/sedation ordered  CLL - heme/ onc following, concern for transformation to high grade lymphoma per note.  Awaiting MRI results for plan, but per Heme/Onc note, further chemo not in line w/pt's wishes.   Palliative Care is following  PT/OT unable to work w/pt due to agitation      Discharge Barriers: workup for AMS, agitation, confusion, hypercalcemia  Expected discharge date:    Expected next site of care: TBD.     / available for discharge planning.     Gina SENAA MSN, RN CTL/  [id number]

## 2025-03-18 NOTE — PROGRESS NOTES
[facility]   part of [facility]     Hospitalist Progress Note     Lisa Cabral Patient Status:  Inpatient    3/12/1975 MRN [id number]   Location [facility] 7NE-A Attending Rubia Santos MD   Hosp Day # 3 PCP Ashley Cross DO     Chief Complaint: Neck pain, left hip/groin pain, cough, JARAMILLO, hypoxic on arrival to the ED    Subjective:     Patient remains agitated, uncomfortable. Moaning in pain through the night. Dilaudid provides only temporary relief. She is tachycardic to 130s. Afebrile. Updated spouse over the phone.     Objective:    Review of Systems:   A comprehensive review of systems was completed; pertinent positive and negatives stated in subjective.    Vital signs:  Temp:  [97.7 °F (36.5 °C)-99.5 °F (37.5 °C)] 99.5 °F (37.5 °C)  Pulse:  [122-138] 134  Resp:  [18-74] 20  BP: (151-178)/() 175/84  SpO2:  [92 %-96 %] 95 %    Physical Exam:    General: lethargic. Moans in pain with minimal movement  Respiratory: No wheezes, no rhonchi  Cardiovascular: S1, S2, regular rhythm, tachycardic  Abdomen: Soft, non-distended, positive bowel sounds, tender to palpation   Neuro: lethargic. Able to state her name, Moves all 4 extremities to pain. Non focal exam  Extremities: No edema    Diagnostic Data:    Labs:  Recent Labs   Lab 03/15/25  1313 25  0701 25  0950 25  0556   WBC 17.6* 17.0*  --  10.5   HGB 15.3 14.0  --  11.4*   MCV 93.3 92.5  --  93.9   PLT 74.0* 51.0*  --  21.0*   BAND 19 24  --   --    INR  --   --  1.42*  --        Recent Labs   Lab 25  0209 25  1754 25  0556   * 157* 183*   BUN 26* 28* 31*   CREATSERUM 1.16* 1.07* 0.99   CA 12.6* 11.5* 10.8*   ALB 4.0 3.5 3.5   * 147* 152*   K 3.2*  3.2* 3.6 3.2*  3.2*    111 113*   CO2 36.0* 28.0 32.0   ALKPHO 527* 668* 625*   AST 93* 91* 67*   ALT 37 37 39   BILT 0.5 0.4 0.4   TP 6.1 5.8 5.5*       Estimated Glomerular Filtration Rate: 69 mL/min/1.73m2 (result from  lab).    Recent Labs   Lab 03/15/25  1313 03/18/25  0556   TROPHS 27  --    CK  --  35       Recent Labs   Lab 03/17/25  0950   PTP 17.5*   INR 1.42*                  Microbiology    Hospital Encounter on 03/15/25   1. Blood Culture     Status: None (Preliminary result)    Collection Time: 03/15/25  4:45 PM    Specimen: Blood,peripheral   Result Value Ref Range    Blood Culture Result No Growth 2 Days N/A         Imaging: Reviewed in Epic.    Medications:    OLANZapine  5 mg Oral Nightly    calcitonin  4 Units/kg Subcutaneous BID    pantoprazole  40 mg Intravenous Q24H    dexamethasone  6 mg Intravenous Q8H    sodium chloride  1,000 mL Intravenous Once    [Held by provider] enoxaparin  40 mg Subcutaneous Daily       Assessment & Plan:      #Metabolic encephalopathy   Multifactorial from hypercalcemia, hypernatremia, metabolic alkalosis, related to meds?  -CT brain limited by motion but negative 3/17  -EEG negative for epileptiform activity   -Neurology following  -Plan for MR brain with anesthesia today     #Severe hypercalcemia  S/p zometa and calcitonin   -Nephrology following  -IVF per nephrology; modified fluids today     #Acute hypoxic respiratory failure suspect 2/2 atelectasis   CTA chest 3/15 with airspace opacities within the lung bases atelectasis vs. Consolidation. Negative for PE. Procal 0.43.  -Received Abx in the ED. They were not continued during admission.   -Repeat CXR 3/16 with discoid atelectasis in the retrocardiac lung base which is stable. And also stable right mid lung atelectasis.   -IS when able     #SIRS with lactic acidosis   -Chronic leukocytosis. Sinus tachycardia could be related to metabolic derangements, hypovolemia. Pneumonia less likely given stable/reassuring repeat CXR 3/16    #Sinus tachycardia  PE ruled out. Related to pain?   -TTE with hyperdynamic EF 75 - 80%, no RWMA    #CLL with concern of transformation to high -grade lymphoma  Imaging with worsening LAD. LDH normal to 1488  on admission.   Heme following  -LN biopsy on 3/17; path pending  -Likely needs inpatient treatment with [facility]    #Thrombocytopenia  2/2 bone marrow involvement     #Coagulopathy   Vit K deficiency?   -Monitor     #Generalized pain suspected due to bony progression of CLL  -Decadron 6 mg every 8 hours   -Consult palliative care for symptom management   -dilaudid cont infusion today given degree of discomfort    #Hypokalemia - supplement per protocol   #Hypernatremia - IVF per nephrology     #CKD stage III - trend     #Metabolic alkalosis suspect 2/2 hypercalcemia and renal bicarb reabsorption    #Acute urinary retention   Requiring ISC for now will continue. Monitor     #Elevated ALP, AST  Some chronicity. Related to chemo vs. CLL. Trend     #Low PO intake  -Per spouse, patient has not eaten in 8 days. Very unlike her.   She likely will not tolerate DHT placement at this time.     #Obesity, BMI 34.45    Rubia Santos MD    Supplementary Documentation:     Quality:  DVT Mechanical Prophylaxis:   SCDs,    DVT Pharmacologic Prophylaxis   Medication    [Held by provider] enoxaparin (Lovenox) 40 MG/0.4ML SUBQ injection 40 mg                Code Status: Not on file  Hagan: External urinary catheter in place  Hagan Duration (in days):   Central line:    MOE:     Discharge is dependent on: course  At this point Ms. Cabral is expected to be discharge to: unclear    The 21st Century Cures Act makes medical notes like these available to patients in the interest of transparency. Please be advised this is a medical document. Medical documents are intended to carry relevant information, facts as evident, and the clinical opinion of the practitioner. The medical note is intended as peer to peer communication and may appear blunt or direct. It is written in medical language and may contain abbreviations or verbiage that are unfamiliar.

## 2025-03-18 NOTE — ANESTHESIA POSTPROCEDURE EVALUATION
[facility]    Lisa Cabral Patient Status:  Inpatient   Age/Gender 50 year old female MRN [id number]   Location [facility] 7NE-A Attending Rubia Santos MD   Hosp Day # 3 PCP Ashley Cross DO       Anesthesia Post-op Note        Procedure Summary       Date: 03/18/25 Room / Location: [facility] MRI    Anesthesia Start: 1652 Anesthesia Stop: 1747    Procedure: MRI BRAIN (W+WO) (CPT=70553) Diagnosis: (mental status change)    Scheduled Providers:  Anesthesiologist: Jabier Bach MD    Anesthesia Type: general ASA Status: 3            Anesthesia Type: No value filed.    Vitals Value Taken Time   /92 03/18/25 1746   Temp 98 03/18/25 1749   Pulse 114 03/18/25 1749   Resp 23 03/18/25 1749   SpO2 98 % 03/18/25 1749   Vitals shown include unfiled device data.        Patient Location: PACU    Anesthesia Type: general    Airway Patency: patent    Postop Pain Control: adequate    Mental Status: mildly sedated but able to meaningfully participate in the post-anesthesia evaluation    Nausea/Vomiting: none    Cardiopulmonary/Hydration status: stable euvolemic    Complications: no apparent anesthesia related complications    Postop vital signs: stable    Dental Exam: Unchanged from Preop    Patient to be discharged from PACU when criteria met.

## 2025-03-18 NOTE — PLAN OF CARE
Received patient drowsy but yells, cries and moans frequently.  Unable to talk in sentence. Restless in bed.   O2 protocol: On 2LNC,sats 93%, clear lungs  posteriorly but with rhonchi anteriorly.    sinus tachy on telemetry monitoring.   Non- cardiac electrolyte protocol, K 3.6  SCD and Lovenox for VTE prophylaxis.  Pain management: Dilaudid prn, Percocet and Ofermiv  IV fluids.  Voiding adequately, clear yellow urine through purewick  Fall precaution, bed alarm on, call light and bedside table within reach.    With 1:1 sitter for safety, pt is impulsive, keeps removing the telemetry wires, pulse ox, nasal cannula and purewick.   2034: Ofirmev given  2117: Dialudid 1 mg given.  2127: sleeping.   2157: started yelling and crying.   2258: Haldol 2 mgs IVP given.   2310: Sleeping.   2335: started yelling, moaning and crying. Trying to remove tele, nasal cannula and pulse ox and purewick. Diaper changed and purewick. Very resistant to care, does not want to turn, doesn not want us to touch her.   0000: Percocet po given.   0030: Continued to yell, cry and moan very loud.   0105: Dilaudid 1 mg IV given.   0110: Sleeping.  0118: Coughing, Crying and yelling again.Offered Guaifenesin and Tessalon thao for coughing.   0200: Frequently yelling and crying.   0300: Yelling and crying.  0354: Dilaudid IV given. Said her pain is severe. \"Please help me\". She was able to say her birthday.   0400: Sleeping/snoring now.   0403: Moaning and yelling and crying again.   0430: On and off crying, moaning and yelling.   0509: Gave Percocet and Robaxin for severe pain. On and off crying, moaning and yelling loud.   0530: On and off crying, moaning and yelling.   0600: On and off crying, moaning and yelling. Wet the whole chux , nothing was inside the canister. Chaned purewick.   0651: Dilaudid given.    0700: Sleeping. Endorsed to am RN.       Problem: Patient/Family Goals  Goal: Patient/Family Long Term Goal  Description: Patient's Long  Term Goal: Discharge home  Interventions:- Follow plan of care- See additional Care Plan goals for specific interventions  Outcome: Progressing  Goal: Patient/Family Short Term Goal  Description: Patient's Short Term Goal: Improved pain  Interventions: - fentanyl patch, fentanyl push, percocet, adjusting as tolerated  - See additional Care Plan goals for specific interventions  Outcome: Progressing     Problem: Delirium  Goal: Minimize duration of delirium  Description: Interventions:- Encourage use of hearing aids, eye glasses- Promote highest level of mobility daily- Provide frequent reorientation- Promote wakefulness i.e. lights on, blinds open- Promote sleep, encourage patient's normal rest cycle i.e. lights off, TV off, minimize noise and interruptions- Encourage family to assist in orientation and promotion of home routines  Outcome: Progressing     Problem: CARDIOVASCULAR - ADULT  Goal: Absence of cardiac arrhythmias or at baseline  Description: INTERVENTIONS:- Continuous cardiac monitoring, monitor vital signs, obtain 12 lead EKG if indicated- Evaluate effectiveness of antiarrhythmic and heart rate control medications as ordered- Initiate emergency measures for life threatening arrhythmias- Monitor electrolytes and administer replacement therapy as ordered  Outcome: Progressing     Problem: RESPIRATORY - ADULT  Goal: Achieves optimal ventilation and oxygenation  Description: INTERVENTIONS:- Assess for changes in respiratory status- Assess for changes in mentation and behavior- Position to facilitate oxygenation and minimize respiratory effort- Oxygen supplementation based on oxygen saturation or ABGs- Provide Smoking Cessation handout, if applicable- Encourage broncho-pulmonary hygiene including cough, deep breathe, Incentive Spirometry- Assess the need for suctioning and perform as needed- Assess and instruct to report SOB or any respiratory difficulty- Respiratory Therapy support as indicated-  Manage/alleviate anxiety- Monitor for signs/symptoms of CO2 retention  Outcome: Progressing     Problem: GENITOURINARY - ADULT  Goal: Absence of urinary retention  Description: INTERVENTIONS:- Assess patient’s ability to void and empty bladder- Monitor intake/output and perform bladder scan as needed- Follow urinary retention protocol/standard of care- Consider collaborating with pharmacy to review patient's medication profile- Implement strategies to promote bladder emptying  Outcome: Progressing     Problem: NEUROLOGICAL - ADULT  Goal: Achieves stable or improved neurological status  Description: INTERVENTIONS- Assess for and report changes in neurological status- Initiate measures to prevent increased intracranial pressure- Maintain blood pressure and fluid volume within ordered parameters to optimize cerebral perfusion and minimize risk of hemorrhage- Monitor temperature, glucose, and sodium. Initiate appropriate interventions as ordered  Outcome: Progressing  Goal: Achieves maximal functionality and self care  Description: INTERVENTIONS- Monitor swallowing and airway patency with patient fatigue and changes in neurological status- Encourage and assist patient to increase activity and self care with guidance from PT/OT- Encourage visually impaired, hearing impaired and aphasic patients to use assistive/communication devices  Outcome: Progressing     Problem: Impaired Cognition  Goal: Patient will exhibit improved attention, thought processing and/or memory  Description: Interventions:- Minimize distractions in the room when full attention is required  - Consider use of a daily journal to improve memory and reduce confusion related to daily events and orientation  - Allow additional time for processing after asking questions or providing instructions  Outcome: Progressing     Problem: Impaired Communication  Goal: Patient will achieve maximal communication potential  Description: Interventions:- Encourage use of  alternative/augmentative communication to express basic wants and needs (use of communication/letter board/or smartphone/tablet/handwriting)  - Allow additional time for processing after asking questions or providing instructions  - Ask \"yes/no\" questions to facilitate patient's ability to communicate wants and needs  Outcome: Progressing     Problem: PAIN - ADULT  Goal: Verbalizes/displays adequate comfort level or patient's stated pain goal  Description: INTERVENTIONS:- Encourage pt to monitor pain and request assistance- Assess pain using appropriate pain scale- Administer analgesics based on type and severity of pain and evaluate response- Implement non-pharmacological measures as appropriate and evaluate response- Consider cultural and social influences on pain and pain management- Manage/alleviate anxiety- Utilize distraction and/or relaxation techniques- Monitor for opioid side effects- Notify MD/LIP if interventions unsuccessful or patient reports new pain- Anticipate increased pain with activity and pre-medicate as appropriate  Outcome: Progressing     Problem: SAFETY ADULT - FALL  Goal: Free from fall injury  Description: INTERVENTIONS:- Assess pt frequently for physical needs- Identify cognitive and physical deficits and behaviors that affect risk of falls.- [place] fall precautions as indicated by assessment.- Educate pt/family on patient safety including physical limitations- Instruct pt to call for assistance with activity based on assessment- Modify environment to reduce risk of injury- Provide assistive devices as appropriate- Consider OT/PT consult to assist with strengthening/mobility- Encourage toileting schedule  Outcome: Progressing

## 2025-03-18 NOTE — PROGRESS NOTES
[facility]  Nephrology Progress Note    Lisa Cabral Patient Status:  Inpatient    3/12/1975 MRN [id number]   Location [facility] 7NE-A Attending Rubia Santos MD   Hosp Day # 3 PCP Ashley Cross,      Subjective:  Continues to be agitated. Plan for MRI brain.    Objective:  Vital signs: Blood pressure (!) 166/92, pulse (!) 138, temperature 98 °F (36.7 °C), temperature source Oral, resp. rate 22, weight 233 lb 4.8 oz (105.8 kg), SpO2 96%, not currently breastfeeding.  General: Awake, moaning  HEENT: Moist mucous membranes.   Respiratory: Clear to auscultation bilaterally.    Cardiovascular: S1, S2.  Regular rate and rhythm.    Abdomen: Soft, nontender, nondistended.    Neurologic: Confused, not oriented  Musculoskeletal: No swelling noted.  Integument: No lesions. No erythema.    Current Facility-Administered Medications   Medication Dose Route Frequency    potassium chloride 40 mEq in 250mL sodium chloride 0.9% IVPB premix  40 mEq Intravenous Once    sodium chloride 0.45% infusion   Intravenous Continuous    calcitonin (Miacalcin) 200 Units/mL injection 424 Units  4 Units/kg Subcutaneous BID    nicotine (Nicoderm CQ) 21 MG/24HR patch 1 patch  1 patch Transdermal Daily    oxyCODONE-acetaminophen (Percocet) 5-325 MG per tab 1 tablet  1 tablet Oral Q4H PRN    labetalol (Trandate) 5 mg/mL injection 10 mg  10 mg Intravenous Q4H PRN    haloperidol lactate (Haldol) 5 MG/ML injection 2 mg  2 mg Intravenous Q6H PRN    HYDROmorphone (Dilaudid) 1 MG/ML injection 0.5 mg  0.5 mg Intravenous Q3H PRN    Or    HYDROmorphone (Dilaudid) 1 MG/ML injection 1 mg  1 mg Intravenous Q3H PRN    acetaminophen (Ofirmev) 10 mg/mL infusion premix 1,000 mg  1,000 mg Intravenous Q6H PRN    pantoprazole (Protonix) 40 mg in sodium chloride 0.9% PF 10 mL IV push  40 mg Intravenous Q24H    dexamethasone (Decadron) 4 MG/ML injection 6 mg  6 mg Intravenous Q8H    sodium chloride 0.9% infusion 1,000 mL  1,000 mL Intravenous  Once    lidocaine-menthol 4-1 % patch 1 patch  1 patch Transdermal Daily PRN    methocarbamol (Robaxin) tab 500 mg  500 mg Oral TID PRN    rizatriptan (Maxalt-MLT) disintegrating tab 5 mg  5 mg Oral PRN    melatonin tab 3 mg  3 mg Oral Nightly PRN    [Held by provider] enoxaparin (Lovenox) 40 MG/0.4ML SUBQ injection 40 mg  40 mg Subcutaneous Daily    acetaminophen (Tylenol Extra Strength) tab 500 mg  500 mg Oral Q4H PRN    ondansetron (Zofran) 4 MG/2ML injection 4 mg  4 mg Intravenous Q6H PRN    polyethylene glycol (PEG 3350) (Miralax) 17 g oral packet 17 g  17 g Oral Daily PRN    sennosides (Senokot) tab 17.2 mg  17.2 mg Oral Nightly PRN    bisacodyl (Dulcolax) 10 MG rectal suppository 10 mg  10 mg Rectal Daily PRN    fleet enema (Fleet) rectal enema 133 mL  1 enema Rectal Once PRN    benzonatate (Tessalon) cap 200 mg  200 mg Oral TID PRN    guaiFENesin (Robitussin) 100 MG/5 ML oral liquid 200 mg  200 mg Oral Q4H PRN       Recent Labs     03/15/25  1313 03/16/25  0701 03/17/25  0950 03/18/25  0556   WBC 17.6* 17.0*  --  10.5   HGB 15.3 14.0  --  11.4*   MCV 93.3 92.5  --  93.9   PLT 74.0* 51.0*  --  21.0*   BAND 19 24  --   --    INR  --   --  1.42*  --        Recent Labs     03/15/25  1313 03/15/25  2051 03/16/25  0701 03/16/25  1620 03/17/25  0209 03/17/25  1754 03/18/25  0556      < > 143 143 146* 147* 152*   K 3.4*   < > 3.1*  3.1* 2.8* 3.2*  3.2* 3.6 3.2*  3.2*   CL 96*   < > 100 98 104 111 113*   CO2 34.0*   < > 38.0* 36.0* 36.0* 28.0 32.0   BUN 21   < > 20 22 26* 28* 31*   CREATSERUM 1.19*   < > 0.99 0.97 1.16* 1.07* 0.99   CA 17.8*   < > 15.6* 14.1* 12.6* 11.5* 10.8*   MG 2.6  --   --   --   --   --   --     < > = values in this interval not displayed.       Recent Labs     03/16/25  0701 03/16/25  1620 03/17/25  0209 03/17/25  1754 03/18/25  0556   ALT 44 38 37 37 39   AST 42* 64* 93* 91* 67*   ALB 4.4 4.0 4.0 3.5 3.5   LDH  --  1,488*  --   --  3,118*     Assessment/Plan:  1) Severe  Hypercalcemia: non-PTH mediated hypercalcemia in setting of high risk CLL and possible transformation to high-grade lymphoma. S/p zometa and calcitonin and currently on IVF. Calcium continuing to improve, continue IVF. Transition from 0.9NS to 0.45NS today. Follow serum calcium.    2) Acute kidney injury: baseline serum creatinine ~0.7-0.9 mg/dL. Acute kidney injury likely 2/2 hypercalcemia and contrast associated nephropathy. Renal function now back to baseline. Continue IVF and follow renal function.      3) CLL: Diagnosed in 2023, follows with Heme/Onc as outpatient. Has high risk mutations. Had been on treatment holiday recently. CT with current lymphadenopathy. S/p lymph node biopsy 3/17. May start [facility] inpatient.      4) Bone pain: likely 2/2 hypercalcemia and bony progression of CLL. Pain management per primary     5) Hypokalemia: replace per protocol      6) Hypernatremia: due to decreased free water intake, transition to 0.45NS today to provide free water.    Thank you for allowing me to participate in this patient's care. Please feel free to call me with any questions or concerns.     Reva Smalls MD  03/18/25

## 2025-03-18 NOTE — PAYOR COMM NOTE
--------------  3/17 CONTINUED STAY REVIEW    Payor: [employer]  Subscriber #:  [id number]  Authorization Number: [id number]    HEME/ONC:    Still confused, moaning and in pain.  Appears to recognize me but could not answer questions meaningfully.     Vitals          Vitals:     03/17/25 0000 03/17/25 0005 03/17/25 0403 03/17/25 0800   BP:   (!) 179/104 (!) 183/97 (!) 154/94   BP Location:   Left arm Right arm Left arm   Pulse: (!) 121 (!) 122 119 113   Resp: (!) 31 (!) 37 21 25   Temp:   98.7 °F (37.1 °C) 98.2 °F (36.8 °C) 99.4 °F (37.4 °C)   TempSrc:   Axillary Axillary Axillary   SpO2: 95% 94% 93% 97%   Weight:                 Lab 03/16/25  0701 03/16/25  1620 03/17/25  0209   * 134* 136*   BUN 20 22 26*   CREATSERUM 0.99 0.97 1.16*   EGFRCR 69 71 57*   CA 15.6* 14.1* 12.6*   ALB 4.4 4.0 4.0    143 146*   K 3.1*  3.1* 2.8* 3.2*  3.2*    98 104   CO2 38.0* 36.0* 36.0*   ALKPHO 260* 337* 527*   AST 42* 64* 93*   ALT 44 38 37   BILT 0.4 0.4 0.5   TP 6.7 6.3 6.1         MEDICATIONS:     Scheduled Medications    potassium chloride  40 mEq Intravenous Once    nicotine  1 patch Transdermal Daily    oxyCODONE-acetaminophen  1 tablet Oral Q6H    pantoprazole  40 mg Intravenous Q24H    dexamethasone  6 mg Intravenous Q8H    sodium chloride  1,000 mL Intravenous Once    buPROPion ER  150 mg Oral Daily    enoxaparin  40 mg Subcutaneous Daily          PHYSICAL EXAM:     General: Restless and agitated, moaning.  Pain on pressure everywhere.  Chest: Clear to auscultation.  Heart: Regular rate and rhythm.   Abdomen: Soft, non tender with good bowel sounds.  Extremities: No edema.  Neurological: Grossly intact.      ASSESSMENT/PLAN:     # Confusion: Still confused and agitated.  Will consider brain imaging once more stable but likely related to hypercalcemia.  Calcium is better.       # Hypercalcemia: Received zoledronic acid, on fluids and steroids.  Received calcitonin, will give few more  doses.  Calcium is improving.  Clinical picture suggests transformation of CLL.     # CLL: High risk disease, initially diag 9/23.  Was on clinical trial and received fixed duration treatment with Obinutuzumab/venetoclax since 10/23-9/24.  Progression diagnosed on lymph node biopsy 11/24.  Received Acalabrutinib from 11/24-3/25.  Stopped due to poor tolerance.  Now imaging shows worsening lymphadenopathy.  LDH has gone from normal to 1488.  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       # Thrombocytopenia: Probably due to bone marrow involvement.     # Neutrophilia: Probably due to steroids.     # Generalized pain: Likely due to bony progression of CLL.  Continue narcotics.  On steroids which should help.     Spoke to  Erik via telephone.  Clinical picture is suggestive of transformation to high-grade lymphoma.  LDH has gone up from normal to almost 1500.  Calcium went from normal when I saw her 10 days back to 17.8 this admission.  Agree with lymph node biopsy but feels strongly that we need to start inpatient treatment with [facility].  If MS improves tomorrow, will put central line and start treatment inpatient.  Spoke to  at length.  Prognosis is poor with transformation.        HOSPITALIST:    Patient is lethargic. Opens eyes briefly to verbal and tactile stimuli but screams in pain with minimal movement.       Vital signs:  Temp:  [98 °F (36.7 °C)-99.4 °F (37.4 °C)] 99.4 °F (37.4 °C)  Pulse:  [113-129] 113  Resp:  [16-37] 25  BP: (154-183)/() 154/94  SpO2:  [85 %-97 %] 95 %     Physical Exam:    General: lethargic. Moans in pain with minimal movement  Respiratory: No wheezes, no rhonchi  Cardiovascular: S1, S2, regular rhythm, tachycardic  Abdomen: Soft, non-distended, positive bowel sounds, tender to palpation   Neuro: lethargic. Able to state her name, Moves all 4 extremities to pain. Non focal exam  Extremities: No edema        Medications:    Scheduled Medications    calcitonin  4 Units/kg Subcutaneous BID    nicotine  1 patch Transdermal Daily    pantoprazole  40 mg Intravenous Q24H    dexamethasone  6 mg Intravenous Q8H    sodium chloride  1,000 mL Intravenous Once    enoxaparin  40 mg Subcutaneous Daily         sodium chloride 0.9% infusion  Rate: 200 mL/hr  Freq: Continuous Route: IV  Start: 03/15/25 1426       Assessment & Plan:  #Metabolic encephalopathy   Multifactorial from hypercalcemia, hypernatremia, metabolic alkalosis, related to meds? ABG 3/16 with pH 7.54, PCO2 51  -repeat ABG, check EEG, CT brain   -If negative, would monitor for now and can consider MR brain pending course   -Check B12, folic acid      #Severe hypercalcemia  S/p zometa and calcitonin   -Nephrology following  -IVF per nephrology      #Acute hypoxic respiratory failure suspect 2/2 atelectasis   CTA chest 3/15 with airspace opacities within the lung bases atelectasis vs. Consolidation. Negative for PE. Procal 0.43.  -Received Abx in the ED. They were not continued during admission.   -Repeat CXR 3/16 with discoid atelectasis in the retrocardiac lung base which is stable. And also stable right mid lung atelectasis.   -IS when able      #SIRS with lactic acidosis   -Chronic leukocytosis. Sinus tachycardia could be related to metabolic derangements, hypovolemia. Pneumonia less likely given stable/reassuring repeat CXR 3/16     #Sinus tachycardia  PE ruled out. Related to pain?   -Check TTE      #CLL with concern of transformation to high -grade lymphoma  Imaging with worsening LAD. LDH normal to 1488 on admission.   Heme following  -LN biopsy pending  -Likely needs inpatient treatment with [facility]     #Thrombocytopenia  2/2 bone marrow involvement      #Generalized pain suspected due to bony progression of CLL  -Decadron 6 mg every 8 hours   -Consult palliative care for symptom management      #Hypokalemia - supplement per protocol   #Hypernatremia - IVF per nephrology       #CKD stage III - trend      #Metabolic alkalosis suspect 2/2 hypercalcemia and renal bicarb reabsorption     #Acute urinary retention   Requiring ISC for now will continue. Monitor      #Elevated ALP, AST  Some chronicity. Related to chemo vs. CLL. Trend      #Low PO intake  -Per spouse, patient has not eaten in 8 days. Very unlike her.   She likely will not tolerate DHT placement at this time. Discussed with  this will be re-evaluated in next 24 - 48 hours if patient can tolerate DHT vs. TPN pending clinical course.      #Obesity, BMI 34.45       RENAL:    Assessment/Plan:  1) Severe Hypercalcemia: non-PTH mediated hypercalcemia in setting of high risk CLL and possible transformation to high-grade lymphoma. S/p zometa and calcitonin and currently on IVF. Calcium improving, continue IVF. Follow serum calcium BID.      2) Acute kidney injury: baseline serum creatinine ~0.7-0.9 mg/dL. Acute kidney injury likely 2/2 hypercalcemia and contrast associated nephropathy. Continue IVF and follow renal function.      3) CLL: Diagnosed in 2023, follows with Heme/Onc as outpatient. Has high risk mutations. Had been on treatment holiday recently. CT with current lymphadenopathy. Plan for left inguinal biopsy per Heme. May start [facility] inpatient.      4) Bone pain: likely 2/2 hypercalcemia and bony progression of CLL. Pain management per primary     5) Hypokalemia: replace per protocol         IR:    Procedure Note     Procedure: US guided lymph node biopsy     Pre-Procedure Diagnosis:  inguinal lymphadenopathy     Post-Procedure Diagnosis: same    NURSING:  Assumed care at 0700  Patient drowsy, oriented x1-2. Follows some commands  Head CT, EEG, echo, and ABGs completed  R groin lymph node biopsy done. Bandaid clean, intact  Dilaudid and IV tylenol given for pain  Moaning/yelling out in pain throughout day  Palliative care consulted  Refusing to eat for most of day. Minimal PO intake  IV fluids infusing  Straight cath  x2    MEDICATIONS ADMINISTERED IN LAST 1 DAY:  acetaminophen (Ofirmev) 10 mg/mL infusion premix 1,000 mg       Date Action Dose Route User    3/17/2025 2034 New Bag 1,000 mg Intravenous Alexsandra Briseno RN    3/17/2025 1434 New Bag 1,000 mg Intravenous Ama Carolina RN          benzonatate (Tessalon) cap 200 mg       Date Action Dose Route User    3/18/2025 0110 Given 200 mg Oral Alexsandra Briseno RN          calcitonin (Miacalcin) 200 Units/mL injection 424 Units       Date Action Dose Route User    3/18/2025 1048 Given 424 Units Subcutaneous (Right Lower Abdomen) Beatriz, Patricia    3/17/2025 2124 Given 424 Units Subcutaneous (Left Lower Abdomen) Alexsandra Briseno RN          dexamethasone (Decadron) 4 MG/ML injection 6 mg       Date Action Dose Route User    3/18/2025 1046 Given 6 mg Intravenous Beatriz Patricia    3/17/2025 2304 Given 6 mg Intravenous Alexsandra Briseno RN    3/17/2025 1501 Given 6 mg Intravenous Ama Carolina RN          guaiFENesin (Robitussin) 100 MG/5 ML oral liquid 200 mg       Date Action Dose Route User    3/18/2025 0109 Given 200 mg Oral Alexsandra Briseno RN          haloperidol lactate (Haldol) 5 MG/ML injection 2 mg       Date Action Dose Route User    3/18/2025 1140 Given 2 mg Intravenous Beatriz, Patricia    3/17/2025 2258 Given 2 mg Intravenous Alexsandra Briseno RN          HYDROmorphone (Dilaudid) 1 MG/ML injection 0.5 mg       Date Action Dose Route User    3/17/2025 1805 Given 0.5 mg Intravenous Ama Carolina RN          HYDROmorphone (Dilaudid) 1 MG/ML injection 1 mg       Date Action Dose Route User    3/18/2025 1341 Given 1 mg Intravenous Beatriz, Patricia    3/18/2025 1046 Given 1 mg Intravenous Beatriz, Patricia    3/18/2025 0651 Given 1 mg Intravenous Alexsandra Briseno RN    3/18/2025 0354 Given 1 mg Intravenous Alexsandra Briseno RN    3/18/2025 0105 Given 1 mg Intravenous Alexsandra Briseno RN    3/17/2025 2117 Given 1 mg Intravenous Alexsandra Briseno RN    3/17/2025 1501 Given 1 mg  Intravenous Ama Carolina RN          lidocaine-menthol 4-1 % patch 1 patch       Date Action Dose Route User    3/18/2025 1138 Patch Applied 1 patch Transdermal (Right Upper Abdomen) Patricia Henderson          melatonin tab 3 mg       Date Action Dose Route User    3/17/2025 2034 Given 3 mg Oral Alexsandra Briseno RN          methocarbamol (Robaxin) tab 500 mg       Date Action Dose Route User    3/18/2025 0509 Given 500 mg Oral Alexsandra Briseno RN          nicotine (Nicoderm CQ) 21 MG/24HR patch 1 patch       Date Action Dose Route User    3/18/2025 1100 Patch Applied 1 patch Transdermal (Left Upper Arm) Patricia Henderson          oxyCODONE-acetaminophen (Percocet) 5-325 MG per tab 1 tablet       Date Action Dose Route User    3/18/2025 1140 Given 1 tablet Oral Patricia Henderson    3/18/2025 0509 Given 1 tablet Oral Alexsandra Briseno RN    3/18/2025 0029 Given 1 tablet Oral Alexsandra Briseno RN          pantoprazole (Protonix) 40 mg in sodium chloride 0.9% PF 10 mL IV push       Date Action Dose Route User    3/17/2025 2303 Given 40 mg Intravenous Alexsandra Briseno RN          potassium chloride 40 mEq in 250mL sodium chloride 0.9% IVPB premix       Date Action Dose Route User    3/18/2025 1051 New Bag 40 mEq Intravenous Patricia Henderson          sodium chloride 0.45% infusion       Date Action Dose Route User    3/18/2025 1051 New Bag (none) Intravenous Patricia Henderson          sodium chloride 0.9% infusion       Date Action Dose Route User    3/18/2025 0029 New Bag (none) Intravenous Alexsandra Briseno RN            Vitals (last day)       Date/Time Temp Pulse Resp BP SpO2 Weight O2 Device O2 Flow Rate (L/min) Who    03/18/25 1200 99.5 °F (37.5 °C) 134 20 175/84 95 % -- Nasal cannula 2 L/min     03/18/25 0800 98 °F (36.7 °C) 138 22 166/92 96 % -- None (Room air) --     03/18/25 0449 99.2 °F (37.3 °C) 128 38 178/116 -- -- Nasal cannula 2 L/min LIOR    03/18/25 0400 97.7 °F (36.5 °C) 122 26 170/96 93 % -- Nasal cannula --  LMA    03/18/25 0002 98.3 °F (36.8 °C) 131 74 160/98 93 % -- Nasal cannula 2 L/min LMA    03/17/25 2059 99 °F (37.2 °C) 130 18 151/104 93 % -- Nasal cannula -- LIOR    03/17/25 1800 99.3 °F (37.4 °C) 126 21 170/102 92 % -- None (Room air) -- HT    03/17/25 1345 99.8 °F (37.7 °C) 127 27 164/106 92 % -- None (Room air) -- HT    03/17/25 1103 -- -- -- -- 95 % -- Nasal cannula 3 L/min EI    03/17/25 0800 99.4 °F (37.4 °C) 113 25 154/94 97 % -- Nasal cannula 3 L/min HT    03/17/25 0403 98.2 °F (36.8 °C) 119 21 183/97 93 % -- Nasal cannula 3 L/min AS    03/17/25 0005 98.7 °F (37.1 °C) 122 37 179/104 94 % -- Nasal cannula 3 L/min AS    03/17/25 0000 -- 121 31 -- 95 % -- -- -- JM

## 2025-03-18 NOTE — TELEPHONE ENCOUNTER
Spoke to aunt Rosanna again at [phone].  No further information available for now.  MRI is being done right now.  Preliminary biopsy will not be available early started tomorrow.  No schistocytes on smear so TTP unlikely.  Normal haptoglobin.  Will call her once we have more information.  She was appreciated and verbalized understanding.

## 2025-03-18 NOTE — PLAN OF CARE
Assumed care 0730.  Alert and oriented x1. Confused. Screaming out in pain.   Neuro consulted.   MRI of brain with sedation.   SBP parameters < 180.  US guided PIV in place. Pt pulled out IV at shift change  Electrolyte Cardiac protocol. K replaced.   PRN pain and anxiety medications given. See MAR.   Nicotine patch placed ERVIN.   Lidocaine patch on abdomen.   Held Lovenox .  PT/OT to see when more appropriate.   BED rest.   Soft diet pills crushed after MRI.     Reieved permission from  to remove nose ring. Nose ring removed with Radha ESPINOZA.   Dilaudid  gtt initiated before MRI. Patient more comfortable and able to rest. Occasional moans, and screams.     Problem: Delirium  Goal: Minimize duration of delirium  Description: Interventions:- Encourage use of hearing aids, eye glasses- Promote highest level of mobility daily- Provide frequent reorientation- Promote wakefulness i.e. lights on, blinds open- Promote sleep, encourage patient's normal rest cycle i.e. lights off, TV off, minimize noise and interruptions- Encourage family to assist in orientation and promotion of home routines  Outcome: Progressing     Problem: CARDIOVASCULAR - ADULT  Goal: Absence of cardiac arrhythmias or at baseline  Description: INTERVENTIONS:- Continuous cardiac monitoring, monitor vital signs, obtain 12 lead EKG if indicated- Evaluate effectiveness of antiarrhythmic and heart rate control medications as ordered- Initiate emergency measures for life threatening arrhythmias- Monitor electrolytes and administer replacement therapy as ordered  Outcome: Progressing     Problem: RESPIRATORY - ADULT  Goal: Achieves optimal ventilation and oxygenation  Description: INTERVENTIONS:- Assess for changes in respiratory status- Assess for changes in mentation and behavior- Position to facilitate oxygenation and minimize respiratory effort- Oxygen supplementation based on oxygen saturation or ABGs- Provide Smoking Cessation handout, if  applicable- Encourage broncho-pulmonary hygiene including cough, deep breathe, Incentive Spirometry- Assess the need for suctioning and perform as needed- Assess and instruct to report SOB or any respiratory difficulty- Respiratory Therapy support as indicated- Manage/alleviate anxiety- Monitor for signs/symptoms of CO2 retention  Outcome: Progressing     Problem: GENITOURINARY - ADULT  Goal: Absence of urinary retention  Description: INTERVENTIONS:- Assess patient’s ability to void and empty bladder- Monitor intake/output and perform bladder scan as needed- Follow urinary retention protocol/standard of care- Consider collaborating with pharmacy to review patient's medication profile- Implement strategies to promote bladder emptying  Outcome: Progressing     Problem: NEUROLOGICAL - ADULT  Goal: Achieves stable or improved neurological status  Description: INTERVENTIONS- Assess for and report changes in neurological status- Initiate measures to prevent increased intracranial pressure- Maintain blood pressure and fluid volume within ordered parameters to optimize cerebral perfusion and minimize risk of hemorrhage- Monitor temperature, glucose, and sodium. Initiate appropriate interventions as ordered  Outcome: Progressing  Goal: Achieves maximal functionality and self care  Description: INTERVENTIONS- Monitor swallowing and airway patency with patient fatigue and changes in neurological status- Encourage and assist patient to increase activity and self care with guidance from PT/OT- Encourage visually impaired, hearing impaired and aphasic patients to use assistive/communication devices  Outcome: Progressing     Problem: Impaired Cognition  Goal: Patient will exhibit improved attention, thought processing and/or memory  Description: Interventions:  Outcome: Progressing     Problem: Impaired Communication  Goal: Patient will achieve maximal communication potential  Description: Interventions:  Problem: PAIN -  ADULT  Goal: Verbalizes/displays adequate comfort level or patient's stated pain goal  Description: INTERVENTIONS:- Encourage pt to monitor pain and request assistance- Assess pain using appropriate pain scale- Administer analgesics based on type and severity of pain and evaluate response- Implement non-pharmacological measures as appropriate and evaluate response- Consider cultural and social influences on pain and pain management- Manage/alleviate anxiety- Utilize distraction and/or relaxation techniques- Monitor for opioid side effects- Notify MD/LIP if interventions unsuccessful or patient reports new pain- Anticipate increased pain with activity and pre-medicate as appropriate  Outcome: Progressing     Problem: SAFETY ADULT - FALL  Goal: Free from fall injury  Description: INTERVENTIONS:- Assess pt frequently for physical needs- Identify cognitive and physical deficits and behaviors that affect risk of falls.- Melvin fall precautions as indicated by assessment.- Educate pt/family on patient safety including physical limitations- Instruct pt to call for assistance with activity based on assessment- Modify environment to reduce risk of injury- Provide assistive devices as appropriate- Consider OT/PT consult to assist with strengthening/mobility- Encourage toileting schedule  Outcome: Progressing     Outcome: Progressing

## 2025-03-18 NOTE — ANESTHESIA PREPROCEDURE EVALUATION
PRE-OP EVALUATION    Patient Name: Lisa Cabral    Admit Diagnosis: Hypercalcemia [E83.52]  Hypokalemia [E87.6]  Thrombocytopenia [D69.6]  CLL (chronic lymphocytic leukemia) (HCC) [C91.10]  Chronic neck pain [M54.2, G89.29]  Pain of left hip [M25.552]  Community acquired pneumonia, unspecified laterality [J18.9]    Pre-op Diagnosis: * No pre-op diagnosis entered *        Anesthesia Procedure: MRI BRAIN (W+WO) (CPT=70553)    * No surgeons found in log *    Pre-op vitals reviewed.  Temp: 98.4 °F (36.9 °C)  Pulse: 117  Resp: 30  BP: 144/87  SpO2: 94 %  Body mass index is 34.45 kg/m².    Current medications reviewed.  Hospital Medications:   [COMPLETED] potassium chloride 40 mEq in 250mL sodium chloride 0.9% IVPB premix  40 mEq Intravenous Once    sodium chloride 0.45% infusion   Intravenous Continuous    sodium chloride 0.9% infusion   Intravenous Continuous    HYDROmorphone 0.4 mg BOLUS FROM PCA  0.4 mg Intravenous Q30 Min PRN    naloxone (Narcan) 0.4 MG/ML injection 0.08 mg  0.08 mg Intravenous Q5 Min PRN    diphenhydrAMINE (Benadryl) 50 mg/mL  injection 12.5 mg  12.5 mg Intravenous Q4H PRN    ondansetron (Zofran) 4 MG/2ML injection 4 mg  4 mg Intravenous Q6H PRN    HYDROmorphone in sodium chloride 0.9% (Dilaudid) 20 mg/100mL PCA premix   Intravenous Continuous    OLANZapine (ZyPREXA Zydis) disintegrating tab 5 mg  5 mg Oral Nightly    OLANZapine (ZyPREXA Zydis) disintegrating tab 2.5 mg  2.5 mg Oral BID PRN    [COMPLETED] potassium chloride 40 mEq in 250mL sodium chloride 0.9% IVPB premix  40 mEq Intravenous Once    calcitonin (Miacalcin) 200 Units/mL injection 424 Units  4 Units/kg Subcutaneous BID    [COMPLETED] LORazepam (Ativan) 2 mg/mL injection 1 mg  1 mg Intravenous Once    [] LORazepam (Ativan) 2 mg/mL injection        [COMPLETED] potassium chloride (Klor-Con M20) tab 40 mEq  40 mEq Oral Once    [COMPLETED] fentaNYL (Sublimaze) 50 mcg/mL injection 50 mcg  50 mcg Intravenous Once    []  potassium chloride (Klor-Con M20) tab 40 mEq  40 mEq Oral Q4H    labetalol (Trandate) 5 mg/mL injection 10 mg  10 mg Intravenous Q4H PRN    acetaminophen (Ofirmev) 10 mg/mL infusion premix 1,000 mg  1,000 mg Intravenous Q6H PRN    pantoprazole (Protonix) 40 mg in sodium chloride 0.9% PF 10 mL IV push  40 mg Intravenous Q24H    [COMPLETED] dexamethasone (Decadron) 4 MG/ML injection 10 mg  10 mg Intravenous Once    dexamethasone (Decadron) 4 MG/ML injection 6 mg  6 mg Intravenous Q8H    [COMPLETED] HYDROmorphone (Dilaudid) 1 MG/ML injection 0.5 mg  0.5 mg Intravenous Once    [COMPLETED] sodium chloride 0.9 % IV bolus 1,000 mL  1,000 mL Intravenous Once    sodium chloride 0.9% infusion 1,000 mL  1,000 mL Intravenous Once    [COMPLETED] zoledronic acid (Zometa) 4 mg in sodium chloride 0.9% 105 mL IVPB  4 mg Intravenous Once    [COMPLETED] calcitonin (Miacalcin) 200 Units/mL injection 432 Units  4 Units/kg Subcutaneous Q12H    [COMPLETED] iopamidol 76% (ISOVUE-370) injection for power injector  100 mL Intravenous ONCE PRN    lidocaine-menthol 4-1 % patch 1 patch  1 patch Transdermal Daily PRN    methocarbamol (Robaxin) tab 500 mg  500 mg Oral TID PRN    rizatriptan (Maxalt-MLT) disintegrating tab 5 mg  5 mg Oral PRN    melatonin tab 3 mg  3 mg Oral Nightly PRN    [Held by provider] enoxaparin (Lovenox) 40 MG/0.4ML SUBQ injection 40 mg  40 mg Subcutaneous Daily    acetaminophen (Tylenol Extra Strength) tab 500 mg  500 mg Oral Q4H PRN    polyethylene glycol (PEG 3350) (Miralax) 17 g oral packet 17 g  17 g Oral Daily PRN    sennosides (Senokot) tab 17.2 mg  17.2 mg Oral Nightly PRN    bisacodyl (Dulcolax) 10 MG rectal suppository 10 mg  10 mg Rectal Daily PRN    fleet enema (Fleet) rectal enema 133 mL  1 enema Rectal Once PRN    benzonatate (Tessalon) cap 200 mg  200 mg Oral TID PRN    guaiFENesin (Robitussin) 100 MG/5 ML oral liquid 200 mg  200 mg Oral Q4H PRN    [COMPLETED] cefTRIAXone (Rocephin) 2 g in sodium chloride  0.9% 100 mL IVPB-ADDV  2 g Intravenous Once    [COMPLETED] azithromycin (Zithromax) 500 mg in sodium chloride 0.9% 250mL IVPB premix  500 mg Intravenous Once    [COMPLETED] HYDROmorphone (Dilaudid) 1 MG/ML injection 0.5 mg  0.5 mg Intravenous Once    [COMPLETED] potassium chloride (Klor-Con M20) tab 40 mEq  40 mEq Oral Once    [COMPLETED] HYDROmorphone (Dilaudid) 1 MG/ML injection 0.5 mg  0.5 mg Intravenous Once    [COMPLETED] potassium chloride (Klor-Con M20) tab 40 mEq  40 mEq Oral Once       Outpatient Medications:   Prescriptions Prior to Admission[1]    Allergies: Aleve [naproxen] and Ultram [tramadol]      Anesthesia Evaluation    Patient summary reviewed.    Anesthetic Complications  (+) history of anesthetic complications         GI/Hepatic/Renal                                 Cardiovascular                                                       Endo/Other  Comment: Lymphoma                                 Pulmonary                           Neuro/Psych  Comment: The patient is aggressive. She is A&O x0. Refusing vitals. Consent was obtained by the  (POA)                   (+) psychiatric history         Takes longer to wake up from anesthesia per the .        Past Surgical History:   Procedure Laterality Date    Hysterectomy      1 ovary in place    Laparoscopic cholecystectomy      Lumbar spine fusion combined      Other      C-Spine fusion and revision     Social History     Socioeconomic History    Marital status:     Number of children: 2   Occupational History    Occupation: on disability   Tobacco Use    Smoking status: Every Day     Current packs/day: 1.00     Types: Cigarettes     Passive exposure: Current    Smokeless tobacco: Never   Vaping Use    Vaping status: Some Days    Substances: Nicotine   Substance and Sexual Activity    Alcohol use: Not Currently    Drug use: Not Currently     Types: Cannabis     Comment: edible prn for neck pain/headache    Sexual activity: Not  Currently     Partners: Male     Birth control/protection: Hysterectomy     History   Drug Use Unknown     Comment: edible prn for neck pain/headache     Available pre-op labs reviewed.  Lab Results   Component Value Date    WBC 10.5 03/18/2025    RBC 3.62 (L) 03/18/2025    HGB 11.4 (L) 03/18/2025    HCT 34.0 (L) 03/18/2025    MCV 93.9 03/18/2025    MCH 31.5 03/18/2025    MCHC 33.5 03/18/2025    RDW 12.7 03/18/2025    PLT 21.0 (L) 03/18/2025     Lab Results   Component Value Date     (H) 03/18/2025    K 3.2 (L) 03/18/2025    K 3.2 (L) 03/18/2025     (H) 03/18/2025    CO2 32.0 03/18/2025    BUN 31 (H) 03/18/2025    CREATSERUM 0.99 03/18/2025     (H) 03/18/2025    CA 10.8 (H) 03/18/2025     Lab Results   Component Value Date    INR 1.42 (H) 03/17/2025         Airway      Mallampati: unable to assess    TM distance: < 4 cm  Neck ROM: full Cardiovascular      Rhythm: regular       Dental  Comment: Unable to assess the teeth. Noted blood in the mouth though (from what appeared to be tongue bite)           Pulmonary    Pulmonary exam normal.                 Other findings              ASA: 3   Plan: general  NPO status verified and           Plan/risks discussed with: father            The patient is aggressive. She is A&O x0. Refusing vitals. Consent was obtained by the  (POA)    Present on Admission:   Palliative care encounter             [1]   Medications Prior to Admission   Medication Sig Dispense Refill Last Dose/Taking    OXYCONTIN 10 MG Oral Tablet Extended Release 12 hour Abuse-Deterrent Take 1 tablet (10 mg total) by mouth 2 (two) times daily as needed for Pain.   3/15/2025 at 11:00 AM    methocarbamol 750 MG Oral Tab TAKE 1 TABLET BY MOUTH DAILY AT NIGHTTIME   3/14/2025 Evening    Acalabrutinib Maleate 100 MG Oral Tab Take 100 mg by mouth in the morning and 100 mg before bedtime. (Patient not taking: Reported on 3/7/2025) 60 tablet 6     Acetaminophen-Caffeine (EXCEDRIN TENSION  HEADACHE) 500-65 MG Oral Tab Take by mouth.   Past Week    prochlorperazine (COMPAZINE) 10 mg tablet Take 1 tablet (10 mg total) by mouth every 6 (six) hours as needed for Nausea. 30 tablet 3 Past Week    Rizatriptan Benzoate 10 MG Oral Tab Take 1 tablet (10 mg total) by mouth as needed for Migraine. may repeat dose after 2 hours. No more than 20 mg in 24 hours. 20 tablet 1 Past Week    ondansetron (ZOFRAN) 8 MG tablet Take 1 tablet (8 mg total) by mouth every 8 (eight) hours as needed for Nausea. 30 tablet 3 Past Week    lidocaine 5 % External Patch Place 1 patch onto the skin daily. Hips and low back   3/14/2025 Evening    ASHWAGANDHA OR Take 1 capsule by mouth daily.   3/14/2025 Evening    Turmeric (QC TUMERIC COMPLEX OR) Take 1 tablet by mouth 2 (two) times daily.   3/14/2025 Evening    Cyanocobalamin (VITAMIN B 12 OR) Take 1 tablet by mouth daily. B 12 complex   3/14/2025    sucralfate 1 g Oral Tab Take 1 tablet (1 g total) by mouth 2 (two) times daily.  0 3/14/2025    omeprazole 20 MG Oral Capsule Delayed Release TK ONE C PO BID 30 MIN B EATING  0 3/14/2025    Cholecalciferol (VITAMIN D-3) 5000 units Oral Tab Take 1 tablet (5,000 Units total) by mouth daily.   3/15/2025    [] diazePAM 5 MG Oral Tab Take 1 tablet (5 mg total) by mouth every 8 (eight) hours as needed (muscle spasm). (Patient taking differently: Take 1 tablet (5 mg total) by mouth every 8 (eight) hours as needed (muscle spasm). Hasn't taken in a while) 20 tablet 0     tiZANidine HCl 2 MG Oral Cap Take 1 capsule (2 mg total) by mouth 3 (three) times daily. (Patient not taking: Reported on 3/15/2025) 30 capsule 0 Not Taking    [] predniSONE 20 MG Oral Tab Take 2 tablets (40 mg total) by mouth daily for 4 days. (Patient not taking: Reported on 3/15/2025) 8 tablet 0 Not Taking    MAGNESIUM ASPARTATE OR Take by mouth. (Patient not taking: Reported on 3/15/2025)   Not Taking    buPROPion  MG Oral Tablet 24 Hr Take 1 tablet (150 mg  total) by mouth daily. (Patient not taking: Reported on 3/15/2025) 30 tablet 0 Not Taking    morphINE 15 MG Oral Tab Take 1 tablet (15 mg total) by mouth every 4 (four) hours as needed for Pain. (Patient not taking: Reported on 3/15/2025) 40 tablet 0 Not Taking    oxyCODONE-acetaminophen  MG Oral Tab every 8 (eight) hours as needed for Pain. (Patient not taking: Reported on 3/15/2025)  0 Not Taking

## 2025-03-18 NOTE — PROGRESS NOTES
[facility]  Progress Note    Lisa Cabral Patient Status:  Inpatient    3/12/1975 MRN [id number]   Location [facility] 7NE-A Attending Rubia Santos MD   Hosp Day # 3 PCP Ashley Cross,        SUBJECTIVE:    Seen this morning.  She continues to be agitated.  Opens eyes briefly and nods but could not have a meaningful conversation.    OBJECTIVE:    Vitals:    25 0400 25 0449 25 0800 25 1200   BP: (!) 170/96 (!) 178/116 (!) 166/92 (!) 175/84   BP Location: Left arm Left arm Left arm Left arm   Pulse: (!) 122 (!) 128 (!) 138 (!) 134   Resp: 26 (!) 38 22 20   Temp: 97.7 °F (36.5 °C) 99.2 °F (37.3 °C) 98 °F (36.7 °C) 99.5 °F (37.5 °C)   TempSrc: Axillary Oral Oral Oral   SpO2: 93%  96% 95%   Weight:           Wt Readings from Last 1 Encounters:   25 105.8 kg (233 lb 4.8 oz)       LABS:  Recent Labs   Lab 03/15/25  1313 25  0701 25  0556   RBC 4.80 4.51 3.62*   HGB 15.3 14.0 11.4*   HCT 44.8 41.7 34.0*   MCV 93.3 92.5 93.9   MCH 31.9 31.0 31.5   MCHC 34.2 33.6 33.5   RDW 12.2 12.2 12.7   NEPRELIM 13.29* 12.32*  --    WBC 17.6* 17.0* 10.5   PLT 74.0* 51.0* 21.0*         Recent Labs   Lab 25  0209 25  1754 25  0556   * 157* 183*   BUN 26* 28* 31*   CREATSERUM 1.16* 1.07* 0.99   EGFRCR 57* 63 69   CA 12.6* 11.5* 10.8*   ALB 4.0 3.5 3.5   * 147* 152*   K 3.2*  3.2* 3.6 3.2*  3.2*    111 113*   CO2 36.0* 28.0 32.0   ALKPHO 527* 668* 625*   AST 93* 91* 67*   ALT 37 37 39   BILT 0.5 0.4 0.4   TP 6.1 5.8 5.5*       MEDICATIONS:     potassium chloride  40 mEq Intravenous Once    calcitonin  4 Units/kg Subcutaneous BID    nicotine  1 patch Transdermal Daily    pantoprazole  40 mg Intravenous Q24H    dexamethasone  6 mg Intravenous Q8H    sodium chloride  1,000 mL Intravenous Once    [Held by provider] enoxaparin  40 mg Subcutaneous Daily       oxyCODONE-acetaminophen    labetalol    haloperidol lactate     HYDROmorphone **OR** HYDROmorphone    acetaminophen    lidocaine-menthol    methocarbamol    rizatriptan    melatonin    acetaminophen    ondansetron    polyethylene glycol (PEG 3350)    sennosides    bisacodyl    fleet enema    benzonatate    guaiFENesin    PHYSICAL EXAM:    General: Restless and agitated, moaning.  Pain on pressure everywhere.  Chest: Clear to auscultation.  Heart: Regular rate and rhythm.   Abdomen: Soft, non tender with good bowel sounds.  Extremities: No edema.  Neurological: Grossly intact.     RADIOLOGY:     ASSESSMENT/PLAN:    # Confusion: Will consult neurology, recommend MRI with sedation.    # Hypercalcemia: Received zoledronic acid, on fluids, calcitonin and steroids.  Calcium better.    # CLL: High risk disease, initially diag 9/23.  Was on clinical trial and received fixed duration treatment with Obinutuzumab/venetoclax since 10/23-9/24.  Progression diagnosed on lymph node biopsy 11/24.  Received Acalabrutinib from 11/24-3/25.  Stopped due to poor tolerance.  Now imaging shows worsening lymphadenopathy.  LDH is rising rapidly.  Clinical picture suggest transformation to aggressive lymphoma.      # Thrombocytopenia: Probably due to bone marrow involvement.  No schistocytes on smear, TTP unlikely.  No evidence of hemolysis.    # Neutrophilia: Probably due to steroids.  Better today.    # Generalized pain: Likely due to bony progression of CLL.  Continue narcotics.  On steroids which should help.    Spoke to her aunt Rosanna via telephone.  Discussed that clinical picture is concerning for transformation to high-grade lymphoma.  LDH is doubling in 48 hours raising concern for very aggressive pathology.  If this is the case, prognosis is  very poor.  US biopsy done yesterday by IR, path pending.    Aunt says that Lisa would not have wanted chemo which is in line with what she had expressed to me in past.  If this is transformation, family will consider comfort measures.    Will wait for  results of MRI to see if there is something reversible for her neurologic change.  Will wait for preliminary lymph node biopsy results.  If everything points towards transformation, comfort measures would be most appropriate.    A total of 75 minutes were spent  during this encounter including  face-to-face time, review of pertinent test results, and documentation.       Tamica Herring M.D.    [facility]   [address] [place] [address] [place], [address]      3/18/2025    12:45 PM

## 2025-03-18 NOTE — TELEPHONE ENCOUNTER
Spoke to radiologist about MRI results.  Marrow involvement with leukemia/lymphoma.  Then spoke to aunt Rosanna and to spouse.  Clinical picture suggests rapidly progressing leukemia.  LN biopsy done yesterday, pathology pending.  While pathology will help confirm diagnosis, everything points to this being rapidly progressive leukemia/lymphoma.  Typically these do not respond to chemotherapy and prognosis is poor.  In light of her confusion and poor performance status, she is not a candidate for any aggressive treatment.  Recommended family members discuss hospice.  We can make final decision tomorrow once we have preliminary back from LN biopsy.  Aunt and  verbalized understanding.

## 2025-03-18 NOTE — PROCEDURES
EEG REPORT;    Reason for Examination: [facility]    Technical Summary:   18 Channels of EEG and 1 Channel of EKG was performed utilizing internation 10/20 method.        Background Activity:   The background activity consisted of 8 Hz waveforms, reactive to eye opening/ external stimulation.    Abnormality:  Throughout the recording low to medium voltage, polymorphic, 2 to 4 Hz slow activity was noted diffusely over both hemispheres    Activation:    Hyperventilation:   Not Performed.    Photic Stimulation:  Driving response seen.No       Sleep:  Stage I sleep seen.       Impression:  This is a Abnormal EEG. No focal, lateralized or generalized epileptiform activity seen.   Moderate diffuse slowing into delta range was noted.  This constellation of findings can be seen in encephalopathy due to metabolic/toxic etiology, medication effects or diffuse cerebral injury.  Clinical correlation is recommended.        Eliud Pearl MD  [facility].

## 2025-03-18 NOTE — ANESTHESIA PROCEDURE NOTES
Airway  Date/Time: 3/18/2025 4:53 PM  Urgency: elective      General Information and Staff    Patient location during procedure: OR  Anesthesiologist: Jabier Bach MD  Performed: anesthesiologist   Performed by: Jabier Bach MD  Authorized by: Jabier Bach MD      Indications and Patient Condition  Indications for airway management: anesthesia  Sedation level: deep  Preoxygenated: yes  Patient position: sniffing  Mask difficulty assessment: 1 - vent by mask    Final Airway Details  Final airway type: supraglottic airway      Successful airway: classic  Size 4       Number of attempts at approach: 1

## 2025-03-19 PROBLEM — Z71.89 GOALS OF CARE, COUNSELING/DISCUSSION: Status: ACTIVE | Noted: 2025-01-01

## 2025-03-19 PROBLEM — N17.9 AKI (ACUTE KIDNEY INJURY): Status: ACTIVE | Noted: 2025-03-17

## 2025-03-19 PROBLEM — N17.9 AKI (ACUTE KIDNEY INJURY): Status: ACTIVE | Noted: 2025-01-01

## 2025-03-19 PROBLEM — Z71.89 GOALS OF CARE, COUNSELING/DISCUSSION: Status: ACTIVE | Noted: 2025-03-19

## 2025-03-19 NOTE — PROGRESS NOTES
[facility]   part of [facility]     Hospitalist Progress Note     Lisa Cabral Patient Status:  Inpatient    3/12/1975 MRN [id number]   Location [facility] 7NE-A Attending Rubia Santos MD   Hosp Day # 4 PCP Ashley Cross DO     Chief Complaint: Neck pain, left hip/groin pain, cough, JARAMILLO, hypoxic on arrival to the ED    Subjective:     Tmax 101.6. Tachycardia improving now 100s to 120s. On 3 L NC. Patient is awake and able to hold a conversation today.     Objective:    Review of Systems:   A comprehensive review of systems was completed; pertinent positive and negatives stated in subjective.    Vital signs:  Temp:  [98.4 °F (36.9 °C)-101.6 °F (38.7 °C)] 98.5 °F (36.9 °C)  Pulse:  [101-134] 101  Resp:  [15-32] 15  BP: (131-190)/() 145/87  SpO2:  [87 %-99 %] 97 %    Physical Exam:    General: More comfortable today.   Respiratory: No wheezes, no rhonchi  Cardiovascular: S1, S2, regular rhythm, tachycardic  Abdomen: Soft, non-distended, positive bowel sounds, tender to palpation   Neuro:oriented to self, place. Has some insight into hospitalization. Moving all 4 extremities. Speech is clear.   Extremities: No edema    Diagnostic Data:    Labs:  Recent Labs   Lab 03/15/25  1313 25  0701 25  0950 25  0556   WBC 17.6* 17.0*  --  10.5   HGB 15.3 14.0  --  11.4*   MCV 93.3 92.5  --  93.9   PLT 74.0* 51.0*  --  21.0*   BAND 19 24  --   --    INR  --   --  1.42*  --        Recent Labs   Lab 25  0209 25  1754 25  0556 25  0613   * 157* 183*  --    BUN 26* 28* 31*  --    CREATSERUM 1.16* 1.07* 0.99  --    CA 12.6* 11.5* 10.8*  --    ALB 4.0 3.5 3.5  --    * 147* 152*  --    K 3.2*  3.2* 3.6 3.2*  3.2* 3.9    111 113*  --    CO2 36.0* 28.0 32.0  --    ALKPHO 527* 668* 625*  --    AST 93* 91* 67*  --    ALT 37 37 39  --    BILT 0.5 0.4 0.4  --    TP 6.1 5.8 5.5*  --        Estimated Glomerular Filtration Rate: 69 mL/min/1.73m2  (result from lab).    Recent Labs   Lab 03/15/25  1313 03/18/25  0556   TROPHS 27  --    CK  --  35       Recent Labs   Lab 03/17/25  0950   PTP 17.5*   INR 1.42*                  Microbiology    Hospital Encounter on 03/15/25   1. Blood Culture     Status: None (Preliminary result)    Collection Time: 03/15/25  4:45 PM    Specimen: Blood,peripheral   Result Value Ref Range    Blood Culture Result No Growth 3 Days N/A         Imaging: Reviewed in Epic.    Medications:    piperacillin-tazobactam  3.375 g Intravenous Q8H    OLANZapine  5 mg Oral Nightly    pantoprazole  40 mg Intravenous Q24H    dexamethasone  6 mg Intravenous Q8H    sodium chloride  1,000 mL Intravenous Once    [Held by provider] enoxaparin  40 mg Subcutaneous Daily       Assessment & Plan:      #Metabolic encephalopathy   Multifactorial from hypercalcemia, hypernatremia, metabolic alkalosis, related to meds?  -CT brain limited by motion but negative 3/17  -EEG negative for epileptiform activity   -MR brain with mild diffuse pachy meningeal enhancement suspected to be related to marrow involvement  -Neurology following    #Severe hypercalcemia  S/p zometa and calcitonin   -Nephrology following  -IVF per nephrology    #SIRS, possible sepsis vs fevers related to underlying lymphoma  RPAN on admission negative   -Blood cultures from admission NGTD, RPAN negative on 3/15  -Repeat BCx, UA, check lactic acid   -Repeat CXR  -Empiric zosyn     #Acute hypoxic respiratory failure suspect 2/2 atelectasis   CTA chest 3/15 with airspace opacities within the lung bases atelectasis vs. Consolidation. Negative for PE. Procal 0.43.  -Repeat CXR 3/16 with discoid atelectasis in the retrocardiac lung base which is stable. And also stable right mid lung atelectasis.   -IS when able   -Repeat CXR 3/19 with new patchy right upper lung airspace opacity as well as b/l interstitial opacities.   -Empiric zosyn     #Sinus tachycardia  PE ruled out. Related to pain?   -TTE with  hyperdynamic EF 75 - 80%, no RWMA    #CLL with concern of transformation to high -grade lymphoma  Imaging with worsening LAD. LDH normal to 1488 on admission.   Heme following  -LN biopsy on 3/17; path pending  -Chemo vs. Hospice; C discussions ongoing    #Thrombocytopenia  2/2 bone marrow involvement     #Coagulopathy   Vit K deficiency?   -Monitor     #Generalized pain suspected due to bony progression of CLL  -Decadron 6 mg every 8 hours   -Consult palliative care for symptom management   -dilaudid cont infusion increase to 0.3 mg/hr    #Hypokalemia - supplement per protocol   #Hypernatremia - IVF per nephrology     #CKD stage III - trend     #Metabolic alkalosis suspect 2/2 hypercalcemia and renal bicarb reabsorption    #Acute urinary retention   Requiring ISC for now will continue. Monitor     #Elevated ALP, AST  Some chronicity. Related to chemo vs. CLL. Trend     #Low PO intake  -Per spouse, patient has not eaten in 8 days. Very unlike her.   She likely will not tolerate DHT placement at this time.     #Obesity, BMI 34.45    Rubia Santos MD    Supplementary Documentation:     Quality:  DVT Mechanical Prophylaxis:   SCDs,    DVT Pharmacologic Prophylaxis   Medication    [Held by provider] enoxaparin (Lovenox) 40 MG/0.4ML SUBQ injection 40 mg                Code Status: Not on file  Hagan: External urinary catheter in place  Hagan Duration (in days):   Central line:    MOE:     Discharge is dependent on: course  At this point Ms. Cabral is expected to be discharge to: unclear    The 21st Century Cures Act makes medical notes like these available to patients in the interest of transparency. Please be advised this is a medical document. Medical documents are intended to carry relevant information, facts as evident, and the clinical opinion of the practitioner. The medical note is intended as peer to peer communication and may appear blunt or direct. It is written in medical language and may contain  abbreviations or verbiage that are unfamiliar.

## 2025-03-19 NOTE — PROGRESS NOTES
[facility]  Progress Note    Lisa Cabral Patient Status:  Inpatient    3/12/1975 MRN [id number]   Location [facility] 7NE-A Attending Rubia Santos MD   Hosp Day # 4 PCP Ashley Cross, DO       SUBJECTIVE:     More alert this morning.  Complains of pain but able to have meaningful conversation. Describes pain all over.  Does not remember events from past few days.  Says pain hit her very hard and very suddenly at home before she came to the ER.      OBJECTIVE:    Vitals:    25 2353 25 0447 25 0500 25 0739   BP: 132/90 (!) 154/94  (!) 175/93   BP Location: Right arm Right arm  Right arm   Pulse: 113 (!) 123 (!) 121 102   Resp: 19 (!) 27 20 15   Temp: 100.3 °F (37.9 °C) (!) 101.6 °F (38.7 °C)  98.5 °F (36.9 °C)   TempSrc: Axillary Axillary  Oral   SpO2: 94% (!) 87% (!) 87% 97%   Weight:           Wt Readings from Last 1 Encounters:   25 105.8 kg (233 lb 4.8 oz)       LABS:  Recent Labs   Lab 03/15/25  1313 25  0701 25  0556   RBC 4.80 4.51 3.62*   HGB 15.3 14.0 11.4*   HCT 44.8 41.7 34.0*   MCV 93.3 92.5 93.9   MCH 31.9 31.0 31.5   MCHC 34.2 33.6 33.5   RDW 12.2 12.2 12.7   NEPRELIM 13.29* 12.32*  --    WBC 17.6* 17.0* 10.5   PLT 74.0* 51.0* 21.0*         Recent Labs   Lab 25  0209 25  1754 25  0556 25  0613   * 157* 183*  --    BUN 26* 28* 31*  --    CREATSERUM 1.16* 1.07* 0.99  --    EGFRCR 57* 63 69  --    CA 12.6* 11.5* 10.8*  --    ALB 4.0 3.5 3.5  --    * 147* 152*  --    K 3.2*  3.2* 3.6 3.2*  3.2* 3.9    111 113*  --    CO2 36.0* 28.0 32.0  --    ALKPHO 527* 668* 625*  --    AST 93* 91* 67*  --    ALT 37 37 39  --    BILT 0.5 0.4 0.4  --    TP 6.1 5.8 5.5*  --        MEDICATIONS:     OLANZapine  5 mg Oral Nightly    calcitonin  4 Units/kg Subcutaneous BID    pantoprazole  40 mg Intravenous Q24H    dexamethasone  6 mg Intravenous Q8H    sodium chloride  1,000 mL Intravenous Once     [Held by provider] enoxaparin  40 mg Subcutaneous Daily       HYDROmorphone    naloxone    diphenhydrAMINE    ondansetron    OLANZapine    labetalol    acetaminophen    lidocaine-menthol    methocarbamol    rizatriptan    melatonin    acetaminophen    polyethylene glycol (PEG 3350)    sennosides    bisacodyl    fleet enema    benzonatate    guaiFENesin    PHYSICAL EXAM:    General: Awake, interacting, tearful and moaning with pain.  Chest: Clear to auscultation.  Heart: Regular rate and rhythm.   Abdomen: Soft, non tender with good bowel sounds.  Extremities: No edema.  Neurological: No obvious focal deficit.    RADIOLOGY:     ASSESSMENT/PLAN:    # Confusion: Multifactorial.  Improving.  MRI does not show any visceral problem.  She has pachymeningeal enhancement which is likely related to marrow involvement with leukemia and less likely to be leptomeningeal disease.    # Severe pain: Due to progression of leukemia and marrow.  On Dilaudid PCA.  Appears better today.    # Hypercalcemia: Received zoledronic acid, on fluids, steroids being tapered.  Repeat calcium from today is pending.     # CLL: High risk disease, initially diag 9/23.  Was on clinical trial and received fixed duration treatment with Obinutuzumab/venetoclax since 10/23-9/24.  Progression diagnosed on lymph node biopsy 11/24.  Received Acalabrutinib from 11/24-3/25.  Stopped due to poor tolerance.  Clinical picture consistent with transformation to high-grade histology and rapid progression.  Inguinal LN biopsy pending.  .      # Thrombocytopenia: Probably due to bone marrow involvement.    # Neutrophilia: due to steroids.    Patient more alert today.  Able to understand conversation.  Discussed that lymph node biopsy is pending but clinical picture is pointing to rapid progression of leukemia.  Discussed that response to aggressive chemotherapy in such situations is poor and short-lived and typically patients need aggressive strategies like bone  marrow transplant down the road if they do respond to chemotherapy.  Lisa verbalized again that she is not in favor of aggressive chemotherapy.    We decided to see how she is doing today.  Will touch base with her and the family once preliminary lymph node biopsy results are available.  I recommended hospice with focus on pain and quality of life and asked her to discuss this with her family.      Tamica Herring M.D.    [facility]   [address] [place] [address] [place], [address]      3/19/2025    7:53 AM

## 2025-03-19 NOTE — PROGRESS NOTES
[facility]   part of [facility]     Nephrology Progress Note    Lisa Cabral Patient Status:  Inpatient    3/12/1975 MRN [id number]   Location [facility] 7NE-A Attending Rubia Santos MD   Hosp Day # 4 PCP Ashley Cross, DO       SUBJECTIVE:  She c/o generalized pain today, alert and interactive        Physical Exam:   BP (!) 175/93 (BP Location: Right arm)   Pulse 102   Temp 98.5 °F (36.9 °C) (Oral)   Resp 15   Wt 233 lb 4.8 oz (105.8 kg)   SpO2 97%   BMI 34.45 kg/m²   Temp (24hrs), Av.6 °F (37.6 °C), Min:98.4 °F (36.9 °C), Max:101.6 °F (38.7 °C)       Intake/Output Summary (Last 24 hours) at 3/19/2025 0855  Last data filed at 3/19/2025 0739  Gross per 24 hour   Intake 3793.55 ml   Output 1800 ml   Net 1993.55 ml     Last 3 Weights   25 2318 233 lb 4.8 oz (105.8 kg)   03/15/25 1954 232 lb 9.4 oz (105.5 kg)   25 1928 238 lb (108 kg)   25 2019 238 lb (108 kg)   25 1151 238 lb (108 kg)   25 1053 249 lb 9.6 oz (113.2 kg)     General: Alert and in no apparent distress.  HEENT: No scleral icterus, MMM  Neck: Supple, no GRACIE or thyromegaly  Cardiac: tachycardic and regular, S1, S2 normal, no murmur or rub  Lungs: Clear without wheezes, rales, rhonchi.    Abdomen: Soft, non-tender. + bowel sounds, no palpable organomegaly  Extremities: Without clubbing, cyanosis or edema.  Neurologic:  moving all extremities  Skin: Warm and dry, no rash        Labs:     Recent Labs   Lab 03/15/25  1313 25  0701 25  0950 25  0556   WBC 17.6* 17.0*  --  10.5   HGB 15.3 14.0  --  11.4*   MCV 93.3 92.5  --  93.9   PLT 74.0* 51.0*  --  21.0*   BAND 19 24  --   --    INR  --   --  1.42*  --        Recent Labs   Lab 03/15/25  1313 03/15/25  2051 25  0701 25  1620 25  0209 25  1754 25  0556 25  0613      < > 143 143 146* 147* 152*  --    K 3.4*   < > 3.1*  3.1* 2.8* 3.2*  3.2* 3.6 3.2*  3.2* 3.9   CL 96*   < > 100 98  104 111 113*  --    CO2 34.0*   < > 38.0* 36.0* 36.0* 28.0 32.0  --    BUN 21   < > 20 22 26* 28* 31*  --    CREATSERUM 1.19*   < > 0.99 0.97 1.16* 1.07* 0.99  --    CA 17.8*   < > 15.6* 14.1* 12.6* 11.5* 10.8*  --    MG 2.6  --   --   --   --   --   --   --    *   < > 131* 134* 136* 157* 183*  --     < > = values in this interval not displayed.       Recent Labs   Lab 03/16/25  0701 03/16/25  1620 03/17/25  0209 03/17/25  1754 03/18/25  0556   ALT 44 38 37 37 39   AST 42* 64* 93* 91* 67*   ALB 4.4 4.0 4.0 3.5 3.5   LDH  --  1,488*  --   --  3,118*       No results for input(s): \"PGLU\" in the last 168 hours.    Meds:   Current Facility-Administered Medications   Medication Dose Route Frequency    sodium chloride 0.45% infusion   Intravenous Continuous    sodium chloride 0.9% infusion   Intravenous Continuous    HYDROmorphone 0.4 mg BOLUS FROM PCA  0.4 mg Intravenous Q30 Min PRN    naloxone (Narcan) 0.4 MG/ML injection 0.08 mg  0.08 mg Intravenous Q5 Min PRN    diphenhydrAMINE (Benadryl) 50 mg/mL  injection 12.5 mg  12.5 mg Intravenous Q4H PRN    ondansetron (Zofran) 4 MG/2ML injection 4 mg  4 mg Intravenous Q6H PRN    HYDROmorphone in sodium chloride 0.9% (Dilaudid) 20 mg/100mL PCA premix   Intravenous Continuous    OLANZapine (ZyPREXA Zydis) disintegrating tab 5 mg  5 mg Oral Nightly    OLANZapine (ZyPREXA Zydis) disintegrating tab 2.5 mg  2.5 mg Oral BID PRN    calcitonin (Miacalcin) 200 Units/mL injection 424 Units  4 Units/kg Subcutaneous BID    labetalol (Trandate) 5 mg/mL injection 10 mg  10 mg Intravenous Q4H PRN    acetaminophen (Ofirmev) 10 mg/mL infusion premix 1,000 mg  1,000 mg Intravenous Q6H PRN    pantoprazole (Protonix) 40 mg in sodium chloride 0.9% PF 10 mL IV push  40 mg Intravenous Q24H    dexamethasone (Decadron) 4 MG/ML injection 6 mg  6 mg Intravenous Q8H    sodium chloride 0.9% infusion 1,000 mL  1,000 mL Intravenous Once    lidocaine-menthol 4-1 % patch 1 patch  1 patch Transdermal  Daily PRN    methocarbamol (Robaxin) tab 500 mg  500 mg Oral TID PRN    rizatriptan (Maxalt-MLT) disintegrating tab 5 mg  5 mg Oral PRN    melatonin tab 3 mg  3 mg Oral Nightly PRN    [Held by provider] enoxaparin (Lovenox) 40 MG/0.4ML SUBQ injection 40 mg  40 mg Subcutaneous Daily    acetaminophen (Tylenol Extra Strength) tab 500 mg  500 mg Oral Q4H PRN    polyethylene glycol (PEG 3350) (Miralax) 17 g oral packet 17 g  17 g Oral Daily PRN    sennosides (Senokot) tab 17.2 mg  17.2 mg Oral Nightly PRN    bisacodyl (Dulcolax) 10 MG rectal suppository 10 mg  10 mg Rectal Daily PRN    fleet enema (Fleet) rectal enema 133 mL  1 enema Rectal Once PRN    benzonatate (Tessalon) cap 200 mg  200 mg Oral TID PRN    guaiFENesin (Robitussin) 100 MG/5 ML oral liquid 200 mg  200 mg Oral Q4H PRN         Impression/Plan:      1) Severe Hypercalcemia: non-PTH mediated hypercalcemia in setting of high risk CLL and possible transformation to high-grade lymphoma. S/p zometa and calcitonin and currently on IVF. Calcium continuing to improve, continue IVF.      2) Acute kidney injury: baseline serum creatinine ~0.7-0.9 mg/dL. Acute kidney injury likely 2/2 hypercalcemia and contrast associated nephropathy. Renal function now back to baseline. Continue IVF and follow renal function.      3) CLL: Diagnosed in 2023, follows with Heme/Onc. Has high risk mutations. Had been on treatment holiday recently. CT with lymphadenopathy. S/p lymph node biopsy 3/17. Ongoing discussion regarding pursing aggressive care vs hospice     4) Bone pain: likely 2/2 hypercalcemia and bony progression of CLL. Pain management per primary     5) Hypokalemia: replace per protocol       6) Hypernatremia: due to decreased free water intake, cont hypotonic IVF and encourage po intake        Questions/concerns were discussed with patient and/or family by bedside.        Koffi Peña MD  3/19/2025  8:55 AM

## 2025-03-19 NOTE — PROGRESS NOTES
[facility]  [facility] Neurology Progress Note    Lisa Cabral Patient Status:  Inpatient    3/12/1975 MRN [id number]   Location [facility] 7NE-A Attending Rubia Santos MD   Hosp Day # 4 PCP Ashley Cross DO     CC: pain    Subjective:  patient seen for a follow up visit today, awake and responds to questions and follows commands, denies headache and vision changes. Reports to be in severe pain, mostly to her back. No acute events over night.        MEDICATIONS:  No current outpatient medications on file.     Current Facility-Administered Medications   Medication Dose Route Frequency    sodium chloride 0.45% infusion   Intravenous Continuous    sodium chloride 0.9% infusion   Intravenous Continuous    HYDROmorphone 0.4 mg BOLUS FROM PCA  0.4 mg Intravenous Q30 Min PRN    naloxone (Narcan) 0.4 MG/ML injection 0.08 mg  0.08 mg Intravenous Q5 Min PRN    diphenhydrAMINE (Benadryl) 50 mg/mL  injection 12.5 mg  12.5 mg Intravenous Q4H PRN    ondansetron (Zofran) 4 MG/2ML injection 4 mg  4 mg Intravenous Q6H PRN    HYDROmorphone in sodium chloride 0.9% (Dilaudid) 20 mg/100mL PCA premix   Intravenous Continuous    OLANZapine (ZyPREXA Zydis) disintegrating tab 5 mg  5 mg Oral Nightly    OLANZapine (ZyPREXA Zydis) disintegrating tab 2.5 mg  2.5 mg Oral BID PRN    labetalol (Trandate) 5 mg/mL injection 10 mg  10 mg Intravenous Q4H PRN    acetaminophen (Ofirmev) 10 mg/mL infusion premix 1,000 mg  1,000 mg Intravenous Q6H PRN    pantoprazole (Protonix) 40 mg in sodium chloride 0.9% PF 10 mL IV push  40 mg Intravenous Q24H    dexamethasone (Decadron) 4 MG/ML injection 6 mg  6 mg Intravenous Q8H    sodium chloride 0.9% infusion 1,000 mL  1,000 mL Intravenous Once    lidocaine-menthol 4-1 % patch 1 patch  1 patch Transdermal Daily PRN    methocarbamol (Robaxin) tab 500 mg  500 mg Oral TID PRN    rizatriptan (Maxalt-MLT) disintegrating tab 5 mg  5 mg Oral PRN    melatonin tab 3 mg  3 mg Oral Nightly PRN     [Held by provider] enoxaparin (Lovenox) 40 MG/0.4ML SUBQ injection 40 mg  40 mg Subcutaneous Daily    acetaminophen (Tylenol Extra Strength) tab 500 mg  500 mg Oral Q4H PRN    polyethylene glycol (PEG 3350) (Miralax) 17 g oral packet 17 g  17 g Oral Daily PRN    sennosides (Senokot) tab 17.2 mg  17.2 mg Oral Nightly PRN    bisacodyl (Dulcolax) 10 MG rectal suppository 10 mg  10 mg Rectal Daily PRN    fleet enema (Fleet) rectal enema 133 mL  1 enema Rectal Once PRN    benzonatate (Tessalon) cap 200 mg  200 mg Oral TID PRN    guaiFENesin (Robitussin) 100 MG/5 ML oral liquid 200 mg  200 mg Oral Q4H PRN       REVIEW OF SYSTEMS:  A 10-point system was reviewed.  Pertinent positives and negatives are noted in HPI.      PHYSICAL EXAMINATION:  VITAL SIGNS: /87   Pulse 101   Temp 98.5 °F (36.9 °C) (Oral)   Resp 15   Wt 233 lb 4.8 oz (105.8 kg)   SpO2 97%   BMI 34.45 kg/m²   GENERAL:  Patient is a 50 year old female in no acute distress.  HEENT:  Normocephalic, atraumatic  ABD: Soft, non tender  SKIN: Warm, dry, no rashes    NEUROLOGICAL:   Mental status: Oriented to person, place. Gets disoriented to situation and time intermittently  Speech: Fluent, no obvious aphasia or dysarthria  Memory and comprehension: Intermittently disoriented to situation and time  Cranial Nerves: PERRL, EOMI, facial sensation intact, face symmetric, tongue midline, shoulder shrug equal  Motor: No drift, generalized weakness, motor exam 4/5 throughout  Sensory: Intact to light touch bilaterally  Coordination: FTN intact bilaterally  Gait: Deferred      Imaging/Diagnostics:  MRI BRAIN (W+WO) (CPT=70553)    Result Date: 3/18/2025  CONCLUSION:   1. No acute intracranial abnormality identified.  2. 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  Clinical correlation  recommended.  3. Trace chronic microvascular ischemic changes in the cerebral white matter.  4. Diffuse low T1 signal in the visualized marrow space of the upper cervical spine as well as the calvarium with associated enhancement is concerning for changes related to lymphoma/leukemia, diffuse osseous metastatic disease, , with other etiologies not entirely excluded.  Clinical correlation recommended.  Nuclear medicine bone scan with SPECT CT may be helpful for further evaluation as clinically directed.  5. Partially imaged lymphadenopathy in the upper neck is likely related to the patient's clinical history of leukemia.  6. There is nonspecific masslike thickening of the right parietal scalp measuring up to 5 mm in thickness with underlying neoplasm not excluded.  Clinical correlation and direct inspection is suggested.  Please see above for further details.  LOCATION:  [id number]    Dictated by (CST): Jeff Royal MD on 3/18/2025 at 5:50 PM     Finalized by (CST): Jeff Royal MD on 3/18/2025 at 5:57 PM       US BIOPSY CORE LYMPH NODE, RIGHT (CPT=76942/85040)    Result Date: 3/17/2025  CONCLUSION:  Uneventful ultrasound guided right inguinal lymph node core biopsy.  The patient was instructed to obtain follow up care and biopsy results from the referring physician.   LOCATION:  [facility]     Dictated by (CST): Dm Fonseca MD on 3/17/2025 at 6:37 PM     Finalized by (CST): Dm Fonseca MD on 3/17/2025 at 6:38 PM       CT BRAIN OR HEAD (CPT=70450)    Result Date: 3/17/2025  PROCEDURE:  CT BRAIN OR HEAD (38297)  COMPARISON:  [facility] , CT, CTA CAROTID ARTERIES (CPT=70498), 3/08/2025, 10:20 PM.  [facility] , CT, CT BRAIN OR HEAD (66864), 10/30/2023, 8:50 PM.  INDICATIONS:  altered mental status  TECHNIQUE:  Noncontrast CT scanning is performed through the brain. Dose reduction techniques were used. Dose information is transmitted to the ACR (American College of Radiology) NRDR (National Radiology Data Registry) which  includes the Dose Index Registry.  PATIENT STATED HISTORY: (As transcribed by Technologist)  AMS   FINDINGS: Examination is significantly limited by motion artifact.  No hemorrhage or extra-axial fluid collection. Lucencies in the deep periventricular white matter are likely sequelae of chronic small vessel ischemic disease. Ventricles and sulci are appropriate for the patient's age. No mass effect. Visualized portions of paranasal sinuses are unremarkable. Visualized portions of the mastoid air cells are unremarkable. Visualized portions of the orbits are unremarkable. IMPRESSION: Motion artifact limits examination significantly.  Minimal high density in sulci is more than likely artifactual from motion.  No evidence of an acute intracranial abnormality.    LOCATION:  [id number]   Dictated by (CST): Jourdan Shine MD on 3/17/2025 at 10:50 AM     Finalized by (CST): Jourdan Shine MD on 3/17/2025 at 10:54 AM       XR CHEST AP PORTABLE  (CPT=71045)    Result Date: 3/16/2025  CONCLUSION:  Stable heart size and pulmonary vascularity.  There is discoid atelectasis in the retrocardiac left lung base which appears stable.  Discoid atelectasis right mid lung appears stable.  Tortuous thoracic aorta unchanged.  Plate and screw fixation lower cervical spine.   LOCATION:  [facility]      Dictated by (CST): Asia Barnes MD on 3/16/2025 at 8:01 AM     Finalized by (CST): Asia Barnes MD on 3/16/2025 at 8:01 AM       CTA CHEST + CT ABD (W) + CT PEL (W) (CPT=71275/16775)    Result Date: 3/15/2025  CONCLUSION:  Progressive lymphadenopathy is seen within the visualized chest and pelvis.  Please see above for further details.  No evidence of pulmonary embolism to the first subsegmental arterial level.  Airspace opacities within the lung bases may represent areas of atelectasis.  Consolidation is difficult to exclude.   LOCATION:  [facility]   Dictated by ([facility]): Marcus Mondragon MD on 3/15/2025 at 3:40 PM     Finalized by (CST): Alanna  MD Marcus on 3/15/2025 at 3:48 PM       XR CHEST AP PORTABLE  (CPT=71045)    Result Date: 3/15/2025  CONCLUSION:  See above.   LOCATION:  [facility]      Dictated by (CST): Stromberg, LeRoy, MD on 3/15/2025 at 12:51 PM     Finalized by (CST): Stromberg, LeRoy, MD on 3/15/2025 at 12:52 PM       MRI SPINE CERVICAL (W+WO) (CPT=72156)    Result Date: 3/14/2025  CONCLUSION:   1. Postoperative changes of C4-C7 anterior cervical discectomy and fusion.  Resultant susceptibility artifacts limit evaluation.  2. Bilateral paramedian T2 bright signal abnormality and volume loss involving the cervical cord at C5-C6, but with no associated postcontrast enhancement.  These findings are most compatible with myelomalacia.  3. C3-C4:  Mild-to-moderate spinal canal with mild-to-moderate right and mild left neural foraminal stenosis. 4. C7-T1:  Mild right neural foraminal stenosis.  5. Partially imaged prominent and mildly enlarged bilateral multilevel cervical lymph nodes.  These likely relate to the patient's history of chronic lymphocytic leukemia and please correlate with prior dedicated imaging.  6. A 1.1 cm right thyroid nodule.  Thyroid nodules less than 1.5 cm on cross-sectional imaging typically require no additional follow-up.  7. Lesser incidental findings as above.   elm-remote  Dictated by (CST): Neftaly Gant MD on 3/14/2025 at 7:55 AM     Finalized by (CST): Neftaly Gant MD on 3/14/2025 at 8:03 AM          CTA CAROTID ARTERIES (CPT=70498)    Result Date: 3/8/2025  CONCLUSION:  No evidence of high-grade stenosis or dissection involving the carotid or vertebral arteries.   LOCATION:  [facility]   Dictated by (CST): Kennedy Orozco MD on 3/08/2025 at 11:03 PM     Finalized by (CST): Kennedy Orozco MD on 3/08/2025 at 11:06 PM          Labs:  Recent Labs   Lab 03/15/25  1313 03/16/25  0701 03/18/25  0556   RBC 4.80 4.51 3.62*   HGB 15.3 14.0 11.4*   HCT 44.8 41.7 34.0*   MCV 93.3 92.5 93.9   MCH 31.9 31.0 31.5   MCHC 34.2 33.6 33.5    RDW 12.2 12.2 12.7   NEPRELIM 13.29* 12.32*  --    WBC 17.6* 17.0* 10.5   PLT 74.0* 51.0* 21.0*         Recent Labs   Lab 03/17/25  0209 03/17/25  1754 03/18/25  0556 03/19/25  0613   * 157* 183*  --    BUN 26* 28* 31*  --    CREATSERUM 1.16* 1.07* 0.99  --    EGFRCR 57* 63 69  --    CA 12.6* 11.5* 10.8*  --    * 147* 152*  --    K 3.2*  3.2* 3.6 3.2*  3.2* 3.9    111 113*  --    CO2 36.0* 28.0 32.0  --      EEG REPORT     Impression:  This is a Abnormal EEG. No focal, lateralized or generalized epileptiform activity seen. Moderate diffuse slowing into delta range was noted.  This constellation of findings can be seen in encephalopathy due to metabolic/toxic etiology, medication effects or diffuse cerebral injury.  Clinical correlation is recommended    Pre-morbid mRS 1-2    Assessment and Plan:    A 50 year old female with:    Acute encephalopathy, most likely multifactorial, possibly contributed by marked hypercalcemia also.    - MRI of the brain w/wo reported mild diffuse pachy meningeal enhancement suspected to be related to marrow involvement.  -  EEG routine reported moderate diffuse slowing, no active seizures as per report above.  - Ammonia level 50, slightly elevated  -  Patient with marked agitation yesterday, cooperative and calm today. Pt currently on Zyprexa once daily.  Generalized body ache of unclear etiology, possibly related to cancer.    -  CPK level 35  Hypercalcemia. Ca level 9 today, management per nephrology.  Thrombocytopenia.  Hematology/oncology following pt.    Plan of care reviewed with pt and family at bed side, all questions answered. Case discussed with Dr. Pearl, further recommendations, if indicated, to follow.    Is this a shared or split note between Advanced Practice Provider and Physician? Yes     Mariel Odom, LETTY  [facility]  3/19/2025, 10:24 AM  [place] # [id number]

## 2025-03-19 NOTE — PAYOR COMM NOTE
--------------  3/18 CONTINUED STAY REVIEW    Payor: [employer]  Subscriber #:  [id number]  Authorization Number: [id number]    PALLIATIVE CARE:      Palliative care consult requested for support with uncontrolled symptoms.     Summary:         HPI:  Lisa Cabral is a 50 year old female with a history of chronic neck and back pain, CLL diagnosed in 2023 on treatment holiday, completed RT to right humerus, axilla and right iliac bone 1/25 who was admitted on 3/15/2025 for  neck pain. Work up in our hospital revealed hypercalcemia s/p zometa and calcitonin on IVF, CT scan with current lymphadenopathy, hypokalemia and confusion, thrombocytopenia, neutrophilia, elevated LDH, planning for lymph node biopsy due to concern there is transformation to high grade lymphoma.  Management for metabolic abnormalities.  Sees pain specialist.        Interval Events: Ongoing severe agitation and pain requirements. Neurology consulted and MRI brain pending.  - S/p LN bx 3/17, path pending  - LDH increasing (3118 today, up from 1488 on 3/16), CBC, CMP reviewed  - Case d/w RN who reports pt screaming out within 5 mins of 1mg IV Dilaudid, removing monitoring equipment, now with sitter at bedside  - Plan for MRI brain w anesthesia this afternoon d/t ongoing acute agitation/AMS      I visited Lisa meléndez sitter at bedside. She is moaning constantly, restless in bed. Occasionally following commands.     Pain:  Reporting pain. She held up her RUE, and also nodded \"yes\" when asked if pain in chest and abd area, then moans and unable to continue ROS  24 hour pain requirements (8598-6612):  6.5mg IV Dilaudid + 10mg Percocet = 143 OME  IV Hydromorphone 0.5mg x1 at 1805 on 3/17     + IV Hydromorphone 1mg    [id number] 12:04 PM           [id number]  3:01 PM           [id number]  6:05 PM           [id number]  9:17 PM           [id number]  1:05 AM           [id number]  3:54 AM           [id number]  6:51 AM            Agitation: Ongoing - severe,  received Haldol 2mg at 2258 and at 1140 today                   Palliative Care Recommendations/Plan:            Symptoms:    Pain - ?cancer-related, RUE - source unclear  Start Dilaudid PCA  Continuous: 0.2mg / hr  PCA: none, pt unable  Clinician Bolus 0.4mg q30min PRN  Lidocaine patch  IV Dexamethasone - unclear if helping w presumed bone pain     Delirium w agitation: acute, hyperactive. Medication vs infectious vs pain component?  Start Olanzapine 5mg ODT nightly and 2.5mg BID PRN. Haldol DC'd per primary.  Stop Nicotine patch  Will d/w oncology re possibility of dexamethasone contributing given timeline  Await MRI brain w anesthesia     Goals of Care:    Await results of LN biopsy - oncology input reviewed.   MRI brain anticipated this afternoon.        Code Status: No Order.  Not addressed     HCPOA: Spouse Erik     Disposition:  Pending clinical course.    HOSPITALIST:  Patient remains agitated, uncomfortable. Moaning in pain through the night. Dilaudid provides only temporary relief. She is tachycardic to 130s. Afebrile. Updated spouse over the phone.      Vital signs:  Temp:  [97.7 °F (36.5 °C)-99.5 °F (37.5 °C)] 99.5 °F (37.5 °C)  Pulse:  [122-138] 134  Resp:  [18-74] 20  BP: (151-178)/() 175/84  SpO2:  [92 %-96 %] 95 %     Physical Exam:    General: lethargic. Moans in pain with minimal movement  Respiratory: No wheezes, no rhonchi  Cardiovascular: S1, S2, regular rhythm, tachycardic  Abdomen: Soft, non-distended, positive bowel sounds, tender to palpation   Neuro: lethargic. Able to state her name, Moves all 4 extremities to pain. Non focal exam  Extremities: No edema     Lab 03/15/25  1313 03/16/25  0701 03/17/25  0950 03/18/25  0556   WBC 17.6* 17.0*  --  10.5   HGB 15.3 14.0  --  11.4*   MCV 93.3 92.5  --  93.9   PLT 74.0* 51.0*  --  21.0*   BAND 19 24  --   --    INR  --   --  1.42*  --       Lab 03/17/25  0209 03/17/25  1754 03/18/25  0556   * 157* 183*   BUN 26* 28* 31*   CREATSERUM  1.16* 1.07* 0.99   CA 12.6* 11.5* 10.8*   ALB 4.0 3.5 3.5   * 147* 152*   K 3.2*  3.2* 3.6 3.2*  3.2*    111 113*   CO2 36.0* 28.0 32.0   ALKPHO 527* 668* 625*   AST 93* 91* 67*   ALT 37 37 39   BILT 0.5 0.4 0.4   TP 6.1 5.8 5.5*      Lab 03/15/25  1313 03/18/25  0556   TROPHS 27  --    CK  --  35           03/16/25  0701 03/16/25  1620 03/17/25  0209 03/17/25  1754 03/18/25  0556   ALT 44 38 37 37 39   AST 42* 64* 93* 91* 67*   ALB 4.4 4.0 4.0 3.5 3.5   LDH  --  1,488*  --   --  3,118*     Medications:   Scheduled Medications    OLANZapine  5 mg Oral Nightly    calcitonin  4 Units/kg Subcutaneous BID    pantoprazole  40 mg Intravenous Q24H    dexamethasone  6 mg Intravenous Q8H    sodium chloride  1,000 mL Intravenous Once    [Held by provider] enoxaparin  40 mg Subcutaneous Daily         HYDROmorphone in sodium chloride 0.9% (Dilaudid) 20 mg/100mL PCA premix  Freq: Continuous Route: IV  Start: 03/18/25 143     sodium chloride 0.45% infusion  Rate: 150 mL/hr  Freq: Continuous Route: IV  Start: 03/18/25 1000     Assessment & Plan:  #Metabolic encephalopathy   Multifactorial from hypercalcemia, hypernatremia, metabolic alkalosis, related to meds?  -CT brain limited by motion but negative 3/17  -EEG negative for epileptiform activity   -Neurology following  -Plan for MR brain with anesthesia today      #Severe hypercalcemia  S/p zometa and calcitonin   -Nephrology following  -IVF per nephrology; modified fluids today      #Acute hypoxic respiratory failure suspect 2/2 atelectasis   CTA chest 3/15 with airspace opacities within the lung bases atelectasis vs. Consolidation. Negative for PE. Procal 0.43.  -Received Abx in the ED. They were not continued during admission.   -Repeat CXR 3/16 with discoid atelectasis in the retrocardiac lung base which is stable. And also stable right mid lung atelectasis.   -IS when able      #SIRS with lactic acidosis   -Chronic leukocytosis. Sinus tachycardia could be  related to metabolic derangements, hypovolemia. Pneumonia less likely given stable/reassuring repeat CXR 3/16     #Sinus tachycardia  PE ruled out. Related to pain?   -TTE with hyperdynamic EF 75 - 80%, no RWMA     #CLL with concern of transformation to high -grade lymphoma  Imaging with worsening LAD. LDH normal to 1488 on admission.   Heme following  -LN biopsy on 3/17; path pending  -Likely needs inpatient treatment with R[facility]     #Thrombocytopenia  2/2 bone marrow involvement      #Coagulopathy   Vit K deficiency?   -Monitor      #Generalized pain suspected due to bony progression of CLL  -Decadron 6 mg every 8 hours   -Consult palliative care for symptom management   -dilaudid cont infusion today given degree of discomfort     #Hypokalemia - supplement per protocol   #Hypernatremia - IVF per nephrology      #CKD stage III - trend      #Metabolic alkalosis suspect 2/2 hypercalcemia and renal bicarb reabsorption     #Acute urinary retention   Requiring ISC for now will continue. Monitor      #Elevated ALP, AST  Some chronicity. Related to chemo vs. CLL. Trend      #Low PO intake  -Per spouse, patient has not eaten in 8 days. Very unlike her.   She likely will not tolerate DHT placement at this time.      #Obesity, BMI 34.45         RENAL:    Assessment/Plan:  1) Severe Hypercalcemia: non-PTH mediated hypercalcemia in setting of high risk CLL and possible transformation to high-grade lymphoma. S/p zometa and calcitonin and currently on IVF. Calcium continuing to improve, continue IVF. Transition from 0.9NS to 0.45NS today. Follow serum calcium.     2) Acute kidney injury: baseline serum creatinine ~0.7-0.9 mg/dL. Acute kidney injury likely 2/2 hypercalcemia and contrast associated nephropathy. Renal function now back to baseline. Continue IVF and follow renal function.      3) CLL: Diagnosed in 2023, follows with Heme/Onc as outpatient. Has high risk mutations. Had been on treatment holiday recently. CT with  current lymphadenopathy. S/p lymph node biopsy 3/17. May start R-[facility] inpatient.      4) Bone pain: likely 2/2 hypercalcemia and bony progression of CLL. Pain management per primary     5) Hypokalemia: replace per protocol       6) Hypernatremia: due to decreased free water intake, transition to 0.45NS today to provide free water.    NEURO:    ASSESSMENT/PLAN   50 year old female with:     Acute encephalopathy, most likely multifactorial, possibly contributed by marked hypercalcemia also.  Advise MRI of the brain with and without contrast, EEG, ammonia level.  Patient has marked agitation at this time.  Recommend Seroquel instead of as needed haloperidol.  Will start Seroquel 25 mg twice daily.  Generalized body ache of unclear etiology, possibly related to cancer.  Advise to check CPK.  Hypercalcemia.  Advise correction.  Thrombocytopenia.  Hematology/oncology recommendations.    HEME/ONC:     Seen this morning.  She continues to be agitated.  Opens eyes briefly and nods but could not have a meaningful conversation.    ASSESSMENT/PLAN:     # Confusion: Will consult neurology, recommend MRI with sedation.     # Hypercalcemia: Received zoledronic acid, on fluids, calcitonin and steroids.  Calcium better.     # CLL: High risk disease, initially diag 9/23.  Was on clinical trial and received fixed duration treatment with Obinutuzumab/venetoclax since 10/23-9/24.  Progression diagnosed on lymph node biopsy 11/24.  Received Acalabrutinib from 11/24-3/25.  Stopped due to poor tolerance.  Now imaging shows worsening lymphadenopathy.  LDH is rising rapidly.  Clinical picture suggest transformation to aggressive lymphoma.       # Thrombocytopenia: Probably due to bone marrow involvement.  No schistocytes on smear, TTP unlikely.  No evidence of hemolysis.     # Neutrophilia: Probably due to steroids.  Better today.     # Generalized pain: Likely due to bony progression of CLL.  Continue narcotics.  On steroids which should  help.     Spoke to her aunt Rosanna via telephone.  Discussed that clinical picture is concerning for transformation to high-grade lymphoma.  LDH is doubling in 48 hours raising concern for very aggressive pathology.  If this is the case, prognosis is  very poor.  US biopsy done yesterday by IR, path pending.    Aunt says that Lisa would not have wanted chemo which is in line with what she had expressed to me in past.  If this is transformation, family will consider comfort measures.     Will wait for results of MRI to see if there is something reversible for her neurologic change.  Will wait for preliminary lymph node biopsy results.  If everything points towards transformation, comfort measures would be most appropriate.        MRI BRAIN:    1. No acute intracranial abnormality identified.      2. 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  Clinical correlation   recommended.      3. Trace chronic microvascular ischemic changes in the cerebral white matter.      4. Diffuse low T1 signal in the visualized marrow space of the upper cervical spine as well as the calvarium with associated enhancement is concerning for changes related to lymphoma/leukemia, diffuse osseous metastatic disease, , with other etiologies   not entirely excluded.  Clinical correlation recommended.  Nuclear medicine bone scan with SPECT CT may be helpful for further evaluation as clinically directed.      5. Partially imaged lymphadenopathy in the upper neck is likely related to the patient's clinical history of leukemia.      6. There is nonspecific masslike thickening of the right parietal scalp measuring up to 5 mm in thickness with underlying neoplasm not excluded.  Clinical correlation and direct inspection is suggested.     MEDICATIONS ADMINISTERED IN LAST 1  DAY:  acetaminophen ([facility]) 10 mg/mL infusion premix 1,000 mg       Date Action Dose Route User    3/19/2025 0523 New Bag 1,000 mg Intravenous Alexsandra Briseno RN    3/18/2025 1837 New Bag 1,000 mg Intravenous Renee Chew RN          [facility]aminophen ([facility]) 10 mg/mL infusion premix       Date Action Dose Route User    3/18/2025 1837 New Bag 1,000 mg Intravenous Renee Chew RN          dexamethasone (Decadron) 4 MG/ML injection 6 mg       Date Action Dose Route User    3/19/2025 0836 Given 6 mg Intravenous Keyanna Tello RN    3/19/2025 0012 Given 6 mg Intravenous Alexsandra Briseno RN          gadoterate meglumine (Dotarem) 7.5 MMOL/15ML injection 30 mL       Date Action Dose Route User    3/18/2025 1719 Given 22 mL Intravenous Carole Bhatia          HYDROmorphone in sodium chloride 0.9% (Dilaudid) 20 mg/100mL PCA premix       Date Action Dose Route User    3/19/2025 0754 Bolus from Bag (none) Intravenous Keyanna Tello RN    3/18/2025 1750 Hi-Risk Restarted (none) Intravenous Renee Chew RN    3/18/2025 1443 New Bag 20 mg Intravenous Patricia Henderson          HYDROmorphone in sodium chloride 0.9% (Dilaudid) 20 mg/100mL PCA premix       Date Action Dose Route User    3/19/2025 1115 Hi-Risk Rate/Dose Change (none) Intravenous Carole Sanchez RN          HYDROmorphone (Dilaudid) 1 MG/ML injection 1 mg       Date Action Dose Route User    3/18/2025 1341 Given 1 mg Intravenous Beatriz, Patricia          HYDROmorphone 0.4 mg BOLUS FROM PCA       Date Action Dose Route User    3/19/2025 1141 Bolus from Bag 0.4 mg Intravenous Keyanna Tello RN    3/19/2025 1041 Bolus from Bag 0.4 mg Intravenous Keyanna Tello RN    3/19/2025 0952 Bolus from Bag 0.4 mg Intravenous Keyanna Tello RN    3/19/2025 0900 Bolus from Bag 0.4 mg Intravenous Keyanna Tello ALEXIS    3/19/2025 0703 Bolus from Bag 0.4 mg Intravenous Alexsandra Briseno RN    3/19/2025 0439 Bolus from Bag 0.4 mg Intravenous Alexsandra Briseno, RN     3/19/2025 0344 Bolus from Bag 0.4 mg Intravenous Alexsandra Briseno, RN    3/19/2025 0258 Bolus from Bag 0.4 mg Intravenous Alexsandra Briseno, RN    3/19/2025 0005 Bolus from Bag 0.4 mg Intravenous Alexsandra Briseno, RN    3/18/2025 2247 Bolus from Bag 0.4 mg Intravenous Ama Carolina, RN    3/18/2025 2017 Bolus from Bag 0.4 mg Intravenous Ama Carolina, RN    3/18/2025 1916 Bolus from Bag 0.4 mg Intravenous Ama Carolina, RN    3/18/2025 1522 Bolus from Bag 0.4 mg Intravenous Patricia Henderson          lactated ringers infusion       Date Action Dose Route User    3/18/2025 1644 New Bag (none) Intravenous Jabier Bach MD          metoclopramide (Reglan) 5 mg/mL injection       Date Action Dose Route User    3/18/2025 1719 Given 5 mg Intravenous Jabier Bach MD    3/18/2025 1716 Given 5 mg Intravenous Jabier Bach MD          OLANZapine (ZyPREXA Zydis) disintegrating tab 5 mg       Date Action Dose Route User    3/18/2025 2231 Given 5 mg Oral Ama Carolina RN          ondansetron (Zofran) 4 MG/2ML injection       Date Action Dose Route User    3/18/2025 1719 Given 4 mg Intravenous Jabier Bach MD          pantoprazole (Protonix) 40 mg in sodium chloride 0.9% PF 10 mL IV push       Date Action Dose Route User    3/19/2025 0012 Given 40 mg Intravenous Alexsandra Briseno RN          phenylephrine (Cristian-Synephrine) 10 MG/ML injection       Date Action Dose Route User    3/18/2025 1706 Given 100 mcg Intravenous Jabier Bach MD    3/18/2025 1703 Given 100 mcg Intravenous Jabier Bach MD    3/18/2025 1656 Given 100 mcg Intravenous Jabier Bach MD          piperacillin-tazobactam (Zosyn) 3.375 g in dextrose 5% 100 mL IVPB-ADDV       Date Action Dose Route User    3/19/2025 1136 New Bag 3.375 g Intravenous Keyanna Tello, RN          propofol (Diprivan) 10 MG/ML injection       Date Action Dose Route User    3/18/2025 1652 Given 150 mg Intravenous Jabier Bach MD    3/18/2025 1647 Given 50 mg  Intravenous Jabier Bach MD          sodium chloride 0.45% infusion       Date Action Dose Route User    3/19/2025 0859 Rate/Dose Change (none) Intravenous Keyanna Tello, RN    3/19/2025 0440 New Bag (none) Intravenous Alexsandra Briseno RN    3/18/2025 2108 New Bag (none) Intravenous Ama Carolina, ALEXIS          sodium chloride 0.9% infusion       Date Action Dose Route User    3/18/2025 1430 New Bag (none) Intravenous Patricia Henderson            Vitals (last day)       Date/Time Temp Pulse Resp BP SpO2 Weight O2 Device O2 Flow Rate (L/min) [facility]    03/19/25 1211 98.3 °F (36.8 °C) 112 17 161/98 94 % -- Nasal cannula 3 L/min SB    03/19/25 0930 -- 110 -- -- -- -- -- -- NC    03/19/25 0840 -- 101 -- 145/87 -- -- -- -- LC    03/19/25 0739 98.5 °F (36.9 °C) 102 15 175/93 97 % -- Nasal cannula 3 L/min SB    03/19/25 0500 -- 121 20 -- 87 % -- -- -- OG    03/19/25 0447 101.6 °F (38.7 °C) 123 27 154/94 87 % -- -- -- OG    03/18/25 2353 100.3 °F (37.9 °C) 113 19 132/90 94 % -- -- -- OG    03/18/25 2008 99.2 °F (37.3 °C) 116 20 134/89 95 % -- Nasal cannula 2 L/min AK    03/18/25 2007 -- 117 20 134/89 95 % -- -- -- AK    03/18/25 1900 99.7 °F (37.6 °C) -- -- -- -- -- -- -- TL    03/18/25 1850 101.1 °F (38.4 °C) -- -- -- -- -- -- -- TL    03/18/25 1850 -- 123 23 153/92 95 % -- Nasal cannula 3 L/min AB    03/18/25 1820 -- 118 20 161/96 96 % -- Nasal cannula 3 L/min AB    03/18/25 1815 99.1 °F (37.3 °C) 116 24 -- 95 % -- -- -- AB    03/18/25 1800 -- 119 27 157/98 99 % -- Simple mask 6 L/min AB    03/18/25 1757 -- 119 30 167/101 98 % -- Simple mask 8 L/min AB    03/18/25 1750 -- 117 24 153/94 96 % -- Simple mask 8 L/min AB    03/18/25 1747 -- 114 26 150/92 96 % -- Simple mask 8 L/min AB    03/18/25 1744 100 °F (37.8 °C) 114 28 190/97 96 % -- Simple mask 8 L/min AB    03/18/25 1540 98.4 °F (36.9 °C) 117 30 144/87 94 % -- Nasal cannula 3 L/min MZ    03/18/25 1530 98.4 °F (36.9 °C) 120 32 131/107 92 % -- Nasal cannula 3 L/min MZ     03/18/25 1512 -- 121 31 153/102 90 % -- Nasal cannula 3 L/min MZ    03/18/25 1457 -- 120 30 173/93 90 % -- Nasal cannula -- MZ    03/18/25 1200 99.5 °F (37.5 °C) 134 20 175/84 95 % -- Nasal cannula 2 L/min LM    03/18/25 0800 98 °F (36.7 °C) 138 22 166/92 96 % -- None (Room air) -- LM    03/18/25 0449 99.2 °F (37.3 °C) 128 38 178/116 -- -- Nasal cannula 2 L/min LIOR    03/18/25 0400 97.7 °F (36.5 °C) 122 26 170/96 93 % -- Nasal cannula -- LMA    03/18/25 0002 98.3 °F (36.8 °C) 131 74 160/98 93 % -- Nasal cannula 2 L/min LMA          CIWA Scores (since admission)       None

## 2025-03-19 NOTE — PROGRESS NOTES
Went back to meet with patient.   and 2 friends at bedside.  Discussed that preliminary lymph node biopsy does confirm transformation to a more aggressive histology, likely large B-cell lymphoma.  Discussed poor prognosis.  Rapidly progressing disease.      Options are to consider palliative care/hospice in light of poor performance status, severe pain and confusion.      Other option is to consider inpatient chemotherapy with R-[facility], plan additional chemotherapy outpatient after discharge and if she responds, she would need transplant.  Discussed thoughts of responding to chemotherapy are very low and given her performance status, risks likely outweigh benefit.     and patient requested some time to make the decision.  Will touch base with them tomorrow morning.    Tamica Herring M.D.    [facility]    [facility]  [address] [place], [address]        3/19/2025    4:47 PM

## 2025-03-19 NOTE — OCCUPATIONAL THERAPY NOTE
OCCUPATIONAL THERAPY EVALUATION - INPATIENT     Room Number: 7611/7611-A  Evaluation Date: 3/19/2025  Type of Evaluation: Initial  Presenting Problem: Confusion, metabolic encephalopathy, severe pain, progression of CLL with multiple skeletal involvement,  CT showing lymphadenopathy, 3/17 s/p lymph node biopsy, Hypercalcemia, LUKE    Physician Order: IP Consult to Occupational Therapy  Reason for Therapy: ADL/IADL Dysfunction and Discharge Planning    OCCUPATIONAL THERAPY ASSESSMENT   Patient is currently functioning below baseline with  all ADL . Prior to admission, patient's baseline is independent.  Patient is requiring maximum assistance as a result of the following impairments: pain. Occupational Therapy will continue to follow for duration of hospitalization.    Patient will benefit from continued skilled OT Services. Anticipating home with home health and increased support with dressing, bathing, and toileting, but patient's activity endurance is limited by pain at this time.   Pt agreeable to sit at edge of bed.     History Related to Current Admission: Patient is a 50 year old female admitted on 3/15/2025 with confusion, metabolic encephalopathy, severe pain, progression of CLL with multiple skeletal involvement,  CT showing lymphadenopathy, 3/17 s/p lymph node biopsy, Hypercalcemia, LUKE. Co-Morbidities : CLL diagnosed in 2023, cervical fusion, sciatica.    Recent visits:  ED visits on 3/8, 3/6 (pm and at night), and 3/2 with L sided neck pain and L hip pain    EVALUATION SESSION:  Patient Start of Session: supine  BED MOBILITY  Supine to Sit : Maximum Assist  Sit to Supine (OT): Dependent    BALANCE ASSESSMENT  Static Sitting: Contact Guard Assist     O2 SATURATIONS  Oxygen Therapy  SPO2% on Oxygen at Rest: 94  At rest oxygen flow (liters per minute): 3    COGNITION  Overall Cognitive Status:  WFL - within functional limits    Upper Extremity   ROM: within functional limits   Strength: within functional  limits     EDUCATION PROVIDED  Patient Education : Role of Occupational Therapy; Plan of Care; Energy Conservation; Proper Body Mechanics  Patient's Response to Education: Requires Further Education    Therapist comments: sitter present. Patient was receptive to working with therapy (only to edge of bed, per pt preference).   OT and PT educated the patient about body mechanics and log rolling that could help minimize back/LE pain.   Max A supine to sit.  Patient was able to reach for the side rail with her L arm. Pt prefers to get out of the bed from her L side.   CGA static sitting balance. Patient commented, \"I am sorry, I am not standing, I am sorry.\"  OT and PT provided support and active listening.   PCT was able to change linens, total A sit to supine.   Max A supine to side rolling during linen adjustment.      Patient End of Session: In bed, With  staff, Needs met, Call light within reach, All patient questions and concerns addressed, RN aware of session/findings, Alarm set    OCCUPATIONAL PROFILE    HOME SITUATION  Type of Home: House  Home Layout: One level  Lives With: Spouse               Occupation/Status: on disability  Hand Dominance: Right  Drives: Yes       Prior Level of Function: independent with ADL. On disability.      PAIN ASSESSMENT  Rating: Unable to rate  Location: \"all over\"  Management Techniques: Repositioning, Activity promotion    OBJECTIVE  Precautions: Bed/chair alarm  Fall Risk: High fall risk    ASSESSMENTS    AM-PAC ‘6-Clicks’ Inpatient Daily Activity Short Form  -   Putting on and taking off regular lower body clothing?: A Lot  -   Bathing (including washing, rinsing, drying)?: A Lot  -   Toileting, which includes using toilet, bedpan or urinal? : A Lot  -   Putting on and taking off regular upper body clothing?: A Lot  -   Taking care of personal grooming such as brushing teeth?: A Little  -   Eating meals?: A Little    AM-PAC Score:  Score: 14  Approx Degree of Impairment:  59.67%  Standardized Score (AM-PAC Scale): 33.39    PLAN     OT Treatment Plan: Energy conservation/work simplification techniques, Balance activities, ADL training, Functional transfer training, UE strengthening/ROM, Patient/Family education, Equipment eval/education, Compensatory technique education  Rehab Potential : Fair  Frequency: 3x/week  Number of Visits to Meet Established Goals: 5    ADL Goals   Patient will perform grooming: with setup  Patient will perform upper body dressing:  with supervision  Patient will perform lower body dressing:  with min assist    Functional Transfer Goals  Patient will transfer from supine to sit:  with mod assist  Patient will transfer to bedside commode:  with mod assist    Additional Goals  Pt will incorporate 2 energy conservation techniques into ADL.    Patient Evaluation Complexity Level:   Occupational Profile/Medical History LOW - Brief history including review of medical or therapy records    Specific performance deficits impacting engagement in ADL/IADL MODERATE  3 - 5 performance deficits   Client Assessment/Performance Deficits MODERATE - Comorbidities and min to mod modifications of tasks    Clinical Decision Making LOW - Analysis of occupational profile, problem-focused assessments, limited treatment options    Overall Complexity LOW     OT Session Time: 20 minutes  Self-Care Home Management:  minutes  Therapeutic Activity: 10 minutes

## 2025-03-19 NOTE — PROGRESS NOTES
[facility]   part of [facility]  Palliative Care Progress Note    Lisa Cabral Patient Status:  Inpatient    3/12/1975 MRN [id number]   [facility] 7NE-A Attending Rubia Santos MD   Hosp Day # 4 PCP Ashley Cross DO     History/Other:  history of chronic neck and back pain, CLL diagnosed in  on treatment holiday, completed RT to right humerus, axilla and right iliac bone  who was admitted on 3/15/2025 for  neck pain. Work up in our hospital revealed hypercalcemia s/p zometa and calcitonin on IVF, CT scan with current lymphadenopathy, hypokalemia and confusion, thrombocytopenia, neutrophilia, elevated LDH, planning for lymph node biopsy due to concern there is transformation to high grade lymphoma.  Management for metabolic abnormalities.  Sees pain specialist.      Allergies:  Allergies[1]  Medications:     Current Facility-Administered Medications:     piperacillin-tazobactam (Zosyn) 3.375 g in dextrose 5% 100 mL IVPB-ADDV, 3.375 g, Intravenous, Q8H    sodium chloride 0.45% infusion, , Intravenous, Continuous    sodium chloride 0.9% infusion, , Intravenous, Continuous    HYDROmorphone 0.4 mg BOLUS FROM PCA, 0.4 mg, Intravenous, Q30 Min PRN    naloxone (Narcan) 0.4 MG/ML injection 0.08 mg, 0.08 mg, Intravenous, Q5 Min PRN    diphenhydrAMINE (Benadryl) 50 mg/mL  injection 12.5 mg, 12.5 mg, Intravenous, Q4H PRN    ondansetron (Zofran) 4 MG/2ML injection 4 mg, 4 mg, Intravenous, Q6H PRN    HYDROmorphone in sodium chloride 0.9% (Dilaudid) 20 mg/100mL PCA premix, , Intravenous, Continuous    OLANZapine (ZyPREXA Zydis) disintegrating tab 5 mg, 5 mg, Oral, Nightly    OLANZapine (ZyPREXA Zydis) disintegrating tab 2.5 mg, 2.5 mg, Oral, BID PRN    labetalol (Trandate) 5 mg/mL injection 10 mg, 10 mg, Intravenous, Q4H PRN    acetaminophen (Ofirmev) 10 mg/mL infusion premix 1,000 mg, 1,000 mg, Intravenous, Q6H PRN    pantoprazole (Protonix) 40 mg in sodium chloride 0.9% PF 10 mL IV  push, 40 mg, Intravenous, Q24H    dexamethasone (Decadron) 4 MG/ML injection 6 mg, 6 mg, Intravenous, Q8H    sodium chloride 0.9% infusion 1,000 mL, 1,000 mL, Intravenous, Once    lidocaine-menthol 4-1 % patch 1 patch, 1 patch, Transdermal, Daily PRN    methocarbamol (Robaxin) tab 500 mg, 500 mg, Oral, TID PRN    rizatriptan (Maxalt-MLT) disintegrating tab 5 mg, 5 mg, Oral, PRN    melatonin tab 3 mg, 3 mg, Oral, Nightly PRN    [Held by provider] enoxaparin (Lovenox) 40 MG/0.4ML SUBQ injection 40 mg, 40 mg, Subcutaneous, Daily    acetaminophen (Tylenol Extra Strength) tab 500 mg, 500 mg, Oral, Q4H PRN    polyethylene glycol (PEG 3350) (Miralax) 17 g oral packet 17 g, 17 g, Oral, Daily PRN    sennosides (Senokot) tab 17.2 mg, 17.2 mg, Oral, Nightly PRN    bisacodyl (Dulcolax) 10 MG rectal suppository 10 mg, 10 mg, Rectal, Daily PRN    fleet enema (Fleet) rectal enema 133 mL, 1 enema, Rectal, Once PRN    benzonatate (Tessalon) cap 200 mg, 200 mg, Oral, TID PRN    guaiFENesin (Robitussin) 100 MG/5 ML oral liquid 200 mg, 200 mg, Oral, Q4H PRN    Subjective      Interval events: Lisa is awake today, able to follow commands, poor recall of why she is hospitalized and what the current plan is for her care.   She was unable to recall details of the discussion with Dr. Herring. With some prompting she acknowledged she had a biopsy and the results are unknown.  She shared she has suffered from chronic pain, her current pain feels different then her chronic pain.     Her cousins arrived and they will visit at the bedside.     Symptom assessment:   Pain assessment:   24 hour (2943-5860) OME total since starting the PCA at 1443   Current pain: 9/10, described as burning, located moves / currently in her lower extremities and back, made worse by movement, alleviated by pain medications.  Pain goal: 6/10  Total Dilaudid use 6.6 mg from continuous rate and bolus starting on 3/18/25 at 1443.  Dilaudid Bolus 0.4 mg every 30 minutes  for break through pain total 4.8 mg  Bolus from Bag [id number]  3:22 PM      Bolus from Bag [id number]  7:16 PM      Bolus from Bag [id number]  8:17 PM      Bolus from Bag [id number] 10:47 PM      Bolus from Bag [id number] 12:05 AM      Bolus from Bag [id number]  2:58 AM      Bolus from Bag [id number]  3:44 AM      Bolus from Bag [id number]  4:39 AM      Bolus from Bag [id number]  7:03 AM      Bolus from Bag [id number]  9:00 AM      Bolus from Bag [id number]  9:52 AM      Bolus from Bag [id number] 10:41 AM          Continuous infusion started Dilaudid 0.2 mg an hour total since starting PCA 1.8 mg  New Bag [id number]  2:43 PM     Will increase continuous rate to 0.3 mg hourly and continue as needed 0.4 mg every 30 minutes nurse bolus. Her mentation is improved although she is not orientated thus not confident she has the cognitive ability to make appropriate decisions on when to use the PCA herself. Will continue to evaluate her response to treatment.        Review of Systems:  Dyspnea: denies  Nausea: denies  Appetite:  poor, can not force herself to eat.     Bowel Movement    No data found in the last 1 encounters.         Objective:     Vital Signs:  Blood pressure 145/87, pulse 101, temperature 98.5 °F (36.9 °C), temperature source Oral, resp. rate 15, weight 233 lb 4.8 oz (105.8 kg), SpO2 97%, not currently breastfeeding.  Body mass index is 34.45 kg/m².      Performance scale: 40%  % Ambulation Activity Level Self-Care Intake Consciousness   100 Full  Normal  No Disease Full Normal Full   90 Full  Normal  Some Disease Full Normal Full   80 Full  Normal w/effort  Some Disease Full Normal or reduced Full   70 Reduced  Can't Perform Job  Some Disease Full Normal or reduced Full   60 Reduced  Can't Perform Hobby   Significant Disease Occ Assist Normal or reduced Full or confused   50 Mainly sit/lie Can't do any work  Extensive Disease Partial Assist Normal or reduced Full or confused   40 Mainly in bed Can't do any work  Extensive Disease  Mainly Assist Normal or reduced Full or confused   30 Bed Bound Can't do any work  Extensive Disease Max Assist  Total Care Reduced  Drowsy/confused   20 Bed Bound Can't do any work  Extensive Disease Max Assist  Total Care Minimal  Drowsy/confused   10 Bed Bound Can't do any work  Extensive Disease Max Assist  Total Care Mouth Care  Drowsy/confused   0 Death          Physical Exam:  General: awake, interacting with short term recall. In no apparent respiratory distress.   HEENT:  Dry MM   Cardiac:  regular rate  Lungs: non labored  Abdomen: Soft  Extremities: no pitting edema present  Neurologic: Alert and oriented to  person, place   Psychiatric: Mood - cooperative, periods of increased pain intermittently vocal    Skin: Warm and dry.        Results:     Hematology:  Lab Results   Component Value Date    WBC 10.5 03/18/2025    HGB 11.4 (L) 03/18/2025    HCT 34.0 (L) 03/18/2025    PLT 21.0 (L) 03/18/2025     Coags:  Lab Results   Component Value Date    INR 1.42 (H) 03/17/2025     Chemistry:  Lab Results   Component Value Date    CREATSERUM 0.99 03/18/2025    BUN 31 (H) 03/18/2025     (H) 03/18/2025    K 3.9 03/19/2025     (H) 03/18/2025    CO2 32.0 03/18/2025     (H) 03/18/2025    CA 9.0 03/19/2025    ALB 3.5 03/18/2025    ALKPHO 625 (H) 03/18/2025    BILT 0.4 03/18/2025    TP 5.5 (L) 03/18/2025    AST 67 (H) 03/18/2025    ALT 39 03/18/2025    MG 2.6 03/15/2025    PHOS 2.7 06/05/2024     Imaging:  MRI BRAIN (W+WO) (CPT=70553)    Result Date: 3/18/2025  CONCLUSION:   1. No acute intracranial abnormality identified.  2. 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  Clinical correlation recommended.  3. Trace chronic microvascular ischemic changes in the cerebral white matter.  4. Diffuse low T1 signal in the visualized marrow  space of the upper cervical spine as well as the calvarium with associated enhancement is concerning for changes related to lymphoma/leukemia, diffuse osseous metastatic disease, , with other etiologies not entirely excluded.  Clinical correlation recommended.  Nuclear medicine bone scan with SPECT CT may be helpful for further evaluation as clinically directed.  5. Partially imaged lymphadenopathy in the upper neck is likely related to the patient's clinical history of leukemia.  6. There is nonspecific masslike thickening of the right parietal scalp measuring up to 5 mm in thickness with underlying neoplasm not excluded.  Clinical correlation and direct inspection is suggested.  Please see above for further details.  LOCATION:  [id number]    Dictated by (CST): Jeff Royal MD on 3/18/2025 at 5:50 PM     Finalized by (CST): Jeff Royal MD on 3/18/2025 at 5:57 PM       US BIOPSY CORE LYMPH NODE, RIGHT (CPT=76942/52343)    Result Date: 3/17/2025  CONCLUSION:  Uneventful ultrasound guided right inguinal lymph node core biopsy.  The patient was instructed to obtain follow up care and biopsy results from the referring physician.   LOCATION:  [place]     Dictated by (CST): Dm Fonseca MD on 3/17/2025 at 6:37 PM     Finalized by (CST): Dm Fonseca MD on 3/17/2025 at 6:38 PM           Summary of Discussion   I spoke with Lisa's , Erik, he wants to wait for the biopsy results prior to changing any GOC or setting limitations to her code status.    Erik is pleased he can talk with Lisa today. He is also aware her processing may not be clear but she can converse in the moment.   Await biopsy results prior to making further decisions related to care.    Advance Care Planning counseling and discussion:   Erik  voluntarily participated in a advanced care planning discussion. We discussed the risks vs benefits of life sustaining treatments in the setting of advanced cancer and chronic pain.     Section A:  A  full code includes aggressive life saving measures with possible CPR, defibrillation/ shocking, intubation and placement on the ventilator with administration of emergent IV medications to assist in restoring circulation.    vs  DNAR ( do not resuscitate) indicates if the heart stops and no spontaneous breaths end of life is present, \" allow natural death.\"    Section B:   DNAR with Full treatment includes ongoing medical management and treatment for new potential symptoms or complications including intubation for respiratory distress,  the exception is NO CPR.     DNAR with Selective treatment is appropriate for on going medical management and treatment for new potential symptoms or complications with the exception of CPR AND INTUBATION.  DNI ( do not intubate) indicates no breathing tube will be placed for respiratory distress or if no spontaneous breaths.    DNAR with Comfort care is appropriate when further readmissions, aggressive treatment and evaluation of potential new symptoms do not benefit the patient nor provide quality of life. Consideration for hospice support when comfort care is appropriate.     Erik verbalized he does not believe Lisa would want to be on a ventilator. He would not want her to have CPR if it is going to cause harm to her chest/ body. With that being said he is not ready to set limitations on her code status until he knows the biopsy results. Erik is aware her code status is full code until he tells us differently.   Erik feels she is present in the moment but does not have recall of conversations.      HC POA  surrogate . Healthcare Agent's Name: Erik Cabral   MORIS FORM - deferred  Full code    Principal Problem:    Hypercalcemia  Active Problems:    CLL (chronic lymphocytic leukemia) (HCC)    Palliative care encounter    Hypokalemia    Pain of left hip    Chronic neck pain    Thrombocytopenia    Community acquired pneumonia, unspecified laterality    LUKE (acute kidney  injury)    Hypernatremia    Intractable pain    Encephalopathy acute    Cancer-related pain    Delirium      Assessment and Recommendations   Goals of care:    Continue pain management.   Dilaudid increased to 0.3 mg continuous via PCA. Nurse Bolus 0.4 mg every 30 minutes as needed.   1634- Dilaudid PCA dose adjusted to 0.4 mg continuous with a lock out of 1.2 mg per hour. Nurse bolus available every 30 minutes PRN break through pain Dilaudid 0.4 mg IV following nursing assessment and oxygenation reviewed.    Code Status: full code   Healthcare Agent's Name: Erik Cabral      Discussed today's visit with Dr. Santos and Keyanna ESPINOZA.     Palliative Care Follow Up: Palliative care team will Continue to follow while inpatient.      Thank you for allowing Palliative Care services to participate in the care of Lisa Cabral.    A total of 50 minutes were spent on this follow up, which included all of the following: chart review, direct face to face contact,  physical examination, counseling, coordinating care and documentation.     Charisse Aguirre, APRN  3/19/2025   11:01 AM   Palliative Care Services    Lisa continues to need nursing bolus Dilaudid 0.4 mg IV every hour with her pain rating 9-10/10 in neck and back. Will increase her continuous rate to 0.4 mg hourly. If she needs further medication adjustments over night, please notify the Hospitalist or Dr. Cruz is on call. Pending her IV requirements today,  response and GOC if biopsy results are back tomorrow will consider transitioning to oral.      The 21st Century Cures Act makes medical notes like these available to patients in the interest of transparency. Please be advised this is a medical document. Medical documents are intended to carry relevant information, facts as evident, and the clinical opinion of the practitioner. The medical note is intended as peer to peer communication and may appear blunt or direct. It is written in medical language and may contain  abbreviations or verbiage that are unfamiliar.          [1]   Allergies  Allergen Reactions    Aleve [Naproxen] HIVES and RASH    Ultram [Tramadol] NAUSEA AND VOMITING     Severe migraine

## 2025-03-19 NOTE — PLAN OF CARE
Assumed care @0730  afebrile  A&Ox1-2, more conversive, able to follow commands.   4L O2    NSR/ST in the low 100's ,   Yells and moans with pain.  10/10 pain in back, neck.  PCA dilaudid continuous and houly boluses.  Patient will roll side to side.  Up to side of bed with PT/OT.     No appetite.  Drank Pepsi.   Purewick in place. No BM.     IVF infusing .45@150, zosyn    Updated POC with patient and family.      Problem: Patient/Family Goals  Goal: Patient/Family Long Term Goal  Description: Patient's Long Term Goal: Discharge home  Interventions:- Follow plan of care- See additional Care Plan goals for specific interventions  Outcome: Progressing  Goal: Patient/Family Short Term Goal  Description: Patient's Short Term Goal: Improved pain  Interventions: - fentanyl patch, fentanyl push, percocet, adjusting as tolerated  - See additional Care Plan goals for specific interventions  Outcome: Progressing     Problem: Delirium  Goal: Minimize duration of delirium  Description: Interventions:- Encourage use of hearing aids, eye glasses- Promote highest level of mobility daily- Provide frequent reorientation- Promote wakefulness i.e. lights on, blinds open- Promote sleep, encourage patient's normal rest cycle i.e. lights off, TV off, minimize noise and interruptions- Encourage family to assist in orientation and promotion of home routines  Outcome: Progressing     Problem: CARDIOVASCULAR - ADULT  Goal: Absence of cardiac arrhythmias or at baseline  Description: INTERVENTIONS:- Continuous cardiac monitoring, monitor vital signs, obtain 12 lead EKG if indicated- Evaluate effectiveness of antiarrhythmic and heart rate control medications as ordered- Initiate emergency measures for life threatening arrhythmias- Monitor electrolytes and administer replacement therapy as ordered  Outcome: Progressing     Problem: RESPIRATORY - ADULT  Goal: Achieves optimal ventilation and oxygenation  Description: INTERVENTIONS:- Assess for  changes in respiratory status- Assess for changes in mentation and behavior- Position to facilitate oxygenation and minimize respiratory effort- Oxygen supplementation based on oxygen saturation or ABGs- Provide Smoking Cessation handout, if applicable- Encourage broncho-pulmonary hygiene including cough, deep breathe, Incentive Spirometry- Assess the need for suctioning and perform as needed- Assess and instruct to report SOB or any respiratory difficulty- Respiratory Therapy support as indicated- Manage/alleviate anxiety- Monitor for signs/symptoms of CO2 retention  Outcome: Progressing     Problem: GENITOURINARY - ADULT  Goal: Absence of urinary retention  Description: INTERVENTIONS:- Assess patient’s ability to void and empty bladder- Monitor intake/output and perform bladder scan as needed- Follow urinary retention protocol/standard of care- Consider collaborating with pharmacy to review patient's medication profile- Implement strategies to promote bladder emptying  Outcome: Progressing     Problem: NEUROLOGICAL - ADULT  Goal: Achieves stable or improved neurological status  Description: INTERVENTIONS- Assess for and report changes in neurological status- Initiate measures to prevent increased intracranial pressure- Maintain blood pressure and fluid volume within ordered parameters to optimize cerebral perfusion and minimize risk of hemorrhage- Monitor temperature, glucose, and sodium. Initiate appropriate interventions as ordered  Outcome: Progressing  Goal: Achieves maximal functionality and self care  Description: INTERVENTIONS- Monitor swallowing and airway patency with patient fatigue and changes in neurological status- Encourage and assist patient to increase activity and self care with guidance from PT/OT- Encourage visually impaired, hearing impaired and aphasic patients to use assistive/communication devices  Outcome: Progressing     Problem: Impaired Cognition  Goal: Patient will exhibit improved  attention, thought processing and/or memory  Description: Interventions:  Outcome: Progressing     Problem: Impaired Communication  Goal: Patient will achieve maximal communication potential  Description: Interventions:  Outcome: Progressing     Problem: PAIN - ADULT  Goal: Verbalizes/displays adequate comfort level or patient's stated pain goal  Description: INTERVENTIONS:- Encourage pt to monitor pain and request assistance- Assess pain using appropriate pain scale- Administer analgesics based on type and severity of pain and evaluate response- Implement non-pharmacological measures as appropriate and evaluate response- Consider cultural and social influences on pain and pain management- Manage/alleviate anxiety- Utilize distraction and/or relaxation techniques- Monitor for opioid side effects- Notify MD/LIP if interventions unsuccessful or patient reports new pain- Anticipate increased pain with activity and pre-medicate as appropriate  Outcome: Progressing     Problem: SAFETY ADULT - FALL  Goal: Free from fall injury  Description: INTERVENTIONS:- Assess pt frequently for physical needs- Identify cognitive and physical deficits and behaviors that affect risk of falls.- [place] fall precautions as indicated by assessment.- Educate pt/family on patient safety including physical limitations- Instruct pt to call for assistance with activity based on assessment- Modify environment to reduce risk of injury- Provide assistive devices as appropriate- Consider OT/PT consult to assist with strengthening/mobility- Encourage toileting schedule  Outcome: Progressing

## 2025-03-19 NOTE — DIETARY NOTE
[facility]   part of [facility]    NUTRITION ASSESSMENT    Pt does not meet malnutrition criteria at this time.      NUTRITION INTERVENTION:      Meal and Snacks - Monitor and encourage adequate PO intake.   Medical Food Supplements - Ensure Plus High Protein TID, Ensure Clear Once, and Magic Cup TID. Rationale/use for oral supplements discussed.  Vitamin and Mineral Supplements - continue Multivitamin with minerals  Coordination of Nutrition Care - SLP consult prior to diet advancement.      PATIENT STATUS:     3/19/25- Following up to check on PO + ONS intake. Pt continues not to eat and has only been agreeable to Pepsi. Consumed small portion of Ensure Clear but nothing more. Pt's  stated that she has not eaten in weeks. They are currently waiting on biopsy results to determine next steps. Family does not thing NG placement would be smart d/t current agitation. There has been noticeable improvement in mental status since last visit - pt is able to converse and follow commands now. Lots of family in room/waiting areas. High hopes that po intake can resume or that alternative nutrition can be administered d/t long term absence of nourishment. Will continue to follow closely. All questions answered.     03/17/25 Pt is a 49 y/o F admitted for head, neck and back pain w KAJAL. Reviewed pt chart d/t at risk consult. RN in room at time of attempted visit trying to get pt to tolerate juice and jello. She is very confused and does not follow most commands. Pt continuously yelling out in pain and communicating very minimally. Pt is heard yelling, moaning and crying. \"Ow\" and \"Please help me\" repeated every minute or so. She has been unable to tolerate much po intake since admit 3/15. Family arriving as this note is being typed. Tolerating some jello once family arrived.  agreeable to Vanilla Ensure or Apple Ensure Clear to help get extra nutrients. Pt keeps trying to take off oxygen. Cranberry juice  brought to pt. Will monitor po and ons intake as well as need for alternative nutrition in case po intake does not improve. All questions answered at this time.    PMH: CLL, LUKE, Hypercalcemia, Class 2 Obesity      ANTHROPOMETRICS:  Ht:  5'9\"  Wt: 105.8 kg (233 lb 4.8 oz).   BMI: Body mass index is 34.45 kg/m².  IBW: 65.9 kg 145 lbs      WEIGHT HISTORY:   Weight loss: Yes, Non-severe Wt loss of 15 lbs, 6%%, over 2 months     Wt Readings from Last 10 Encounters:   03/16/25 105.8 kg (233 lb 4.8 oz)   03/08/25 108 kg (238 lb)   03/06/25 108 kg (238 lb)   03/02/25 108 kg (238 lb)   01/28/25 113.2 kg (249 lb 9.6 oz)   01/07/25 112.6 kg (248 lb 3.2 oz)   12/10/24 114.5 kg (252 lb 6.4 oz)   11/22/24 117.5 kg (259 lb)   08/28/24 116.3 kg (256 lb 6.4 oz)   07/31/24 116.1 kg (256 lb)        NUTRITION:  Diet:       Procedures    Regular/General diet Is Patient on Accuchecks? No      Food Allergies: No  Cultural/Ethnic/Confucianism Preferences Addressed: Yes    Percent Meals Eaten (last 3 days)       Date/Time Percent Meals Eaten (%)    03/17/25 1200 100 %     Percent Meals Eaten (%): jello at 03/17/25 1200    03/17/25 1800 100 %     Percent Meals Eaten (%): ice cream at 03/17/25 1800    03/18/25 1500 0 %            GI system review: WNL Last BM Date:  (days ago - per pt)  Skin and wounds: WNL    NUTRITION RELATED PHYSICAL FINDINGS:     1. Body Fat/Muscle Mass: no wasting noted / well nourished     2. Fluid Accumulation: none    NUTRITION PRESCRIPTION:  65.9 kg 145 lbs IBW  Calories: 1943-1478 calories/day (25-30 kcal/kg)  Protein:  grams protein/day (1.5-2.0 grams protein per kg)  Fluid: ~1 ml/kcal or per MD discretion    NUTRITION DIAGNOSIS/PROBLEM:  Inadequate energy intake related to inability to take or tolerate as evidenced by documented/reported insufficient oral intake      MONITOR AND EVALUATE/NUTRITION GOALS:  PO intake of 75% of meals TID - Ongoing  PO intake of 75% of oral nutrition supplement/s -  Ongoing      MEDICATIONS:  Decadron, Protonix    GTT: NaCl 150 ml/hr    LABS:  Elevated LFTs    Pt is at High nutrition risk    Claudette Short  Dietetic Intern

## 2025-03-19 NOTE — SPIRITUAL CARE NOTE
Spiritual Care Visit Note    Patient Name: Lisa Cabral Date of Spiritual Care Visit: 25   : 3/12/1975 Primary Dx: Hypercalcemia       Referred By: Referral From:     Spiritual Care Taxonomy:    Intended Effects: Convey a calming presence         Interventions: Acknowledge response to difficult experience, [place]    Visit Type/Summary:     - Spiritual Care: Patient alert in pain.  Patient unable to talk due to level of pain.  provided a prayer.  Patient expressed a thank you.   will ask evening  to follow up with a visit.     Spiritual Care support can be requested via an Epic consult. For urgent/immediate needs, please contact the On Call  at: [place]: ext [phone]    Anupama Lama MDiv, BCC, DMin

## 2025-03-19 NOTE — PHYSICAL THERAPY NOTE
PHYSICAL THERAPY EVALUATION - INPATIENT     Room Number: 7611/7611-A  Evaluation Date: 3/19/2025  Type of Evaluation: Initial  Physician Order: PT Eval and Treat    Presenting Problem: Pneumonia, hypercalcemia, CLL  Co-Morbidities : CLL diagnosed in 2023, cervical fusion, sciatica  Reason for Therapy: Mobility Dysfunction and Discharge Planning    PHYSICAL THERAPY ASSESSMENT   Patient is a 50 year old female admitted 3/15/2025 for pneumonia, metabolic encephalopathy, CLL.  Prior to admission, patient's baseline is independent.  Patient is currently functioning below baseline with bed mobility, transfers, and gait.  Patient is requiring maximum assist as a result of the following impairments: decreased endurance/aerobic capacity, pain, impaired static and dynamic balance, decreased muscular endurance, and cognitive deficits (encephalopathy).  Physical Therapy will continue to follow for duration of hospitalization.    Patient will benefit from continued skilled PT Services upon discharge. Pt currently highly limited in function due to excessive pain.     PLAN DURING HOSPITALIZATION  Nursing Mobility Recommendation : Lift Equipment     PT Treatment Plan: Bed mobility, Body mechanics, Endurance, Energy conservation, Patient education, Family education, Gait training, Neuromuscular re-educate, Balance training, Transfer training, Strengthening  Rehab Potential : Guarded  Frequency (Obs): 3x/week     CURRENT GOALS    Goal #1 Patient is able to demonstrate supine - sit EOB @ level: supervision     Goal #2 Patient is able to demonstrate transfers EOB to/from Chair/Wheelchair at assistance level: minimum assistance     Goal #3 Patient is able to ambulate 50 feet with assist device:  LRAD  at assistance level: supervision     Goal #4    Goal #5    Goal #6    Goal Comments: Goals established on 3/19/2025      PHYSICAL THERAPY MEDICAL/SOCIAL HISTORY  History related to current admission: Patient is a 50 year old female  admitted on 3/15/2025 from home for worsening neck pain.  Pt diagnosed with pneumonia, hypercalcemia, CLL, metabolic encephalopathy.    HOME SITUATION  Type of Home: House  Home Layout: One level                     Lives With: Spouse    Drives: Yes          Prior Level of [place]: Pt typically indep with ADLs and mobility. Lives with spouse.     SUBJECTIVE  \"I'm so sorry\"     OBJECTIVE  Precautions: Bed/chair alarm  Fall Risk: High fall risk    WEIGHT BEARING RESTRICTION     PAIN ASSESSMENT  Rating: Unable to rate  Location: whole body but mainly backs of legs  Management Techniques: Relaxation, Repositioning    COGNITION  Overall Cognitive Status:  Impaired    RANGE OF MOTION AND STRENGTH ASSESSMENT  See OT note for UE assessment    Lower extremity ROM is within functional limits via observation    Lower extremity strength is grossly 4/5, not tested due to pt pain.     BALANCE  Static Sitting: Poor +  Dynamic Sitting: Not tested  Static Standing: Not tested  Dynamic Standing: Not tested    ADDITIONAL TESTS                                    ACTIVITY TOLERANCE  Pulse: 110  Heart Rate Source: Monitor                   O2 WALK       NEUROLOGICAL FINDINGS                        AM-PAC '6-Clicks' INPATIENT SHORT FORM - BASIC MOBILITY  How much difficulty does the patient currently have...  Patient Difficulty: Turning over in bed (including adjusting bedclothes, sheets and blankets)?: A Lot   Patient Difficulty: Sitting down on and standing up from a chair with arms (e.g., wheelchair, bedside commode, etc.): Unable   Patient Difficulty: Moving from lying on back to sitting on the side of the bed?: A Lot   How much help from another person does the patient currently need...   Help from Another: Moving to and from a bed to a chair (including a wheelchair)?: Total   Help from Another: Need to walk in hospital room?: Total   Help from Another: Climbing 3-5 steps with a railing?: Total     AM-PAC Score:  Raw Score: 8    Approx Degree of Impairment: 86.62%   Standardized Score (AM-PAC Scale): 28.58   CMS Modifier (G-Code): CM    FUNCTIONAL ABILITY STATUS  Gait Assessment   Functional Mobility/Gait Assessment  Gait Assistance: Not tested    Skilled Therapy Provided     Bed Mobility:  Rolling: NT  Supine to sit: maxA x2   Sit to supine: maxA x2     Transfer Mobility:  Sit to stand: NT   Stand to sit: NT  Gait = NT    Therapist's Comments: RN cleared for session. Pt agreeable for therapy, received supine. Sitter present for session. Trained on log roll technique for supine<>sit for back pain mgmt. Pt in high levels of pain and distress throughout session. Returned to supine, rolled for sheet and brief change.      Exercise/Education Provided:  Bed mobility  Body mechanics  Energy conservation  Functional activity tolerated  Gait training  Posture  Strengthening  Transfer training    Patient End of Session: In bed, Needs met, Call light within reach, RN aware of session/findings, All patient questions and concerns addressed, Hospital anti-slip socks, With  staff, Discussed recommendations with /      Patient Evaluation Complexity Level:  History High - 3 or more personal factors and/or co-morbidities   Examination of body systems Moderate - addressing a total of 3 or more elements   Clinical Presentation  High - Unstable   Clinical Decision Making Moderate Complexity       PT Session Time: 30 minutes  Therapeutic Activity: 15 minutes

## 2025-03-19 NOTE — PLAN OF CARE
Received patient awake , family at bedside. On 3LNC, sats 93%. Lungs w/ rhonchi, has non-productive cough.   Sinus tach  at 110  on telemetry monitoring.   Has purewcik.   On 3LNC.   Pt has a sitter due to being impulsive, Keeps removing nasal cannula, telemetry.   Has Dilaudid continuous PCA for pain.  1916: Restless, yelling, crying, bolus given.   1925: Patient is sleeping.   2017: Bolus given for yelling, crying and moaning.   2040: sleeping.   2247: Bolus given for moaning, yelling and crying.   2300: sleeping.   0005: Bolus given for moaning, yelling and crying.   0240: at 0150-230, fully awake, conversant but refusing nasal cannula, she doesn't know where she is, she knows its march and 2025 but she doesn't know why she is here. She thinks we are trying to kill her and poison her because she is in so much pain. Convinced her for an hour to put the nasal cannula back but she refused, offered pain meds and she agreed, 5 mins after bolus, pt was sleeping and we were able to place the nasal cannula. She also finished the bottle of Pepsi which she requested  She said that we were bad because we are letting her drink the bad Pepsi.   0258: bolus given for moaning, yelling and crying.   0344: crying, yelling, bolus given. \"Please, please, it hurts\"  0350: sleeping.   0523: Yelling, crying, moaning, \"please help me, it hurts too much\" Temp 101.6. Tylenol iv given.   0552: yelling, moaning and crying intermittently. Purewick and diaper changed.   0617: sleeping.   0703: Yelling, moaning, crying for 5 mins asking for more pain meds. Bolus given. Has non-productive cough and she refused cough meds.     Problem: Patient/Family Goals  Goal: Patient/Family Long Term Goal  Description: Patient's Long Term Goal: Discharge home  Interventions:- Follow plan of care- See additional Care Plan goals for specific interventions  Outcome: Progressing  Goal: Patient/Family Short Term Goal  Description: Patient's Short Term Goal:  Improved pain  Interventions: - fentanyl patch, fentanyl push, percocet, adjusting as tolerated  - See additional Care Plan goals for specific interventions  Outcome: Progressing     Problem: Delirium  Goal: Minimize duration of delirium  Description: Interventions:- Encourage use of hearing aids, eye glasses- Promote highest level of mobility daily- Provide frequent reorientation- Promote wakefulness i.e. lights on, blinds open- Promote sleep, encourage patient's normal rest cycle i.e. lights off, TV off, minimize noise and interruptions- Encourage family to assist in orientation and promotion of home routines  Outcome: Progressing     Problem: CARDIOVASCULAR - ADULT  Goal: Absence of cardiac arrhythmias or at baseline  Description: INTERVENTIONS:- Continuous cardiac monitoring, monitor vital signs, obtain 12 lead EKG if indicated- Evaluate effectiveness of antiarrhythmic and heart rate control medications as ordered- Initiate emergency measures for life threatening arrhythmias- Monitor electrolytes and administer replacement therapy as ordered  Outcome: Progressing     Problem: RESPIRATORY - ADULT  Goal: Achieves optimal ventilation and oxygenation  Description: INTERVENTIONS:- Assess for changes in respiratory status- Assess for changes in mentation and behavior- Position to facilitate oxygenation and minimize respiratory effort- Oxygen supplementation based on oxygen saturation or ABGs- Provide Smoking Cessation handout, if applicable- Encourage broncho-pulmonary hygiene including cough, deep breathe, Incentive Spirometry- Assess the need for suctioning and perform as needed- Assess and instruct to report SOB or any respiratory difficulty- Respiratory Therapy support as indicated- Manage/alleviate anxiety- Monitor for signs/symptoms of CO2 retention  Outcome: Progressing     Problem: GENITOURINARY - ADULT  Goal: Absence of urinary retention  Description: INTERVENTIONS:- Assess patient’s ability to void and empty  bladder- Monitor intake/output and perform bladder scan as needed- Follow urinary retention protocol/standard of care- Consider collaborating with pharmacy to review patient's medication profile- Implement strategies to promote bladder emptying  Outcome: Progressing     Problem: NEUROLOGICAL - ADULT  Goal: Achieves stable or improved neurological status  Description: INTERVENTIONS- Assess for and report changes in neurological status- Initiate measures to prevent increased intracranial pressure- Maintain blood pressure and fluid volume within ordered parameters to optimize cerebral perfusion and minimize risk of hemorrhage- Monitor temperature, glucose, and sodium. Initiate appropriate interventions as ordered  Outcome: Progressing  Goal: Achieves maximal functionality and self care  Description: INTERVENTIONS- Monitor swallowing and airway patency with patient fatigue and changes in neurological status- Encourage and assist patient to increase activity and self care with guidance from PT/OT- Encourage visually impaired, hearing impaired and aphasic patients to use assistive/communication devices  Outcome: Progressing     Problem: Impaired Cognition  Goal: Patient will exhibit improved attention, thought processing and/or memory  Description: Interventions:- Minimize distractions in the room when full attention is required  Outcome: Progressing     Problem: PAIN - ADULT  Goal: Verbalizes/displays adequate comfort level or patient's stated pain goal  Description: INTERVENTIONS:- Encourage pt to monitor pain and request assistance- Assess pain using appropriate pain scale- Administer analgesics based on type and severity of pain and evaluate response- Implement non-pharmacological measures as appropriate and evaluate response- Consider cultural and social influences on pain and pain management- Manage/alleviate anxiety- Utilize distraction and/or relaxation techniques- Monitor for opioid side effects- Notify MD/LIP if  interventions unsuccessful or patient reports new pain- Anticipate increased pain with activity and pre-medicate as appropriate  Outcome: Progressing     Problem: SAFETY ADULT - FALL  Goal: Free from fall injury  Description: INTERVENTIONS:- Assess pt frequently for physical needs- Identify cognitive and physical deficits and behaviors that affect risk of falls.- [place] fall precautions as indicated by assessment.- Educate pt/family on patient safety including physical limitations- Instruct pt to call for assistance with activity based on assessment- Modify environment to reduce risk of injury- Provide assistive devices as appropriate- Consider OT/PT consult to assist with strengthening/mobility- Encourage toileting schedule  Outcome: Progressing

## 2025-03-20 PROBLEM — C83.30 DIFFUSE LARGE B-CELL LYMPHOMA (HCC): Status: ACTIVE | Noted: 2025-03-20

## 2025-03-20 PROBLEM — C83.30 DIFFUSE LARGE B-CELL LYMPHOMA (HCC): Status: ACTIVE | Noted: 2025-01-01

## 2025-03-20 NOTE — PROGRESS NOTES
[facility] [place]   part of [facility]     Hospitalist Progress Note     Lisa Cabral Patient Status:  Inpatient    3/12/1975 MRN [id number]   Location [facility] 7NE-A Attending Rubia Santos MD   Hosp Day # 5 PCP Ashley Cross DO     Chief Complaint: Neck pain, left hip/groin pain, cough, JARAMILLO, hypoxic on arrival to the ED    Subjective:     Afebrile. More alert today. Reports some SOB. Pain 8/10. Family present at bedside.     Objective:    Review of Systems:   A comprehensive review of systems was completed; pertinent positive and negatives stated in subjective.    Vital signs:  Temp:  [97.6 °F (36.4 °C)-99.9 °F (37.7 °C)] 97.9 °F (36.6 °C)  Pulse:  [] 108  Resp:  [13-23] 14  BP: (138-163)/() 156/90  SpO2:  [95 %-98 %] 98 %    Physical Exam:    General: NAD  Respiratory: No wheezes, no rhonchi  Cardiovascular: S1, S2, regular rhythm, tachycardic  Abdomen: Soft, non-distended, positive bowel sounds, tender to palpation   Neuro:oriented x 4. Speech is clear. Moving all 4 extremities. Speech is clear.   Extremities: No edema    Diagnostic Data:    Labs:  Recent Labs   Lab 03/15/25  1313 25  0701 25  0950 25  0556 25  1043 25  0548   WBC 17.6* 17.0*  --  10.5 9.3 7.9   HGB 15.3 14.0  --  11.4* 9.9* 9.7*   MCV 93.3 92.5  --  93.9 95.3 91.6   PLT 74.0* 51.0*  --  21.0* 12.0* 10.0*   BAND 19 24  --   --  7  --    INR  --   --  1.42*  --   --   --        Recent Labs   Lab 25  0556 25  0613 25  1043 25  0548   *  --  197* 157*   BUN 31*  --  24* 21   CREATSERUM 0.99  --  0.82 0.71   CA 10.8* 9.0 8.5* 8.7   ALB 3.5  --  3.2 3.5   *  --  141 139   K 3.2*  3.2* 3.9 3.8 3.9   *  --  106 104   CO2 32.0  --  26.0 29.0   ALKPHO 625*  --  544* 676*   AST 67*  --  67* 73*   ALT 39  --  50* 45   BILT 0.4  --  0.3 0.4   TP 5.5*  --  5.3* 5.5*       Estimated Glomerular Filtration Rate: 103 mL/min/1.73m2 (result from  lab).    Recent Labs   Lab 03/15/25  1313 03/18/25  0556   TROPHS 27  --    CK  --  35       Recent Labs   Lab 03/17/25  0950   PTP 17.5*   INR 1.42*                  Microbiology    Hospital Encounter on 03/15/25   1. Blood Culture     Status: None (Preliminary result)    Collection Time: 03/19/25 10:43 AM    Specimen: Blood,peripheral   Result Value Ref Range    Blood Culture Result No Growth 1 Day N/A         Imaging: Reviewed in Epic.    Medications:    sennosides  8.6 mg Oral BID    piperacillin-tazobactam  3.375 g Intravenous Q8H    [START ON 3/21/2025] pantoprazole  40 mg Oral QAM AC    [START ON 3/21/2025] fosaprepitant (Emend) 150 mg in sodium chloride 0.9% 150 mL IVPB  150 mg Intravenous Q24H    [START ON 3/21/2025] ondansetron (Zofran) 16 mg in sodium chloride 0.9% 108 mL IVPB  16 mg Intravenous Q24H    [START ON 3/21/2025] acetaminophen  650 mg Oral Q24H    [START ON 3/21/2025] diphenhydrAMINE  25 mg Oral Q24H    [START ON 3/21/2025] riTUXimab-abbs (Truxima) 800 mg in sodium chloride 0.9% 580 mL infusion  375 mg/m2 Intravenous Q24H    [START ON 3/21/2025] DOXOrubicin  50 mg/m2 Intravenous Q24H    [START ON 3/21/2025] vinCRIStine (Oncovin) 2 mg in sodium chloride 0.9% 52 mL IVPB  2 mg Intravenous Q24H    [START ON 3/21/2025] cyclophosphamide (Cytoxan) 1,660 mg in sodium chloride 0.9% 258.3 mL infusion  750 mg/m2 Intravenous Q24H    [START ON 3/21/2025] predniSONE  100 mg Oral Daily with breakfast    allopurinol  300 mg Oral BID    OLANZapine  5 mg Oral Nightly    sodium chloride  1,000 mL Intravenous Once    [Held by provider] enoxaparin  40 mg Subcutaneous Daily       Assessment & Plan:      #Metabolic encephalopathy   Multifactorial from hypercalcemia, hypernatremia, metabolic alkalosis, related to meds?  -CT brain limited by motion but negative 3/17  -EEG negative for epileptiform activity   -MR brain with mild diffuse pachy meningeal enhancement suspected to be related to marrow  involvement  -Neurology following; signed off    #Severe hypercalcemia - improved  S/p zometa and calcitonin   -Nephrology following    #Sepsis 2/2 pneumonia    #Acute hypoxic respiratory failure suspect 2/2 pneumonia/atelectasis   CTA chest 3/15 with airspace opacities within the lung bases atelectasis vs. Consolidation. Negative for PE. Procal 0.43.  -Repeat CXR 3/16 with discoid atelectasis in the retrocardiac lung base which is stable. And also stable right mid lung atelectasis.   -Fever 3/18 overnight, CXR 3/19 with new patchy right upper lung airspace opacity as well as b/l interstitial opacities.   -IV zosyn 3/19 for 7 day course    #Sinus tachycardia - improved with hydration  PE ruled out. Related to pain?   -TTE with hyperdynamic EF 75 - 80%, no RWMA    #CLL with concern of transformation to high -grade lymphoma  Imaging with worsening LAD. LDH normal to 1488 on admission.   Heme following  -LN biopsy on 3/17; prelim report DLBCL  -Plan for chemo R-CHOP via PICC -transferred to med-onc 3/20    #Thrombocytopenia  2/2 bone marrow involvement     #Coagulopathy   Vit K deficiency?   -Monitor     #Generalized pain suspected due to bony progression of CLL  -Decadron 6 mg every 8 hours   -Consult palliative care for symptom management   -dilaudid pca per palliative care    #Hypokalemia - supplement per protocol   #Hypernatremia - IVF per nephrology     #CKD stage III - trend     #Metabolic alkalosis suspect 2/2 hypercalcemia and renal bicarb reabsorption    #Acute urinary retention   Requiring ISC for now will continue. Monitor     #Elevated ALP, AST  Some chronicity. Related to chemo vs. CLL. Trend     #Low PO intake  -Continue to monitor     #Obesity, BMI 34.45    Rubia Santos MD    Supplementary Documentation:     Quality:  DVT Mechanical Prophylaxis:   SCDs,    DVT Pharmacologic Prophylaxis   Medication    [Held by provider] enoxaparin (Lovenox) 40 MG/0.4ML SUBQ injection 40 mg                Code Status:  Full Code  Hagan: External urinary catheter in place  Hagan Duration (in days):   Central line:    MOE:     Discharge is dependent on: course  At this point Ms. Cabral is expected to be discharge to: unclear    The 21st Century Cures Act makes medical notes like these available to patients in the interest of transparency. Please be advised this is a medical document. Medical documents are intended to carry relevant information, facts as evident, and the clinical opinion of the practitioner. The medical note is intended as peer to peer communication and may appear blunt or direct. It is written in medical language and may contain abbreviations or verbiage that are unfamiliar.

## 2025-03-20 NOTE — PLAN OF CARE
Assumed care at 0700.Pt A/O x4. 2-3L O2 per NC.   1 U platelets given. Follow up platelet drawn, waiting for result.   PCA dose adjusted, pain better controlled.   Plan for pt to receive chemo. Order to tx pt to med onc.   Report called to med onc RN. Pt sent down to IR to have picc placed and then will tx to med onc 414.

## 2025-03-20 NOTE — PROGRESS NOTES
[facility]   part of [facility]  Palliative Care Follow Up    Lisa Cabral Patient Status:  Inpatient    3/12/1975 MRN [id number]   Location [facility] 7NE-A Attending Rubia Santos MD   Hosp Day # 5 PCP Ashley Cross, DO   [id number]/7611-[id number]    Date of visit:  3/20/2025  Day 5 of hospitalization.     Palliative care consult requested for support with uncontrolled symptoms.     Summary:         HPI:  Lisa Cabral is a 50 year old female with a history of chronic neck and back pain, CLL diagnosed in  on treatment holiday, completed RT to right humerus, axilla and right iliac bone  who was admitted on 3/15/2025 for  neck pain. Work up in our hospital revealed hypercalcemia s/p zometa and calcitonin on IVF, CT scan with current lymphadenopathy, hypokalemia and confusion, thrombocytopenia, neutrophilia, elevated LDH, planning for lymph node biopsy due to concern there is transformation to high grade lymphoma.  Management for metabolic abnormalities.  Sees pain specialist.    3/17 - 3/18:  severe agitation and pain , required sitter, continuous dilaudid infusion started. Neurology consulted and MRI brain completed. S/p LN bx 3/17, prelim path reviewed in oncology notes.  LDH increasing.    Interval Events: CBC reviewed, noting worsening counts/thrombocytopenia, will receive Plts today. LD remains elevated.  - oncology note reviewed, c/f DLBCL on preliminary path with recommendation to initiate inpatient chemo and pt is deliberating.  -   RN reporting requiring RN boluses throughout night w ongoing pain uncontrolled.    SUBJECTIVE  Review of Systems - Palliative Care Symptom Assessment     I visited Lisa meléndez daughter Hailey and Hailey's friend at bedside.     Pain:  lower back and hips at 9/10 due to recently getting cleaned up and repositioned. Requesting bolus during visit.  Has pain spikes that become out of control while waiting for clinician bolus to be given/start working. We discussed  PCA, she has used before, she feels she is able to operate PCA and that this may help stay ahead of pain exacerbations due to activity + help her pre-medicate in advance of repositioning.  Discussed safe PCA use with daughter and family friend present.     24 hour pain requirements (0097-4256):  304 OME  Continuous: 8.8 mg  Bolus: 6.4 mg  Total:  15.2 mg  3/18 - 3/19: 6.6 mg IV Dilaudid = 132 OME  3/17 - 3/18: 143 OME    Agitation: Improved, pt reports feeling fuzzy but improving with time     Last BM:  just ate for first time this AM, discussed goal BM q48 hrs, starting bowel regimen, can hold if loose stools.  Bowel Movement    No data found in the last 1 encounters.            OBJECTIVE     Hematology:   Lab Results   Component Value Date    WBC 7.9 03/20/2025    HGB 9.7 (L) 03/20/2025    HCT 28.3 (L) 03/20/2025    PLT 10.0 (LL) 03/20/2025       Chemistry:  Lab Results   Component Value Date    CREATSERUM 0.71 03/20/2025    BUN 21 03/20/2025     03/20/2025    K 3.9 03/20/2025     03/20/2025    CO2 29.0 03/20/2025     (H) 03/20/2025    CA 8.7 03/20/2025    ALB 3.5 03/20/2025    ALKPHO 676 (H) 03/20/2025    BILT 0.4 03/20/2025    TP 5.5 (L) 03/20/2025    AST 73 (H) 03/20/2025    ALT 45 03/20/2025    MG 2.6 03/15/2025    PHOS 2.7 06/05/2024       Imaging:  XR CHEST AP PORTABLE  (CPT=71045)    Result Date: 3/19/2025  CONCLUSION:  New patchy right upper lung airspace opacity as well as bilateral interstitial opacities, findings may be related to infectious/inflammatory process, clinical correlation and follow-up to resolution is recommended.   LOCATION:  [facility]      Dictated by (CST): Slim Page MD on 3/19/2025 at 1:38 PM     Finalized by (CST): Slim Page MD on 3/19/2025 at 1:39 PM       MRI BRAIN (W+WO) (CPT=70553)    Result Date: 3/18/2025  CONCLUSION:   1. No acute intracranial abnormality identified.  2. There is mild diffuse pachymeningeal enhancement that is concerning for nonspecific  pachymeningitis, metastatic disease, changes possibly related to recent lumbar puncture, with other etiologies not entirely excluded.  Clinical correlation recommended.  3. Trace chronic microvascular ischemic changes in the cerebral white matter.  4. Diffuse low T1 signal in the visualized marrow space of the upper cervical spine as well as the calvarium with associated enhancement is concerning for changes related to lymphoma/leukemia, diffuse osseous metastatic disease, , with other etiologies not entirely excluded.  Clinical correlation recommended.  Nuclear medicine bone scan with SPECT CT may be helpful for further evaluation as clinically directed.  5. Partially imaged lymphadenopathy in the upper neck is likely related to the patient's clinical history of leukemia.  6. There is nonspecific masslike thickening of the right parietal scalp measuring up to 5 mm in thickness with underlying neoplasm not excluded.  Clinical correlation and direct inspection is suggested.  Please see above for further details.  LOCATION:  [facility]    Dictated by (CST): Jeff Royal MD on 3/18/2025 at 5:50 PM     Finalized by (CST): Jeff Royal MD on 3/18/2025 at 5:57 PM        Vital Signs:  Blood pressure (!) 151/99, pulse 100, temperature 97.8 °F (36.6 °C), temperature source Oral, resp. rate 13, weight 233 lb 4.8 oz (105.8 kg), SpO2 96%, not currently breastfeeding.  Body mass index is 34.45 kg/m².    Physical Exam:     GENERAL: Alert in bed, NAD  CARDIAC: HR low 100s per tele  RESPIRATORY: no increased effort at rest. Wet sounding cough  NEUROLOGIC: oriented  SKIN: Warm and dry.     Palliative Performance Scale: 40%   Palliative Performance Scale   % Ambulation Activity Level Self-Care Intake Consciousness   100 Full Normal Full   Normal Full   90 Full No disease  Normal Full Normal Full   80 Full Some disease  Normal w/effort Full Normal or  Reduced Full   70 Reduced Some disease  Can't perform job Full Normal or   Reduced  Full   60 Reduced Significant disease  Can't perform hobby Occasional  Assist Normal or   Reduced Full or confused   50 Mainly sit/lie Extensive Disease  Can't do any work Partial Assist Normal or Reduced Full or confused   40 Mainly in bed Extensive Disease  Can't do any work Mainly Assist Normal or Reduced Full or confused   30 Bedbound Extensive Disease  Can't do any work Max Assist  Total Care Reduced Drowsy/confused   20 Bedbound Extensive Disease  Can't do any work Max Assist  Total Care Minimal Drowsy/confused   10 Bedbound/coma Extensive Disease  Can't do any work  coma  Max Assist  Total Care Mouth care Drowsy or coma   0 Death       Palliative Care Assessment:     Goals of Care Discussion:    Lisa is considering pursuing inpatient cancer-directed treatment and bone marrow biopsy in the future. She still wants time to d/w her . She was asking to have until Monday to decide. She is aware of blood count findings and plan for Plt transfusion today.  She stated awareness that without treatment she has been told that she does not have much time left. She requested pain medication due to increasing pain during discussion.    Emotional support provided to Lisa and her family. Will remain available to support w symptoms and GOC.    Problem List:       Principal Problem:    Hypercalcemia  Active Problems:    CLL (chronic lymphocytic leukemia) ([facility])    Palliative care encounter    Hypokalemia    Pain of left hip    Chronic neck pain    Thrombocytopenia    Community acquired pneumonia, unspecified laterality    LUKE (acute kidney injury)    Hypernatremia    Intractable pain    Encephalopathy acute    Cancer-related pain    Delirium    Goals of care, counseling/discussion        Palliative Care Recommendations/Plan:         Symptoms:    Pain - ?cancer-related, RUE -   Dilaudid PCA  Continuous: increase to 0.5mg / hr  Start PCA: 0.2mg q30min  Clinician Bolus 0.4mg q30min PRN  Lidocaine patch  IV Dexamethasone  - per oncology    Opioid-induced constipation: prevention  Senokot 2 tabs nightly  Bowel regimen available prn      Delirium w agitation: improved.  Olanzapine 5mg ODT nightly and 2.5mg BID PRN.       Goals of Care:    Lisa is leaning towards pursuing inpatient chemo and considering bone marrow transplant. Wants some time to d/w her family before confirming plan.      Code Status: Full Code.     HCPOA: Spouse Erik    Disposition:  Pending clinical course.      Medical Decision Making (MDM) for Palliative Care   Problems Addressed:  High Details: CLL with concern for conversion, encephalopathy, respiratory failure, hypercalcemia  Data Reviewed & Analyzed:  Results reviewed from each unique test: 2  Select all that apply: independent interpretation of test performed by other healthcare provider  Details: Reviewed CMP, CBC - discussed results w pt/daughter.  Risk of Complications from DIagnostic Testing & Treatment:  High Details: Management of parenteral opioids - continuous dilaudid infusion/PCA.  Calculated MDM Level: High         Reviewed the above with patient's RN, primary, oncology.    Thank you for inviting Palliative Care to participate in the care of Lisa Cabral and family.       Will follow.      LETTY Padilla  Palliative Care    3/18/2025  3:33 PM    The 21st Century Cures Act makes medical notes like these available to patients in the interest of transparency. Please be advised this is a medical document. Medical documents are intended to carry relevant information, facts as evident, and the clinical opinion of the practitioner. The medical note is intended as a peer to peer communication, and may appear blunt or direct. It is written in medical language and may contain abbreviations or verbiage that are unfamiliar.    Addendum: Chart reviewed noting line placement and plan to initiate inpatient chemo today, pt having line placed for access. Checked in with RN re response to changes in PCA - RN  reports that pt reported improvement w rate change. Has not required clinician bolus since 0728.     Angela Schroeder APRN  4:33 pm

## 2025-03-20 NOTE — PROGRESS NOTES
[facility]  [facility] Neurology Progress Note    Lisa Cabral Patient Status:  Inpatient    3/12/1975 MRN [id number]   Location [facility] 7NE-A Attending Rubia Santos MD   Hosp Day # 5 PCP Ashley Cross,      CC: Pain    Subjective:  Patient seen for a follow up visit today, alert and oriented x 4 and responds to questions appropriately. Severe back pain remains, pt continues with Dilaudid PCA pump. No acute events over night, pt denies any vision changes, nausea, vomiting.        MEDICATIONS:  No current outpatient medications on file.     Current Facility-Administered Medications   Medication Dose Route Frequency    acetaminophen (Tylenol) tab 650 mg  650 mg Oral Once    allopurinol (Zyloprim) tab 300 mg  300 mg Oral Daily    piperacillin-tazobactam (Zosyn) 3.375 g in dextrose 5% 100 mL IVPB-ADDV  3.375 g Intravenous Q8H    HYDROmorphone in sodium chloride 0.9% (Dilaudid) 20 mg/100mL PCA premix   Intravenous Continuous    sodium chloride 0.45% infusion   Intravenous Continuous    sodium chloride 0.9% infusion   Intravenous Continuous    HYDROmorphone 0.4 mg BOLUS FROM PCA  0.4 mg Intravenous Q30 Min PRN    naloxone (Narcan) 0.4 MG/ML injection 0.08 mg  0.08 mg Intravenous Q5 Min PRN    diphenhydrAMINE (Benadryl) 50 mg/mL  injection 12.5 mg  12.5 mg Intravenous Q4H PRN    ondansetron (Zofran) 4 MG/2ML injection 4 mg  4 mg Intravenous Q6H PRN    OLANZapine (ZyPREXA Zydis) disintegrating tab 5 mg  5 mg Oral Nightly    OLANZapine (ZyPREXA Zydis) disintegrating tab 2.5 mg  2.5 mg Oral BID PRN    labetalol (Trandate) 5 mg/mL injection 10 mg  10 mg Intravenous Q4H PRN    acetaminophen (Ofirmev) 10 mg/mL infusion premix 1,000 mg  1,000 mg Intravenous Q6H PRN    pantoprazole (Protonix) 40 mg in sodium chloride 0.9% PF 10 mL IV push  40 mg Intravenous Q24H    dexamethasone (Decadron) 4 MG/ML injection 6 mg  6 mg Intravenous Q8H    sodium chloride 0.9% infusion 1,000 mL  1,000 mL Intravenous Once     lidocaine-menthol 4-1 % patch 1 patch  1 patch Transdermal Daily PRN    methocarbamol (Robaxin) tab 500 mg  500 mg Oral TID PRN    rizatriptan (Maxalt-MLT) disintegrating tab 5 mg  5 mg Oral PRN    melatonin tab 3 mg  3 mg Oral Nightly PRN    [Held by provider] enoxaparin (Lovenox) 40 MG/0.4ML SUBQ injection 40 mg  40 mg Subcutaneous Daily    acetaminophen (Tylenol Extra Strength) tab 500 mg  500 mg Oral Q4H PRN    polyethylene glycol (PEG 3350) (Miralax) 17 g oral packet 17 g  17 g Oral Daily PRN    sennosides (Senokot) tab 17.2 mg  17.2 mg Oral Nightly PRN    bisacodyl (Dulcolax) 10 MG rectal suppository 10 mg  10 mg Rectal Daily PRN    fleet enema (Fleet) rectal enema 133 mL  1 enema Rectal Once PRN    benzonatate (Tessalon) cap 200 mg  200 mg Oral TID PRN    guaiFENesin (Robitussin) 100 MG/5 ML oral liquid 200 mg  200 mg Oral Q4H PRN       REVIEW OF SYSTEMS:  A 10-point system was reviewed.  Pertinent positives and negatives are noted in HPI.      PHYSICAL EXAMINATION:  VITAL SIGNS: BP (!) 151/99 (BP Location: Right arm)   Pulse 100   Temp 97.8 °F (36.6 °C) (Oral)   Resp 13   Wt 233 lb 4.8 oz (105.8 kg)   SpO2 96%   BMI 34.45 kg/m²   GENERAL:  Patient is a 50 year old female in no acute distress.  HEENT:  Normocephalic, atraumatic  ABD: Soft, non tender  SKIN: Warm, dry, no rashes    NEUROLOGICAL:   Mental status: Oriented to person, place, situation and time.  Speech: Fluent, no obvious aphasia or dysarthria  Memory and comprehension: Intact  Cranial Nerves: PERRL, EOMI, facial sensation intact, face symmetric, tongue midline, shoulder shrug equal  Motor: No drift, generalized weakness, motor exam 4/5 throughout  Sensory: Intact to light touch bilaterally  Coordination: FTN intact bilaterally  Gait: Deferred    Imaging/Diagnostics:  XR CHEST AP PORTABLE  (CPT=71045)    Result Date: 3/19/2025  CONCLUSION:  New patchy right upper lung airspace opacity as well as bilateral interstitial opacities, findings  may be related to infectious/inflammatory process, clinical correlation and follow-up to resolution is recommended.   LOCATION:  [id number]      Dictated by (CST): Slim Page MD on 3/19/2025 at 1:38 PM     Finalized by (CST): Slim Page MD on 3/19/2025 at 1:39 PM       MRI BRAIN (W+WO) (CPT=70553)    Result Date: 3/18/2025  CONCLUSION:   1. No acute intracranial abnormality identified.  2. 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  Clinical correlation recommended.  3. Trace chronic microvascular ischemic changes in the cerebral white matter.  4. Diffuse low T1 signal in the visualized marrow space of the upper cervical spine as well as the calvarium with associated enhancement is concerning for changes related to lymphoma/leukemia, diffuse osseous metastatic disease, , with other etiologies not entirely excluded.  Clinical correlation recommended.  Nuclear medicine bone scan with SPECT CT may be helpful for further evaluation as clinically directed.  5. Partially imaged lymphadenopathy in the upper neck is likely related to the patient's clinical history of leukemia.  6. There is nonspecific masslike thickening of the right parietal scalp measuring up to 5 mm in thickness with underlying neoplasm not excluded.  Clinical correlation and direct inspection is suggested.  Please see above for further details.  LOCATION:  [id number]    Dictated by (CST): Jeff Royal MD on 3/18/2025 at 5:50 PM     Finalized by (CST): Jeff Royal MD on 3/18/2025 at 5:57 PM       US BIOPSY CORE LYMPH NODE, RIGHT (CPT=76942/23808)    Result Date: 3/17/2025  CONCLUSION:  Uneventful ultrasound guided right inguinal lymph node core biopsy.  The patient was instructed to obtain follow up care and biopsy results from the referring physician.   LOCATION:  [facility]      Dictated by (CST): Dm Fonseca MD on 3/17/2025 at 6:37 PM     Finalized by (CST): Dm Fonseca MD on 3/17/2025 at 6:38 PM       CT BRAIN OR HEAD (CPT=70450)    Result Date: 3/17/2025  PROCEDURE:  CT BRAIN OR HEAD (05124)  COMPARISON:  [facility] , CT, CTA CAROTID ARTERIES (CPT=70498), 3/08/2025, 10:20 PM.  [facility] , CT, CT BRAIN OR HEAD (88513), 10/30/2023, 8:50 PM.  INDICATIONS:  altered mental status  TECHNIQUE:  Noncontrast CT scanning is performed through the brain. Dose reduction techniques were used. Dose information is transmitted to the ACR (American College of Radiology) NRDR (National Radiology Data Registry) which includes the Dose Index Registry.  PATIENT STATED HISTORY: (As transcribed by Technologist)  AMS   FINDINGS: Examination is significantly limited by motion artifact.  No hemorrhage or extra-axial fluid collection. Lucencies in the deep periventricular white matter are likely sequelae of chronic small vessel ischemic disease. Ventricles and sulci are appropriate for the patient's age. No mass effect. Visualized portions of paranasal sinuses are unremarkable. Visualized portions of the mastoid air cells are unremarkable. Visualized portions of the orbits are unremarkable. IMPRESSION: Motion artifact limits examination significantly.  Minimal high density in sulci is more than likely artifactual from motion.  No evidence of an acute intracranial abnormality.    LOCATION:  [id number]   Dictated by (CST): Jourdan Shine MD on 3/17/2025 at 10:50 AM     Finalized by (CST): Jourdan Shine MD on 3/17/2025 at 10:54 AM       XR CHEST AP PORTABLE  (CPT=71045)    Result Date: 3/16/2025  CONCLUSION:  Stable heart size and pulmonary vascularity.  There is discoid atelectasis in the retrocardiac left lung base which appears stable.  Discoid atelectasis right mid lung appears stable.  Tortuous thoracic aorta unchanged.  Plate and screw fixation lower cervical spine.   LOCATION:  [facility]      Dictated by (CST):  Asia Barnes MD on 3/16/2025 at 8:01 AM     Finalized by (CST): Asia Barnes MD on 3/16/2025 at 8:01 AM       CTA CHEST + CT ABD (W) + CT PEL (W) (CPT=71275/62804)    Result Date: 3/15/2025  CONCLUSION:  Progressive lymphadenopathy is seen within the visualized chest and pelvis.  Please see above for further details.  No evidence of pulmonary embolism to the first subsegmental arterial level.  Airspace opacities within the lung bases may represent areas of atelectasis.  Consolidation is difficult to exclude.   LOCATION:  [facility]   Dictated by (CST): Marcus Mondragon MD on 3/15/2025 at 3:40 PM     Finalized by (CST): Marcus Mondragon MD on 3/15/2025 at 3:48 PM       XR CHEST AP PORTABLE  (CPT=71045)    Result Date: 3/15/2025  CONCLUSION:  See above.   LOCATION:  [facility]      Dictated by (CST): Stromberg, LeRoy, MD on 3/15/2025 at 12:51 PM     Finalized by (CST): Stromberg, LeRoy, MD on 3/15/2025 at 12:52 PM       MRI SPINE CERVICAL (W+WO) (CPT=72156)    Result Date: 3/14/2025  CONCLUSION:   1. Postoperative changes of C4-C7 anterior cervical discectomy and fusion.  Resultant susceptibility artifacts limit evaluation.  2. Bilateral paramedian T2 bright signal abnormality and volume loss involving the cervical cord at C5-C6, but with no associated postcontrast enhancement.  These findings are most compatible with myelomalacia.  3. C3-C4:  Mild-to-moderate spinal canal with mild-to-moderate right and mild left neural foraminal stenosis. 4. C7-T1:  Mild right neural foraminal stenosis.  5. Partially imaged prominent and mildly enlarged bilateral multilevel cervical lymph nodes.  These likely relate to the patient's history of chronic lymphocytic leukemia and please correlate with prior dedicated imaging.  6. A 1.1 cm right thyroid nodule.  Thyroid nodules less than 1.5 cm on cross-sectional imaging typically require no additional follow-up.  7. Lesser incidental findings as above.   elm-remote  Dictated by (CST): Neftaly Gant  MD on 3/14/2025 at 7:55 AM     Finalized by (CST): Neftaly Gant MD on 3/14/2025 at 8:03 AM          CTA CAROTID ARTERIES (CPT=70498)    Result Date: 3/8/2025  CONCLUSION:  No evidence of high-grade stenosis or dissection involving the carotid or vertebral arteries.   LOCATION:  [facility]   Dictated by (CST): Kennedy Orozco MD on 3/08/2025 at 11:03 PM     Finalized by (CST): Kennedy Orozco MD on 3/08/2025 at 11:06 PM          Labs:  Recent Labs   Lab 03/15/25  1313 03/16/25  0701 03/18/25  0556 03/19/25  1043 03/20/25  0548   RBC 4.80 4.51 3.62* 3.18* 3.09*   HGB 15.3 14.0 11.4* 9.9* 9.7*   HCT 44.8 41.7 34.0* 30.3* 28.3*   MCV 93.3 92.5 93.9 95.3 91.6   MCH 31.9 31.0 31.5 31.1 31.4   MCHC 34.2 33.6 33.5 32.7 34.3   RDW 12.2 12.2 12.7 13.2 13.0   NEPRELIM 13.29* 12.32*  --  4.20  --    WBC 17.6* 17.0* 10.5 9.3 7.9   PLT 74.0* 51.0* 21.0* 12.0* 10.0*         Recent Labs   Lab 03/18/25  0556 03/19/25  0613 03/19/25  1043 03/20/25  0548   *  --  197* 157*   BUN 31*  --  24* 21   CREATSERUM 0.99  --  0.82 0.71   EGFRCR 69  --  87 103   CA 10.8* 9.0 8.5* 8.7   *  --  141 139   K 3.2*  3.2* 3.9 3.8 3.9   *  --  106 104   CO2 32.0  --  26.0 29.0       EEG REPORT      Impression:  This is a Abnormal EEG. No focal, lateralized or generalized epileptiform activity seen. Moderate diffuse slowing into delta range was noted.  This constellation of findings can be seen in encephalopathy due to metabolic/toxic etiology, medication effects or diffuse cerebral injury.  Clinical correlation is recommended     Pre-morbid mRS 1-2     Assessment and Plan:     A 50 year old female with:     Acute encephalopathy, most likely multifactorial, possibly contributed by marked hypercalcemia also.    - MRI of the brain w/wo reported mild diffuse pachy meningeal enhancement suspected to be related to marrow involvement.  -  EEG routine reported moderate diffuse slowing, no active seizures as per report above.  - Ammonia level 50,  slightly elevated  - Patient with marked agitation initially, cooperative, pleasant and calm today. Pt currently on Zyprexa once daily.   Generalized body ache/ severe pain possibly due to cancer progression. Currently on Dilaudid PCA   -  CPK level 35  Hypercalcemia. Ca level 8.7 today, management per nephrology.  Thrombocytopenia. Hematology/oncology following pt.     Plan of care reviewed with pt and family at bed side, all questions answered. No further inpatient neuro interventions at this time. Case discussed with Dr. Pearl, further recommendations, if indicated, to follow.      Is this a shared or split note between Advanced Practice Provider and Physician? Yes     Mariel Odom, LETTY  [facility]  3/20/2025, 9:50 AM  [place] # [id number]      Impression/plan/MDM:  Patient seen and examined personally.  Investigations reviewed.    Encephalopathy, multifactorial, reasonably improved with correction correction of hypercalcemia.  MRI also showed meningeal enhancement, concerning for metastatic involvement.  Oncology on the case for further management and recommendations.  Clinically patient has shown significant interval improvement.  No further neurology recommendations at this time.  Please feel free to call for further queries.

## 2025-03-20 NOTE — PLAN OF CARE
I met with Mrs. Cabral, her  Erik, and the patient's family today.     I introduced myself this afternoon to the family, explaining that I work with Dr. Herring and am on call this weekend. I re-iterated to the patient that time is of essence in terms of treatment, given uptrending LDH and newly diagnosed diffuse-large-B cell lymphoma (preliminary report given to Dr. Herring by pathology team).     At bedside, patient is tearful however clear that she would like to \"give chemotherapy a shot.\" Given how symptomatic she is and the aggressive nature of her unfortunately transformed disease, this is reasonable.    Today, I discussed with the patient her diagnosis of large B-cell lymphoma, the proposed treatment consisting of R-CHOP (rituximab, cyclophosphamide, doxorubicin, vincristine, prednisone). Possible risks of systemic chemotherapy and immunotherapy were discussed in detail, including but not limited to -- increased risk of infection due to decreased white blood cell counts, increased risk of bleeding due to decreased red blood cell and decreased platelet counts, mucositis, fatigue, nausea, vomiting, elevated liver enzymes, kidney dysfunction, alopecia, infusion reaction, peripheral neuropathy, constipation, cardiac dysfunction, tachyarrythymias, diarrhea and hemorrhagic cystitis.     After thorough discussion, patient has agreed to receive this treatment. All of patient's and patient's family's questions were answered thoroughly.      Plan:  - VAT team requested to kindly place PICC line today ASA, as patient needs central access for chemotherapy  - transfer to [facility] onc  - HIV and Hepatitis profile drawn, in process  - orders for R-CHOP placed in [facility]. Patient to start C1D1 R-CHOP tomorrow, 3/21/25  - continue allopurinol 300 mg PO BID, in anticipation of starting systemic chemotherapy  - transfuse 1 unit platelets for platelet count < 10 k, and 1 unit PRBC for Hb count < 7 g/dL -- based on each lab  drawn      Will see patient again tomorrow morning.     Bozena Mac D.O.  [facility]        I spent > 90 minutes today face to face and in conversation with the patient and family as well as on coordination of care.  >50% of the time was spent counseling the patient on the diagnosis, prognosis, and/or management of large B-cell lymphoma. We spoke in detail for >30 mins regarding advanced care planning as well.

## 2025-03-20 NOTE — PROGRESS NOTES
Chemotherapy Pharmacy Clarification    For 3/21/25    BSA: 2.21 m2    Pre-Truxima meds:    Tylenol 650 mg po  Benadryl 25 mg po      Truxima 800 mg (375 mg/m2) in  ml over titrated rate. Rate will be increased every 30 min as tolerated      Pre-Chemotherapy meds:    Emend 150 mg in  ml over 20 min  Zofran 16 mg in  ml over 15 min      Chemotherapy:    Doxorubicin 110 mg (50 mg/m2) iv push over 10 min (split syringe)  Vincristine 2 mg in NS 50 ml over 5 min  Cytoxan 1660 mg (750 mg/m2) in  ml over 30 min    [place], PharmD

## 2025-03-20 NOTE — PROCEDURES
[facility]   part of [facility]  Procedure Note    Lisa Cabral Patient Status:  Inpatient    3/12/1975 MRN [id number]   Location [facility] INTERVENTIONAL SUITES Attending Rubia Santos MD   Hosp Day # 5 PCP Ashley Cross DO     Procedure: right arm PICC placement    Pre-Procedure Diagnosis:  Chemotherapy for CLL    Post-Procedure Diagnosis: same    Anesthesia:  Local    Findings:  tip in SVC    Specimens: none    Blood Loss:  <5 ml    Tourniquet Time: n/a  Complications:  None  Drains:  none    Secondary Diagnosis:  none    Dm Fonseca MD  3/20/2025

## 2025-03-20 NOTE — PLAN OF CARE
[facility] care 1900, 3/19, NOC. Reoriented as needed, PCA for pain control w/ bolus, ABX, communicative and rousable but intermittently fatigued, slept intermittenty, needs met

## 2025-03-20 NOTE — PROGRESS NOTES
[facility]   part of [facility]     Nephrology Progress Note    Lisa Cabral Patient Status:  Inpatient    3/12/1975 MRN [id number]   Location [facility] 7NE-A Attending Rubia Santos MD   Hosp Day # 5 PCP Ashley Cross,        SUBJECTIVE:  Alert, interactive. Family at bedside. Reports increasing appetite      Physical Exam:   BP (!) 151/99 (BP Location: Right arm)   Pulse 100   Temp 97.8 °F (36.6 °C) (Oral)   Resp 13   Wt 233 lb 4.8 oz (105.8 kg)   SpO2 96%   BMI 34.45 kg/m²   Temp (24hrs), Av.6 °F (37 °C), Min:97.8 °F (36.6 °C), Max:99.9 °F (37.7 °C)       Intake/Output Summary (Last 24 hours) at 3/20/2025 1100  Last data filed at 3/20/2025 0729  Gross per 24 hour   Intake 438.95 ml   Output 670 ml   Net -231.05 ml     Last 3 Weights   25 2318 233 lb 4.8 oz (105.8 kg)   03/15/25 1954 232 lb 9.4 oz (105.5 kg)   25 1928 238 lb (108 kg)   25 2019 238 lb (108 kg)   25 1151 238 lb (108 kg)   25 1053 249 lb 9.6 oz (113.2 kg)     General: Alert and in no apparent distress.  HEENT: No scleral icterus, MMM  Neck: Supple, no GRACIE or thyromegaly  Cardiac: tachycardic and regular, S1, S2 normal, no murmur   Lungs: Clear without wheezes, rales, rhonchi.    Abdomen: Soft, non-tender.   Extremities: Without clubbing, cyanosis or edema.  Neurologic:  moving all extremities  Skin: Warm and dry, no rash    Labs:     Recent Labs   Lab 03/15/25  1313 25  0701 25  0950 25  0556 25  1043 25  0548   WBC 17.6* 17.0*  --  10.5 9.3 7.9   HGB 15.3 14.0  --  11.4* 9.9* 9.7*   MCV 93.3 92.5  --  93.9 95.3 91.6   PLT 74.0* 51.0*  --  21.0* 12.0* 10.0*   BAND   --   --  7  --    INR  --   --  1.42*  --   --   --        Recent Labs   Lab 03/15/25  1313 03/15/25  2051 25  0209 25  1754 25  0556 25  0613 25  1043 25  0548      < > 146* 147* 152*  --  141 139   K 3.4*   < > 3.2*  3.2* 3.6 3.2*  3.2*  3.9 3.8 3.9   CL 96*   < > 104 111 113*  --  106 104   CO2 34.0*   < > 36.0* 28.0 32.0  --  26.0 29.0   BUN 21   < > 26* 28* 31*  --  24* 21   CREATSERUM 1.19*   < > 1.16* 1.07* 0.99  --  0.82 0.71   CA 17.8*   < > 12.6* 11.5* 10.8* 9.0 8.5* 8.7   MG 2.6  --   --   --   --   --   --   --    *   < > 136* 157* 183*  --  197* 157*    < > = values in this interval not displayed.       Recent Labs   Lab 03/16/25  1620 03/17/25  0209 03/17/25  1754 03/18/25  0556 03/19/25  1043 03/20/25  0548   ALT 38 37 37 39 50* 45   AST 64* 93* 91* 67* 67* 73*   ALB 4.0 4.0 3.5 3.5 3.2 3.5   LDH 1,488*  --   --  3,118* 2,016* 2,679*       No results for input(s): \"PGLU\" in the last 168 hours.    Meds:   Current Facility-Administered Medications   Medication Dose Route Frequency    allopurinol (Zyloprim) tab 300 mg  300 mg Oral Daily    piperacillin-tazobactam (Zosyn) 3.375 g in dextrose 5% 100 mL IVPB-ADDV  3.375 g Intravenous Q8H    HYDROmorphone in sodium chloride 0.9% (Dilaudid) 20 mg/100mL PCA premix   Intravenous Continuous    sodium chloride 0.9% infusion   Intravenous Continuous    HYDROmorphone 0.4 mg BOLUS FROM PCA  0.4 mg Intravenous Q30 Min PRN    naloxone (Narcan) 0.4 MG/ML injection 0.08 mg  0.08 mg Intravenous Q5 Min PRN    diphenhydrAMINE (Benadryl) 50 mg/mL  injection 12.5 mg  12.5 mg Intravenous Q4H PRN    ondansetron (Zofran) 4 MG/2ML injection 4 mg  4 mg Intravenous Q6H PRN    OLANZapine (ZyPREXA Zydis) disintegrating tab 5 mg  5 mg Oral Nightly    OLANZapine (ZyPREXA Zydis) disintegrating tab 2.5 mg  2.5 mg Oral BID PRN    labetalol (Trandate) 5 mg/mL injection 10 mg  10 mg Intravenous Q4H PRN    acetaminophen (Ofirmev) 10 mg/mL infusion premix 1,000 mg  1,000 mg Intravenous Q6H PRN    pantoprazole (Protonix) 40 mg in sodium chloride 0.9% PF 10 mL IV push  40 mg Intravenous Q24H    dexamethasone (Decadron) 4 MG/ML injection 6 mg  6 mg Intravenous Q8H    sodium chloride 0.9% infusion 1,000 mL  1,000 mL  Intravenous Once    lidocaine-menthol 4-1 % patch 1 patch  1 patch Transdermal Daily PRN    methocarbamol (Robaxin) tab 500 mg  500 mg Oral TID PRN    rizatriptan (Maxalt-MLT) disintegrating tab 5 mg  5 mg Oral PRN    melatonin tab 3 mg  3 mg Oral Nightly PRN    [Held by provider] enoxaparin (Lovenox) 40 MG/0.4ML SUBQ injection 40 mg  40 mg Subcutaneous Daily    acetaminophen (Tylenol Extra Strength) tab 500 mg  500 mg Oral Q4H PRN    polyethylene glycol (PEG 3350) (Miralax) 17 g oral packet 17 g  17 g Oral Daily PRN    sennosides (Senokot) tab 17.2 mg  17.2 mg Oral Nightly PRN    bisacodyl (Dulcolax) 10 MG rectal suppository 10 mg  10 mg Rectal Daily PRN    fleet enema (Fleet) rectal enema 133 mL  1 enema Rectal Once PRN    benzonatate (Tessalon) cap 200 mg  200 mg Oral TID PRN    guaiFENesin (Robitussin) 100 MG/5 ML oral liquid 200 mg  200 mg Oral Q4H PRN         Impression/Plan:      1) Severe Hypercalcemia: non-PTH mediated hypercalcemia in setting of high risk CLL and possible transformation to high-grade lymphoma. S/p zometa and calcitonin and currently on IVF. Calcium has now normalized, does not need IVF today from nephrology perspective.      2) Acute kidney injury: baseline serum creatinine ~0.7-0.9 mg/dL. Acute kidney injury likely 2/2 hypercalcemia and contrast associated nephropathy. Renal function now back to baseline. Hold IVF and monitor     3) CLL: Diagnosed in 2023, follows with Heme/Onc. Has high risk mutations. Had been on treatment holiday recently. CT with lymphadenopathy. S/p lymph node biopsy 3/17. Ongoing discussion regarding pursing aggressive care vs hospice     4) Bone pain: likely 2/2 hypercalcemia and bony progression of CLL. Pain management per primary     5) Hypokalemia: replace per protocol       6) Hypernatremia: due to decreased free water intake, improved with hypotonic fluids. Now drinking more water and with increased appetite, will hold fluids.     Thank you for allowing me to  participate in this patient's care. Please feel free to call me with any questions or concerns.     Reva Smalls MD  03/20/25

## 2025-03-20 NOTE — PROGRESS NOTES
[facility]  Progress Note    Lisa Cabral Patient Status:  Inpatient    3/12/1975 MRN [id number]   Location [facility] 7NE-A Attending Rubia Santos MD   Hosp Day # 5 PCP Ashley Cross, DO       SUBJECTIVE:     Alert and appropriate today.  Still in pain but much better.  She was able to drink yesterday.  Daughter at bedside.      OBJECTIVE:    Vitals:    25 2300 25 0000 25 0030 25 0400   BP:  (!) 147/95  (!) 151/99   BP Location:  Right arm  Right arm   Pulse: 106 113 102 100   Resp:    Temp:  99.9 °F (37.7 °C)  97.8 °F (36.6 °C)   TempSrc:  Axillary  Oral   SpO2:  95%  96%   Weight:           Wt Readings from Last 1 Encounters:   25 105.8 kg (233 lb 4.8 oz)       LABS:  Recent Labs   Lab 03/15/25  1313 25  0701 25  0556 25  1043 25  0548   RBC 4.80 4.51 3.62* 3.18* 3.09*   HGB 15.3 14.0 11.4* 9.9* 9.7*   HCT 44.8 41.7 34.0* 30.3* 28.3*   MCV 93.3 92.5 93.9 95.3 91.6   MCH 31.9 31.0 31.5 31.1 31.4   MCHC 34.2 33.6 33.5 32.7 34.3   RDW 12.2 12.2 12.7 13.2 13.0   NEPRELIM 13.29* 12.32*  --  4.20  --    WBC 17.6* 17.0* 10.5 9.3 7.9   PLT 74.0* 51.0* 21.0* 12.0* 10.0*         Recent Labs   Lab 25  0556 25  0613 25  1043 25  0548   *  --  197* 157*   BUN 31*  --  24* 21   CREATSERUM 0.99  --  0.82 0.71   EGFRCR 69  --  87 103   CA 10.8* 9.0 8.5* 8.7   ALB 3.5  --  3.2 3.5   *  --  141 139   K 3.2*  3.2* 3.9 3.8 3.9   *  --  106 104   CO2 32.0  --  26.0 29.0   ALKPHO 625*  --  544* 676*   AST 67*  --  67* 73*   ALT 39  --  50* 45   BILT 0.4  --  0.3 0.4   TP 5.5*  --  5.3* 5.5*       MEDICATIONS:     sodium chloride   Intravenous Once    acetaminophen  650 mg Oral Once    allopurinol  300 mg Oral Daily    piperacillin-tazobactam  3.375 g Intravenous Q8H    OLANZapine  5 mg Oral Nightly    pantoprazole  40 mg Intravenous Q24H    dexamethasone  6 mg Intravenous Q8H    sodium  chloride  1,000 mL Intravenous Once    [Held by provider] enoxaparin  40 mg Subcutaneous Daily       HYDROmorphone    naloxone    diphenhydrAMINE    ondansetron    OLANZapine    labetalol    acetaminophen    lidocaine-menthol    methocarbamol    rizatriptan    melatonin    acetaminophen    polyethylene glycol (PEG 3350)    sennosides    bisacodyl    fleet enema    benzonatate    guaiFENesin    PHYSICAL EXAM:    General: Awake, alert, uncomfortable due to pain but not moaning or crying.  Chest: Clear to auscultation.  Heart: Regular rate and rhythm.   Abdomen: Soft, non tender with good bowel sounds.  Extremities: No edema.  Neurological: No obvious focal deficit.    RADIOLOGY:     ASSESSMENT/PLAN:    # CLL: High risk disease, diag 9/23.Received fixed duration treatment with Obinutuzumab/venetoclax since 10/23-9/24 on clinical trial but progressed on LN biopsy 11/24.  Took Acalabrutinib from 11/24-3/25 and self stopped due to poor tolerance.  Now transforming to high-grade histology, probably DLBCL on preliminary path, final pending.      Yesterday she was not in favor of chemotherapy.  She has spoken with her family and is leaning towards it but not ready to make a decision or proceed today.  I urged her to decide quickly because disease is progressing rapidly as evidenced by dropping platelet count and rising LDH.  Will start allopurinol in anticipation of chemotherapy.    # Severe pain: Due to progression of leukemia and marrow.  On Dilaudid PCA.  Appears better today.    # Hypercalcemia: Received zoledronic acid, on fluids, steroids being tapered.  Calcium normalized.    # Thrombocytopenia: Due to bone marrow involvement.  Transfuse today in light of bruising and expected continued drop.    # Neutrophilia: due to steroids.  Better.  I suspect her counts will drop in near future due to bone marrow involvement.    Continue current management.  I have strongly recommended inpatient chemotherapy but she is not ready  to make a decision yet.  I did discuss with her that long-term, she will likely need transplant for consolidation if she responds to chemotherapy.  I also discussed that disease may not respond to chemotherapy.    LM for .     Tamica Herring M.D.    [facility]   [address] [place] [address] [place], [address]      3/20/2025    8:25 AM

## 2025-03-20 NOTE — SPIRITUAL CARE NOTE
Spiritual Care Visit Note    Patient Name: Lisa Cabral Date of Spiritual Care Visit: 25   : 3/12/1975 Primary Dx: Hypercalcemia       Referred By: Referral From:     Spiritual Care Taxonomy:    Intended Effects: Build relationship of care and support    Methods: Encourage self care, Offer emotional support, Exploring hope    Interventions: Acknowledge response to difficult experience, Ask guided questions about lorraine, Share words of hope and inspiration, [place] spiritual/Sikhism resources, Prayer for healing, Explain  role    Visit Type/Summary:     - Spiritual Care: Consulted with RN prior to visit. Patient and family expressed appreciation for  visit. Provided information regarding how to contact Spiritual Care and left a Spiritual Care information card. Lisa was in better spirits than earlier today.  normalized the situation as appropriate.  discussed self-care to get better, including nourishing the body regardless of appetite - the body needs fuel.   explored how Lisa feels about her health challenge including new diagnosis and transfer to Palliative Care and how that brings more care and resources.  said he has worked in the Cancer Center and would like to help facilitate decisions if and as appropriate, and that he would be available Friday and Saturday.  also provided support to daughter and sister, both before and during the visit.   remains available as needed for follow up.    Spiritual Care support can be requested via an Epic consult. For urgent/immediate needs, please contact the On Call  at: Edward: ext [phone]

## 2025-03-21 NOTE — PHYSICAL THERAPY NOTE
Physical Therapy    Attempted treatment today, but pt is receiving chemo.  After communication w/ rn decided to hold session.  Will re-attempt as schedule and pt's medical stability allow.

## 2025-03-21 NOTE — PLAN OF CARE
Pt A&ox4, but can get confused/is forgetful. Pt c/o severe pain mainly to lower back and hips. PCA pump running w/ little relief. Pt needing some reminders to press PCA button. Several PRN boluses given through the night for pain w/ minimal relief. Palliative and MD notified, palliative to see pt today. Pt on 2L nc for [place]. ST. Lidocaine patch placed on lower back. Heat packs applied. All meds given per MAR, IVF infusing. PICC line in place. Critical lab value: plt 15, hem/onc notified, no new orders. Pt to get chemo today, approved by hem/onc. Pt resting in bed w/ call light in reach, safety precautions in place.    Problem: Delirium  Goal: Minimize duration of delirium  Description: Interventions:- Encourage use of hearing aids, eye glasses- Promote highest level of mobility daily- Provide frequent reorientation- Promote wakefulness i.e. lights on, blinds open- Promote sleep, encourage patient's normal rest cycle i.e. lights off, TV off, minimize noise and interruptions- Encourage family to assist in orientation and promotion of home routines  Outcome: Progressing     Problem: CARDIOVASCULAR - ADULT  Goal: Absence of cardiac arrhythmias or at baseline  Description: INTERVENTIONS:- Continuous cardiac monitoring, monitor vital signs, obtain 12 lead EKG if indicated- Evaluate effectiveness of antiarrhythmic and heart rate control medications as ordered- Initiate emergency measures for life threatening arrhythmias- Monitor electrolytes and administer replacement therapy as ordered  Outcome: Progressing     Problem: RESPIRATORY - ADULT  Goal: Achieves optimal ventilation and oxygenation  Description: INTERVENTIONS:- Assess for changes in respiratory status- Assess for changes in mentation and behavior- Position to facilitate oxygenation and minimize respiratory effort- Oxygen supplementation based on oxygen saturation or ABGs- Provide Smoking Cessation handout, if applicable- Encourage broncho-pulmonary hygiene  including cough, deep breathe, Incentive Spirometry- Assess the need for suctioning and perform as needed- Assess and instruct to report SOB or any respiratory difficulty- Respiratory Therapy support as indicated- Manage/alleviate anxiety- Monitor for signs/symptoms of CO2 retention  Outcome: Progressing     Problem: GENITOURINARY - ADULT  Goal: Absence of urinary retention  Description: INTERVENTIONS:- Assess patient’s ability to void and empty bladder- Monitor intake/output and perform bladder scan as needed- Follow urinary retention protocol/standard of care- Consider collaborating with pharmacy to review patient's medication profile- Implement strategies to promote bladder emptying  Outcome: Progressing     Problem: NEUROLOGICAL - ADULT  Goal: Achieves stable or improved neurological status  Description: INTERVENTIONS- Assess for and report changes in neurological status- Initiate measures to prevent increased intracranial pressure- Maintain blood pressure and fluid volume within ordered parameters to optimize cerebral perfusion and minimize risk of hemorrhage- Monitor temperature, glucose, and sodium. Initiate appropriate interventions as ordered  Outcome: Progressing  Goal: Achieves maximal functionality and self care  Description: INTERVENTIONS- Monitor swallowing and airway patency with patient fatigue and changes in neurological status- Encourage and assist patient to increase activity and self care with guidance from PT/OT- Encourage visually impaired, hearing impaired and aphasic patients to use assistive/communication devices  Outcome: Progressing     Problem: Impaired Cognition  Goal: Patient will exhibit improved attention, thought processing and/or memory  Description: Interventions:  Outcome: Progressing     Problem: Impaired Communication  Goal: Patient will achieve maximal communication potential  Description: Interventions:  Outcome: Progressing     Problem: PAIN - ADULT  Goal: Verbalizes/displays  adequate comfort level or patient's stated pain goal  Description: INTERVENTIONS:- Encourage pt to monitor pain and request assistance- Assess pain using appropriate pain scale- Administer analgesics based on type and severity of pain and evaluate response- Implement non-pharmacological measures as appropriate and evaluate response- Consider cultural and social influences on pain and pain management- Manage/alleviate anxiety- Utilize distraction and/or relaxation techniques- Monitor for opioid side effects- Notify MD/LIP if interventions unsuccessful or patient reports new pain- Anticipate increased pain with activity and pre-medicate as appropriate  Outcome: Progressing     Problem: SAFETY ADULT - FALL  Goal: Free from fall injury  Description: INTERVENTIONS:- Assess pt frequently for physical needs- Identify cognitive and physical deficits and behaviors that affect risk of falls.- [place] fall precautions as indicated by assessment.- Educate pt/family on patient safety including physical limitations- Instruct pt to call for assistance with activity based on assessment- Modify environment to reduce risk of injury- Provide assistive devices as appropriate- Consider OT/PT consult to assist with strengthening/mobility- Encourage toileting schedule  Outcome: Progressing

## 2025-03-21 NOTE — PROGRESS NOTES
[facility]   part of [facility]     Hospitalist Progress Note     Lisa Cabral Patient Status:  Inpatient    3/12/1975 MRN [id number]   Location [facility] 7NE-A Attending Rubia Santos MD   Hosp Day # 6 PCP Ashley Cross DO     Chief Complaint: Neck pain, left hip/groin pain, cough, JARAMILLO, hypoxic on arrival to the ED    Subjective:     Pian 7/10 more tolerable. Family updated at bedside.     Objective:    Review of Systems:   A comprehensive review of systems was completed; pertinent positive and negatives stated in subjective.    Vital signs:  Temp:  [97.9 °F (36.6 °C)-98.9 °F (37.2 °C)] 98.2 °F (36.8 °C)  Pulse:  [101-115] 115  Resp:  [14-20] 20  BP: (140-174)/(79-94) 146/94  SpO2:  [92 %-98 %] 94 %    Physical Exam:    General: NAD  Respiratory: No wheezes, no rhonchi  Cardiovascular: S1, S2, regular rhythm, tachycardic  Abdomen: Soft, non-distended, positive bowel sounds, tender to palpation   Neuro:oriented x 4. Speech is clear. Moving all 4 extremities. Speech is clear.   Extremities: No edema    Diagnostic Data:    Labs:  Recent Labs   Lab 03/15/25  1313 25  0701 25  0950 25  0556 25  1043 25  0548 25  0518   WBC 17.6* 17.0*  --  10.5 9.3 7.9 6.6   HGB 15.3 14.0  --  11.4* 9.9* 9.7* 9.1*   MCV 93.3 92.5  --  93.9 95.3 91.6 92.2   PLT 74.0* 51.0*  --  21.0* 12.0* 10.0* 15.0*   BAND 19 24  --   --  7  --  20   INR  --   --  1.42*  --   --   --   --        Recent Labs   Lab 25  1043 25  0548 25  0518   * 157* 130*   BUN 24* 21 16   CREATSERUM 0.82 0.71 0.66   CA 8.5* 8.7 8.6*   ALB 3.2 3.5 3.3    139 140   K 3.8 3.9 3.4*    104 103   CO2 26.0 29.0 30.0   ALKPHO 544* 676* 629*   AST 67* 73* 61*   ALT 50* 45 51*   BILT 0.3 0.4 0.4   TP 5.3* 5.5* 5.4*       Estimated Glomerular Filtration Rate: 106 mL/min/1.73m2 (result from lab).    Recent Labs   Lab 03/15/25  1313 25  0556   TROPHS 27  --    CK  --  35        Recent Labs   Lab 03/17/25  0950   PTP 17.5*   INR 1.42*                  Microbiology    Hospital Encounter on 03/15/25   1. Blood Culture     Status: None (Preliminary result)    Collection Time: 03/19/25 10:43 AM    Specimen: Blood,peripheral   Result Value Ref Range    Blood Culture Result No Growth 1 Day N/A         Imaging: Reviewed in Epic.    Medications:    sennosides  8.6 mg Oral BID    piperacillin-tazobactam  3.375 g Intravenous Q8H    pantoprazole  40 mg Oral QAM AC    fosaprepitant (Emend) 150 mg in sodium chloride 0.9% 150 mL IVPB  150 mg Intravenous Q24H    ondansetron (Zofran) 16 mg in sodium chloride 0.9% 108 mL IVPB  16 mg Intravenous Q24H    riTUXimab-abbs (Truxima) 800 mg in sodium chloride 0.9% 580 mL infusion  375 mg/m2 Intravenous Q24H    DOXOrubicin  50 mg/m2 Intravenous Q24H    vinCRIStine (Oncovin) 2 mg in sodium chloride 0.9% 52 mL IVPB  2 mg Intravenous Q24H    cyclophosphamide (Cytoxan) 1,660 mg in sodium chloride 0.9% 258.3 mL infusion  750 mg/m2 Intravenous Q24H    predniSONE  100 mg Oral Daily with breakfast    allopurinol  300 mg Oral BID    OLANZapine  5 mg Oral Nightly    sodium chloride  1,000 mL Intravenous Once    [Held by provider] enoxaparin  40 mg Subcutaneous Daily       Assessment & Plan:      #CLL with transformation to high -grade DLBCL  Imaging with worsening LAD. LDH normal to 1488 on admission.   Heme following  -LN biopsy on 3/17; prelim report DLBCL  -Started chemo 3/21    #Metabolic encephalopathy   Multifactorial from hypercalcemia, hypernatremia, metabolic alkalosis, related to meds?  -CT brain limited by motion but negative 3/17  -EEG negative for epileptiform activity   -MR brain with mild diffuse pachy meningeal enhancement suspected to be related to marrow involvement  -Neurology following; signed off    #Severe hypercalcemia - improved  S/p zometa and calcitonin   -Nephrology following    #Sepsis 2/2 pneumonia    #Acute hypoxic respiratory failure  suspect 2/2 pneumonia/atelectasis   CTA chest 3/15 with airspace opacities within the lung bases atelectasis vs. Consolidation. Negative for PE. Procal 0.43.  -Repeat CXR 3/16 with discoid atelectasis in the retrocardiac lung base which is stable. And also stable right mid lung atelectasis.   -Fever 3/18 overnight, CXR 3/19 with new patchy right upper lung airspace opacity as well as b/l interstitial opacities.   -IV zosyn 3/19 for 7 day course    #Sinus tachycardia - improved with hydration  PE ruled out. Related to pain?   -TTE with hyperdynamic EF 75 - 80%, no RWMA    #Thrombocytopenia  2/2 bone marrow involvement     #Coagulopathy   Vit K deficiency?   -Monitor     #Generalized pain suspected due to bony progression of CLL  -Decadron 6 mg every 8 hours   -Consult palliative care for symptom management   -dilaudid pca per palliative care    #Hypokalemia - supplement per protocol   #Hypernatremia - IVF per nephrology     #CKD stage III - trend     #Metabolic alkalosis suspect 2/2 hypercalcemia and renal bicarb reabsorption    #Acute urinary retention   Requiring ISC for now will continue. Monitor     #Elevated ALP, AST  Some chronicity. Related to chemo vs. CLL. Trend     #Low PO intake  -Continue to monitor     #Obesity, BMI 34.45    Rubia Santos MD    Supplementary Documentation:     Quality:  DVT Mechanical Prophylaxis:   SCDs,    DVT Pharmacologic Prophylaxis   Medication    [Held by provider] enoxaparin (Lovenox) 40 MG/0.4ML SUBQ injection 40 mg                Code Status: Full Code  Hagan: External urinary catheter in place  Hagan Duration (in days):   Central line:    MOE:     Discharge is dependent on: course  At this point Ms. Cabral is expected to be discharge to: unclear    The 21st Century Cures Act makes medical notes like these available to patients in the interest of transparency. Please be advised this is a medical document. Medical documents are intended to carry relevant information, facts as  evident, and the clinical opinion of the practitioner. The medical note is intended as peer to peer communication and may appear blunt or direct. It is written in medical language and may contain abbreviations or verbiage that are unfamiliar.

## 2025-03-21 NOTE — DIETARY NOTE
[facility]   part of [facility]    NUTRITION ASSESSMENT    Pt does not meet malnutrition criteria at this time.      NUTRITION INTERVENTION:      Meal and Snacks - Monitor and encourage adequate PO intake.   Medical Food Supplements - Magic Cup TID. Rationale/use for oral supplements discussed.  Vitamin and Mineral Supplements - continue Multivitamin with minerals  Coordination of Nutrition Care - SLP consult prior to diet advancement.      PATIENT STATUS:   3/21/25-  pt had  picc line placed and started on chemo today.  Pt feeling better and eating. She  reports liking the magic cup and would like it to continue. She is only ordering to 2 meals . Encouraged 3 meals and will receive 3 magic cups.  Family bringing in snacks from home.      3/19/25- Following up to check on PO + ONS intake. Pt continues not to eat and has only been agreeable to [employer]eps[employer]. Consumed small portion of Ensure Clear but nothing more. Pt's  stated that she has not eaten in weeks. They are currently waiting on biopsy results to determine next steps. Family does not thing NG placement would be smart d/t current agitation. There has been noticeable improvement in mental status since last visit - pt is able to converse and follow commands now. Lots of family in room/waiting areas. High hopes that po intake can resume or that alternative nutrition can be administered d/t long term absence of nourishment. Will continue to follow closely. All questions answered.     03/17/25 Pt is a 51 y/o F admitted for head, neck and back pain w KAJAL. Reviewed pt chart d/t at risk consult. RN in room at time of attempted visit trying to get pt to tolerate juice and jello. She is very confused and does not follow most commands. Pt continuously yelling out in pain and communicating very minimally. Pt is heard yelling, moaning and crying. \"Ow\" and \"Please help me\" repeated every minute or so. She has been unable to tolerate much po intake since admit  3/15. Family arriving as this note is being typed. Tolerating some jello once family arrived.  agreeable to Vanilla Ensure or Apple Ensure Clear to help get extra nutrients. Pt keeps trying to take off oxygen. Cranberry juice brought to pt. Will monitor po and ons intake as well as need for alternative nutrition in case po intake does not improve. All questions answered at this time.    PMH: CLL, LUKE, Hypercalcemia, Class 2 Obesity      ANTHROPOMETRICS:  Ht:  5'9\"  Wt: 105.8 kg (233 lb 4.8 oz).   BMI: Body mass index is 34.45 kg/m².  IBW: 65.9 kg 145 lbs      WEIGHT HISTORY:   Weight loss: Yes, Non-severe Wt loss of 15 lbs, 6%%, over 2 months     Wt Readings from Last 10 Encounters:   03/16/25 105.8 kg (233 lb 4.8 oz)   03/08/25 108 kg (238 lb)   03/06/25 108 kg (238 lb)   03/02/25 108 kg (238 lb)   01/28/25 113.2 kg (249 lb 9.6 oz)   01/07/25 112.6 kg (248 lb 3.2 oz)   12/10/24 114.5 kg (252 lb 6.4 oz)   11/22/24 117.5 kg (259 lb)   08/28/24 116.3 kg (256 lb 6.4 oz)   07/31/24 116.1 kg (256 lb)        NUTRITION:  Diet:       Procedures    Regular/General diet Is Patient on Accuchecks? No      Food Allergies: No  Cultural/Ethnic/Mormonism Preferences Addressed: Yes    Percent Meals Eaten (last 3 days)       Date/Time Percent Meals Eaten (%)    03/18/25 1500 0 %            GI system review: WNL Last BM Date:  (days ago - per pt)  Skin and wounds: WNL    NUTRITION RELATED PHYSICAL FINDINGS:     1. Body Fat/Muscle Mass: no wasting noted / well nourished     2. Fluid Accumulation: none    NUTRITION PRESCRIPTION:  65.9 kg 145 lbs IBW  Calories: 7169-9742 calories/day (25-30 kcal/kg)  Protein:  grams protein/day (1.5-2.0 grams protein per kg)  Fluid: ~1 ml/kcal or per MD discretion    NUTRITION DIAGNOSIS/PROBLEM:  Inadequate energy intake related to inability to take or tolerate as evidenced by documented/reported insufficient oral intake      MONITOR AND EVALUATE/NUTRITION GOALS:  PO intake of 75% of meals  TID - Ongoing  PO intake of 75% of oral nutrition supplement/s - Ongoing      MEDICATIONS:  Prednisone, Protonix      LABS:  Elevated LFTs    Pt is at High nutrition risk    Chica Kirk RD,LDN  Clinical Dietitian  [phone]

## 2025-03-21 NOTE — PROGRESS NOTES
[facility]   part of [facility]  Palliative Care Progress Note    Lisa Cabral Patient Status:  Inpatient    3/12/1975 MRN [id number]   Location [facility] 4NW-A Attending Rubia Santos MD   Hosp Day # 6 PCP Ashley Cross,      History/Other:      Lisa Cabral is a 50 year old female with a history of chronic neck and back pain, CLL diagnosed in  on treatment holiday, completed RT to right humerus, axilla and right iliac bone  who was admitted on 3/15/2025 for  neck pain. Work up in our hospital revealed hypercalcemia s/p zometa and calcitonin on IVF, CT scan with current lymphadenopathy, hypokalemia and confusion, thrombocytopenia, neutrophilia, elevated LDH, planning for lymph node biopsy due to concern there is transformation to high grade lymphoma.  Management for metabolic abnormalities.  Sees pain specialist.  3/17 - 3/18:  severe agitation and pain , required sitter, continuous dilaudid infusion started. Neurology consulted and MRI brain completed. S/p LN bx 3/17, prelim path reviewed in oncology notes.   PCA started 3/18    Consult ordered by:: Dr. Santos for evaluation of Palliative Care needs and Uncontrolled symptoms.    Allergies:  Allergies[1]  Medications:     Current Facility-Administered Medications:     HYDROmorphone in sodium chloride 0.9% (Dilaudid) 20 mg/100mL PCA premix, , Intravenous, Continuous    sennosides (Senokot) tab 8.6 mg, 8.6 mg, Oral, BID    piperacillin-tazobactam (Zosyn) 3.375 g in dextrose 5% 100 mL IVPB-ADDV, 3.375 g, Intravenous, Q8H    pantoprazole (Protonix) DR tab 40 mg, 40 mg, Oral, QAM AC    sodium chloride 0.9% infusion, , Intravenous, Continuous    fosaprepitant (Emend) 150 mg in sodium chloride 0.9% 150 mL IVPB, 150 mg, Intravenous, Q24H    ondansetron (Zofran) 16 mg in sodium chloride 0.9% 108 mL IVPB, 16 mg, Intravenous, Q24H    prochlorperazine (Compazine) 10 MG/2ML injection 10 mg, 10 mg, Intravenous, Q6H PRN    acetaminophen  (Tylenol) tab 650 mg, 650 mg, Oral, Q24H    diphenhydrAMINE (Benadryl) cap/tab 25 mg, 25 mg, Oral, Q24H    riTUXimab-abbs (Truxima) 800 mg in sodium chloride 0.9% 580 mL infusion, 375 mg/m2, Intravenous, Q24H    acetaminophen (Tylenol) tab 650 mg, 650 mg, Oral, Once PRN    diphenhydrAMINE (Benadryl) 50 mg/mL  injection 50 mg, 50 mg, Intravenous, Once PRN    methylPREDNISolone sodium succinate (Solu-MEDROL) injection 125 mg, 125 mg, Intravenous, Once PRN    EPINEPHrine (Adrenalin) 1 MG/ML injection (Allergic Reaction Kit) 0.3 mg, 0.3 mg, Intramuscular, Once PRN    DOXOrubicin (Adriamycin) 2 mg/mL IV syringe 110 mg 55 mL, 50 mg/m2, Intravenous, Q24H    vinCRIStine (Oncovin) 2 mg in sodium chloride 0.9% 52 mL IVPB, 2 mg, Intravenous, Q24H    cyclophosphamide (Cytoxan) 1,660 mg in sodium chloride 0.9% 258.3 mL infusion, 750 mg/m2, Intravenous, Q24H    predniSONE (Deltasone) tab 100 mg, 100 mg, Oral, Daily with breakfast    allopurinol (Zyloprim) tab 300 mg, 300 mg, Oral, BID    sodium chloride 0.9% infusion, , Intravenous, Continuous    HYDROmorphone 0.4 mg BOLUS FROM PCA, 0.4 mg, Intravenous, Q30 Min PRN    naloxone (Narcan) 0.4 MG/ML injection 0.08 mg, 0.08 mg, Intravenous, Q5 Min PRN    ondansetron (Zofran) 4 MG/2ML injection 4 mg, 4 mg, Intravenous, Q6H PRN    OLANZapine (ZyPREXA Zydis) disintegrating tab 5 mg, 5 mg, Oral, Nightly    OLANZapine (ZyPREXA Zydis) disintegrating tab 2.5 mg, 2.5 mg, Oral, BID PRN    labetalol (Trandate) 5 mg/mL injection 10 mg, 10 mg, Intravenous, Q4H PRN    acetaminophen (Ofirmev) 10 mg/mL infusion premix 1,000 mg, 1,000 mg, Intravenous, Q6H PRN    sodium chloride 0.9% infusion 1,000 mL, 1,000 mL, Intravenous, Once    lidocaine-menthol 4-1 % patch 1 patch, 1 patch, Transdermal, Daily PRN    methocarbamol (Robaxin) tab 500 mg, 500 mg, Oral, TID PRN    rizatriptan (Maxalt-MLT) disintegrating tab 5 mg, 5 mg, Oral, PRN    [Held by provider] enoxaparin (Lovenox) 40 MG/0.4ML SUBQ injection 40  mg, 40 mg, Subcutaneous, Daily    polyethylene glycol (PEG 3350) (Miralax) 17 g oral packet 17 g, 17 g, Oral, Daily PRN    sennosides (Senokot) tab 17.2 mg, 17.2 mg, Oral, Nightly PRN    bisacodyl (Dulcolax) 10 MG rectal suppository 10 mg, 10 mg, Rectal, Daily PRN    fleet enema (Fleet) rectal enema 133 mL, 1 enema, Rectal, Once PRN    benzonatate (Tessalon) cap 200 mg, 200 mg, Oral, TID PRN    guaiFENesin (Robitussin) 100 MG/5 ML oral liquid 200 mg, 200 mg, Oral, Q4H PRN    Subjective      Interval events:  to begin chemo today. Pain uncontrolled per overnight RN.     When I entered the room, the patient was sleeping but easy to wake and lying in bed.  RN and No family present at bedside.     Symptom assessment: per pt chronic cough, severe pain, slept poorly. Does not have an appetite and fear of emesis w/ chemo.   Pain assessment:   PCA interrogation at 9:15 AM:   At 24 Hours: 20.01mg total (10.81mg continuous, 5.2mg pt admin bolus, 4mg clinician bolus)  At 16 Hours: 15.45mg total (7.45mg continuous, 4mg pt admin bolus, 4mg clinician bolus)  At 12 Hours: 11.43mg total (5.83mg continuous, 2.8mg pt admin bolus, 2.8mg clinician bolus)  At 8 Hours: 7.84mg total (3.84mg continuous, 2mg pt admin bolus, 2mg clinician bolus)  At 4 Hours: 4.4mg total (2mg continuous, 1.2mg pt admin bolus, 1.2mg clinician bolus)  At 2 Hours: 2.41mg total (1mg continuous, 0.6mg pt admin bolus, 0.8mg clinician bolus)  At 1 Hours: 1.1mg total (0.5mg continuous, 0.2mg pt admin bolus, 0.4mg clinician bolus)  Current pain: 8/10, located in lower back and pelvis, made worse by movement, alleviated minimally by current pain regimen     See summary of discussion below.     Review of Systems:  Pertinent items are noted in subjective.  Bowel Movement    No data found in the last 1 encounters.         Objective:     Vital Signs:  Blood pressure (!) 146/94, pulse 115, temperature 98.2 °F (36.8 °C), temperature source Oral, resp. rate 20, weight 233 lb  4.8 oz (105.8 kg), SpO2 94%, not currently breastfeeding.  Body mass index is 34.45 kg/m².  Present Level of pain: 8/10  Non-verbal signs of pain present: Yes  Current Palliative performance scale PPSv2 (%): 40    Physical Exam:  General: Lethargic. In no apparent respiratory distress. Body habitus Obese   HEENT: pinpoint pupils.   Cardiac: tachycardia  Lungs: on NC. Has wet sounding cough. Snoring to voluntarily holding breath while awake d/t cough/pain.   Neurologic: AOx3  Skin: no rashes on face/arms during simple visual inspection         Results:     Hematology:  Lab Results   Component Value Date    WBC 6.6 03/21/2025    HGB 9.1 (L) 03/21/2025    HCT 27.0 (L) 03/21/2025    PLT 15.0 (LL) 03/21/2025     Coags:  Lab Results   Component Value Date    INR 1.42 (H) 03/17/2025     Chemistry:  Lab Results   Component Value Date    CREATSERUM 0.66 03/21/2025    BUN 16 03/21/2025     03/21/2025    K 3.4 (L) 03/21/2025     03/21/2025    CO2 30.0 03/21/2025     (H) 03/21/2025    CA 8.6 (L) 03/21/2025    ALB 3.3 03/21/2025    ALKPHO 629 (H) 03/21/2025    BILT 0.4 03/21/2025    TP 5.4 (L) 03/21/2025    AST 61 (H) 03/21/2025    ALT 51 (H) 03/21/2025    MG 1.9 03/21/2025    PHOS 2.6 03/21/2025     Imaging:  IR CENTRAL VENOUS ACCESS    Result Date: 3/20/2025  CONCLUSION:  Successful right arm PICC placement without complication.   LOCATION:  [facility]    Dictated by (CST): Dm Fonseca MD on 3/20/2025 at 5:44 PM     Finalized by (CST): Dm Fonseca MD on 3/20/2025 at 5:45 PM       XR CHEST AP PORTABLE  (CPT=71045)    Result Date: 3/19/2025  CONCLUSION:  New patchy right upper lung airspace opacity as well as bilateral interstitial opacities, findings may be related to infectious/inflammatory process, clinical correlation and follow-up to resolution is recommended.   LOCATION:  [facility]      Dictated by (CST): Slim Page MD on 3/19/2025 at 1:38 PM     Finalized by (CST): Slim Page MD on 3/19/2025 at  1:39 PM           Summary of Discussion     Pt focused on pain control. Cannot recall meds she takes at home. Screams out frequently.     Advance Care Planning documentation:   HC POA Documentation  Healthcare Agent's Name: Erik Cabral  - no POA on file   Full Code    Assessment and Recommendations       Principal Problem:    Hypercalcemia  Active Problems:    CLL (chronic lymphocytic leukemia) (HCC)    Palliative care encounter    Hypokalemia    Pain of left hip    Chronic neck pain    Thrombocytopenia    Community acquired pneumonia, unspecified laterality    LUKE (acute kidney injury)    Hypernatremia    Intractable pain    Encephalopathy acute    Cancer-related pain    Delirium    Goals of care, counseling/discussion    Diffuse large B-cell lymphoma (HCC)    Goals of care: established and treatment focused   Receiving chemo today   Code Status: Full Code  Healthcare Agent's Name: Erik Cabral    Symptoms  #cancer related pain  #chronic pain   PCA adjustments   Continuous 0.5mg - unchanged d/t lethargy    Pt admin 0.2 Q30 minutes -> 0.4mg Q20 minute lockout   CB 0.8mg Q30 minutes PRN   Robaxin 500mg Q6H PRN   Lidocaine patches   -plan to revisit this afternoon     #constipation   Senna scheduled  PRN bowel regimen available     #delirium - improved  Continue olanzapine 5mg at bedtime and 2.5mg BID PRN     Discussed today's visit with RN.    Palliative Care Follow Up: Palliative care team will Continue to follow while inpatient.  Palliative care follow up outpatient: TBD.    Thank you for allowing Palliative Care services to participate in the care of Lisa Cabral.    Medical Decision Making (MDM) for Palliative Care   Problems Addressed:  High Details: CLL with concern for conversion, encephalopathy, respiratory failure, hypercalcemia, and severe pain crisis.   Data Reviewed & Analyzed:  External notes reviewed from each unique source: 1  Results reviewed from each unique test: 2  Details: Reviewed oncology note  to initiate chemo today. Labs reviewed but not discussed w/ pt.   Risk of Complications from DIagnostic Testing & Treatment:  High Details: Management of parenteral opioids - continuous dilaudid infusion/PCA.  Calculated MDM Level: High     Akiko Cruz MD  3/21/2025  11:20 AM  Palliative Care Services    The 21st Century Cures Act makes medical notes like these available to patients in the interest of transparency. Please be advised this is a medical document. Medical documents are intended to carry relevant information, facts as evident, and the clinical opinion of the practitioner. The medical note is intended as peer to peer communication and may appear blunt or direct. It is written in medical language and may contain abbreviations or verbiage that are unfamiliar.      Addendum: revisited around 1:20 PM. Pain 7/10 and tolerable. Pain in lower back, pelvis, and LUE. Friends present. They just finished an energy/spiritual session.   PCA interrogation at 1:25 PM:   At 8 Hours: 8.8mg total (4mg continuous, 4.8mg pt admin bolus, 0mg clinician bolus)  At 4 Hours: 4.8mg total (2mg continuous, 2.8mg pt admin bolus, 0mg clinician bolus)  At 2 Hours: 2.99mg total (0.99mg continuous, 2mg pt admin bolus, 0mg clinician bolus)  At this time, no changes to PCA. Will monitor on dose. The Palliative Care Service does not round on the weekends but providers are available by Prifloat, if needed. Please page with the interrogation as above if pain uncontrolled. Will coordinate with hospitalist for weekend coverage.   Akiko Cruz MD, 03/21/25, 1:41 PM         [1]   Allergies  Allergen Reactions    Aleve [Naproxen] HIVES and RASH    Ultram [Tramadol] NAUSEA AND VOMITING     Severe migraine

## 2025-03-21 NOTE — PLAN OF CARE
Pt Aox4. Tachycardic. Other VSS.   Sleeping for the most of the day.   No complain of N/V/D. No BM.   Severe pain. Managed by Dilaudid continuous infusion, PCA and Bolus PRN.   Very poor appetite.     R-CHOP C1D1 completed today. Tolerated well.     Pt tried to get up. Able to sit at the edge of the bed only. To be evaluated by PT.     Family at the bedside.   Will continue plan of care.       Problem: PAIN - ADULT  Goal: Verbalizes/displays adequate comfort level or patient's stated pain goal  Description: INTERVENTIONS:- Encourage pt to monitor pain and request assistance- Assess pain using appropriate pain scale- Administer analgesics based on type and severity of pain and evaluate response- Implement non-pharmacological measures as appropriate and evaluate response- Consider cultural and social influences on pain and pain management- Manage/alleviate anxiety- Utilize distraction and/or relaxation techniques- Monitor for opioid side effects- Notify MD/LIP if interventions unsuccessful or patient reports new pain- Anticipate increased pain with activity and pre-medicate as appropriate  Outcome: Progressing     Problem: RESPIRATORY - ADULT  Goal: Achieves optimal ventilation and oxygenation  Description: INTERVENTIONS:- Assess for changes in respiratory status- Assess for changes in mentation and behavior- Position to facilitate oxygenation and minimize respiratory effort- Oxygen supplementation based on oxygen saturation or ABGs- Provide Smoking Cessation handout, if applicable- Encourage broncho-pulmonary hygiene including cough, deep breathe, Incentive Spirometry- Assess the need for suctioning and perform as needed- Assess and instruct to report SOB or any respiratory difficulty- Respiratory Therapy support as indicated- Manage/alleviate anxiety- Monitor for signs/symptoms of CO2 retention  Outcome: Progressing

## 2025-03-21 NOTE — PROGRESS NOTES
Pt received from IR s/p PICC placement, tx from CTU7 for R-CHOP tomorrow. Afebrile. ST on tele. Weaned to RA. BP elevated - c/o severe lower back pain. Re-connected to dilaudid PCA, bolus given x2. Mag and phos low this afternoon, nephro notified. Okay to replace per protocol per MD. Double lumen PICC in place to RUE. Chemo ed consent signed and placed in chart. Per Dr. Mac, do not start chemo until labs reviewed by DO. Family at bedside. Fall precautions in place. Call light in reach.

## 2025-03-21 NOTE — PROGRESS NOTES
[facility]   part of [facility]     Nephrology Progress Note    Lisa Cabral Patient Status:  Inpatient    3/12/1975 MRN [id number]   Location [facility] 7NE-A Attending Rubia Santos MD   Hosp Day # 6 PCP Ashley Cross, DO       SUBJECTIVE:  Resting comfortably this morning. PICC placed yesterday and plan for chemo today.     Physical Exam:   BP (!) 146/94 (BP Location: Left arm)   Pulse 115   Temp 98.2 °F (36.8 °C) (Oral)   Resp 20   Wt 233 lb 4.8 oz (105.8 kg)   SpO2 94%   BMI 34.45 kg/m²   Temp (24hrs), Av.3 °F (36.8 °C), Min:97.6 °F (36.4 °C), Max:98.9 °F (37.2 °C)       Intake/Output Summary (Last 24 hours) at 3/21/2025 0845  Last data filed at 3/21/2025 0748  Gross per 24 hour   Intake 6068.95 ml   Output 1650 ml   Net 4418.95 ml     Last 3 Weights   25 2318 233 lb 4.8 oz (105.8 kg)   03/15/25 1954 232 lb 9.4 oz (105.5 kg)   25 1928 238 lb (108 kg)   25 2019 238 lb (108 kg)   25 1151 238 lb (108 kg)   25 1053 249 lb 9.6 oz (113.2 kg)     General: In no apparent distress.  HEENT: No scleral icterus, MMM  Neck: Supple, no GRACIE or thyromegaly  Cardiac: tachycardic and regular, S1, S2 normal, no murmur   Lungs: Clear without wheezes, rales, rhonchi.    Abdomen: Soft, non-tender.   Extremities: Without clubbing, cyanosis or edema.  Neurologic:  moving all extremities  Skin: Warm and dry, no rash    Labs:     Recent Labs   Lab 03/15/25  1313 25  0701 25  0950 25  0556 25  1043 25  0548 25  0518   WBC 17.6* 17.0*  --  10.5 9.3 7.9 6.6   HGB 15.3 14.0  --  11.4* 9.9* 9.7* 9.1*   MCV 93.3 92.5  --  93.9 95.3 91.6 92.2   PLT 74.0* 51.0*  --  21.0* 12.0* 10.0* 15.0*   BAND 19 24  --   --  7  --  20   INR  --   --  1.42*  --   --   --   --        Recent Labs   Lab 03/15/25  1313 03/15/25  2051 25  1754 25  0556 25  0613 25  1043 25  0548 25  1440 25  0518      < > 147*  152*  --  141 139  --  140   K 3.4*   < > 3.6 3.2*  3.2* 3.9 3.8 3.9  --  3.4*   CL 96*   < > 111 113*  --  106 104  --  103   CO2 34.0*   < > 28.0 32.0  --  26.0 29.0  --  30.0   BUN 21   < > 28* 31*  --  24* 21  --  16   CREATSERUM 1.19*   < > 1.07* 0.99  --  0.82 0.71  --  0.66   CA 17.8*   < > 11.5* 10.8* 9.0 8.5* 8.7  --  8.6*   MG 2.6  --   --   --   --   --   --  1.5* 1.9   PHOS  --   --   --   --   --   --   --  1.9* 2.6   *   < > 157* 183*  --  197* 157*  --  130*    < > = values in this interval not displayed.       Recent Labs   Lab 03/16/25  1620 03/17/25  0209 03/17/25  1754 03/18/25  0556 03/19/25  1043 03/20/25  0548 03/21/25  0518   ALT 38   < > 37 39 50* 45 51*   AST 64*   < > 91* 67* 67* 73* 61*   ALB 4.0   < > 3.5 3.5 3.2 3.5 3.3   LDH 1,488*  --   --  3,118* 2,016* 2,679* 2,140*    < > = values in this interval not displayed.       No results for input(s): \"PGLU\" in the last 168 hours.    Meds:   Current Facility-Administered Medications   Medication Dose Route Frequency    HYDROmorphone in sodium chloride 0.9% (Dilaudid) 20 mg/100mL PCA premix   Intravenous Continuous    sennosides (Senokot) tab 8.6 mg  8.6 mg Oral BID    piperacillin-tazobactam (Zosyn) 3.375 g in dextrose 5% 100 mL IVPB-ADDV  3.375 g Intravenous Q8H    pantoprazole (Protonix) DR tab 40 mg  40 mg Oral QAM AC    sodium chloride 0.9% infusion   Intravenous Continuous    fosaprepitant (Emend) 150 mg in sodium chloride 0.9% 150 mL IVPB  150 mg Intravenous Q24H    ondansetron (Zofran) 16 mg in sodium chloride 0.9% 108 mL IVPB  16 mg Intravenous Q24H    prochlorperazine (Compazine) 10 MG/2ML injection 10 mg  10 mg Intravenous Q6H PRN    acetaminophen (Tylenol) tab 650 mg  650 mg Oral Q24H    diphenhydrAMINE (Benadryl) cap/tab 25 mg  25 mg Oral Q24H    riTUXimab-abbs (Truxima) 800 mg in sodium chloride 0.9% 580 mL infusion  375 mg/m2 Intravenous Q24H    acetaminophen (Tylenol) tab 650 mg  650 mg Oral Once PRN    diphenhydrAMINE  (Benadryl) 50 mg/mL  injection 50 mg  50 mg Intravenous Once PRN    methylPREDNISolone sodium succinate (Solu-MEDROL) injection 125 mg  125 mg Intravenous Once PRN    EPINEPHrine (Adrenalin) 1 MG/ML injection (Allergic Reaction Kit) 0.3 mg  0.3 mg Intramuscular Once PRN    DOXOrubicin (Adriamycin) 2 mg/mL IV syringe 110 mg 55 mL  50 mg/m2 Intravenous Q24H    vinCRIStine (Oncovin) 2 mg in sodium chloride 0.9% 52 mL IVPB  2 mg Intravenous Q24H    cyclophosphamide (Cytoxan) 1,660 mg in sodium chloride 0.9% 258.3 mL infusion  750 mg/m2 Intravenous Q24H    predniSONE (Deltasone) tab 100 mg  100 mg Oral Daily with breakfast    allopurinol (Zyloprim) tab 300 mg  300 mg Oral BID    sodium chloride 0.9% infusion   Intravenous Continuous    HYDROmorphone 0.4 mg BOLUS FROM PCA  0.4 mg Intravenous Q30 Min PRN    naloxone (Narcan) 0.4 MG/ML injection 0.08 mg  0.08 mg Intravenous Q5 Min PRN    ondansetron (Zofran) 4 MG/2ML injection 4 mg  4 mg Intravenous Q6H PRN    OLANZapine (ZyPREXA Zydis) disintegrating tab 5 mg  5 mg Oral Nightly    OLANZapine (ZyPREXA Zydis) disintegrating tab 2.5 mg  2.5 mg Oral BID PRN    labetalol (Trandate) 5 mg/mL injection 10 mg  10 mg Intravenous Q4H PRN    acetaminophen (Ofirmev) 10 mg/mL infusion premix 1,000 mg  1,000 mg Intravenous Q6H PRN    sodium chloride 0.9% infusion 1,000 mL  1,000 mL Intravenous Once    lidocaine-menthol 4-1 % patch 1 patch  1 patch Transdermal Daily PRN    methocarbamol (Robaxin) tab 500 mg  500 mg Oral TID PRN    rizatriptan (Maxalt-MLT) disintegrating tab 5 mg  5 mg Oral PRN    [Held by provider] enoxaparin (Lovenox) 40 MG/0.4ML SUBQ injection 40 mg  40 mg Subcutaneous Daily    polyethylene glycol (PEG 3350) (Miralax) 17 g oral packet 17 g  17 g Oral Daily PRN    sennosides (Senokot) tab 17.2 mg  17.2 mg Oral Nightly PRN    bisacodyl (Dulcolax) 10 MG rectal suppository 10 mg  10 mg Rectal Daily PRN    fleet enema (Fleet) rectal enema 133 mL  1 enema Rectal Once PRN     benzonatate (Tessalon) cap 200 mg  200 mg Oral TID PRN    guaiFENesin (Robitussin) 100 MG/5 ML oral liquid 200 mg  200 mg Oral Q4H PRN         Impression/Plan:      1) Severe Hypercalcemia: non-PTH mediated hypercalcemia in setting of high risk CLL and possible transformation to high-grade lymphoma. S/p zometa and calcitonin and currently on IVF. Calcium has now normalized, does not need IVF from nephrology perspective.      2) Acute kidney injury: baseline serum creatinine ~0.7-0.9 mg/dL. Acute kidney injury likely 2/2 hypercalcemia and contrast associated nephropathy. Renal function now back to baseline.      3) CLL: Diagnosed in 2023, follows with Heme/Onc. Has high risk mutations. Had been on treatment holiday recently. CT with lymphadenopathy. S/p lymph node biopsy 3/17. MRI brain with concern for marrow involvement. Concern for transformation to rapidly progressive lymphoma. S/p PICC placement and plan for chemo initiation.      4) Bone pain: likely 2/2 hypercalcemia and bony progression of CLL. Pain management per primary     5) Hypokalemia: replace per protocol       6) Hypernatremia: due to decreased free water intake, improved with hypotonic fluids. Has remained stable off IVF.     Nephrology will sign-off. Please feel free to call me with any questions or concerns.     Thank you for allowing me to participate in this patient's care.     Reva Smalls MD  03/21/25

## 2025-03-22 NOTE — PLAN OF CARE
Patient is A/O x 2-4, fluctuating but cooperative and pleasant. Continue to have moderate to severe pain. Dilaudid bolus given as needed with total relief for generalized pain and Robaxin for muscle spasms. Observed coughing but declined robitussin when offered. Reported metallic taste at start of shift that then went away. Denies any SOB, KAJAL and itching. Needs addressed. Call light placed within reach.     Problem: Delirium  Goal: Minimize duration of delirium  Description: Interventions:- Encourage use of hearing aids, eye glasses- Promote highest level of mobility daily- Provide frequent reorientation- Promote wakefulness i.e. lights on, blinds open- Promote sleep, encourage patient's normal rest cycle i.e. lights off, TV off, minimize noise and interruptions- Encourage family to assist in orientation and promotion of home routines  Outcome: Progressing     Problem: GENITOURINARY - ADULT  Goal: Absence of urinary retention  Description: INTERVENTIONS:- Assess patient’s ability to void and empty bladder- Monitor intake/output and perform bladder scan as needed- Follow urinary retention protocol/standard of care- Consider collaborating with pharmacy to review patient's medication profile- Implement strategies to promote bladder emptying  Outcome: Progressing     Problem: NEUROLOGICAL - ADULT  Goal: Achieves stable or improved neurological status  Description: INTERVENTIONS- Assess for and report changes in neurological status- Initiate measures to prevent increased intracranial pressure- Maintain blood pressure and fluid volume within ordered parameters to optimize cerebral perfusion and minimize risk of hemorrhage- Monitor temperature, glucose, and sodium. Initiate appropriate interventions as ordered  Outcome: Progressing     Problem: SAFETY ADULT - FALL  Goal: Free from fall injury  Description: INTERVENTIONS:- Assess pt frequently for physical needs- Identify cognitive and physical deficits and behaviors that  affect risk of falls.- [place] fall precautions as indicated by assessment.- Educate pt/family on patient safety including physical limitations- Instruct pt to call for assistance with activity based on assessment- Modify environment to reduce risk of injury- Provide assistive devices as appropriate- Consider OT/PT consult to assist with strengthening/mobility- Encourage toileting schedule  Outcome: Progressing

## 2025-03-22 NOTE — PROGRESS NOTES
[facility]  Progress Note    Lisa Cabral Patient Status:  Inpatient    3/12/1975 MRN [id number]   Location [facility] 7NE-A Attending Rubia Santos MD   Hosp Day # 6 PCP Ashley Cross,        SUBJECTIVE:     Patient seen and examined at bedside this evening. She completed C1 D1 R-CHOP today successfully. At bedside, patient is sleeping.  Erik was at bedside and relayed that she was able to walk around a little earlier. Stated that she didn't complain of any adverse effects from chemotherapy.       OBJECTIVE:    Vitals:    25 1234 25 1338 25 1810 25 195   BP:   117/69 120/72   BP Location:   Left arm Left arm   Pulse: (!) 124 120 101 92   Resp:   20 20   Temp:   98.3 °F (36.8 °C) 98.2 °F (36.8 °C)   TempSrc:   Oral Oral   SpO2: 95%  93% 90%   Weight:           Wt Readings from Last 1 Encounters:   25 105.8 kg (233 lb 4.8 oz)       LABS:  Recent Labs   Lab 25  0701 25  0556 25  1043 25  0548 25  0518   RBC 4.51   < > 3.18* 3.09* 2.93*   HGB 14.0   < > 9.9* 9.7* 9.1*   HCT 41.7   < > 30.3* 28.3* 27.0*   MCV 92.5   < > 95.3 91.6 92.2   MCH 31.0   < > 31.1 31.4 31.1   MCHC 33.6   < > 32.7 34.3 33.7   RDW 12.2   < > 13.2 13.0 13.2   NEPRELIM 12.32*  --  4.20  --  2.46   WBC 17.0*   < > 9.3 7.9 6.6   PLT 51.0*   < > 12.0* 10.0* 15.0*    < > = values in this interval not displayed.         Recent Labs   Lab 25  1043 25  0548 25  0518   * 157* 130*   BUN 24* 21 16   CREATSERUM 0.82 0.71 0.66   EGFRCR 87 103 106   CA 8.5* 8.7 8.6*   ALB 3.2 3.5 3.3    139 140   K 3.8 3.9 3.4*    104 103   CO2 26.0 29.0 30.0   ALKPHO 544* 676* 629*   AST 67* 73* 61*   ALT 50* 45 51*   BILT 0.3 0.4 0.4   TP 5.3* 5.5* 5.4*       MEDICATIONS:     sennosides  8.6 mg Oral BID    piperacillin-tazobactam  3.375 g Intravenous Q8H    pantoprazole  40 mg Oral QAM AC    predniSONE  100 mg Oral Daily with  breakfast    allopurinol  300 mg Oral BID    OLANZapine  5 mg Oral Nightly    sodium chloride  1,000 mL Intravenous Once    [Held by provider] enoxaparin  40 mg Subcutaneous Daily       HYDROmorphone    methocarbamol    prochlorperazine    acetaminophen    diphenhydrAMINE    methylPREDNISolone    EPINEPHrine    naloxone    ondansetron    OLANZapine    labetalol    acetaminophen    lidocaine-menthol    rizatriptan    polyethylene glycol (PEG 3350)    sennosides    bisacodyl    fleet enema    benzonatate    guaiFENesin    PHYSICAL EXAM:    General: Sleeping at time of exam  Chest: Unable to assess due to sleeping  Heart: Regular rate and rhythm.   Abdomen: Soft, non-distended  Extremities: No edema.  Neurological: Not assessed    RADIOLOGY:     PATHOLOGY 3/17/25:  Biopsy, right inguinal lymph node:  -Diffuse large B-cell lymphoma.     Electronically signed by Goldberg, Cathryn A, MD on 3/21/2025 at 1633        Final Diagnosis Comment      The biopsy demonstrates an atypical lymphoid infiltrate composed of a mixture of small lymphocytes with mature chromatin and large lymphoid cells.  In many areas there are sheets of large transformed lymphoid cells with dispersed chromatin, 1-2 prominent nucleoli, round nuclear contours and scant cytoplasm.     Immunohistochemistry is performed to evaluate the atypical lymphoid cells.  They are CD20, BCL-2, BCL6, CD5, CD20, MUM1 and c-Myc positive.  They are negative for PAULIE by in situ hybridization.  They are negative for all other markers tested.  Ki-67 stains approximately 90% of malignant cells.     Flow cytometry shows a monotypic B-cell population based on surface kappa light chain restriction.  They are CD19, CD20, CD5 and  positive.  They are negative for all other markers tested.  There is no aberrant T-cell phenotype.     The findings support the diagnosis of diffuse large B-cell lymphoma.  Using the Robby algorithm, the lymphoma shows a non -germinal center phenotype.   As per protocol, FISH for MYC rearrangement will be performed and the results will follow as a procedure addendum.  Dr. Villaseñor has reviewed the case and concurs.         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  Took Acalabrutinib from 11/24-3/25 and self stopped due to poor tolerance.  Now transformed to high-grade histology, DLBCL - confirmed per pathology above. Ki-67 stains with approximately 90% of malignant cells further re-iterating aggressive disease. FISH for MYC rearrangement pending.     Patient agreed to starting systemic chemotherapy when I spoke to her afternoon of 3/20/25. IR placed PICC line 3/20/25.    Patient received C1 D1 R-CHOP on 3/21/25. No adverse reactions.    Recommendations:  - continue allopurinol 300 mg PO every day  - daily CBC with diff, CMP, LDH, and uric acid (ordered)  - transfuse 1 unit platelets for platelet count < 10 k, and 1 unit PRBC for Hb count < 7 g/dL -- based on each lab drawn   - if ANC < 500, will start short acting G-CSF      # Severe pain: Due to progression of leukemia and marrow.  On Dilaudid PCA.     # Hypercalcemia: Received zoledronic acid, on fluids, steroids being tapered.  Calcium normalized.    # Thrombocytopenia: Due to bone marrow involvement.  Transfuse 1 unit platelets for platelet count < 10k, or < 15 k with fevers.    # Neutrophilia: due to steroids.  Resolved.  Her counts have been dropping and I suspect she will become neutropenic in the near future -- due to bone marrow involvement and recent chemotherapy.       Bozena Mac D.O.  [facility]      [facility]   [address] [place], [address]      3/21/2025

## 2025-03-22 NOTE — PROGRESS NOTES
[facility]   [facility]     Hospitalist Progress Note     Lisa Cabral Patient Status:  Inpatient    3/12/1975 MRN [id number]   Location [facility] 7NE-A Attending Rubia Santos MD   Hosp Day # 7 PCP Ashley Cross DO     Chief Complaint: Neck pain, left hip/groin pain, cough, JARAMILLO, hypoxic on arrival to the ED    Subjective:     No acute complaints.     Objective:    Review of Systems:   A comprehensive review of systems was completed; pertinent positive and negatives stated in subjective.    Vital signs:  Temp:  [97.4 °F (36.3 °C)-98.7 °F (37.1 °C)] 98.4 °F (36.9 °C)  Pulse:  [] 124  Resp:  [18-20] 20  BP: (115-170)/(60-86) 170/80  SpO2:  [90 %-97 %] 96 %    Physical Exam:    General: NAD  Respiratory: No wheezes, no rhonchi  Cardiovascular: S1, S2, regular rhythm, tachycardic  Abdomen: Soft, non-distended, positive bowel sounds, tender to palpation   Neuro:oriented x 4. Speech is clear. Moving all 4 extremities. Speech is clear.   Extremities: No edema    Diagnostic Data:    Labs:  Recent Labs   Lab 03/15/25  1313 25  0701 25  0950 25  0556 25  1043 25  0548 25  0518 25  0404   WBC 17.6* 17.0*  --  10.5 9.3 7.9 6.6 2.6*   HGB 15.3 14.0  --  11.4* 9.9* 9.7* 9.1* 7.9*   MCV 93.3 92.5  --  93.9 95.3 91.6 92.2 93.0   PLT 74.0* 51.0*  --  21.0* 12.0* 10.0* 15.0* 8.0*   BAND 19 24  --   --  7  --  20    INR  --   --  1.42*  --   --   --   --   --        Recent Labs   Lab 25  0548 25  0518 25  0404   * 130* 148*   BUN 21 16 17   CREATSERUM 0.71 0.66 0.68   CA 8.7 8.6* 9.5   ALB 3.5 3.3 3.2    140 141   K 3.9 3.4* 3.6  3.6    103 106   CO2 29.0 30.0 27.0   ALKPHO 676* 629* 659*   AST 73* 61* 66*   ALT 45 51* 58*   BILT 0.4 0.4 0.4   TP 5.5* 5.4* 5.2*       Estimated Glomerular Filtration Rate: 105 mL/min/1.73m2 (result from lab).    Recent Labs   Lab 03/15/25  1313 25  0556   TROPHS 27  --     CK  --  35       Recent Labs   Lab 03/17/25  0950   PTP 17.5*   INR 1.42*                  Microbiology    Hospital Encounter on 03/15/25   1. Blood Culture     Status: None (Preliminary result)    Collection Time: 03/19/25 10:43 AM    Specimen: Blood,peripheral   Result Value Ref Range    Blood Culture Result No Growth 2 Days N/A         Imaging: Reviewed in Epic.    Medications:    allopurinol  300 mg Oral Daily    polyethylene glycol (PEG 3350)  17 g Oral Daily    sennosides  8.6 mg Oral BID    piperacillin-tazobactam  3.375 g Intravenous Q8H    pantoprazole  40 mg Oral QAM AC    predniSONE  100 mg Oral Daily with breakfast    OLANZapine  5 mg Oral Nightly    sodium chloride  1,000 mL Intravenous Once    [Held by provider] enoxaparin  40 mg Subcutaneous Daily       Assessment & Plan:      #CLL with transformation to high -grade DLBCL  Imaging with worsening LAD. LDH normal to 1488 on admission.   Heme following  -LN biopsy on 3/17; DLBCL  -Started chemo 3/21    #Metabolic encephalopathy   Multifactorial from hypercalcemia, hypernatremia, metabolic alkalosis, related to meds?  -CT brain limited by motion but negative 3/17  -EEG negative for epileptiform activity   -MR brain with mild diffuse pachy meningeal enhancement suspected to be related to marrow involvement  -Neurology following; signed off    #Severe hypercalcemia - improved  S/p zometa and calcitonin   -Nephrology following    #Sepsis 2/2 pneumonia    #Acute hypoxic respiratory failure suspect 2/2 pneumonia/atelectasis   CTA chest 3/15 with airspace opacities within the lung bases atelectasis vs. Consolidation. Negative for PE. Procal 0.43.  -Repeat CXR 3/16 with discoid atelectasis in the retrocardiac lung base which is stable. And also stable right mid lung atelectasis.   -Fever 3/18 overnight, CXR 3/19 with new patchy right upper lung airspace opacity as well as b/l interstitial opacities.   -IV zosyn 3/19 for 7 day course    #Sinus tachycardia -  improved with hydration  PE ruled out. Related to pain?   -TTE with hyperdynamic EF 75 - 80%, no RWMA    #Thrombocytopenia  2/2 bone marrow involvement     #Coagulopathy   Vit K deficiency?   -Monitor     #Generalized pain suspected due to bony progression of CLL  -Decadron 6 mg every 8 hours   -Consult palliative care for symptom management   -dilaudid pca per palliative care    #Hypokalemia - supplement per protocol   #Hypernatremia - IVF per nephrology     #CKD stage III - trend     #Metabolic alkalosis suspect 2/2 hypercalcemia and renal bicarb reabsorption    #Acute urinary retention   Requiring ISC for now will continue. Monitor     #Elevated ALP, AST  Some chronicity. Related to chemo vs. CLL. Trend     #Low PO intake  -Continue to monitor     #Obesity, BMI 34.45    Rubia Santos MD    Supplementary Documentation:     Quality:  DVT Mechanical Prophylaxis:   SCDs,    DVT Pharmacologic Prophylaxis   Medication    [Held by provider] enoxaparin (Lovenox) 40 MG/0.4ML SUBQ injection 40 mg                Code Status: Full Code  Hagan: External urinary catheter in place  Hagan Duration (in days):   Central line:    MOE:     Discharge is dependent on: course  At this point Ms. Cabral is expected to be discharge to: unclear    The 21st Century Cures Act makes medical notes like these available to patients in the interest of transparency. Please be advised this is a medical document. Medical documents are intended to carry relevant information, facts as evident, and the clinical opinion of the practitioner. The medical note is intended as peer to peer communication and may appear blunt or direct. It is written in medical language and may contain abbreviations or verbiage that are unfamiliar.

## 2025-03-22 NOTE — PLAN OF CARE
Patient is alert and oriented x4, intermittent confusion. Patient c/o generalized pain - PCA per MAR. IVF per MAR. 1 unit platelets ordered and transfused, patient tolerated. Heart rate sustaining in 120's - MD Tom notified. Patient c/o constipation - scheduled medications administered per MAR. Patient passing flatus and belching. Patient assisted to commode with maximum assist of two. Patient very weak and required maximum assist back to bed. Repositioning and hygiene performed. Patient and family updated on plan of care.

## 2025-03-22 NOTE — PROGRESS NOTES
[facility]  Progress Note    Lisa Cabral Patient Status:  Inpatient    3/12/1975 MRN [id number]   Location [facility] 7NE-A Attending Rubia Santos MD   Hosp Day # 7 PCP Ashley Cross DO       SUBJECTIVE:     Patient seen and examined at bedside this morning. She completed C1 D1 R-CHOP 3/21. She states she slept well and visibly looks refreshed this morning. In good spirits; eating breakfast at time of encounter. Denies fevers, chills, nausea, or vomiting. She did not feel any side effects from chemotherapy. Does not remember when her last bowel movement was (has been > 3 days, subjectively).       OBJECTIVE:    Vitals:    25 0718 25 0751 25 0956 25 1020   BP:  152/86 160/68 (!) 170/80   BP Location:  Left arm Left arm Left arm   Pulse: 114 105 (!) 126 (!) 124   Resp:     Temp:  98.1 °F (36.7 °C) 98.7 °F (37.1 °C) 98.4 °F (36.9 °C)   TempSrc:  Oral Oral Oral   SpO2: 97% 93% 96% 96%   Weight:           Wt Readings from Last 1 Encounters:   25 113.6 kg (250 lb 6.4 oz)       LABS:  Recent Labs   Lab 25  1043 25  0548 25  0518 25  0404   RBC 3.18* 3.09* 2.93* 2.57*   HGB 9.9* 9.7* 9.1* 7.9*   HCT 30.3* 28.3* 27.0* 23.9*   MCV 95.3 91.6 92.2 93.0   MCH 31.1 31.4 31.1 30.7   MCHC 32.7 34.3 33.7 33.1   RDW 13.2 13.0 13.2 13.2   NEPRELIM 4.20  --  2.46 1.70   WBC 9.3 7.9 6.6 2.6*   PLT 12.0* 10.0* 15.0* 8.0*         Recent Labs   Lab 25  0548 25  0518 25  0404   * 130* 148*   BUN 21 16 17   CREATSERUM 0.71 0.66 0.68   EGFRCR 103 106 105   CA 8.7 8.6* 9.5   ALB 3.5 3.3 3.2    140 141   K 3.9 3.4* 3.6  3.6    103 106   CO2 29.0 30.0 27.0   ALKPHO 676* 629* 659*   AST 73* 61* 66*   ALT 45 51* 58*   BILT 0.4 0.4 0.4   TP 5.5* 5.4* 5.2*       MEDICATIONS:     allopurinol  300 mg Oral Daily    polyethylene glycol (PEG 3350)  17 g Oral Daily    sennosides  8.6 mg Oral BID     piperacillin-tazobactam  3.375 g Intravenous Q8H    pantoprazole  40 mg Oral QAM AC    predniSONE  100 mg Oral Daily with breakfast    OLANZapine  5 mg Oral Nightly    sodium chloride  1,000 mL Intravenous Once    [Held by provider] enoxaparin  40 mg Subcutaneous Daily       HYDROmorphone    methocarbamol    prochlorperazine    acetaminophen    diphenhydrAMINE    methylPREDNISolone    EPINEPHrine    naloxone    ondansetron    OLANZapine    labetalol    acetaminophen    lidocaine-menthol    rizatriptan    sennosides    bisacodyl    fleet enema    benzonatate    guaiFENesin    PHYSICAL EXAM:    General: No acute distress, sitting up in bed, in good spirits today  Chest: CTA bilaterally, no wheezes or rhonchi  Heart: Regular rate and rhythm.   Abdomen: Soft, non-distended, non-tender  Extremities: No edema.  Neurological: No focal sensory or motor deficits    RADIOLOGY:     PATHOLOGY 3/17/25:  Biopsy, right inguinal lymph node:  -Diffuse large B-cell lymphoma.     Electronically signed by Goldberg, Cathryn A, MD on 3/21/2025 at 1633        Final Diagnosis Comment      The biopsy demonstrates an atypical lymphoid infiltrate composed of a mixture of small lymphocytes with mature chromatin and large lymphoid cells.  In many areas there are sheets of large transformed lymphoid cells with dispersed chromatin, 1-2 prominent nucleoli, round nuclear contours and scant cytoplasm.     Immunohistochemistry is performed to evaluate the atypical lymphoid cells.  They are CD20, BCL-2, BCL6, CD5, CD20, MUM1 and c-Myc positive.  They are negative for PAULIE by in situ hybridization.  They are negative for all other markers tested.  Ki-67 stains approximately 90% of malignant cells.     Flow cytometry shows a monotypic B-cell population based on surface kappa light chain restriction.  They are CD19, CD20, CD5 and  positive.  They are negative for all other markers tested.  There is no aberrant T-cell phenotype.     The findings  support the diagnosis of diffuse large B-cell lymphoma.  Using the Robby algorithm, the lymphoma shows a non -germinal center phenotype.  As per protocol, FISH for MYC rearrangement will be performed and the results will follow as a procedure addendum.  Dr. Villaseñor has reviewed the case and concurs.         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  Took Acalabrutinib from 11/24-3/25 and self stopped due to poor tolerance.  Now transformed to high-grade histology, DLBCL - confirmed per pathology above. Ki-67 stains with approximately 90% of malignant cells further re-iterating aggressive disease. FISH for MYC rearrangement pending.     Patient agreed to starting systemic chemotherapy when I spoke to her afternoon of 3/20/25. IR placed PICC line 3/20/25.    Patient received C1 D1 R-CHOP on 3/21/25. No adverse reactions. LDH is downtrending.     Recommendations:  - continue allopurinol 300 mg PO every day  - daily CBC with diff, CMP, LDH, and uric acid (ordered)  - transfuse 1 unit platelets for platelet count < 10 k, and 1 unit PRBC for Hb count < 7 g/dL -- based on each lab drawn   - if ANC < 500, will start short acting G-CSF      # Severe pain: Due to progression of leukemia and marrow.  On Dilaudid PCA.     # Hypercalcemia: Received zoledronic acid, on fluids, steroids being tapered.  Calcium normalized.    # Thrombocytopenia: Due to bone marrow involvement.  Transfuse 1 unit platelets for platelet count < 10k, or < 15 k with fevers.    # Neutrophilia: due to steroids.  Resolved.  Her counts have been dropping and I suspect she will become neutropenic in the near future -- due to bone marrow involvement and recent chemotherapy.     #Constipation: patient reports no bowel movement \"in days\" -- so far out that \"she can't  even remember\" when her last bowel movement is. In setting of continued dilaudid PCA and recent vincristine administration (which can cause ileus), please be aggressive with bowel regimen. Miralax daily and senna-lucina BID scheduled; please uptitrate as necessary.        Bozena Mac D.O.  [facility]      [facility]   [address] [place] [address] [address] [place], [address]      3/22/2025  12:40 PM

## 2025-03-23 NOTE — PROGRESS NOTES
[facility]   part of [facility]     Hospitalist Progress Note     Lisa Cabral Patient Status:  Inpatient    3/12/1975 MRN [id number]   Location [facility] 7NE-A Attending Rubia Santos MD   Hosp Day # 8 PCP Ashley Cross DO     Chief Complaint: Neck pain, left hip/groin pain, cough, JARAMILLO, hypoxic on arrival to the ED    Subjective:     Sleeping during my assessment. Per spouse, she didn't sleep well overnight. She reports increased pain today. Denies BM.     Objective:    Review of Systems:   A comprehensive review of systems was completed; pertinent positive and negatives stated in subjective.    Vital signs:  Temp:  [97.7 °F (36.5 °C)-98.6 °F (37 °C)] 97.7 °F (36.5 °C)  Pulse:  [101-114] 104  Resp:  [15-20] 15  BP: (146-196)/(79-96) 162/89  SpO2:  [95 %-98 %] 95 %    Physical Exam:    General: NAD  Respiratory: No wheezes, no rhonchi  Cardiovascular: S1, S2, regular rhythm, tachycardic  Abdomen: Soft, non-distended, positive bowel sounds, tender to palpation   Neuro:oriented x 4. Speech is clear. Moving all 4 extremities. Speech is clear.   Extremities: No edema    Diagnostic Data:    Labs:  Recent Labs   Lab 25  0950 25  0556 25  1043 25  0548 25  0518 25  0404 25  0605   WBC  --    < > 9.3 7.9 6.6 2.6* 2.3*   HGB  --    < > 9.9* 9.7* 9.1* 7.9* 7.8*   MCV  --    < > 95.3 91.6 92.2 93.0 90.8   PLT  --    < > 12.0* 10.0* 15.0* 8.0* 17.0*   BAND  --   --  7  --     INR 1.42*  --   --   --   --   --   --     < > = values in this interval not displayed.       Recent Labs   Lab 25  0518 25  0404 25  0605   * 148* 121*   BUN 16 17 12   CREATSERUM 0.66 0.68 0.59   CA 8.6* 9.5 9.9   ALB 3.3 3.2 3.2    141 142   K 3.4* 3.6  3.6 3.1*    106 104   CO2 30.0 27.0 31.0   ALKPHO 629* 659* 608*   AST 61* 66* 60*   ALT 51* 58* 53*   BILT 0.4 0.4 0.4   TP 5.4* 5.2* 5.2*       Estimated Glomerular Filtration Rate: 109  mL/min/1.73m2 (result from lab).    Recent Labs   Lab 03/18/25  0556   CK 35       Recent Labs   Lab 03/17/25  0950   PTP 17.5*   INR 1.42*                  Microbiology    Hospital Encounter on 03/15/25   1. Blood Culture     Status: None (Preliminary result)    Collection Time: 03/19/25 10:43 AM    Specimen: Blood,peripheral   Result Value Ref Range    Blood Culture Result No Growth 3 Days N/A         Imaging: Reviewed in Epic.    Medications:    polyethylene glycol (PEG 3350)  17 g Oral Daily    sennosides  8.6 mg Oral BID    piperacillin-tazobactam  3.375 g Intravenous Q8H    pantoprazole  40 mg Oral QAM AC    predniSONE  100 mg Oral Daily with breakfast    OLANZapine  5 mg Oral Nightly    sodium chloride  1,000 mL Intravenous Once    [Held by provider] enoxaparin  40 mg Subcutaneous Daily       Assessment & Plan:      #CLL with transformation to high -grade DLBCL  Imaging with worsening LAD. LDH normal to 1488 on admission.   Heme following  -LN biopsy on 3/17; DLBCL  -Started chemo 3/21    #Metabolic encephalopathy   Multifactorial from hypercalcemia, hypernatremia, metabolic alkalosis, related to meds?  -CT brain limited by motion but negative 3/17  -EEG negative for epileptiform activity   -MR brain with mild diffuse pachy meningeal enhancement suspected to be related to marrow involvement  -Neurology following; signed off    #Severe hypercalcemia - improved  S/p zometa and calcitonin   -Nephrology following    #Sepsis 2/2 pneumonia    #Acute hypoxic respiratory failure suspect 2/2 pneumonia/atelectasis   CTA chest 3/15 with airspace opacities within the lung bases atelectasis vs. Consolidation. Negative for PE. Procal 0.43.  -Repeat CXR 3/16 with discoid atelectasis in the retrocardiac lung base which is stable. And also stable right mid lung atelectasis.   -Fever 3/18 overnight, CXR 3/19 with new patchy right upper lung airspace opacity as well as b/l interstitial opacities.   -IV zosyn 3/19 for 7 day  course    #Sinus tachycardia - improved with hydration  PE ruled out. Related to pain?   -TTE with hyperdynamic EF 75 - 80%, no RWMA    #Thrombocytopenia  2/2 bone marrow involvement     #Coagulopathy   Vit K deficiency?   -Monitor     #Generalized pain suspected due to bony progression of CLL  -Decadron 6 mg every 8 hours   -Consult palliative care for symptom management   -dilaudid pca per palliative care    #Constipation -bowel regimen     #Hypokalemia - supplement per protocol   #Hypernatremia - IVF per nephrology     #CKD stage III - trend     #Metabolic alkalosis suspect 2/2 hypercalcemia and renal bicarb reabsorption    #Acute urinary retention   Requiring ISC for now will continue. Monitor     #Elevated ALP, AST  Some chronicity. Related to chemo vs. CLL. Trend     #Low PO intake  -Continue to monitor     #Obesity, BMI 34.45    Rubia Santos MD    Supplementary Documentation:     Quality:  DVT Mechanical Prophylaxis:   SCDs,    DVT Pharmacologic Prophylaxis   Medication    [Held by provider] enoxaparin (Lovenox) 40 MG/0.4ML SUBQ injection 40 mg                Code Status: Full Code  Hagan: External urinary catheter in place  Hagan Duration (in days):   Central line:    MOE:     Discharge is dependent on: course  At this point Ms. Cabral is expected to be discharge to: unclear    The 21st Century Cures Act makes medical notes like these available to patients in the interest of transparency. Please be advised this is a medical document. Medical documents are intended to carry relevant information, facts as evident, and the clinical opinion of the practitioner. The medical note is intended as peer to peer communication and may appear blunt or direct. It is written in medical language and may contain abbreviations or verbiage that are unfamiliar.

## 2025-03-23 NOTE — PLAN OF CARE
Patient alert and oriented x3, with episodes of confusion. Afebrile. On 2LO2 with sats 93-96%. Maintained on PCA Dilaudid for pain management. On IV antibiotics for Pneumonia. Incontinent of bladder. Voiding well. Kept dry and clean. Repositioned. Fall precautions in place. Call light within reach.  at bedside. Will continue Plan of care.   Problem: GENITOURINARY - ADULT  Goal: Absence of urinary retention  Description: INTERVENTIONS:- Assess patient’s ability to void and empty bladder- Monitor intake/output and perform bladder scan as needed- Follow urinary retention protocol/standard of care- Consider collaborating with pharmacy to review patient's medication profile- Implement strategies to promote bladder emptying  Outcome: Progressing     Problem: NEUROLOGICAL - ADULT  Goal: Achieves stable or improved neurological status  Description: INTERVENTIONS- Assess for and report changes in neurological status- Initiate measures to prevent increased intracranial pressure- Maintain blood pressure and fluid volume within ordered parameters to optimize cerebral perfusion and minimize risk of hemorrhage- Monitor temperature, glucose, and sodium. Initiate appropriate interventions as ordered  Outcome: Progressing  Goal: Achieves maximal functionality and self care  Description: INTERVENTIONS- Monitor swallowing and airway patency with patient fatigue and changes in neurological status- Encourage and assist patient to increase activity and self care with guidance from PT/OT- Encourage visually impaired, hearing impaired and aphasic patients to use assistive/communication devices  Outcome: Progressing     Problem: PAIN - ADULT  Goal: Verbalizes/displays adequate comfort level or patient's stated pain goal  Description: INTERVENTIONS:- Encourage pt to monitor pain and request assistance- Assess pain using appropriate pain scale- Administer analgesics based on type and severity of pain and evaluate response- Implement  non-pharmacological measures as appropriate and evaluate response- Consider cultural and social influences on pain and pain management- Manage/alleviate anxiety- Utilize distraction and/or relaxation techniques- Monitor for opioid side effects- Notify MD/LIP if interventions unsuccessful or patient reports new pain- Anticipate increased pain with activity and pre-medicate as appropriate  Outcome: Progressing

## 2025-03-23 NOTE — PROGRESS NOTES
[facility]  Progress Note    Lisa Cabral Patient Status:  Inpatient    3/12/1975 MRN [id number]   Location [facility] 7NE-A Attending Rubia Santos MD   Hosp Day # 8 PCP Ashley Cross DO       SUBJECTIVE:     Patient seen and examined at bedside this morning. She completed C1 D1 R-CHOP 3/21.   This morning, patient is confused with evidence of some hospital induced delirium.  at bedside relayed that she called him overnight, telling her things that did not make sense.  This morning she looks distressed and states she has severe diffused pain all throughout her back.  Is passing a lot of flatus this morning. No bowel movement yet.       OBJECTIVE:    Vitals:    25 0455 25 0627 25 0831 25 1139   BP: (!) 171/96  160/90 (!) 162/89   BP Location: Left arm  Left arm    Pulse: 114 109 107 104   Resp: 20  18    Temp: 98.6 °F (37 °C)  98.4 °F (36.9 °C) 97.7 °F (36.5 °C)   TempSrc: Oral  Oral Temporal   SpO2: 96% 96% 97% 95%   Weight:           Wt Readings from Last 1 Encounters:   25 114.8 kg (253 lb)       LABS:  Recent Labs   Lab 25  0525  0404 25  0605   RBC 2.93* 2.57* 2.49*   HGB 9.1* 7.9* 7.8*   HCT 27.0* 23.9* 22.6*   MCV 92.2 93.0 90.8   MCH 31.1 30.7 31.3   MCHC 33.7 33.1 34.5   RDW 13.2 13.2 12.7   NEPRELIM 2.46 1.70 1.57   WBC 6.6 2.6* 2.3*   PLT 15.0* 8.0* 17.0*         Recent Labs   Lab 25  0518 25  0404 25  0605   * 148* 121*   BUN 16 17 12   CREATSERUM 0.66 0.68 0.59   EGFRCR 106 105 109   CA 8.6* 9.5 9.9   ALB 3.3 3.2 3.2    141 142   K 3.4* 3.6  3.6 3.1*    106 104   CO2 30.0 27.0 31.0   ALKPHO 629* 659* 608*   AST 61* 66* 60*   ALT 51* 58* 53*   BILT 0.4 0.4 0.4   TP 5.4* 5.2* 5.2*       MEDICATIONS:     polyethylene glycol (PEG 3350)  17 g Oral Daily    sennosides  8.6 mg Oral BID    piperacillin-tazobactam  3.375 g Intravenous Q8H    pantoprazole  40 mg Oral QAM AC     predniSONE  100 mg Oral Daily with breakfast    OLANZapine  5 mg Oral Nightly    sodium chloride  1,000 mL Intravenous Once    [Held by provider] enoxaparin  40 mg Subcutaneous Daily       HYDROmorphone    methocarbamol    prochlorperazine    acetaminophen    diphenhydrAMINE    methylPREDNISolone    EPINEPHrine    naloxone    ondansetron    OLANZapine    labetalol    acetaminophen    lidocaine-menthol    rizatriptan    sennosides    bisacodyl    fleet enema    benzonatate    guaiFENesin    PHYSICAL EXAM:    General: Distressed this morning due to diffuse back pain  Chest: CTA bilaterally, no wheezes or rhonchi  Heart: Regular rate and rhythm.   Abdomen: Soft, non-distended, non-tender  Extremities: No edema.  Neurological: No focal sensory or motor deficits    RADIOLOGY:     PATHOLOGY 3/17/25:  Biopsy, right inguinal lymph node:  -Diffuse large B-cell lymphoma.     Electronically signed by Goldberg, Cathryn A, MD on 3/21/2025 at 1633        Final Diagnosis Comment      The biopsy demonstrates an atypical lymphoid infiltrate composed of a mixture of small lymphocytes with mature chromatin and large lymphoid cells.  In many areas there are sheets of large transformed lymphoid cells with dispersed chromatin, 1-2 prominent nucleoli, round nuclear contours and scant cytoplasm.     Immunohistochemistry is performed to evaluate the atypical lymphoid cells.  They are CD20, BCL-2, BCL6, CD5, CD20, MUM1 and c-Myc positive.  They are negative for PAULIE by in situ hybridization.  They are negative for all other markers tested.  Ki-67 stains approximately 90% of malignant cells.     Flow cytometry shows a monotypic B-cell population based on surface kappa light chain restriction.  They are CD19, CD20, CD5 and  positive.  They are negative for all other markers tested.  There is no aberrant T-cell phenotype.     The findings support the diagnosis of diffuse large B-cell lymphoma.  Using the Robby algorithm, the lymphoma shows  a non -germinal center phenotype.  As per protocol, FISH for MYC rearrangement will be performed and the results will follow as a procedure addendum.  Dr. Villaseñor has reviewed the case and concurs.         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  Took Acalabrutinib from 11/24-3/25 and self stopped due to poor tolerance.  Now transformed to high-grade histology, DLBCL - confirmed per pathology above. Ki-67 stains with approximately 90% of malignant cells further re-iterating aggressive disease. FISH for MYC rearrangement pending.     Patient agreed to starting systemic chemotherapy when I spoke to her afternoon of 3/20/25. IR placed PICC line 3/20/25.    Patient received C1 D1 R-CHOP on 3/21/25. No adverse reactions. LDH is downtrending.     Recommendations:  - discontinue allopurinol since uric acid has normalized  - daily CBC with diff, CMP, LDH, and uric acid (ordered)  - transfuse 1 unit platelets for platelet count < 10 k, and 1 unit PRBC for Hb count < 7 g/dL -- based on each lab drawn   - if ANC < 500, will start short acting G-CSF      # Severe pain: Due to progression of leukemia and marrow.  On Dilaudid PCA. Will be difficult to transition to oral pain meds - recommend palliative care assistance with this.     # Hypercalcemia: Received zoledronic acid, on fluids, steroids being tapered.  Calcium normalized.    # Thrombocytopenia: Due to bone marrow involvement and possible recent chemotherapy.  Transfuse 1 unit platelets for platelet count < 10k, < 15 k with fevers, <30k with bleeding.     # Neutrophilia: due to steroids.  Resolved.  Her counts have been dropping and I suspect she will become neutropenic in the near future -- due to bone marrow involvement and recent chemotherapy.     #Constipation: now passing  flatus but patient reports no bowel movement \"in days\" -- so far out that \"she can't even remember\" when her last bowel movement is. In setting of continued dilaudid PCA and recent vincristine administration (which can cause ileus), please continue to be aggressive with bowel regimen. Miralax daily and senna-lucina BID scheduled; please uptitrate as necessary.        Bozena Mac D.O.  [facility]      [facility] [employer]   [address] [place] [address] [place], [address]      3/23/2025  11:58 AM

## 2025-03-23 NOTE — PLAN OF CARE
Patient is alert and oriented x3-4, periods of confusion. Patient reports generalized pain - PCA and PRNs per MAR. Patient had bowel movement - repositioning and hygiene completed. Patient c/o nausea - PRN per MAR. Patient refused meals. Patient agitated/restless - PRN per MAR. Family reported patient did not sleep well last night - MD Tom notified, orders received. Patient and family updated on plan of care.

## 2025-03-24 NOTE — OCCUPATIONAL THERAPY NOTE
OCCUPATIONAL THERAPY TREATMENT NOTE - INPATIENT     Room Number: 425/425-A  Session: 1   Number of Visits to Meet Established Goals: 5    Presenting Problem: Confusion, metabolic encephalopathy, severe pain, progression of CLL with multiple skeletal involvement,  CT showing lymphadenopathy, 3/17 s/p lymph node biopsy, Hypercalcemia, LUKE    ASSESSMENT   Patient demonstrates limited progress this session, goals remain in progress.    Patient continues to function below baseline with toileting, upper body dressing, lower body dressing, grooming, bed mobility, transfers, static sitting balance, dynamic sitting balance, static standing balance, dynamic standing balance, maintaining seated position, and functional standing tolerance.   Contributing factors to remaining limitations include decreased functional strength, decreased functional reach, decreased endurance, impaired coordination, impaired motor planning, decreased muscular endurance, and medical status.  Next session anticipate patient to progress toileting, upper body dressing, lower body dressing, grooming, bed mobility, transfers, static sitting balance, dynamic sitting balance, static standing balance, dynamic standing balance, maintaining seated position, functional standing tolerance, and energy conservation strategies.  Occupational Therapy will continue to follow patient for duration of hospitalization.    Patient continues to benefit from continued skilled OT services: to promote return to prior level of function and safety with continuous assistance and gradual rehabilitative therapy.     History: Patient is a 50 year old female admitted on 3/15/2025 from home for worsening neck pain.  Pt diagnosed with pneumonia, hypercalcemia, CLL, metabolic encephalopathy.    WEIGHT BEARING RESTRICTION       Recommendations for nursing staff:   Transfers: total lift  Toileting location: bed level    TREATMENT SESSION:  Patient Start of Session: supine in bed for  session    FUNCTIONAL TRANSFER ASSESSMENT  Sit to Stand: Edge of Bed  Edge of Bed: Not Tested (pt in too much pain to attempt)    BED MOBILITY  Rolling: Maximum Assist  Supine to Sit : Maximum Assist  Sit to Supine (OT): Maximum Assist  Scooting: Max A to EOB    BALANCE ASSESSMENT  Static Sitting: Contact Guard Assist    FUNCTIONAL ADL ASSESSMENT  UB Dressing Seated: Moderate Assist (to abner robe)  LB Dressing Seated: Maximum Assist (to sonn shoes)    ACTIVITY TOLERANCE: vitals stable                         O2 SATURATIONS       EDUCATION PROVIDED  Patient Education : Role of Occupational Therapy; Plan of Care; Energy Conservation; Proper Body Mechanics  Patient's Response to Education: Requires Further Education    Equipment used: RW  Demonstrates functional use, Would benefit from additional trial      Exercises:    Exercises Repetitions Comments   Scapular elevation     Scapular retraction     Shoulder rolls     Shoulder flexion     Shoulder abduction     Shoulder internal/external rotation     Forward punch     Elbow flexion     Elbow extension     Forearm pronation/supination     Wrist flexion/extension     Gross grasp/fist pumps     Ankle pumps     Knee extension     Marching         Therapist comments: Pt limited by pain this session but also with weakness.     Patient End of Session: Up in chair, Needs met, Call light within reach, RN aware of session/findings, All patient questions and concerns addressed, Alarm set, Family present    SUBJECTIVE  Pt stated, \"I want to try to move.\"    PAIN ASSESSMENT  Rating: Unable to rate  Location: generalized  Management Techniques: Activity promotion, Body mechanics, Breathing techniques, Relaxation, Repositioning     OBJECTIVE  Precautions: Bed/chair alarm    AM-PAC ‘6-Clicks’ Inpatient Daily Activity Short Form  -   Putting on and taking off regular lower body clothing?: A Lot  -   Bathing (including washing, rinsing, drying)?: A Lot  -   Toileting, which includes  using toilet, bedpan or urinal? : A Lot  -   Putting on and taking off regular upper body clothing?: A Lot  -   Taking care of personal grooming such as brushing teeth?: A Lot  -   Eating meals?: A Lot    AM-PAC Score:  Score: 12  Approx Degree of Impairment: 66.57%  Standardized Score (AM-PAC Scale): 30.6    PLAN     OT Treatment Plan: Energy conservation/work simplification techniques, Balance activities, ADL training, Functional transfer training, UE strengthening/ROM, Patient/Family education, Equipment eval/education, Compensatory technique education  Rehab Potential : Fair  Frequency: 3x/week    OT Goals:   ADL Goals   Patient will perform grooming: with setup  Patient will perform upper body dressing:  with supervision  Patient will perform lower body dressing:  with min assist     Functional Transfer Goals  Patient will transfer from supine to sit:  with mod assist  Patient will transfer to bedside commode:  with mod assist     Additional Goals  Pt will incorporate 2 energy conservation techniques into ADL.    OT Session Time: 15 minutes  Self-Care Home Management: 5 minutes  Therapeutic Activity: 10 minutes  Neuromuscular Re-education: 0 minutes  Therapeutic Exercise: 0 minutes  Cognitive Skills: 0 minutes  Sensory Integrative: 0 minutes  Orthotic Management and Trainin minutes  Can add/delete any of these

## 2025-03-24 NOTE — CM/SW NOTE
PASRR screen completed for potential HARRIS needed at DC. Outcome attached to pt's [facility] referral. [facility].     CM/SW will remain available for DC planning and/or support.     SANDHYA Brandt, CMSRN    [id number]

## 2025-03-24 NOTE — PROGRESS NOTES
[facility]   part of [facility]  Palliative Care Follow Up    Lisa Cabral Patient Status:  Inpatient    3/12/1975 MRN [id number]   Location [facility] 7NE-A Attending Rubia Santos MD   Hosp Day # 9 PCP Ashley Cross, DO   [id number]-[id number]    Date of visit:  3/24/2025  Day 9 of hospitalization.     Palliative care consult requested for support with uncontrolled symptoms.     Summary:         HPI:  Lisa Cabral is a 50 year old female with a history of chronic neck and back pain, CLL diagnosed in  on treatment holiday, completed RT to right humerus, axilla and right iliac bone  who was admitted on 3/15/2025 for  neck pain. Work up in our hospital revealed hypercalcemia s/p zometa and calcitonin on IVF, CT scan with current lymphadenopathy, hypokalemia and confusion, thrombocytopenia, neutrophilia, elevated LDH, planning for lymph node biopsy due to concern there is transformation to high grade lymphoma.  Management for metabolic abnormalities.  Sees pain specialist.  3/17 - 3/18:  severe agitation and pain , required sitter, continuous dilaudid infusion started. Neurology consulted and MRI brain completed. S/p LN bx 3/17, prelim path reviewed in oncology notes.  LDH increasing.  - 3/18 Dilaudid PCA started  - Findings c/f DLBCL    Interval Events: Inpatient chemo initiated on 3/21. Continued on Dilaudid PCA over weekend. Ongoing insomnia - trazodone added for sleep over weekend and valium x1 last night.    SUBJECTIVE  Review of Systems - Palliative Care Symptom Assessment     I visited Lisa meléndez her mom and  at bedside. She speaks w eyes closed mostly. She reports d/t poor sleep she is very drowsy, weak, and wants to be able to stay awake and be out of bed more than she's currently physically able to d/t pain, fatigue, and nausea.       Pain:  lower back and hips at 8-9/10 +spasms, cannot tell if pca dose is making a difference. Unable to state if valium vs robaxin helps more w  diffuse muscle spasms.     Continues on Dilaudid PCA at 0.5 mg / hour continuous, PCA dose 0.4mg q20 min, Clinician Bolus 0.8 mg. Has not received bolus since 2233 last night.      24 hour pain requirements (1949-4509):  Pump Interrogated at 1030am    24 hours: Total 28.2 mg (Continuous: 11.82 mg, PCA: 13.16 mg Clinician bolus: 3.22 mg)  16 hours: Total 17.01 mg (Continuous: 7.85 mg, PCA: 7.56 mg Clinician bolus: 1.6 mg)  12 hours: Total 11.42 mg (Continuous: 5.86 mg, PCA: 5.56 mg Clinician bolus: 0 mg)  8 hours: Total 7.03 mg (Continuous: 3.87 mg, PCA: 3.16 mg Clinician bolus: 0 mg)  4 hours: Total 3.43 mg (Continuous: 1.87 mg, PCA: 1.56 mg Clinician bolus: 0 mg)  2 hours: Total 2.19 mg (Continuous: 0.99 mg, PCA: 1.2 mg Clinician bolus: 0 mg)  1 hours: Total 0.89 mg (Continuous: 0.49 mg, PCA: 0.4 mg Clinician bolus:  0 mg)    3/20 - 3/21:  20.01 mg IV Dilaudid  3/19 - 3/20:  304 OME (8.8mg IV Dilaudid)  3/18 - 3/19: 6.6 mg IV Dilaudid = 132 OME  3/17 - 3/18: 143 OME    Olanzapine nightly + PRN dose 1504     Last BM:  3/23 per chart / pt    Nausea: iv zofran and compazine x2 yesterday +emend    Received 1x dose of Diazepam 5mg at 0054      OBJECTIVE     Hematology:   Lab Results   Component Value Date    WBC 2.1 (L) 03/24/2025    HGB 8.0 (L) 03/24/2025    HCT 22.8 (L) 03/24/2025    PLT 12.0 (LL) 03/24/2025       Chemistry:  Lab Results   Component Value Date    CREATSERUM 0.61 03/24/2025    BUN 14 03/24/2025     03/24/2025    K 3.2 (L) 03/24/2025    K 3.2 (L) 03/24/2025     03/24/2025    CO2 32.0 03/24/2025     (H) 03/24/2025    CA 10.4 03/24/2025    ALB 3.3 03/24/2025    ALKPHO 667 (H) 03/24/2025    BILT 0.5 03/24/2025    TP 5.3 (L) 03/24/2025    AST 79 (H) 03/24/2025    ALT 52 (H) 03/24/2025    MG 1.9 03/21/2025    PHOS 2.2 (L) 03/24/2025       Imaging:  No results found.    Vital Signs:  Blood pressure (!) 187/82, pulse 87, temperature 98.6 °F (37 °C), temperature source Oral, resp. rate 18,  weight 238 lb 1.6 oz (108 kg), SpO2 94%, not currently breastfeeding.  Body mass index is 35.16 kg/m².    Physical Exam:     GENERAL: Drowsy but participating in discussion in bed, NAD  CARDIAC: HR 80s per tele  RESPIRATORY: no increased effort at rest. Wet sounding cough  though sounds improved  NEUROLOGIC: oriented x3  SKIN: Warm and dry.     Palliative Performance Scale: 40%   Palliative Performance Scale   % Ambulation Activity Level Self-Care Intake Consciousness   100 Full Normal Full   Normal Full   90 Full No disease  Normal Full Normal Full   80 Full Some disease  Normal w/effort Full Normal or  Reduced Full   70 Reduced Some disease  Can't perform job Full Normal or   Reduced Full   60 Reduced Significant disease  Can't perform hobby Occasional  Assist Normal or   Reduced Full or confused   50 Mainly sit/lie Extensive Disease  Can't do any work Partial Assist Normal or Reduced Full or confused   40 Mainly in bed Extensive Disease  Can't do any work Mainly Assist Normal or Reduced Full or confused   30 Bedbound Extensive Disease  Can't do any work Max Assist  Total Care Reduced Drowsy/confused   20 Bedbound Extensive Disease  Can't do any work Max Assist  Total Care Minimal Drowsy/confused   10 Bedbound/coma Extensive Disease  Can't do any work  coma  Max Assist  Total Care Mouth care Drowsy or coma   0 Death       Palliative Care Assessment:     Goals of Care Discussion:    Lisa hopes that chemo is effective. She hopes to have control of symptom-burden and to be able to work on her strength/mobility so that she can get back home. She plans to f/u with oncology as OP for further cancer-directed therapy.    Emotional support provided to Lisa and her family.     Problem List:       Principal Problem:    Hypercalcemia  Active Problems:    CLL (chronic lymphocytic leukemia) ([facility])    Palliative care encounter    Hypokalemia    Pain of left hip    Chronic neck pain    Thrombocytopenia    Community acquired  pneumonia, unspecified laterality    LUKE (acute kidney injury)    Hypernatremia    Intractable pain    Encephalopathy acute    Cancer-related pain    Delirium    Goals of care, counseling/discussion    Diffuse large B-cell lymphoma (HCC)        Palliative Care Recommendations/Plan:         Symptoms:    Pain - cancer-related, RUE, lower back and hips   No changes to Dilaudid PCA today  Continuous: 0.5 mg / hr  PCA: 0.4 mg q20min  Clinician Bolus 0.8mg q30min PRN  Lidocaine patches  Start Diazepam 5mg q6hr prn for muscle spasms, pt unsure but possible relief w dose last night    Opioid-induced constipation: prevention - not eating much at all  Senokot 2 tabs bid, miralax scheduled  Bowel regimen available prn    Nausea:  IV anti-emetics available prn - seems to respond better to compazine   Emend per oncology      Delirium w agitation: improving  Olanzapine 5mg ODT nightly and 2.5mg BID PRN.   Trazodone 50mg nightly      Goals of Care:  Treatment focused.  Next chemo cycle anticipated on 4/11 per oncology.  Recommend to resume following w OP PC LETTY William.      Code Status: Full Code.     HCPOA: Spouse Erik    Disposition:  Pending clinical course.      Medical Decision Making (MDM) for Palliative Care   Problems Addressed:  High Details: CLL with conversion, encephalopathy, respiratory failure, hypercalcemia, and severe pain crisis.   Data Reviewed & Analyzed:  Results reviewed from each unique test: 2  Select all that apply: assessment requiring an independent historian, independent interpretation of test performed by other healthcare provider  Details: Reviewed oncology note. Labs reviewed but not discussed w/ pt. D/w spouse and mother at bedside  Risk of Complications from DIagnostic Testing & Treatment:  High Details: Management of parenteral opioids - continuous dilaudid infusion/PCA.  Calculated MDM Level: High         Reviewed the above with patient's RN, primary, oncology.    Thank you for inviting  Palliative Care to participate in the care of Lisa Cabral and family.       Will follow.      LETTY Padilla  Palliative Care    3/24/2025  11:08 AM    The 21st Century Cures Act makes medical notes like these available to patients in the interest of transparency. Please be advised this is a medical document. Medical documents are intended to carry relevant information, facts as evident, and the clinical opinion of the practitioner. The medical note is intended as a peer to peer communication, and may appear blunt or direct. It is written in medical language and may contain abbreviations or verbiage that are unfamiliar.

## 2025-03-24 NOTE — PHYSICAL THERAPY NOTE
PHYSICAL THERAPY TREATMENT NOTE - INPATIENT    Room Number: 425/425-A     Session: 1     Number of Visits to Meet Established Goals: 8    Presenting Problem: Pneumonia, hypercalcemia, CLL  Co-Morbidities : CLL diagnosed in 2023, cervical fusion, sciatica    History related to current admission: Patient is a 50 year old female admitted on 3/15/2025 from home for worsening neck pain.  Pt diagnosed with pneumonia, hypercalcemia, CLL, metabolic encephalopathy.     HOME SITUATION  Type of Home: House  Home Layout: One level                     Lives With: Spouse    Drives: Yes           Prior Level of [place]: Pt typically indep with ADLs and mobility. Lives with spouse.     PHYSICAL THERAPY ASSESSMENT   Patient demonstrates limited progress this session, goals  remain in progress.      Patient is requiring minimal assist and moderate assist as a result of the following impairments: decreased functional strength, pain, decreased muscular endurance, and medical status.     Patient continues to function below baseline with bed mobility, transfers, and gait.  Next session anticipate patient to progress bed mobility and transfers.  Physical Therapy will continue to follow patient for duration of hospitalization.    Patient continues to benefit from continued skilled PT services: to promote return to prior level of function and safety with continuous assistance and gradual rehabilitative therapy .  IF pt/family prefer return home, will need 24 hour care, hospital bed, w/c, rw and commode.  PLAN DURING HOSPITALIZATION  Nursing Mobility Recommendation : Lift Equipment     PT Treatment Plan: Bed mobility, Body mechanics, Endurance, Energy conservation, Patient education, Family education, Gait training, Neuromuscular re-educate, Balance training, Transfer training, Strengthening  Frequency (Obs): 3x/week     CURRENT GOALS     Goal #1 Patient is able to demonstrate supine - sit EOB @ level: supervision      Goal #2 Patient  is able to demonstrate transfers EOB to/from Chair/Wheelchair at assistance level: minimum assistance      Goal #3 Patient is able to ambulate 50 feet with assist device:  LRAD  at assistance level: supervision      Goal #4     Goal #5     Goal #6     Goal Comments: Goals established on 3/19/2025  3/24/2025 all goals ongoing    SUBJECTIVE  \"I need to lay back down, the pain is too much.\"    OBJECTIVE  Precautions: Bed/chair alarm    WEIGHT BEARING RESTRICTION     PAIN ASSESSMENT   Ratin  Location: L hip and back approaching that level of pain as well once in sitting position  Management Techniques: Activity promotion, Body mechanics, Repositioning    BALANCE                                                                                                                       Static Sitting: Fair -  Dynamic Sitting: Poor +           Static Standing: Not tested  Dynamic Standing: Not tested    ACTIVITY TOLERANCE  Pulse: 98  Heart Rate Source: Monitor     BP: (!) 149/95 (Previous reading in supine was 190/91)  BP Location: Left arm  BP Method: Automatic  Patient Position: Sitting    O2 WALK  Oxygen Therapy  SPO2% on Oxygen at Rest: 93    AM-PAC '6-Clicks' INPATIENT SHORT FORM - BASIC MOBILITY  How much difficulty does the patient currently have...  Patient Difficulty: Turning over in bed (including adjusting bedclothes, sheets and blankets)?: A Lot   Patient Difficulty: Sitting down on and standing up from a chair with arms (e.g., wheelchair, bedside commode, etc.): Unable   Patient Difficulty: Moving from lying on back to sitting on the side of the bed?: A Lot   How much help from another person does the patient currently need...   Help from Another: Moving to and from a bed to a chair (including a wheelchair)?: Total   Help from Another: Need to walk in hospital room?: Total   Help from Another: Climbing 3-5 steps with a railing?: Total     AM-PAC Score:  Raw Score: 8   Approx Degree of Impairment: 86.62%    Standardized Score (AM-PAC Scale): 28.58   CMS Modifier (G-Code): CM    FUNCTIONAL ABILITY STATUS  Gait Assessment   Functional Mobility/Gait Assessment  Gait Assistance: Not tested    Skilled Therapy Provided    Bed Mobility:  Rolling: Left and right w/ mod a of 1   Supine<>Sit: Mod a of 2.  Once in sitting, noted dizziness and increase in L hip pain and low back pain.  Able to remain in sitting to obtain a bp.  Bp in sitting was 149/95, in supine it was 190/91.  Rn aware.  Tolerated about 5 min in sitting before needing to lie back down.   Sit<>Supine: Mod a of 2     Transfer Mobility:  Sit<>Stand: NT   Stand<>Sit: NT   Gait: NT    Therapist's Comments: Pt going for head CT shortly.  Has been having elevated bp's all day.  Worked on positioning pt at end of session for comfort.  Did not perform ex other than 10 ankle pumps due to high level of pain.      Patient End of Session: In bed, Needs met, Call light within reach, RN aware of session/findings, All patient questions and concerns addressed, Family present (Tena Burris aware of treatment session)    PT Session Time: 19 minutes  Gait Trainin minutes  Therapeutic Activity: 18 minutes  Therapeutic Exercise: 0 minutes   Neuromuscular Re-education: 1 minutes

## 2025-03-24 NOTE — PROGRESS NOTES
[facility]  Progress Note    Lisa Cabral Patient Status:  Inpatient    3/12/1975 MRN [id number]   Location [facility] 7NE-A Attending Rubia Santos MD   Hosp Day # 9 PCP Ashley Cross,        SUBJECTIVE:     Appears restless and uncomfortable.  Lethargic, able to open eyes and answer questions appropriately but sleepy.      OBJECTIVE:    Vitals:    25 2300 25 0330 25 0739 25 0748   BP: (!) 186/70 (!) 167/82  (!) 187/82   BP Location: Left arm Left arm  Left arm   Pulse: 83 98  87   Resp: 18 18  18   Temp: 98.6 °F (37 °C) 97.9 °F (36.6 °C)  98.6 °F (37 °C)   TempSrc: Oral Oral  Oral   SpO2: 91% 100%  94%   Weight:   108 kg (238 lb 1.6 oz)        Wt Readings from Last 1 Encounters:   25 108 kg (238 lb 1.6 oz)       LABS:  Recent Labs   Lab 25  0404 25  0605 25  0707   RBC 2.57* 2.49* 2.57*   HGB 7.9* 7.8* 8.0*   HCT 23.9* 22.6* 22.8*   MCV 93.0 90.8 88.7   MCH 30.7 31.3 31.1   MCHC 33.1 34.5 35.1   RDW 13.2 12.7 12.4   NEPRELIM 1.70 1.57 1.44*   WBC 2.6* 2.3* 2.1*   PLT 8.0* 17.0* 12.0*         Recent Labs   Lab 25  0404 25  0605 25  0707   * 121* 118*   BUN 17 12 14   CREATSERUM 0.68 0.59 0.61   EGFRCR 105 109 108   CA 9.5 9.9 10.4   ALB 3.2 3.2 3.3    142 143   K 3.6  3.6 3.1* 3.2*  3.2*    104 102   CO2 27.0 31.0 32.0   ALKPHO 659* 608* 667*   AST 66* 60* 79*   ALT 58* 53* 52*   BILT 0.4 0.4 0.5   TP 5.2* 5.2* 5.3*       MEDICATIONS:     potassium phosphate dibasic 15 mmol in sodium chloride 0.9% 250 mL IVPB  15 mmol Intravenous Once    Followed by    potassium chloride  20 mEq Intravenous Once    filgrastim-aafi  480 mcg Subcutaneous Daily    traZODone  50 mg Oral Nightly    polyethylene glycol (PEG 3350)  17 g Oral Daily    sennosides  8.6 mg Oral BID    piperacillin-tazobactam  3.375 g Intravenous Q8H    pantoprazole  40 mg Oral QAM AC    predniSONE  100 mg Oral Daily with breakfast     OLANZapine  5 mg Oral Nightly    [Held by provider] enoxaparin  40 mg Subcutaneous Daily       HYDROmorphone    prochlorperazine    acetaminophen    diphenhydrAMINE    methylPREDNISolone    EPINEPHrine    naloxone    ondansetron    OLANZapine    labetalol    acetaminophen    lidocaine-menthol    rizatriptan    sennosides    bisacodyl    fleet enema    benzonatate    guaiFENesin    PHYSICAL EXAM:    General: Sleepy but able to answer questions appropriately.  Chest: Clear to auscultation.  Heart: Regular rate and rhythm.   Abdomen: Soft, non tender with good bowel sounds.  Extremities: No edema.  Neurological: Lethargic but no obvious focal deficit.    RADIOLOGY:     ASSESSMENT/PLAN:    # DLBCL: Perez transformation.  Diag on inguinal LN biopsy this admission.  H/o CLL for which she received treatment with a year of Obinutuzumab/venetoclax and was in hematologic and molecular remission.  Experienced progressive disease on axillary LN biopsy 11/24.  Took Acalabrutinib till 3/25 and self stopped.  Now evidence of disease transformation with lymphadenopathy, likely bone marrow involvement due to drop in counts.    Aggressive disease with high Ki-67.  FISH for MYC transformation is pending.  Received cycle # 1 R-CHOP on 3/21/25.  Next cycle chemotherapy due 4/11/25, will plan outpatient.  No evidence of tumor lysis.  Allopurinol discontinued.    # Severe pain: Due to progression of leukemia and marrow.  On Dilaudid PCA.  Once acute pain is better, would like to titrate to orals in anticipation of discharge.    # Hypercalcemia: Normalized after fluids, zoledronic acid, steroids..    # Thrombocytopenia: Transfuse for clinical bleeding/increased bruising or platelets <10K.    # Neutropenia: Due to marrow involvement and chemotherapy.S.  tart G-CSF as counts will hold drop further.    # Constipation: Due to narcotics.  Continue aggressive bowel regimen.  No enemas in light of neutropenia.    Plan d/w  Erik via  telephone.     Tamica Herring M.D.    [facility]   [address] [place], [address]      3/24/2025    8:43 AM

## 2025-03-24 NOTE — PLAN OF CARE
Patient is A/O x3-4 with intermittent delirium. Frequent turning/ repositioning. Constant back pain and nauseous, PRN meds per MAR with moderate relief. Intermittent coughs noted but declined cough meds. Very uncomfortable and restless. Frequent reassurance provided. High BP but asymptomatic. Needs addressed. Call light within reach.     0102 Patient is very restless, c/o muscle spasm, and difficulty sleeping. Reported trazodone not working and requested for more meds. MD notified, order carried out.    Problem: RESPIRATORY - ADULT  Goal: Achieves optimal ventilation and oxygenation  Description: INTERVENTIONS:- Assess for changes in respiratory status- Assess for changes in mentation and behavior- Position to facilitate oxygenation and minimize respiratory effort- Oxygen supplementation based on oxygen saturation or ABGs- Provide Smoking Cessation handout, if applicable- Encourage broncho-pulmonary hygiene including cough, deep breathe, Incentive Spirometry- Assess the need for suctioning and perform as needed- Assess and instruct to report SOB or any respiratory difficulty- Respiratory Therapy support as indicated- Manage/alleviate anxiety- Monitor for signs/symptoms of CO2 retention  Outcome: Progressing     Problem: SAFETY ADULT - FALL  Goal: Free from fall injury  Description: INTERVENTIONS:- Assess pt frequently for physical needs- Identify cognitive and physical deficits and behaviors that affect risk of falls.- [place] fall precautions as indicated by assessment.- Educate pt/family on patient safety including physical limitations- Instruct pt to call for assistance with activity based on assessment- Modify environment to reduce risk of injury- Provide assistive devices as appropriate- Consider OT/PT consult to assist with strengthening/mobility- Encourage toileting schedule  Outcome: Progressing

## 2025-03-24 NOTE — CM/SW NOTE
SW sent referral for rehab placement via [facility] in the event that patient requires additional therapy post DC. SW will discuss DC plan and choice list if appropriate with family once it becomes available.     SW will continue to follow for plan of care changes and remain available for any additional DC needs or concerns.     Delia Cordova MSW, LSW  Discharge Planner   [id number]

## 2025-03-24 NOTE — PLAN OF CARE
Received pt alert and orientated x4. BP elevated, Dr. Santos aware. PRNs given and head CT done. MD aware of results. 2L nc needed, sats in the 90s. Afebrile. C/o pain, on PCA. 1 episode of emesis. Compazine given with good relief. Bathed. Frequent repositioning. Worked with PT/OT sitting up at the edge of the bed. Voiding. All meds given per MAR. Fall precautions in place, call light within reach.    Problem: NEUROLOGICAL - ADULT  Goal: Achieves stable or improved neurological status  Description: INTERVENTIONS- Assess for and report changes in neurological status- Initiate measures to prevent increased intracranial pressure- Maintain blood pressure and fluid volume within ordered parameters to optimize cerebral perfusion and minimize risk of hemorrhage- Monitor temperature, glucose, and sodium. Initiate appropriate interventions as ordered  Outcome: Progressing  Goal: Achieves maximal functionality and self care  Description: INTERVENTIONS- Monitor swallowing and airway patency with patient fatigue and changes in neurological status- Encourage and assist patient to increase activity and self care with guidance from PT/OT- Encourage visually impaired, hearing impaired and aphasic patients to use assistive/communication devices  Outcome: Progressing     Problem: PAIN - ADULT  Goal: Verbalizes/displays adequate comfort level or patient's stated pain goal  Description: INTERVENTIONS:- Encourage pt to monitor pain and request assistance- Assess pain using appropriate pain scale- Administer analgesics based on type and severity of pain and evaluate response- Implement non-pharmacological measures as appropriate and evaluate response- Consider cultural and social influences on pain and pain management- Manage/alleviate anxiety- Utilize distraction and/or relaxation techniques- Monitor for opioid side effects- Notify MD/LIP if interventions unsuccessful or patient reports new pain- Anticipate increased pain with activity  and pre-medicate as appropriate  Outcome: Progressing     Problem: SAFETY ADULT - FALL  Goal: Free from fall injury  Description: INTERVENTIONS:- Assess pt frequently for physical needs- Identify cognitive and physical deficits and behaviors that affect risk of falls.- [place] fall precautions as indicated by assessment.- Educate pt/family on patient safety including physical limitations- Instruct pt to call for assistance with activity based on assessment- Modify environment to reduce risk of injury- Provide assistive devices as appropriate- Consider OT/PT consult to assist with strengthening/mobility- Encourage toileting schedule  Outcome: Progressing

## 2025-03-24 NOTE — PROGRESS NOTES
[facility]   part of [facility]     Hospitalist Progress Note     Lsia Cabral Patient Status:  Inpatient    3/12/1975 MRN [id number]   Location [facility] 7NE-A Attending Rubia Santos MD   Hosp Day # 9 PCP Ashley Cross DO     Chief Complaint: Neck pain, left hip/groin pain, cough, JARAMILLO, hypoxic on arrival to the ED    Subjective:     Reports 9-10/10 pain today morning. She is nauseated with a poor appetite. Denies BM.     Objective:    Review of Systems:   A comprehensive review of systems was completed; pertinent positive and negatives stated in subjective.    Vital signs:  Temp:  [97.9 °F (36.6 °C)-98.7 °F (37.1 °C)] 98.6 °F (37 °C)  Pulse:  [] 104  Resp:  [16-18] 18  BP: (167-191)/(70-88) 187/82  SpO2:  [91 %-100 %] 94 %    Physical Exam:    General: NAD  Respiratory: No wheezes, no rhonchi  Cardiovascular: S1, S2, regular rhythm, tachycardic  Abdomen: Soft, non-distended, positive bowel sounds, tender to palpation   Neuro:oriented x 4. Speech is clear. Moving all 4 extremities. Speech is clear.   Extremities: No edema    Diagnostic Data:    Labs:  Recent Labs   Lab 25  1043 25  0548 25  0518 25  0404 25  0605 25  0707   WBC 9.3 7.9 6.6 2.6* 2.3* 2.1*   HGB 9.9* 9.7* 9.1* 7.9* 7.8* 8.0*   MCV 95.3 91.6 92.2 93.0 90.8 88.7   PLT 12.0* 10.0* 15.0* 8.0* 17.0* 12.0*   BAND 7  --   2       Recent Labs   Lab 25  0404 25  0605 25  0707   * 121* 118*   BUN 17 12 14   CREATSERUM 0.68 0.59 0.61   CA 9.5 9.9 10.4   ALB 3.2 3.2 3.3    142 143   K 3.6  3.6 3.1* 3.2*  3.2*    104 102   CO2 27.0 31.0 32.0   ALKPHO 659* 608* 667*   AST 66* 60* 79*   ALT 58* 53* 52*   BILT 0.4 0.4 0.5   TP 5.2* 5.2* 5.3*       Estimated Glomerular Filtration Rate: 108 mL/min/1.73m2 (result from lab).    Recent Labs   Lab 25  0556   CK 35       No results for input(s): \"PTP\", \"INR\" in the last 168 hours.                  Microbiology    Hospital Encounter on 03/15/25   1. Blood Culture     Status: None (Preliminary result)    Collection Time: 03/19/25 10:43 AM    Specimen: Blood,peripheral   Result Value Ref Range    Blood Culture Result No Growth 4 Days N/A         Imaging: Reviewed in Epic.    Medications:    potassium phosphate dibasic 15 mmol in sodium chloride 0.9% 250 mL IVPB  15 mmol Intravenous Once    Followed by    potassium chloride  20 mEq Intravenous Once    filgrastim-aafi  480 mcg Subcutaneous Daily    traZODone  50 mg Oral Nightly    polyethylene glycol (PEG 3350)  17 g Oral Daily    sennosides  8.6 mg Oral BID    piperacillin-tazobactam  3.375 g Intravenous Q8H    pantoprazole  40 mg Oral QAM AC    predniSONE  100 mg Oral Daily with breakfast    OLANZapine  5 mg Oral Nightly    [Held by provider] enoxaparin  40 mg Subcutaneous Daily       Assessment & Plan:      #CLL with transformation to high -grade DLBCL  Imaging with worsening LAD. LDH normal to 1488 on admission.   Heme following  -LN biopsy on 3/17; DLBCL  -Started chemo 3/21; next planned for 4/11    #Metabolic encephalopathy   Multifactorial from hypercalcemia, hypernatremia, metabolic alkalosis, related to meds?  -CT brain limited by motion but negative 3/17  -EEG negative for epileptiform activity   -MR brain with mild diffuse pachy meningeal enhancement suspected to be related to marrow involvement  -Neurology following; signed off    #Severe hypercalcemia - improved  S/p zometa and calcitonin   -Nephrology following    #Sepsis 2/2 pneumonia    #Acute hypoxic respiratory failure suspect 2/2 pneumonia/atelectasis   CTA chest 3/15 with airspace opacities within the lung bases atelectasis vs. Consolidation. Negative for PE. Procal 0.43.  -Repeat CXR 3/16 with discoid atelectasis in the retrocardiac lung base which is stable. And also stable right mid lung atelectasis.   -Fever 3/18 overnight, CXR 3/19 with new patchy right upper lung airspace  opacity as well as b/l interstitial opacities.   -IV zosyn 3/19 for 7 day course    #Sinus tachycardia - improved with hydration  PE ruled out. Related to pain?   -TTE with hyperdynamic EF 75 - 80%, no RWMA    #Thrombocytopenia  2/2 bone marrow involvement     #Coagulopathy   Vit K deficiency?   -Monitor     #Generalized pain suspected due to bony progression of CLL  -Decadron 6 mg every 8 hours   -Consult palliative care for symptom management   -dilaudid pca per palliative care    #Constipation -bowel regimen     #Hypokalemia - supplement per protocol   #Hypernatremia - IVF per nephrology     #CKD stage III - trend     #Metabolic alkalosis suspect 2/2 hypercalcemia and renal bicarb reabsorption    #Acute urinary retention   Requiring ISC for now will continue. Monitor     #Elevated ALP, AST  Some chronicity. Related to chemo vs. CLL. Trend     #Low PO intake  -Continue to monitor     #Obesity, BMI 34.45    Rubia Santos MD    Supplementary Documentation:     Quality:  DVT Mechanical Prophylaxis:   SCDs,    DVT Pharmacologic Prophylaxis   Medication    [Held by provider] enoxaparin (Lovenox) 40 MG/0.4ML SUBQ injection 40 mg                Code Status: Full Code  Hagan: External urinary catheter in place  Hagan Duration (in days):   Central line:    MOE:     Discharge is dependent on: course  At this point Ms. Cabral is expected to be discharge to: unclear    The 21st Century Cures Act makes medical notes like these available to patients in the interest of transparency. Please be advised this is a medical document. Medical documents are intended to carry relevant information, facts as evident, and the clinical opinion of the practitioner. The medical note is intended as peer to peer communication and may appear blunt or direct. It is written in medical language and may contain abbreviations or verbiage that are unfamiliar.

## 2025-03-25 PROBLEM — Z71.89 COUNSELING REGARDING ADVANCE CARE PLANNING AND GOALS OF CARE: Status: ACTIVE | Noted: 2025-01-01

## 2025-03-25 PROBLEM — C83.38 DIFFUSE LARGE B-CELL LYMPHOMA OF LYMPH NODES OF MULTIPLE REGIONS (HCC): Status: ACTIVE | Noted: 2025-01-01

## 2025-03-25 NOTE — HOSPICE RN NOTE
[facility] received a call back from the family that the spouse wanted to meet and sign consents for hospice. Erik/spouse said she has been yelling out in so much pain, that she \"can't do this anymore.\" The spouse tearfully signed hospice consents and an updated DNR/ Comfort POLST. Working on flipping the chart into hospice.      Emma Anderson RN, [facility]  [facility] RN Liaison  [phone] (After-hours)

## 2025-03-25 NOTE — PROGRESS NOTES
[facility]  Progress Note    Lisa Cabral Patient Status:  Inpatient    3/12/1975 MRN [id number]   Location [facility] 7NE-A Attending Rubia Santos MD   Hosp Day # 10 PCP Ashley Cross, DO       SUBJECTIVE:     Awake but visibly in pain, restless and agitated.  Answers some questions appropriately.    OBJECTIVE:    Vitals:    25 2000 25 2306 25 0213 25 0449   BP: 144/86 139/81  (!) 160/95   BP Location: Left arm Left arm  Left arm   Pulse: 120 (!) 126 119 117   Resp: 18 18  18   Temp: 98.5 °F (36.9 °C) 98.3 °F (36.8 °C)  98.1 °F (36.7 °C)   TempSrc: Oral Oral  Oral   SpO2: 92% 90% 96% 98%   Weight:           Wt Readings from Last 1 Encounters:   25 108 kg (238 lb 1.6 oz)       LABS:  Recent Labs   Lab 25  0404 25  0605 25  0707   RBC 2.57* 2.49* 2.57*   HGB 7.9* 7.8* 8.0*   HCT 23.9* 22.6* 22.8*   MCV 93.0 90.8 88.7   MCH 30.7 31.3 31.1   MCHC 33.1 34.5 35.1   RDW 13.2 12.7 12.4   NEPRELIM 1.70 1.57 1.44*   WBC 2.6* 2.3* 2.1*   PLT 8.0* 17.0* 12.0*         Recent Labs   Lab 25  0404 25  0605 25  0707 25  2030   * 121* 118*  --    BUN 17 12 14  --    CREATSERUM 0.68 0.59 0.61  --    EGFRCR 105 109 108  --    CA 9.5 9.9 10.4  --    ALB 3.2 3.2 3.3  --     142 143  --    K 3.6  3.6 3.1* 3.2*  3.2* 2.9*    104 102  --    CO2 27.0 31.0 32.0  --    ALKPHO 659* 608* 667*  --    AST 66* 60* 79*  --    ALT 58* 53* 52*  --    BILT 0.4 0.4 0.5  --    TP 5.2* 5.2* 5.3*  --        MEDICATIONS:     filgrastim-aafi  480 mcg Subcutaneous Daily    amLODIPine  10 mg Oral Daily    traZODone  50 mg Oral Nightly    polyethylene glycol (PEG 3350)  17 g Oral Daily    sennosides  8.6 mg Oral BID    piperacillin-tazobactam  3.375 g Intravenous Q8H    pantoprazole  40 mg Oral QAM AC    predniSONE  100 mg Oral Daily with breakfast    OLANZapine  5 mg Oral Nightly    [Held by provider] enoxaparin  40 mg  Subcutaneous Daily       diazePAM    HYDROmorphone    prochlorperazine    naloxone    ondansetron    OLANZapine    labetalol    acetaminophen    lidocaine-menthol    rizatriptan    sennosides    bisacodyl    benzonatate    guaiFENesin    PHYSICAL EXAM:    General: Awake, restless.  Answers questions appropriately but falls asleep easily.  Chest: Clear to auscultation.  Heart: Regular rate and rhythm.   Abdomen: Soft, non tender with good bowel sounds.  Extremities: No edema.  Neurological: Restless, complete exam not possible but no obvious focal deficit seen.    RADIOLOGY:     ASSESSMENT/PLAN:    # DLBCL: Perez transformation.  Extremely poor prognosis with rapidly progressing disease.  Initially patient was not in favor of chemotherapy but after discussing with , she agreed and received cycle number 1R-CHOP on 3/21/25.  have spoken with patient and  that chance of response is low.  For now we will wait for next couple of days to see if there is improvement clinically and in lab parameters.    If she continues to get better, will  transition to oral narcotics and send home with plans to proceed with next cycle of chemotherapy in about 2 weeks.  If she does not get better, palliative care/hospice would be appropriate.    # Severe pain: Due to progression of leukemia/lymphoma with marrow involvement.  Additional element of low back pain may be due to G-CSF.  Increase PCA settings today, will d/w palliative care.  She is not appropriate for transition to oral narcotics yet.    # Thrombocytopenia: Due to marrow involvement and chemotherapy.  Transfused today.    # Hypercalcemia: Normalized after fluids, zoledronic acid, steroids.    # Neutropenia: Due to marrow involvement and chemotherapy.G-CSF started yesterday.  WBC lower today.    # Constipation: Due to narcotics.  Continue aggressive bowel regimen.  No enemas in light of neutropenia.    Called  after labs resulted from today.  LDH is higher  indicating continued progression.  Discussed with  that prognosis was poor at the time of admission but we agreed to try 1 cycle of chemotherapy at patient and 's request.  I am concerned that disease continues to progress very rapidly.  I told  to let family members know.  If LDH continues to rise, I would strongly favor palliative care/inpatient hospice.    Tamica Herring M.D.    [facility]   [address] [place] [address] [place], [address]      3/25/2025    6:54 AM

## 2025-03-25 NOTE — PROGRESS NOTES
[facility] received hospice referral, will follow up today with pt/family/    Liaison spoke with spouse Erik he is agreeable to meet at 1:15p today, meeting scheduled.    [facility]  [phone]

## 2025-03-25 NOTE — DIETARY NOTE
[facility]   [facility] of [facility]    NUTRITION ASSESSMENT    Pt does not meet malnutrition criteria at this time.      NUTRITION INTERVENTION:      Meal and Snacks - Monitor and encourage adequate PO intake.   Medical Food Supplements - Magic Cup TID. Rationale/use for oral supplements discussed.  Vitamin and Mineral Supplements - continue Multivitamin with minerals  Coordination of Nutrition Care - SLP consult prior to diet advancement.      PATIENT STATUS:   3/25/25-  pt having worsening pain and discomfort.  Family meeting with hospice today to discuss GOC. Plan was to trial one chemo treatment and see if improvement. Pt has  a lot friends and family at bedside. RD deferred visit, not appropriate at this time. RD remains available     3/21/25-  pt had  picc line placed and started on chemo today.  Pt feeling better and eating. She  reports liking the magic cup and would like it to continue. She is only ordering to 2 meals . Encouraged 3 meals and will receive 3 magic cups.  Family bringing in snacks from home.      3/19/25- Following up to check on PO + ONS intake. Pt continues not to eat and has only been agreeable to [employer]. Consumed small portion of Ensure Clear but nothing more. Pt's  stated that she has not eaten in weeks. They are currently waiting on biopsy results to determine next steps. Family does not thing NG placement would be smart d/t current agitation. There has been noticeable improvement in mental status since last visit - pt is able to converse and follow commands now. Lots of family in room/waiting areas. High hopes that po intake can resume or that alternative nutrition can be administered d/t long term absence of nourishment. Will continue to follow closely. All questions answered.     03/17/25 Pt is a 51 y/o F admitted for head, neck and back pain w KAJAL. Reviewed pt chart d/t at risk consult. RN in room at time of attempted visit trying to get pt to tolerate juice and jello.  She is very confused and does not follow most commands. Pt continuously yelling out in pain and communicating very minimally. Pt is heard yelling, moaning and crying. \"Ow\" and \"Please help me\" repeated every minute or so. She has been unable to tolerate much po intake since admit 3/15. Family arriving as this note is being typed. Tolerating some jello once family arrived.  agreeable to Vanilla Ensure or Apple Ensure Clear to help get extra nutrients. Pt keeps trying to take off oxygen. Cranberry juice brought to pt. Will monitor po and ons intake as well as need for alternative nutrition in case po intake does not improve. All questions answered at this time.    PMH: CLL, LUKE, Hypercalcemia, Class 2 Obesity      ANTHROPOMETRICS:  Ht:  5'9\"  Wt: 108 kg (238 lb 1.6 oz).   BMI: Body mass index is 35.16 kg/m².  IBW: 65.9 kg 145 lbs      WEIGHT HISTORY:   Weight loss: Yes, Non-severe Wt loss of 15 lbs, 6%%, over 2 months     Wt Readings from Last 10 Encounters:   03/24/25 108 kg (238 lb 1.6 oz)   03/08/25 108 kg (238 lb)   03/06/25 108 kg (238 lb)   03/02/25 108 kg (238 lb)   01/28/25 113.2 kg (249 lb 9.6 oz)   01/07/25 112.6 kg (248 lb 3.2 oz)   12/10/24 114.5 kg (252 lb 6.4 oz)   11/22/24 117.5 kg (259 lb)   08/28/24 116.3 kg (256 lb 6.4 oz)   07/31/24 116.1 kg (256 lb)        NUTRITION:  Diet:       Procedures    Regular/General diet Is Patient on Accuchecks? No      Food Allergies: No  Cultural/Ethnic/Voodoo Preferences Addressed: Yes    Percent Meals Eaten (last 3 days)       Date/Time Percent Meals Eaten (%)    03/22/25 0928 100 %    03/22/25 1735 50 %    03/24/25 0900 0 %     Percent Meals Eaten (%): refused at 03/24/25 0900    03/24/25 1300 0 %    03/24/25 1832 75 %            GI system review: WNL Last BM Date: 03/23/25  Skin and wounds: WNL    NUTRITION RELATED PHYSICAL FINDINGS:     1. Body Fat/Muscle Mass: no wasting noted / well nourished     2. Fluid Accumulation: none    NUTRITION PRESCRIPTION:   65.9 kg 145 lbs IBW  Calories: 5998-7102 calories/day (25-30 kcal/kg)  Protein:  grams protein/day (1.5-2.0 grams protein per kg)  Fluid: ~1 ml/kcal or per MD discretion    NUTRITION DIAGNOSIS/PROBLEM:  Inadequate energy intake related to inability to take or tolerate as evidenced by documented/reported insufficient oral intake      MONITOR AND EVALUATE/NUTRITION GOALS:  PO intake of 75% of meals TID - Ongoing  PO intake of 75% of oral nutrition supplement/s - Ongoing      MEDICATIONS:  Prednisone, Protonix      LABS:  Elevated LFTs    Pt is at High nutrition risk    Chica Kirk RD,LDN  Clinical Dietitian  [id number]

## 2025-03-25 NOTE — PLAN OF CARE
Patient alert and oriented x4. Forgetful with intermittent confusion. Very restless. Yelling out throughout night. VSS - HR tachy. Afebrile. On 2L O2 with sats in the mid 90s. Dilaudid gtt infusing per orders. PRN boluses administered. IVF infusing. See MAR. K replaced per protocol. Reminded throughout shift to use PCA button. Repositioned frequently. Safety precautions continued. Call light within reach.  Problem: RESPIRATORY - ADULT  Goal: Achieves optimal ventilation and oxygenation  Description: INTERVENTIONS:- Assess for changes in respiratory status- Assess for changes in mentation and behavior- Position to facilitate oxygenation and minimize respiratory effort- Oxygen supplementation based on oxygen saturation or ABGs- Provide Smoking Cessation handout, if applicable- Encourage broncho-pulmonary hygiene including cough, deep breathe, Incentive Spirometry- Assess the need for suctioning and perform as needed- Assess and instruct to report SOB or any respiratory difficulty- Respiratory Therapy support as indicated- Manage/alleviate anxiety- Monitor for signs/symptoms of CO2 retention  Outcome: Progressing     Problem: GENITOURINARY - ADULT  Goal: Absence of urinary retention  Description: INTERVENTIONS:- Assess patient’s ability to void and empty bladder- Monitor intake/output and perform bladder scan as needed- Follow urinary retention protocol/standard of care- Consider collaborating with pharmacy to review patient's medication profile- Implement strategies to promote bladder emptying  Outcome: Progressing     Problem: SAFETY ADULT - FALL  Goal: Free from fall injury  Description: INTERVENTIONS:- Assess pt frequently for physical needs- Identify cognitive and physical deficits and behaviors that affect risk of falls.- Ramer fall precautions as indicated by assessment.- Educate pt/family on patient safety including physical limitations- Instruct pt to call for assistance with activity based on assessment-  Modify environment to reduce risk of injury- Provide assistive devices as appropriate- Consider OT/PT consult to assist with strengthening/mobility- Encourage toileting schedule  Outcome: Progressing     Problem: PAIN - ADULT  Goal: Verbalizes/displays adequate comfort level or patient's stated pain goal  Description: INTERVENTIONS:- Encourage pt to monitor pain and request assistance- Assess pain using appropriate pain scale- Administer analgesics based on type and severity of pain and evaluate response- Implement non-pharmacological measures as appropriate and evaluate response- Consider cultural and social influences on pain and pain management- Manage/alleviate anxiety- Utilize distraction and/or relaxation techniques- Monitor for opioid side effects- Notify MD/LIP if interventions unsuccessful or patient reports new pain- Anticipate increased pain with activity and pre-medicate as appropriate  Outcome: Not Progressing

## 2025-03-25 NOTE — SPIRITUAL CARE NOTE
Spiritual Care Visit Note    Patient Name: Lisa Cabral Date of Spiritual Care Visit: 25   : 3/12/1975 Primary Dx: <principal problem not specified>       Referred By: Referral From: Family    Spiritual Care Taxonomy:    Intended Effects: Establish rapport and connectedness    Methods: Explore lorraine and values, Offer support    Interventions: Acknowledge current situation, Active listening, Facilitate preparing for end of life, Identify supportive relationship(s), Perform a blessing, Dale, Provide sacred reading(s)    Visit Type/Summary:    Per patient's daughter,  offered emotional and spiritual support to the family in the midst of life change. Assessed family anxiety and grief process related to end of life issues. Listened compassionately to their world. Journeyed with the family. Per request, rites and rituals at the end of life were offered to the patient, incl. reading a few comforting Scriptures and end of life prayers were offered. The patient appeared to be like sleeping. Promoted a sense of peace, comfort and understanding to the family.  communicated willingness to discuss next steps and facilitate grief process with family as appropriate.  established supportive/caring relationship to the spouse, mom and daughter of the patient.     Spiritual Care support can be requested via an Epic consult.   For urgent/immediate needs, please contact the On Call  at: Rohit: ext [phone]    Rev. Jaret Mireles M.Div., M.T.S.,   Certified Grief Counseling Specialist  Advanced Practice Board Certified

## 2025-03-25 NOTE — PROGRESS NOTES
[facility]   part of [facility]  Palliative Care Follow Up    Lisa Cabral Patient Status:  Inpatient    3/12/1975 MRN [id number]   Location [facility] 7NE-A Attending Rubia Santos MD   Hosp Day # 10 PCP Ashley Cross, DO   [id number]    Date of visit:  3/25/2025  Day 10 of hospitalization.     Palliative care consult requested for support with uncontrolled symptoms.     Summary:         HPI:  Lisa Cabral is a 50 year old female with a history of chronic neck and back pain, CLL diagnosed in  on treatment holiday, completed RT to right humerus, axilla and right iliac bone  who was admitted on 3/15/2025 for  neck pain. Work up in our hospital revealed hypercalcemia s/p zometa and calcitonin on IVF, CT scan with current lymphadenopathy, hypokalemia and confusion, thrombocytopenia, neutrophilia, elevated LDH, planning for lymph node biopsy due to concern there is transformation to high grade lymphoma.  Management for metabolic abnormalities.  Sees pain specialist.  3/17 - 3/18:  severe agitation and pain , required sitter, continuous dilaudid infusion started. Neurology consulted and MRI brain completed. S/p LN bx 3/17, prelim path reviewed in oncology notes.  LDH increasing.  - 3/18 Dilaudid PCA started  - DLBCL w Perez transformation, inpatient chemo started on 3/21    Interval Events: Severe pain overnight. Notified by oncology of change in settings and GOC considerations.    SUBJECTIVE  Review of Systems - Palliative Care Symptom Assessment     I visited Lisa meléndez her daughter Hailey and mom arrived to bedside during encounter.      Pain:  Uncontrolled, and unable to rate. New severe pain located R chest beneath breast near ribs. Causing her to hypoventilate. Ongoing discomfort in low back and hips.    She is weak and drowsy and forgetful with PCA button. We discussed increasing continuous and decreasing frequency of PCA - pt/family in agreement. Discussed concerns associated  with uncontrolled pain exacerbations occurring more frequently and with increasing requirements each day. Daughter and mom state that her family is on their way, and all are open to discussing GOC.     Encouraged discussion of GOC. We discussed the balance between pain control and drowsiness as well as potentially increasing the existing hypoventilation. Even at this point, she is currently experiencing so much pain that she is unable to consider her preference on being involved in discussions about her care.     She is aware of plan for CT chest and why it's ordered. She wants to pursue exam, but is unsure if able to tolerate the exam process. Wants to see how she feels.    Dilaudid PCA was adjusted per oncology this AM  PCA Interrogated at : 0845 am with   Per MAR required CB x5 (2352, 0151, 0419, 0600, 0722) + at 0845    24 hour pain requirements (1654-1034):  PCA Interrogated at 0845 settings for Continuous 0.7 mg / hr, PCA 0.4 mg q10min, CB 0.8 mg q30min  24 hours: Total 30.71 mg (Continuous: 12.12 mg, PCA: 13.78 mg Clinician bolus: 4.8 mg)  16 hours: Total 23.93 mg (Continuous: 8.23 mg, PCA: 10.9 mg Clinician bolus: 4.8 mg)  12 hours: Total 20.08 mg (Continuous: 6.28 mg, PCA: 9 mg Clinician bolus: 4.8 mg)  8 hours: Total 14.6 mg (Continuous: 4.3 mg, PCA: 6.3 mg Clinician bolus: 4 mg)  4 hours: Total 8.36 mg (Continuous: 2.38 mg, PCA: 3.58 mg Clinician bolus: 2.4 mg)  2 hours: Total 4.58 mg (Continuous: 1.38 mg, PCA: 1.6 mg Clinician bolus: 1.6 mg)  1 hours: Total 2.3 mg (Continuous: 0.7 mg, PCA: 0.8 mg Clinician bolus:  0.8 mg)    3/23 - 3/24:  28.2 mg IV Dilaudid  3/20 - 3/21:  20.01 mg IV Dilaudid  3/19 - 3/20:  8.8mg IV Dilaudid  3/18 - 3/19: 6.6 mg IV Dilaudid  3/17 - 3/18: 143 OME    Olanzapine 5mg nightly, Trazodone 50mg nightly     Last BM: 3/24 per chart    Nausea: iv zofran and compazine - no doses required in past 24    Diazepam 5mg x0      OBJECTIVE     Hematology:   Lab Results   Component Value  Date    WBC 1.0 (L) 03/25/2025    HGB 7.8 (L) 03/25/2025    HCT 22.4 (L) 03/25/2025    PLT 6.0 (LL) 03/25/2025       Chemistry:  Lab Results   Component Value Date    CREATSERUM 0.59 03/25/2025    BUN 12 03/25/2025     03/25/2025    K 3.6 03/25/2025     03/25/2025    CO2 31.0 03/25/2025     (H) 03/25/2025    CA 9.7 03/25/2025    ALB 3.2 03/25/2025    ALKPHO 834 (H) 03/25/2025    BILT 0.5 03/25/2025    TP 5.1 (L) 03/25/2025     (H) 03/25/2025    ALT 48 03/25/2025    MG 1.9 03/21/2025    PHOS 3.1 03/25/2025       Imaging:  CT BRAIN OR HEAD (CPT=70450)    Result Date: 3/24/2025  CONCLUSION:   1. No acute intracranial abnormality. If there is clinical concern for acute ischemia/infarction, an MRI of the brain would be recommended for further evaluation.  2. There is mild heterogeneity to the calvarium.  Please refer to the recent prior brain MRI from 3/18/2025 for further details.    LOCATION:  [facility]   Dictated by (CST): Stromberg, LeRoy, MD on 3/24/2025 at 2:48 PM     Finalized by (CST): Stromberg, LeRoy, MD on 3/24/2025 at 2:52 PM        Vital Signs:  Blood pressure (!) 146/93, pulse (!) 129, temperature 98.6 °F (37 °C), temperature source Oral, resp. rate 16, weight 238 lb 1.6 oz (108 kg), SpO2 98%, not currently breastfeeding.  Body mass index is 35.16 kg/m².    Physical Exam:     GENERAL: Drowsy but still able to participate minimally in discussion, not able to make any decisions. Mild distress  CARDIAC: HR 130s per tele  RESPIRATORY: hypoventilating d/t reported pain in R chest  NEUROLOGIC: oriented x3, extremely weak  SKIN: Warm and dry.     Palliative Performance Scale: 30%   Palliative Performance Scale   % Ambulation Activity Level Self-Care Intake Consciousness   100 Full Normal Full   Normal Full   90 Full No disease  Normal Full Normal Full   80 Full Some disease  Normal w/effort Full Normal or  Reduced Full   70 Reduced Some disease  Can't perform job Full Normal or   Reduced  Full   60 Reduced Significant disease  Can't perform hobby Occasional  Assist Normal or   Reduced Full or confused   50 Mainly sit/lie Extensive Disease  Can't do any work Partial Assist Normal or Reduced Full or confused   40 Mainly in bed Extensive Disease  Can't do any work Mainly Assist Normal or Reduced Full or confused   30 Bedbound Extensive Disease  Can't do any work Max Assist  Total Care Reduced Drowsy/confused   20 Bedbound Extensive Disease  Can't do any work Max Assist  Total Care Minimal Drowsy/confused   10 Bedbound/coma Extensive Disease  Can't do any work  coma  Max Assist  Total Care Mouth care Drowsy or coma   0 Death       Palliative Care Assessment:     Goals of Care Discussion:    Met with her mom and aunt Rosanna outside of room to review GOC given increasing and new onset R chest pain. They have been informed of condition by , and other immediate family is at bedside.  They communicate that Lisa would not want CPR done, but defer to  and are encouraging him to visit hospital. He is at home with other family members - he is experiencing extreme anticipatory grief with physical symptoms as well, and they are supporting him to get to hospital as soon as he is able to participate in GOC discussions.     ~30 mins after Dilaudid infusion increase, Lisa is able to rest more, but states she is unsure if it has helped her pain, so administered PCA during visit.     She does not think she can tolerate CTa at this time d/t pain. She still wants to try to have it done though.    Emotional support provided to Lisa and her family.     Problem List:       Principal Problem:    Hypercalcemia  Active Problems:    CLL (chronic lymphocytic leukemia) ([facility])    Palliative care encounter    Hypokalemia    Pain of left hip    Chronic neck pain    Thrombocytopenia    Community acquired pneumonia, unspecified laterality    LUKE (acute kidney injury)    Hypernatremia    Intractable pain     Encephalopathy acute    Cancer-related pain    Delirium    Goals of care, counseling/discussion    Diffuse large B-cell lymphoma (HCC)        Palliative Care Recommendations/Plan:         Symptoms:    Pain - cancer-related, RUE, lower back and hips   Continue Dilaudid PCA   Continuous: 0.9 mg / hr  PCA: 0.4 mg q30min - not using much d/t weakness  Clinician Bolus 0.4mg q30min PRN  Lidocaine patches  Diazepam 5mg q6hr prn for muscle spasms,    Opioid-induced constipation: prevention - not eating much at all  Senokot 2 tabs bid, miralax scheduled  Bowel regimen available prn    Chemo-induced nausea and vomiting:  IV anti-emetics available prn - responds best to compazine       Delirium w agitation: improving  Olanzapine 5mg ODT nightly and 2.5mg BID PRN.   Trazodone 50mg nightly      Goals of Care:    Initiated discussions with family present at hospital (daughter Hailey, her mom, and aunt Rosanna) - they are preparing for hospice, and in support of DNAR as they know this to be what Lisa would want.  Await 's arrival to discuss GOC/code status.   Will encourage meeting with hospice to begin understanding what her care will look like.    Code Status: Full Code.     HCPOA: Spouse Erik    Disposition:  Pending clinical course.    Medical Decision Making (MDM) for Palliative Care   Problems Addressed:  High Details: CLL with conversion, encephalopathy, respiratory failure, hypercalcemia, and ongoing severe pain crisis.  Data Reviewed & Analyzed:  External notes reviewed from each unique source:    Results reviewed from each unique test: 2  Unique tests ordered:    Select all that apply: assessment requiring an independent historian, independent interpretation of test performed by other healthcare provider, discussion of management or test interpretation with external healthcare provider  Details: Case extensively d/w oncology and primary throughout the AM. Labs reviewed and d/w pt's family. GOC discussed w pt's  family.  Risk of Complications from DIagnostic Testing & Treatment:  High Details: Management and ongoing monitoring of parenteral opioids - continuous dilaudid infusion/PCA.  Calculated MDM Level: High   -- Addendum: This encounter will be billed on time - see below.      Reviewed the above with patient's RN, primary, oncology throughout the day.    Thank you for inviting Palliative Care to participate in the care of Lisa Cabral and her family.       Will follow.      LETTY Padilla  Palliative Care    3/25/2025  10:18 AM    The 21st Century Cures Act makes medical notes like these available to patients in the interest of transparency. Please be advised this is a medical document. Medical documents are intended to carry relevant information, facts as evident, and the clinical opinion of the practitioner. The medical note is intended as a peer to peer communication, and may appear blunt or direct. It is written in medical language and may contain abbreviations or verbiage that are unfamiliar.    Addendum:   Advance Care Planning  Erik arrived at hospital. He initiated further discussion surrounding code status and communicated limitations for DNAR, DNI.     Additional discussion of code status, clinical picture, concerns and options going forward took place in family conference room with spouse, daughters, her mom, and her brother. All voice support for DNAR, DNI and are open to exploring hospice as they recognize this as EOL for her, and they also believe that Lisa knows she is nearing EOL.  - Family consents to code status order change, order placed in [facility] and treatment team informed. > 16 minutes spent on ACP.    - Family voiced preference to defer CTa d/t concerns with her ability to tolerate based on degree of discomfort with repositioning in bed, and their sense that the results will not change her outcome.   - They unanimously feel that Lisa is waiting for her father to arrive in [facility] from [place] so that  they can speak with each other. Encourage travel arrangements to be made as soon as possible.  - I will place hospice consult and strongly encourage family to meet w agency today given clinical decline and increasing symptom-burden.   - Emotional support provided.    - Case d/w treatment team and provided background information to [facility].    Angela Schroeder APRN 1235pm    Addendum: The following PCA dose changes were made at 0930am - Continuous 0.9mg / hr, PCA 0.4mg q30min, CB 0.4mg q30 min    Pump Interrogated at 230pm    8 hours: Total 13.37 mg (Continuous 6.27 mg, PCA 4.7 mg, CB 2.4 mg)  4 hours: Total 6.42 mg (Continuous 3.32 mg, PCA 2.2 mg, CB 0.8 mg)  2 hours: Total 3.44 mg (Continuous 1.54mg, PCA 1.5 mg, CB: 0.4mg)  1 hour:  Total 1.33 mg (Continuous 0.63 mg, PCA 0.7mg, CB 0mg)    - Receiving IV tylenol for fever, received 5mg valium at 1204pm.     She was initially seen attempting to get up into chair. After being repositioned in bed, Lisa reported relief of her pain at this time. HR 110s/one-teens, RR improved in 20s (previously 30s). Confused at times, asking to go for a ride in a jeep, otherwise resting on/off. Family reports they are having meaningful conversations w her periodically, and feel her symptoms are significantly improved since this AM / following valium.     No changes to PCA at this time.  If pain becomes uncontrolled,  please page Palliative MD Akiko Cruz via [employer] with pump interrogation information as outlined above.     GOC: Family met w hospice for information. Spouse requests to d/w oncology before making decisions. Primary and oncology updated on POC/GOC via epic message.     Angela Schroeder 344pm    A total of 120 minutes spent on this encounter, of the total, > 16 minutes spent on Advance Care Planning.    Addendum: Received message from RN regarding uncontrolled pain despite recent bolus. Continuous dose increased to 1.2mg / hr, PCA 0.4mg q30 min, CB 0.4mg - ok to give an  additional CB now. Nursing Communication order placed.    457pm

## 2025-03-25 NOTE — HOSPICE RN NOTE
[facility] RN admitted the patient to general inpatient hospice per Dr. Santos and Dr. Garner. Patient is eligible for general inpatient hospice care for symptom management of severe pain, dyspnea, and anxiety requiring frequent nursing assessments and interventions including titration of IV medications.    Chart flipped.    Comfort order set placed. Medications: Dilaudid gtt continued with continuous 1.2mcg/hr, PCA dose 0.4mg/hr with a lockout of 30 minutes max amount 2mg/hr, Fentanyl 12 mcg/hr patch, Valium PO and IVP and  Zyrexa available for anxiety    Regular diet with pleasure feeding.    Aspiration and fall precautions in place.    PPS: 20%    POC discussed with family and Fatuma RN. All are in agreement. [facility] to follow daily to support the patient and family.     Emma Anderson, ALEXIS, [facility]  [facility] RN Liaison  [phone] (After-hours)

## 2025-03-25 NOTE — HOSPICE RN NOTE
[facility] RN and Palliative Liaison met with the patient, spouse and  numerous family members to discuss comfort care. Informational meeting complete. Hospice philosophy, Medicare benefit, and levels of care discussed. All questions answered. The family would like to wait to talk with the oncologist. The patient is eligible for inpatient hospice for severe pain and agitation. Will follow-up tomorrow if they do not reach out sooner. [facility] will follow to support the patient and family.     Emma Anderson RN,[facility]  [facility] RN Liaison  [phone] (After-hours)

## 2025-03-25 NOTE — PROGRESS NOTES
Met with family-, mother, mother-in-law, sister, aunt and daughter.  Discussed rapidly progressive disease.  Recommend inpatient hospice.  Family has agreed to DNR which is most appropriate.  I recommended against further transfusions, lab tests or imaging.  Family is agreeable.  Will continue liberal pain management.  Family notices improvement with diazepam, continue as needed.    Tamica Herring M.D.    [facility]    [facility] [place]  [address] [place] [address] [place], [address]

## 2025-03-25 NOTE — SPIRITUAL CARE NOTE
Spiritual Care Visit Note    Patient Name: Lisa Cabral Date of Spiritual Care Visit: 25   : 3/12/1975 Primary Dx: <principal problem not specified>       Referred By: Referral From: Nurse, Family,     Spiritual Care Taxonomy:    Intended Effects: Convey a calming presence    Methods: Offer spiritual/Zoroastrian support, Offer emotional support    Interventions: Acknowledge current situation, Active listening, Identify supportive relationship(s), Silent prayer    Visit Type/Summary:     responded to the consult. Met w/ the family (spouse and mom of the patient). They shared that the patient is in \"pain not comfortable at this time.\" Listened compassionately to their story outside the patient's room. Mom was tearful as she verbalized their feelings, anticipatory grief, supportive lorraine, and strong family presence and support. The spouse was gently pacing, grieving quietly and appropriately. Also he was responding to the calls from their their friends and family members. Acknowledged their situation/grief. Processed and validated their feelings. Empowered to call  anytime for continued care and support. Provided Spiritual Care card with  contact information/resources to the spouse and mom of the patient.  Established a supportive/caring relationship with the family.    Spiritual Care support can be requested via an Epic consult.   For urgent/immediate needs, please contact the On Call  at: Rohit: ext [phone]    Rev. Jaret Mireles M.Div., M.T.S.,   Certified Grief Counseling Specialist  Advanced Practice Board Certified

## 2025-03-25 NOTE — PROGRESS NOTES
[facility]   part of [facility]     Hospitalist Progress Note     Lisa Cabral Patient Status:  Inpatient    3/12/1975 MRN [id number]   Location [facility] 7NE-A Attending Rubia Santos MD   Hosp Day # 10 PCP Ashley Cross DO     Chief Complaint: Neck pain, left hip/groin pain, cough, JARAMILLO, hypoxic on arrival to the ED    Subjective:     Pain poorly controlled today AM with new right sided chest pain and shallow breathing in the morning. Dilaudid pca dose increased with some improvement. Family decided to pursue comfort care.    Objective:    Review of Systems:   A comprehensive review of systems was completed; pertinent positive and negatives stated in subjective.    Vital signs:  Temp:  [98.1 °F (36.7 °C)-101.1 °F (38.4 °C)] 101.1 °F (38.4 °C)  Pulse:  [] 125  Resp:  [12-20] 12  BP: (120-182)/(70-95) 146/94  SpO2:  [90 %-98 %] 98 %    Physical Exam:    General: Moderate distress due to pain  Respiratory: No wheezes, no rhonchi  Cardiovascular: S1, S2, regular rhythm, tachycardic  Abdomen: Soft, non-distended, positive bowel sounds, tender to palpation   Neuro:oriented x 4. Speech is clear. Moving all 4 extremities. Speech is clear.   Extremities: No edema    Diagnostic Data:    Labs:  Recent Labs   Lab 25  1043 25  0548 25  0518 25  0404 25  0605 25  0707 25  0632   WBC 9.3   < > 6.6 2.6* 2.3* 2.1* 1.0*   HGB 9.9*   < > 9.1* 7.9* 7.8* 8.0* 7.8*   MCV 95.3   < > 92.2 93.0 90.8 88.7 90.0   PLT 12.0*   < > 15.0* 8.0* 17.0* 12.0* 6.0*   BAND 7  --   2  --     < > = values in this interval not displayed.       Recent Labs   Lab 25  0605 25  0707 25  2030 25  0632   * 118*  --  127*   BUN 12 14  --  12   CREATSERUM 0.59 0.61  --  0.59   CA 9.9 10.4  --  9.7   ALB 3.2 3.3  --  3.2    143  --  140   K 3.1* 3.2*  3.2* 2.9* 3.6    102  --  102   CO2 31.0 32.0  --  31.0   ALKPHO 608* 667*  --  834*    AST 60* 79*  --  102*   ALT 53* 52*  --  48   BILT 0.4 0.5  --  0.5   TP 5.2* 5.3*  --  5.1*       Estimated Glomerular Filtration Rate: 109 mL/min/1.73m2 (result from lab).    No results for input(s): \"TROP\", \"TROPHS\", \"CK\" in the last 168 hours.      No results for input(s): \"PTP\", \"INR\" in the last 168 hours.                 Microbiology    Hospital Encounter on 03/15/25   1. Blood Culture     Status: None    Collection Time: 03/19/25 10:43 AM    Specimen: Blood,peripheral   Result Value Ref Range    Blood Culture Result No Growth 5 Days N/A         Imaging: Reviewed in Epic.    Medications:    filgrastim-aafi  480 mcg Subcutaneous Daily    amLODIPine  10 mg Oral Daily    traZODone  50 mg Oral Nightly    polyethylene glycol (PEG 3350)  17 g Oral Daily    sennosides  8.6 mg Oral BID    piperacillin-tazobactam  3.375 g Intravenous Q8H    pantoprazole  40 mg Oral QAM AC    predniSONE  100 mg Oral Daily with breakfast    OLANZapine  5 mg Oral Nightly    [Held by provider] enoxaparin  40 mg Subcutaneous Daily       Assessment & Plan:      #CLL with transformation to high -grade DLBCL  #Metabolic encephalopathy  (multifactorial hypercalcemia, hypernatremia, metabolic alkalosis, related to meds), improved  #Severe hypercalcemia - improved  #Sepsis 2/2 pneumonia  #Acute hypoxic respiratory failure suspect 2/2 pneumonia/atelectasis   #Sinus tachycardia - improved with hydration  #Pancytopenia 2/2 DLBCL   #Coagulopathy   #Generalized pain   #Constipation  #Hypokalemia  #Hypernatremia - resolved   #CKD stage III  #Metabolic alkalosis   #Acute urinary retention   #Elevated LFTs  #Malnutrition   #Obesity, BMI 35.16    Patient was admitted 3/15 with AMS, hypercalcemia. She was treated aggressively with IVF, zometa, calcitonin. Her mental status improved over a few days. She was found to have transformation of her CLL into an aggressive lymphoma on LN biopsy done on 3/17. She underwent chemo while inpatient on 3/21.  Clinically she continued to decline despite treatment and decided to pursue comfort focused care. Hospice referral placed. Palliative care working to titrate dilaudid pca for patient's pain and SOB. Code status modified to DNAR/select care.     Rubia Santos MD    Supplementary Documentation:     Quality:  DVT Mechanical Prophylaxis:   SCDs,    DVT Pharmacologic Prophylaxis   Medication    [Held by provider] enoxaparin (Lovenox) 40 MG/0.4ML SUBQ injection 40 mg                Code Status: DNAR/Selective Treatment  Hagan: External urinary catheter in place  Hagan Duration (in days):   Central line:    MOE:     Discharge is dependent on: course  At this point Ms. Cabral is expected to be discharge to: unclear    The 21st Century Cures Act makes medical notes like these available to patients in the interest of transparency. Please be advised this is a medical document. Medical documents are intended to carry relevant information, facts as evident, and the clinical opinion of the practitioner. The medical note is intended as peer to peer communication and may appear blunt or direct. It is written in medical language and may contain abbreviations or verbiage that are unfamiliar.

## 2025-03-25 NOTE — PLAN OF CARE
Problem: Delirium  Goal: Minimize duration of delirium  Description: Interventions:- Encourage use of hearing aids, eye glasses- Promote highest level of mobility daily- Provide frequent reorientation- Promote wakefulness i.e. lights on, blinds open- Promote sleep, encourage patient's normal rest cycle i.e. lights off, TV off, minimize noise and interruptions- Encourage family to assist in orientation and promotion of home routines  Outcome: Progressing     Problem: CARDIOVASCULAR - ADULT  Goal: Absence of cardiac arrhythmias or at baseline  Description: INTERVENTIONS:- Continuous cardiac monitoring, monitor vital signs, obtain 12 lead EKG if indicated- Evaluate effectiveness of antiarrhythmic and heart rate control medications as ordered- Initiate emergency measures for life threatening arrhythmias- Monitor electrolytes and administer replacement therapy as ordered  Outcome: Progressing     Problem: RESPIRATORY - ADULT  Goal: Achieves optimal ventilation and oxygenation  Description: INTERVENTIONS:- Assess for changes in respiratory status- Assess for changes in mentation and behavior- Position to facilitate oxygenation and minimize respiratory effort- Oxygen supplementation based on oxygen saturation or ABGs- Provide Smoking Cessation handout, if applicable- Encourage broncho-pulmonary hygiene including cough, deep breathe, Incentive Spirometry- Assess the need for suctioning and perform as needed- Assess and instruct to report SOB or any respiratory difficulty- Respiratory Therapy support as indicated- Manage/alleviate anxiety- Monitor for signs/symptoms of CO2 retention  Outcome: Progressing     Problem: GENITOURINARY - ADULT  Goal: Absence of urinary retention  Description: INTERVENTIONS:- Assess patient’s ability to void and empty bladder- Monitor intake/output and perform bladder scan as needed- Follow urinary retention protocol/standard of care- Consider collaborating with pharmacy to review patient's  medication profile- Implement strategies to promote bladder emptying  Outcome: Progressing     Problem: NEUROLOGICAL - ADULT  Goal: Achieves stable or improved neurological status  Description: INTERVENTIONS- Assess for and report changes in neurological status- Initiate measures to prevent increased intracranial pressure- Maintain blood pressure and fluid volume within ordered parameters to optimize cerebral perfusion and minimize risk of hemorrhage- Monitor temperature, glucose, and sodium. Initiate appropriate interventions as ordered  Outcome: Progressing  Goal: Achieves maximal functionality and self care  Description: INTERVENTIONS- Monitor swallowing and airway patency with patient fatigue and changes in neurological status- Encourage and assist patient to increase activity and self care with guidance from PT/OT- Encourage visually impaired, hearing impaired and aphasic patients to use assistive/communication devices  Outcome: Progressing     Problem: PAIN - ADULT  Goal: Verbalizes/displays adequate comfort level or patient's stated pain goal  Description: INTERVENTIONS:- Encourage pt to monitor pain and request assistance- Assess pain using appropriate pain scale- Administer analgesics based on type and severity of pain and evaluate response- Implement non-pharmacological measures as appropriate and evaluate response- Consider cultural and social influences on pain and pain management- Manage/alleviate anxiety- Utilize distraction and/or relaxation techniques- Monitor for opioid side effects- Notify MD/LIP if interventions unsuccessful or patient reports new pain- Anticipate increased pain with activity and pre-medicate as appropriate  Outcome: Progressing     Problem: SAFETY ADULT - FALL  Goal: Free from fall injury  Description: INTERVENTIONS:- Assess pt frequently for physical needs- Identify cognitive and physical deficits and behaviors that affect risk of falls.- [place] fall precautions as indicated  by assessment.- Educate pt/family on patient safety including physical limitations- Instruct pt to call for assistance with activity based on assessment- Modify environment to reduce risk of injury- Provide assistive devices as appropriate- Consider OT/PT consult to assist with strengthening/mobility- Encourage toileting schedule  Outcome: Progressing

## 2025-03-26 NOTE — CM/SW NOTE
MOHSEN and Hospice RN Emma met with the patient at bedside.  Patient will remain GIP.  Support provided to family.    Patricia Rodgers, \[facility] [place] [facility]\""TOBIAS  [facility]  [phone]

## 2025-03-26 NOTE — PROGRESS NOTES
Patient seen socially.  Currently inpatient hospice.  At time of visit,  was sleeping at bedside.  Patient herself was sleeping, lethargic, appears comfortable.  Pain appears to be controlled for now.    Tamica Herring M.D.    [facility]    [facility]  [address] [place] [address] [place], [address]

## 2025-03-26 NOTE — PROGRESS NOTES
Received pt disorientated, crying out in pain. On PCA. Other PRNs given as well. Frequent repositioning. Voiding. No BM. All meds given per MAR. Fall precautions in place, call light within reach.     1600: Pt switched to in hospice. Fentanyl patch applied. PCA continued. Other PRNs given as well for comfort. Family aware of POC.

## 2025-03-26 NOTE — TELEPHONE ENCOUNTER
Michael, at [id number] called stating the pt advised her medication calquence will either on hold due to headaches or the doctor maybe changing the therapy completely.    Michael would like to know if a decision has been made regarding whether or not the pt will remain on medication     Please fax info [phone]

## 2025-03-26 NOTE — PLAN OF CARE
Patient  at 1023 2025. Notified hospitalist. [facility] called ref#[id number]. Family at bedside. Death packet completed.

## 2025-03-26 NOTE — HOSPICE RN NOTE
[facility] Inpatient Nursing Rounds:     Patient seen at bedside with family (spouse, mom and sister) present. Family support provided. Patient unresponsive today, with labored open mouth breathing with some pauses. Patient's abdomen was firm, round and distended yesterday. Today abdomen soft, non-distended. Patient currently room air, pulses faint, skin clammy, pale. All oral medications discontinued today. Dilaudid gtt changed since patient is unable to press the button.      Medications presently: Dilaudid gtt 1.6mg/hr continuous patient controlled dose 0.4 with a 30 minute lockout 2mg/hr max. Dilaudid 0.4mg/hr bolus from the bag IV X 4, Fentanyl patch 12mcg/hr, Valium 7.5mg IVP X 1, Scopolamine patch, Ofirmev IV X 1, Valium 5mg IVP X 2, Robinul IVP X 1, Haldol IVP X 2 all in the past 24 hrs.     PPS: 10%    Patient remains eligible for general inpatient hospice care for symptom management of pain/dyspnea, anxiety and airway secretions requiring frequent nursing assessments and interventions including titration of IV medications. POC discussed with family  Dr. Suarez, and Yossi ESPINOZA. All are in agreement. [facility] will continue to support the patient and family.     Emma Anderson, RN, [facility]  [facility] RN Liaison  [phone] (After-hours)

## 2025-03-26 NOTE — DISCHARGE SUMMARY
[facility]     Lisa Cabral Patient Status:  Inpatient    3/12/1975 MRN [id number]   Location [facility] 4NW-A Attending Da Suarez MD   Hosp Day # 1 PCP Ashley Cross DO     Date of Admission: 3/25/2025  Date of Discharge:   3/26/2025      Discharge Disposition:  1023AM    Discharge Diagnosis:  #CLL with transformation to high -grade DLBCL  #Metabolic encephalopathy  (multifactorial hypercalcemia, hypernatremia, metabolic alkalosis, related to meds), improved  #Severe hypercalcemia - improved  #Sepsis 2/2 pneumonia  #Acute hypoxic respiratory failure suspect 2/2 pneumonia/atelectasis   #Sinus tachycardia - improved with hydration  #Pancytopenia 2/2 DLBCL   #Coagulopathy   #Generalized pain   #Constipation  #Hypokalemia  #Hypernatremia - resolved   #CKD stage III  #Metabolic alkalosis   #Acute urinary retention   #Elevated LFTs  #Malnutrition   #Obesity, BMI 35.16    History of Present Illness: Lisa Cabral is a 50 year old female with history of chronic left-sided neck pain, CLL presents emergency room with worsening neck pain.  Patient complains that she has had a cough over the last 2 weeks that is nonproductive.  No fevers, chills, nausea, vomiting, diarrhea or constipation.  Patient takes oxycodone for her chronic neck pain and her pain doctor recently increased her to 10 mg.  No dizziness, lightheadedness, or syncope.     Brief Synopsis: Pt was admitted and transitioned to inpatient hospice and passed on 3/26/2025      Lace+ Score: 61  59-90 High Risk  29-58 Medium Risk  0-28   Low Risk           Procedures during hospitalization:   none    Incidental or significant findings and recommendations (brief descriptions):  none    Lab/Test results pending at Discharge:   none    Consultants:  Onc    Discharge Medication List:     Discharge Medications        ASK your doctor about these medications        Instructions Prescription details   Acalabrutinib  Maleate 100 MG Tabs      Take 100 mg by mouth in the morning and 100 mg before bedtime.   Quantity: 60 tablet  Refills: 6     ASHWAGANDHA OR      Take 1 capsule by mouth daily.   Refills: 0     buPROPion  MG Tb24  Commonly known as: Wellbutrin XL      Take 1 tablet (150 mg total) by mouth daily.   Quantity: 30 tablet  Refills: 0     Cholecalciferol 125 MCG (5000 UT) Tabs  Commonly known as: VITAMIN D-3      Take 1 tablet (5,000 Units total) by mouth daily.   Refills: 0     diazePAM 5 MG Tabs  Commonly known as: Valium  Ask about: Should I take this medication?      Take 1 tablet (5 mg total) by mouth every 8 (eight) hours as needed (muscle spasm).   Stop taking on: March 14, 2025  Quantity: 20 tablet  Refills: 0     Excedrin Tension Headache 500-65 MG Tabs  Generic drug: Acetaminophen-Caffeine      Take by mouth.   Refills: 0     lidocaine 5 % Ptch  Commonly known as: Lidoderm      Place 1 patch onto the skin daily. Hips and low back   Refills: 0     MAGNESIUM ASPARTATE OR      Take by mouth.   Refills: 0     methocarbamol 750 MG Tabs  Commonly known as: Robaxin      TAKE 1 TABLET BY MOUTH DAILY AT NIGHTTIME   Refills: 0     morphINE 15 MG Tabs      Take 1 tablet (15 mg total) by mouth every 4 (four) hours as needed for Pain.   Quantity: 40 tablet  Refills: 0     omeprazole 20 MG Cpdr  Commonly known as: PriLOSEC      TK ONE C PO BID 30 MIN B EATING   Refills: 0     ondansetron 8 MG tablet  Commonly known as: Zofran      Take 1 tablet (8 mg total) by mouth every 8 (eight) hours as needed for Nausea.   Quantity: 30 tablet  Refills: 3     oxyCODONE-acetaminophen  MG Tabs  Commonly known as: Percocet      every 8 (eight) hours as needed for Pain.   Refills: 0     OxyCONTIN 10 MG T12a  Generic drug: oxyCODONE ER      Take 1 tablet (10 mg total) by mouth 2 (two) times daily as needed for Pain.   Refills: 0     predniSONE 20 MG Tabs  Commonly known as: Deltasone  Ask about: Should I take this medication?       Take 2 tablets (40 mg total) by mouth daily for 4 days.   Stop taking on: 2025  Quantity: 8 tablet  Refills: 0     prochlorperazine 10 mg tablet  Commonly known as: Compazine      Take 1 tablet (10 mg total) by mouth every 6 (six) hours as needed for Nausea.   Quantity: 30 tablet  Refills: 3     QC TUMERIC COMPLEX OR      Take 1 tablet by mouth 2 (two) times daily.   Refills: 0     Rizatriptan Benzoate 10 MG Tabs  Commonly known as: MAXALT      Take 1 tablet (10 mg total) by mouth as needed for Migraine. may repeat dose after 2 hours. No more than 20 mg in 24 hours.   Quantity: 20 tablet  Refills: 1     sucralfate 1 g Tabs  Commonly known as: Carafate      Take 1 tablet (1 g total) by mouth 2 (two) times daily.   Refills: 0     tiZANidine HCl 2 MG Caps  Commonly known as: ZANAFLEX      Take 1 capsule (2 mg total) by mouth 3 (three) times daily.   Quantity: 30 capsule  Refills: 0     VITAMIN B 12 OR      Take 1 tablet by mouth daily. B 12 complex   Refills: 0              [place]    Follow-up appointment:   No follow-up provider specified.  Appointments for Next 30 Days 3/26/2025 - 2025      None            Vital signs:  Temp:  [98 °F (36.7 °C)-101.5 °F (38.6 °C)] 101.5 °F (38.6 °C)  Pulse:  [] 97  Resp:  [12-16] 16  BP: ()/() 72/41  SpO2:  [63 %-98 %] 63 %          -----------------------------------------------------------------------------------------------  PATIENT DISCHARGE INSTRUCTIONS: See electronic chart    Da Suarez MD    Total time spent on discharge plannin minutes     The  Cures Act makes medical notes like these available to patients in the interest of transparency. Please be advised this is a medical document. Medical documents are intended to carry relevant information, facts as evident, and the clinical opinion of the practitioner. The medical note is intended as peer to peer communication and may appear blunt or direct. It is  written in medical language and may contain abbreviations or verbiage that are unfamiliar.

## 2025-03-26 NOTE — SPIRITUAL CARE NOTE
Spiritual Care Visit Note    Patient Name: Lisa Cabral Date of Spiritual Care Visit: 25   : 3/12/1975 Primary Dx: <principal problem not specified>       Referred By: Referral From: Family    Spiritual Care Taxonomy:    Intended Effects: Demonstrate caring and concern    Methods: Collaborate with care team member, Explore spiritual/Alevism beliefs    Interventions: Active listening, Ask guided questions, Explain  role, [place], Provide grief resources, Provide hospitality    Visit Type/Summary:     - Spiritual Care: Consulted with RN prior to visit. Offered empathic listening and emotional support. Provided resources related to grief and grieving. Provided information regarding grief support groups. Provided information regarding how to contact Spiritual Care and left a Spiritual Care information card.  remains available as needed for follow up. Spoke with the nurse prior to the visit and was informed that the patient has passed and family is at bedside.  provided grief support to the family Also  provided spiritual and emotional support alone> A grief packet was provided to the family.     Spiritual Care support can be requested via an Epic consult. For urgent/immediate needs, please contact the On Call  at: Rohit: ext [phone]    Chaplain Resident Tracee Shah MA

## 2025-03-26 NOTE — PROGRESS NOTES
Clinical Research Note    Patient seen socially at bedside. Several family members are present. Patient's cousin asked questions about chemotherapy, prognosis and treatment options. Reinforced only what was detailed in most recent med onc note. Provided comfort to patient and family. Thanked patient for her participation on the clinical trial and reminded patient and family that research department is happy to assist if there is further need.

## 2025-03-26 NOTE — PLAN OF CARE
Problem: PAIN - ADULT  Goal: Verbalizes/displays adequate comfort level or patient's stated pain goal  Description: INTERVENTIONS:- Encourage pt to monitor pain and request assistance- Assess pain using appropriate pain scale- Administer analgesics based on type and severity of pain and evaluate response- Implement non-pharmacological measures as appropriate and evaluate response- Consider cultural and social influences on pain and pain management- Manage/alleviate anxiety- Utilize distraction and/or relaxation techniques- Monitor for opioid side effects- Notify MD/LIP if interventions unsuccessful or patient reports new pain- Anticipate increased pain with activity and pre-medicate as appropriate  Outcome: Progressing  Patient is drowsy/ lethargic,  but get very restless once awake, constant moaning, dont look  comfortable even after few doses of Valium and haldol,  made known to hospitalist, restlessness  improved  after dose of PCA and valium increased, on hospice care,   multiple family members at the bedside, updated of changes of meds.  Hagan catheter inserted for comfort. Unable to take oral meds, will cont to monitor.  0630: Lethargic, spouse at the bedside.

## 2025-03-26 NOTE — PROGRESS NOTES
[facility]   part of [facility]     Hospitalist Progress Note     Lisa Cabral Patient Status:  Inpatient    3/12/1975 MRN [id number]   [facility] 7NE-A Attending Rubia Santos MD   Hosp Day # 1 PCP Ashley Cross DO     Chief Complaint: Neck pain, left hip/groin pain, cough, JARAMILLO, hypoxic on arrival to the ED    Subjective:     Pt without acute events overnight. Transitioned to inpatient hospice. On comfort care. Lethargic. Appears comfortable.     Objective:    Review of Systems:   A comprehensive review of systems was completed; pertinent positive and negatives stated in subjective.    Vital signs:  Temp:  [98 °F (36.7 °C)-101.5 °F (38.6 °C)] 101.5 °F (38.6 °C)  Pulse:  [] 97  Resp:  [12-16] 16  BP: ()/() 72/41  SpO2:  [63 %-98 %] 63 %    Physical Exam:    General: Moderate distress due to pain  Respiratory: No wheezes, no rhonchi  Cardiovascular: S1, S2, regular rhythm  Abdomen: Soft, non-distended, positive bowel sounds, tender to palpation   Neuro:lethargic  Extremities: No edema    Diagnostic Data:    Labs:  Recent Labs   Lab 25  1043 25  0548 25  0518 25  0404 25  0605 25  0707 25  0632   WBC 9.3   < > 6.6 2.6* 2.3* 2.1* 1.0*   HGB 9.9*   < > 9.1* 7.9* 7.8* 8.0* 7.8*   MCV 95.3   < > 92.2 93.0 90.8 88.7 90.0   PLT 12.0*   < > 15.0* 8.0* 17.0* 12.0* 6.0*   BAND 7  --   2  --     < > = values in this interval not displayed.       Recent Labs   Lab 25  0605 25  0707 25  2030 25  0632   * 118*  --  127*   BUN 12 14  --  12   CREATSERUM 0.59 0.61  --  0.59   CA 9.9 10.4  --  9.7   ALB 3.2 3.3  --  3.2    143  --  140   K 3.1* 3.2*  3.2* 2.9* 3.6    102  --  102   CO2 31.0 32.0  --  31.0   ALKPHO 608* 667*  --  834*   AST 60* 79*  --  102*   ALT 53* 52*  --  48   BILT 0.4 0.5  --  0.5   TP 5.2* 5.3*  --  5.1*       Estimated Glomerular Filtration Rate: 109  mL/min/1.73m2 (result from lab).    No results for input(s): \"TROP\", \"TROPHS\", \"CK\" in the last 168 hours.      No results for input(s): \"PTP\", \"INR\" in the last 168 hours.                 Microbiology    No results found for this visit on 03/25/25.        Imaging: Reviewed in Epic.    Medications:    diazepam  7.5 mg Intravenous Q4H    fentaNYL  1 patch Transdermal Q72H    scopolamine  1 patch Transdermal Q72H       Assessment & Plan:      #CLL with transformation to high -grade DLBCL  #Metabolic encephalopathy  (multifactorial hypercalcemia, hypernatremia, metabolic alkalosis, related to meds), improved  #Severe hypercalcemia - improved  #Sepsis 2/2 pneumonia  #Acute hypoxic respiratory failure suspect 2/2 pneumonia/atelectasis   #Sinus tachycardia - improved with hydration  #Pancytopenia 2/2 DLBCL   #Coagulopathy   #Generalized pain   #Constipation  #Hypokalemia  #Hypernatremia - resolved   #CKD stage III  #Metabolic alkalosis   #Acute urinary retention   #Elevated LFTs  #Malnutrition   #Obesity, BMI 35.16    Continue comfort care measures    Da Suarez MD      Supplementary Documentation:     Quality:  DVT Mechanical Prophylaxis:        DVT Pharmacologic Prophylaxis   Medication   None                Code Status: DNAR/Comfort Care  Hagan: Hagan catheter in place  Hagan Duration (in days):   Central line:    MOE:     Discharge is dependent on: course  At this point Ms. Cabral is expected to be discharge to: unclear    The 21st Century Cures Act makes medical notes like these available to patients in the interest of transparency. Please be advised this is a medical document. Medical documents are intended to carry relevant information, facts as evident, and the clinical opinion of the practitioner. The medical note is intended as peer to peer communication and may appear blunt or direct. It is written in medical language and may contain abbreviations or verbiage that are unfamiliar.

## 2025-03-26 NOTE — HOSPICE RN NOTE
[facility] RN was present for patient's natural death. Reported TOD 1023. Hospice RN visited with family at bedside to offer condolences and bereavement resources/services. Family stated understanding. More family will be coming to the bedside. [facility] team is available for family support and further resource education.  [facility] chosen from the family.    Emma Anderson, RN,[facility]  [facility] RN Liaison  [phone] (After-hours)

## 2025-03-27 LAB
ALBUMIN SERPL ELPH-MCNC: 2.37 G/DL (ref 3.75–5.21)
ALBUMIN/GLOB SERPL: 1.02 {RATIO} (ref 1–2)
ALPHA1 GLOB SERPL ELPH-MCNC: 0.62 G/DL (ref 0.19–0.46)
ALPHA2 GLOB SERPL ELPH-MCNC: 0.73 G/DL (ref 0.48–1.05)
B-GLOBULIN SERPL ELPH-MCNC: 0.65 G/DL (ref 0.68–1.23)
GAMMA GLOB SERPL ELPH-MCNC: 0.32 G/DL (ref 0.62–1.7)
KAPPA LC FREE SER-MCNC: 3.13 MG/DL (ref 0.33–1.94)
KAPPA LC FREE/LAMBDA FREE SER NEPH: 4.54 {RATIO} (ref 0.26–1.65)
LAMBDA LC FREE SERPL-MCNC: 0.69 MG/DL (ref 0.57–2.63)
PROT SERPL-MCNC: 4.7 G/DL (ref 5.7–8.2)

## 2025-03-28 NOTE — PAYOR COMM NOTE
--------------  3/23-3/25  CONTINUED STAY REVIEW    Payor: [employer] [facility] PPO  Subscriber #:  [id number]  Authorization Number: [id number]    Admit date: 3/15/25  Admit time:  6:35 PM    REVIEW DOCUMENTATION:    3/23    Chief Complaint: Neck pain, left hip/groin pain, cough, JARAMILLO, hypoxic on arrival to the ED        Subjective:  Sleeping during my assessment. Per spouse, she didn't sleep well overnight. She reports increased pain today. Denies BM.     Temp:  [97.7 °F (36.5 °C)-98.6 °F (37 °C)] 97.7 °F (36.5 °C)  Pulse:  [101-114] 104  Resp:  [15-20] 15  BP: (146-196)/(79-96) 162/89  SpO2:  [95 %-98 %] 95 %    #CLL with transformation to high -grade DLBCL  Imaging with worsening LAD. LDH normal to 1488 on admission.   Heme following  -LN biopsy on 3/17; DLBCL  -Started chemo 3/21     #Metabolic encephalopathy   Multifactorial from hypercalcemia, hypernatremia, metabolic alkalosis, related to meds?  -CT brain limited by motion but negative 3/17  -EEG negative for epileptiform activity   -MR brain with mild diffuse pachy meningeal enhancement suspected to be related to marrow involvement  -Neurology following; signed off     #Severe hypercalcemia - improved  S/p zometa and calcitonin   -Nephrology following     #Sepsis 2/2 pneumonia     #Acute hypoxic respiratory failure suspect 2/2 pneumonia/atelectasis   CTA chest 3/15 with airspace opacities within the lung bases atelectasis vs. Consolidation. Negative for PE. Procal 0.43.  -Repeat CXR 3/16 with discoid atelectasis in the retrocardiac lung base which is stable. And also stable right mid lung atelectasis.   -Fever 3/18 overnight, CXR 3/19 with new patchy right upper lung airspace opacity as well as b/l interstitial opacities.   -IV zosyn 3/19 for 7 day course     #Sinus tachycardia - improved with hydration  PE ruled out. Related to pain?   -TTE with hyperdynamic EF 75 - 80%, no RWMA     #Thrombocytopenia  2/2 bone marrow involvement      #Coagulopathy   Vit K  deficiency?   -Monitor      #Generalized pain suspected due to bony progression of CLL  -Decadron 6 mg every 8 hours   -Consult palliative care for symptom management   -dilaudid pca per palliative care     #Constipation -bowel regimen      #Hypokalemia - supplement per protocol   #Hypernatremia - IVF per nephrology      #CKD stage III - trend      #Metabolic alkalosis suspect 2/2 hypercalcemia and renal bicarb reabsorption     #Acute urinary retention   Requiring ISC for now will continue. Monitor      #Elevated ALP, AST  Some chronicity. Related to chemo vs. CLL. Trend      #Low PO intake  -Continue to monitor      #Obesity, BMI 34.45           3/24    Reports 9-10/10 pain today morning. She is nauseated with a poor appetite. Denies BM.       Temp:  [97.9 °F (36.6 °C)-98.7 °F (37.1 °C)] 98.6 °F (37 °C)  Pulse:  [] 104  Resp:  [16-18] 18  BP: (167-191)/(70-88) 187/82  SpO2:  [91 %-100 %] 94 %      Lab 03/19/25  1043 03/20/25  0548 03/21/25  0518 03/22/25  0404 03/23/25  0605 03/24/25  0707   WBC 9.3 7.9 6.6 2.6* 2.3* 2.1*   HGB 9.9* 9.7* 9.1* 7.9* 7.8* 8.0*   MCV 95.3 91.6 92.2 93.0 90.8 88.7   PLT 12.0* 10.0* 15.0* 8.0* 17.0* 12.0*   BAND 7  --  20 22 24 2               Recent Labs   Lab 03/22/25  0404 03/23/25  0605 03/24/25  0707   * 121* 118*   BUN 17 12 14   CREATSERUM 0.68 0.59 0.61   CA 9.5 9.9 10.4   ALB 3.2 3.2 3.3    142 143   K 3.6  3.6 3.1* 3.2*  3.2*    104 102   CO2 27.0 31.0 32.0   ALKPHO 659* 608* 667*   AST 66* 60* 79*   ALT 58* 53* 52*   BILT 0.4 0.4 0.5   TP 5.2* 5.2* 5.3*       Scheduled Medications    potassium phosphate dibasic 15 mmol in sodium chloride 0.9% 250 mL IVPB  15 mmol Intravenous Once     Followed by    potassium chloride  20 mEq Intravenous Once    filgrastim-aafi  480 mcg Subcutaneous Daily    traZODone  50 mg Oral Nightly    polyethylene glycol (PEG 3350)  17 g Oral Daily    sennosides  8.6 mg Oral BID    piperacillin-tazobactam  3.375 g Intravenous  Q8H    pantoprazole  40 mg Oral QAM AC    predniSONE  100 mg Oral Daily with breakfast    OLANZapine  5 mg Oral Nightly    [Held by provider] enoxaparin  40 mg Subcutaneous Daily               Assessment & Plan:  #CLL with transformation to high -grade DLBCL  Imaging with worsening LAD. LDH normal to 1488 on admission.   Heme following  -LN biopsy on 3/17; DLBCL  -Started chemo 3/21; next planned for 4/11     #Metabolic encephalopathy   Multifactorial from hypercalcemia, hypernatremia, metabolic alkalosis, related to meds?  -CT brain limited by motion but negative 3/17  -EEG negative for epileptiform activity   -MR brain with mild diffuse pachy meningeal enhancement suspected to be related to marrow involvement  -Neurology following; signed off     #Severe hypercalcemia - improved  S/p zometa and calcitonin   -Nephrology following     #Sepsis 2/2 pneumonia     #Acute hypoxic respiratory failure suspect 2/2 pneumonia/atelectasis   CTA chest 3/15 with airspace opacities within the lung bases atelectasis vs. Consolidation. Negative for PE. Procal 0.43.  -Repeat CXR 3/16 with discoid atelectasis in the retrocardiac lung base which is stable. And also stable right mid lung atelectasis.   -Fever 3/18 overnight, CXR 3/19 with new patchy right upper lung airspace opacity as well as b/l interstitial opacities.   -IV zosyn 3/19 for 7 day course     #Sinus tachycardia - improved with hydration  PE ruled out. Related to pain?   -TTE with hyperdynamic EF 75 - 80%, no RWMA     #Thrombocytopenia  2/2 bone marrow involvement      #Coagulopathy   Vit K deficiency?   -Monitor      #Generalized pain suspected due to bony progression of CLL  -Decadron 6 mg every 8 hours   -Consult palliative care for symptom management   -dilaudid pca per palliative care     #Constipation -bowel regimen      #Hypokalemia - supplement per protocol   #Hypernatremia - IVF per nephrology      #CKD stage III - trend      #Metabolic alkalosis suspect 2/2  hypercalcemia and renal bicarb reabsorption     #Acute urinary retention   Requiring ISC for now will continue. Monitor      #Elevated ALP, AST  Some chronicity. Related to chemo vs. CLL. Trend      #Low PO intake  -Continue to monitor      #Obesity, BMI 34.45            3/25    Pain poorly controlled today AM with new right sided chest pain and shallow breathing in the morning. Dilaudid pca dose increased with some improvement. Family decided to pursue comfort care.       Patient was admitted 3/15 with AMS, hypercalcemia. She was treated aggressively with IVF, zometa, calcitonin. Her mental status improved over a few days. She was found to have transformation of her CLL into an aggressive lymphoma on LN biopsy done on 3/17. She underwent chemo while inpatient on 3/21. Clinically she continued to decline despite treatment and decided to pursue comfort focused care. Hospice referral placed. Palliative care working to titrate dilaudid pca for patient's pain and SOB. Code status modified to DNAR/select care.   Vitals (last day) before discharge       Date/Time Temp Pulse Resp BP SpO2 Weight O2 Device O2 Flow Rate (L/min) [facility]    03/25/25 1554 98.5 °F (36.9 °C) 111 14 152/90 94 % -- Nasal cannula 3.5 L/min KS    03/25/25 1224 101.1 °F (38.4 °C) 125 12 146/94 98 % -- Nasal cannula 2 L/min KS    03/25/25 1100 99.8 °F (37.7 °C) 129 16 120/70 97 % -- -- --     03/25/25 1045 -- 102 -- 121/95 94 % -- -- --     03/25/25 1030 -- 129 -- 137/73 98 % -- -- --     03/25/25 1015 -- 130 16 133/89 97 % -- -- --     03/25/25 1000 99.4 °F (37.4 °C) 130 20 139/94 97 % -- -- --     03/25/25 0754 98.6 °F (37 °C) 129 16 146/93 98 % -- Nasal cannula 2 L/min KS    03/25/25 0449 98.1 °F (36.7 °C) 117 18 160/95 98 % -- Nasal cannula 2 L/min     03/25/25 0213 -- 119 -- -- 96 % -- -- --     03/24/25 2306 98.3 °F (36.8 °C) 126 18 139/81 90 % -- Nasal cannula 2 L/min     03/24/25 2000 98.5 °F (36.9 °C) 120 18 144/86 92 % -- Nasal  cannula 2 L/min     03/24/25 1605 -- 108 -- 162/93 93 % -- -- -- EM    03/24/25 1528 98.5 °F (36.9 °C) 105 18 182/92 92 % -- Nasal cannula 2 L/min AR    03/24/25 1330 -- 98 -- 149/95 -- -- -- -- CV    03/24/25 1242 -- -- -- -- 96 % -- Nasal cannula 2 L/min JS    03/24/25 1241 98.6 °F (37 °C) 98 18 190/91 95 % -- Nasal cannula 2 L/min AR    03/24/25 1100 -- 98 -- -- 93 % -- -- -- AR    03/24/25 1025 -- 104 -- -- 94 % -- -- -- MR    03/24/25 0748 98.6 °F (37 °C) 87 18 187/82 94 % -- Nasal cannula 2 L/min AR    03/24/25 0739 -- -- -- -- -- 238 lb 1.6 oz (108 kg) -- -- AR    03/24/25 0330 97.9 °F (36.6 °C) 98 18 167/82 100 % -- Nasal cannula 2 L/min BM         Blood Transfusion Record       Product Unit Status Volume Start End            Transfuse platelets       25  [id number]  [id number] Completed 03/25/25 1111 250 mL 03/25/25 1000 03/25/25 1100       25  [id number]  [id number] Completed 03/22/25 1302 346 mL 03/22/25 1005 03/22/25 1258       25  [id number]  [id number] Completed 03/20/25 1830 300 mL 03/20/25 1049 03/20/25 1830                --------------  DISCHARGE REVIEW    Payor: [employer]  Subscriber #:  [id number]  Authorization Number: [id number]    Admit date: 3/15/25  Admit time:   6:35 PM  Discharge Date: 3/25/2025  5:40 PM     Admitting Physician: Douglas Smalls MD  Attending Physician:  No att. providers found  Primary Care Physician: Ashley Cross DO       REVIEWER COMMENTS    Admitted to [facility] hospice

## 2025-03-28 NOTE — PAYOR COMM NOTE
3/22  --------------  CONTINUED STAY REVIEW    Payor: [employer]  Subscriber #:  [id number]  Authorization Number: [id number]    Admit date: 3/15/25  Admit time:  6:35 PM    REVIEW DOCUMENTATION:  3/22    Chief Complaint: Neck pain, left hip/groin pain, cough, JARAMILLO, hypoxic on arrival to the ED         Vital signs:  Temp:  [97.4 °F (36.3 °C)-98.7 °F (37.1 °C)] 98.4 °F (36.9 °C)  Pulse:  [] 124  Resp:  [18-20] 20  BP: (115-170)/(60-86) 170/80  SpO2:  [90 %-97 %] 96 %     Physical Exam:    General: NAD  Respiratory: No wheezes, no rhonchi  Cardiovascular: S1, S2, regular rhythm, tachycardic  Abdomen: Soft, non-distended, positive bowel sounds, tender to palpation   Neuro:oriented x 4. Speech is clear. Moving all 4 extremities. Speech is clear.   Extremities: No edema     Diagnostic Data:    Labs:             Recent Labs   Lab 03/15/25  1313 03/16/25  0701 03/17/25  0950 03/18/25  0556 03/19/25  1043 03/20/25  0548 03/21/25  0518 03/22/25  0404   WBC 17.6* 17.0*  --  10.5 9.3 7.9 6.6 2.6*   HGB 15.3 14.0  --  11.4* 9.9* 9.7* 9.1* 7.9*   MCV 93.3 92.5  --  93.9 95.3 91.6 92.2 93.0   PLT 74.0* 51.0*  --  21.0* 12.0* 10.0* 15.0* 8.0*   BAND 19 24  --   --  7  --  20 22   INR  --   --  1.42*  --   --   --   --   --                Recent Labs   Lab 03/20/25  0548 03/21/25  0518 03/22/25  0404   * 130* 148*   BUN 21 16 17   CREATSERUM 0.71 0.66 0.68   CA 8.7 8.6* 9.5   ALB 3.5 3.3 3.2    140 141   K 3.9 3.4* 3.6  3.6    103 106   CO2 29.0 30.0 27.0   ALKPHO 676* 629* 659*   AST 73* 61* 66*   ALT 45 51* 58*   BILT 0.4 0.4 0.4   TP 5.5* 5.4* 5.2*            Assessment & Plan:  #CLL with transformation to high -grade DLBCL  Imaging with worsening LAD. LDH normal to 1488 on admission.   Heme following  -LN biopsy on 3/17; DLBCL  -Started chemo 3/21     #Metabolic encephalopathy   Multifactorial from hypercalcemia, hypernatremia, metabolic alkalosis, related to meds?  -CT brain limited by  motion but negative 3/17  -EEG negative for epileptiform activity   -MR brain with mild diffuse pachy meningeal enhancement suspected to be related to marrow involvement  -Neurology following; signed off     #Severe hypercalcemia - improved  S/p zometa and calcitonin   -Nephrology following     #Sepsis 2/2 pneumonia     #Acute hypoxic respiratory failure suspect 2/2 pneumonia/atelectasis   CTA chest 3/15 with airspace opacities within the lung bases atelectasis vs. Consolidation. Negative for PE. Procal 0.43.  -Repeat CXR 3/16 with discoid atelectasis in the retrocardiac lung base which is stable. And also stable right mid lung atelectasis.   -Fever 3/18 overnight, CXR 3/19 with new patchy right upper lung airspace opacity as well as b/l interstitial opacities.   -IV zosyn 3/19 for 7 day course     #Sinus tachycardia - improved with hydration  PE ruled out. Related to pain?   -TTE with hyperdynamic EF 75 - 80%, no RWMA     #Thrombocytopenia  2/2 bone marrow involvement      #Coagulopathy   Vit K deficiency?   -Monitor      #Generalized pain suspected due to bony progression of CLL  -Decadron 6 mg every 8 hours   -Consult palliative care for symptom management   -dilaudid pca per palliative care     #Hypokalemia - supplement per protocol   #Hypernatremia - IVF per nephrology      #CKD stage III - trend      #Metabolic alkalosis suspect 2/2 hypercalcemia and renal bicarb reabsorption     #Acute urinary retention   Requiring ISC for now will continue. Monitor      #Elevated ALP, AST  Some chronicity. Related to chemo vs. CLL. Trend      #Low PO intake  -Continue to monitor      #Obesity, BMI 34.45        Vitals (last day) before discharge       Date/Time Temp Pulse Resp BP SpO2 Weight O2 Device O2 Flow Rate (L/min) Who    03/25/25 1554 98.5 °F (36.9 °C) 111 14 152/90 94 % -- Nasal cannula 3.5 L/min KS    03/25/25 1224 101.1 °F (38.4 °C) 125 12 146/94 98 % -- Nasal cannula 2 L/min KS    03/25/25 1100 99.8 °F (37.7 °C)  129 16 120/70 97 % -- -- -- AF    03/25/25 1045 -- 102 -- 121/95 94 % -- -- --     03/25/25 1030 -- 129 -- 137/73 98 % -- -- --     03/25/25 1015 -- 130 16 133/89 97 % -- -- --     03/25/25 1000 99.4 °F (37.4 °C) 130 20 139/94 97 % -- -- --     03/25/25 0754 98.6 °F (37 °C) 129 16 146/93 98 % -- Nasal cannula 2 L/min KS    03/25/25 0449 98.1 °F (36.7 °C) 117 18 160/95 98 % -- Nasal cannula 2 L/min     03/25/25 0213 -- 119 -- -- 96 % -- -- --     03/24/25 2306 98.3 °F (36.8 °C) 126 18 139/81 90 % -- Nasal cannula 2 L/min     03/24/25 2000 98.5 °F (36.9 °C) 120 18 144/86 92 % -- Nasal cannula 2 L/min     03/24/25 1605 -- 108 -- 162/93 93 % -- -- --     03/24/25 1528 98.5 °F (36.9 °C) 105 18 182/92 92 % -- Nasal cannula 2 L/min AR    03/24/25 1330 -- 98 -- 149/95 -- -- -- -- CV    03/24/25 1242 -- -- -- -- 96 % -- Nasal cannula 2 L/min     03/24/25 1241 98.6 °F (37 °C) 98 18 190/91 95 % -- Nasal cannula 2 L/min AR    03/24/25 1100 -- 98 -- -- 93 % -- -- -- AR    03/24/25 1025 -- 104 -- -- 94 % -- -- --     03/24/25 0748 98.6 °F (37 °C) 87 18 187/82 94 % -- Nasal cannula 2 L/min AR    03/24/25 0739 -- -- -- -- -- 238 lb 1.6 oz (108 kg) -- -- AR    03/24/25 0330 97.9 °F (36.6 °C) 98 18 167/82 100 % -- Nasal cannula 2 L/min BM          CIWA Scores (last 11 days) before discharge       None          Blood Transfusion Record       Product Unit Status Volume Start End            Transfuse platelets       25  [id number]  [id number] Completed 03/25/25 1111 250 mL 03/25/25 1000 03/25/25 1100       25  [id number]  [id number] Completed 03/22/25 1302 346 mL 03/22/25 1005 03/22/25 1258       25  [id number]  [id number] Completed 03/20/25 1830 300 mL 03/20/25 1049 03/20/25 1830

## 2025-03-31 NOTE — PAYOR COMM NOTE
--------------  DISCHARGE REVIEW    Payor: [employer] [facility] PPO  Subscriber #:  [id number]  Authorization Number: [id number]    Admit date: 3/15/25  Admit time:   6:35 PM  Discharge Date: 3/25/2025  5:40 PM     Admitting Physician: Douglas Smalls MD  Attending Physician:  No att. providers found  Primary Care Physician: Ashley Cross DO       Discharge Summary Notes    No notes of this type exist for this encounter.         REVIEWER COMMENTS

## 2025-03-31 NOTE — DISCHARGE SUMMARY
[facility]     Lisa Cabral Patient Status:  Inpatient    3/12/1975 MRN [id number]   Location [facility] 4NW-A Attending No att. providers found   Hosp Day # 10 PCP Ashley Cross DO     Date of Admission: 3/15/2025  Date of Discharge:  3/25/2025     Discharge Disposition: Hospice    Discharge Diagnosis:  #CLL with transformation to high -grade DLBCL  #Metabolic encephalopathy  (multifactorial hypercalcemia, hypernatremia, metabolic alkalosis, related to meds), improved  #Severe hypercalcemia - improved  #Sepsis 2/2 pneumonia  #Acute hypoxic respiratory failure suspect 2/2 pneumonia/atelectasis   #Sinus tachycardia - improved with hydration  #Pancytopenia 2/2 DLBCL   #Coagulopathy   #Generalized pain   #Constipation  #Hypokalemia  #Hypernatremia - resolved   #CKD stage III  #Metabolic alkalosis   #Acute urinary retention   #Elevated LFTs  #Malnutrition   #Obesity, BMI 35.16    History of Present Illness:    50 year old female with history of chronic left-sided neck pain, CLL presents emergency room with worsening neck pain.  Patient complains that she has had a cough over the last 2 weeks that is nonproductive.  No fevers, chills, nausea, vomiting, diarrhea or constipation.  Patient takes oxycodone for her chronic neck pain and her pain doctor recently increased her to 10 mg.  No dizziness, lightheadedness, or syncope.    Brief Synopsis:   Patient was admitted 3/15 with AMS, hypercalcemia. She was treated aggressively with IVF, zometa, calcitonin. Her mental status improved over a few days. She was found to have transformation of her CLL into an aggressive lymphoma on LN biopsy done on 3/17. She underwent chemo while inpatient on 3/21. Clinically she continued to decline despite treatment and decided to pursue comfort focused care. Patient was admitted to hospice.     The  [employer] C[employer] Act makes medical notes like these available to patients in the interest of  transparency. Please be advised this is a medical document. Medical documents are intended to carry relevant information, facts as evident, and the clinical opinion of the practitioner. The medical note is intended as peer to peer communication and may appear blunt or direct. It is written in medical language and may contain abbreviations or verbiage that are unfamiliar.

## 2025-03-31 NOTE — H&P
[facility]     Lisa Cabral Patient Status:  Inpatient    3/12/1975 MRN [id number]   [facility] 4[facility]-A Attending No att. providers found   Hosp Day # 1 PCP Ashley Cross DO     Chief Complaint: Hospice    Subjective:    History of Present Illness:     Lisa Cabral is a 50 year old female with Patient was admitted 3/15 with AMS, hypercalcemia. She was treated aggressively with IVF, zometa, calcitonin. Her mental status improved over a few days. She was found to have transformation of her CLL into an aggressive lymphoma on LN biopsy done on 3/17. She underwent chemo while inpatient on 3/21. Clinically she continued to decline despite treatment and decided to pursue comfort focused care. Patient was admitted to hospice.     History/Other:    Past Medical History:  Past Medical History:    Anxiety    Cancer (HCC)    CLL    Cervical radiculopathy    CLL (chronic lymphocytic leukemia) (HCC)    Depression    PONV (postoperative nausea and vomiting)    Visual impairment    reading glasses     Past Surgical History:   Past Surgical History:   Procedure Laterality Date    Hysterectomy      1 ovary in place    Laparoscopic cholecystectomy      Lumbar spine fusion combined      Other      C-Spine fusion and revision      Family History:   Family History   Problem Relation Age of Onset    High Blood Pressure Father     High Blood Pressure Mother     Heart Disease Maternal Grandfather     Cancer Paternal Grandfather     Cancer Paternal Aunt         breast    Breast Cancer Paternal Aunt     Colon Cancer Maternal Uncle     Cancer Maternal Uncle         cancer     Social History:    reports that she has been smoking cigarettes. She has been exposed to tobacco smoke. She has never used smokeless tobacco. She reports that she does not currently use alcohol. She reports that she does not currently use drugs after having used the following drugs: Cannabis.     Allergies:  Allergies[1]    Medications:  Medications Ordered Prior to Encounter[2]    Review of Systems:   A comprehensive review of systems was completed.    Pertinent positives and negatives noted in the HPI.    Objective:   Physical Exam:    BP (!) 72/41 (BP Location: Left arm)   Pulse 97   Temp (!) 101.5 °F (38.6 °C) (Axillary)   Resp 16   SpO2 (!) 63%     Results:    Labs:      Labs Last 24 Hours:    Recent Labs   Lab 03/25/25  0632   RBC 2.49*   HGB 7.8*   HCT 22.4*   MCV 90.0   MCH 31.3   MCHC 34.8   RDW 12.5   NEPRELIM 0.54*   WBC 1.0*   PLT 6.0*       Recent Labs   Lab 03/24/25 2030 03/25/25  0632   GLU  --  127*   BUN  --  12   CREATSERUM  --  0.59   EGFRCR  --  109   CA  --  9.7   ALB  --  3.2   NA  --  140   K 2.9* 3.6   CL  --  102   CO2  --  31.0   ALKPHO  --  834*   AST  --  102*   ALT  --  48   BILT  --  0.5   TP  --  5.1*       Estimated Glomerular Filtration Rate: 109 mL/min/1.73m2 (result from lab).    Lab Results   Component Value Date    INR 1.42 (H) 03/17/2025    INR 0.94 10/24/2023    INR 0.93 09/27/2023       No results for input(s): \"TROP\", \"TROPHS\", \"CK\" in the last 168 hours.    No results for input(s): \"TROP\", \"PBNP\" in the last 168 hours.    No results for input(s): \"PCT\" in the last 168 hours.    Imaging: Imaging data reviewed in Epic.    Assessment & Plan:      #CLL with transformation to high -grade DLBCL  #Metabolic encephalopathy  (multifactorial hypercalcemia, hypernatremia, metabolic alkalosis, related to meds), improved  #Severe hypercalcemia - improved  #Sepsis 2/2 pneumonia  #Acute hypoxic respiratory failure suspect 2/2 pneumonia/atelectasis   #Sinus tachycardia - improved with hydration  #Pancytopenia 2/2 DLBCL   #Coagulopathy   #Generalized pain   #Constipation  #Hypokalemia  #Hypernatremia - resolved   #CKD stage III  #Metabolic alkalosis   #Acute urinary retention   #Elevated LFTs  #Malnutrition   #Obesity, BMI 35.16    Hospice care    Plan of care discussed with  patient    Rubia Santos MD    Supplementary Documentation:     The  [employer] Cures Act makes medical notes like these available to patients in the interest of transparency. Please be advised this is a medical document. Medical documents are intended to carry relevant information, facts as evident, and the clinical opinion of the practitioner. The medical note is intended as peer to peer communication and may appear blunt or direct. It is written in medical language and may contain abbreviations or verbiage that are unfamiliar.                                       [1]   Allergies  Allergen Reactions    Aleve [Naproxen] HIVES and RASH    Ultram [Tramadol] NAUSEA AND VOMITING     Severe migraine   [2]   Current Facility-Administered Medications on File Prior to Encounter   Medication Dose Route Frequency Provider Last Rate Last Admin    [COMPLETED] sodium chloride 0.9% infusion   Intravenous Once Elsy Herring MD 10 mL/hr at 25 0959 New Bag at 25 0959    [COMPLETED] diazePAM (Valium) tab 5 mg  5 mg Oral Once Laura Newman DO   5 mg at 25 0054    [COMPLETED] potassium phosphate dibasic 15 mmol in sodium chloride 0.9% 250 mL IVPB  15 mmol Intravenous Once Rubia Santos MD 62.5 mL/hr at 25 0922 15 mmol at 25 0922    Followed by    [COMPLETED] potassium chloride 20 mEq/100mL IVPB premix 20 mEq  20 mEq Intravenous Once Rubia Santos MD 50 mL/hr at 25 1349 20 mEq at 25 1349    [] amLODIPine (Norvasc) tab 5 mg  5 mg Oral Once PRN Rubia Santos MD        [COMPLETED] potassium chloride 40 mEq in 250mL sodium chloride 0.9% IVPB premix  40 mEq Intravenous Once Rubia Santos MD 62.5 mL/hr at 25 2127 40 mEq at 257    Followed by    [COMPLETED] potassium chloride 20 mEq/100mL IVPB premix 20 mEq  20 mEq Intravenous Once Rubia Santos MD 50 mL/hr at 25 0151 20 mEq at 25 0151    [COMPLETED] potassium chloride (Klor-Con M20) tab 40 mEq   40 mEq Oral Once Rubia Santos MD   40 mEq at 25 0819    [COMPLETED] potassium chloride (Klor-Con M20) tab 40 mEq  40 mEq Oral Once Rubia Santos MD   40 mEq at 25 0914    [] methocarbamol (Robaxin) tab 500 mg  500 mg Oral Q6H PRN Akiko Cruz MD   500 mg at 25 2048    [COMPLETED] sodium chloride 0.9% infusion   Intravenous Once Elsy Herring MD 10 mL/hr at 25 0830 New Bag at 25 0830    [COMPLETED] diphenhydrAMINE (Benadryl) 50 mg/mL  injection 25 mg  25 mg Intravenous Once Aubrey Santos MD   25 mg at 25 1046    [COMPLETED] fosaprepitant (Emend) 150 mg in sodium chloride 0.9% 150 mL IVPB  150 mg Intravenous Q24H Tathineni Bozena,  mL/hr at 25 1455 150 mg at 25 1455    [COMPLETED] ondansetron (Zofran) 16 mg in sodium chloride 0.9% 108 mL IVPB  16 mg Intravenous Q24H Tathineni, Bozena,  mL/hr at 25 1434 16 mg at 25 1434    [COMPLETED] acetaminophen (Tylenol) tab 650 mg  650 mg Oral Q24H Tathineni, Bozena, DO   650 mg at 25 0929    [COMPLETED] diphenhydrAMINE (Benadryl) cap/tab 25 mg  25 mg Oral Q24H Tathineni, Bozena, DO   25 mg at 25 0929    [COMPLETED] riTUXimab-abbs (Truxima) 800 mg in sodium chloride 0.9% 580 mL infusion  375 mg/m2 Intravenous Q24H Tathineni, Bozena,  mL/hr at 25 1333 Rate Change at 25 1333    [] acetaminophen (Tylenol) tab 650 mg  650 mg Oral Once PRN Tathineliliana, Bozena, DO        [] diphenhydrAMINE (Benadryl) 50 mg/mL  injection 50 mg  50 mg Intravenous Once PRN Tathineni, Bozena, DO        [] methylPREDNISolone sodium succinate (Solu-MEDROL) injection 125 mg  125 mg Intravenous Once PRN Tathineni, Bozena, DO        [] EPINEPHrine (Adrenalin) 1 MG/ML injection (Allergic Reaction Kit) 0.3 mg  0.3 mg Intramuscular Once PRN Tathineni, Bozena, DO        [COMPLETED] DOXOrubicin (Adriamycin) 2 mg/mL IV syringe 110 mg 55 mL  50 mg/m2  Intravenous Q24H Cjneliliana Bozena, DO   110 mg at 25 1530    [COMPLETED] vinCRIStine (Oncovin) 2 mg in sodium chloride 0.9% 52 mL IVPB  2 mg Intravenous Q24H TathineFide fordBozena, DO   2 mg at 25 1540    [COMPLETED] cyclophosphamide (Cytoxan) 1,660 mg in sodium chloride 0.9% 258.3 mL infusion  750 mg/m2 Intravenous Q24H TathineFide fordBozena, .6 mL/hr at 25 1549 1,660 mg at 25 1549    [COMPLETED] lidocaine (Xylocaine) 1 % injection             [COMPLETED] fentaNYL (Sublimaze) 50 mcg/mL injection             [COMPLETED] HYDROmorphone (Dilaudid) 1 MG/ML injection             [COMPLETED] fentaNYL (Sublimaze) 50 mcg/mL injection             [COMPLETED] magnesium sulfate 4 g/100mL IVPB premix 4 g  4 g Intravenous Once Tom Francisco MD 50 mL/hr at 25 1936 4 g at 25 1936    [COMPLETED] sodium phosphate 15 mmol in 0.9% NaCl 100mL IVPB premix  15 mmol Intravenous Once Tom Francisco MD   15 mmol at 25 2159    [COMPLETED] potassium chloride 40 mEq in 250mL sodium chloride 0.9% IVPB premix  40 mEq Intravenous Once Rubia Santos MD 62.5 mL/hr at 25 1051 40 mEq at 25 1051    [COMPLETED] gadoterate meglumine (Dotarem) 7.5 MMOL/15ML injection 30 mL  30 mL Intravenous ONCE PRN Rubia Santos MD   22 mL at 25 1719    [COMPLETED] potassium chloride 40 mEq in 250mL sodium chloride 0.9% IVPB premix  40 mEq Intravenous Once Rubia Santos MD 62.5 mL/hr at 25 0440 40 mEq at 25 0440    [] calcitonin (Miacalcin) 200 Units/mL injection 424 Units  4 Units/kg Subcutaneous BID Elsy Herring MD   424 Units at 25 1048    [COMPLETED] LORazepam (Ativan) 2 mg/mL injection 1 mg  1 mg Intravenous Once Juan Luis Osullivan, DO   1 mg at 25 0448    [] LORazepam (Ativan) 2 mg/mL injection             [COMPLETED] potassium chloride (Klor-Con M20) tab 40 mEq  40 mEq Oral Once Polo Blanco, DO   40 mEq at 25 0932    [COMPLETED]  fentaNYL (Sublimaze) 50 mcg/mL injection 50 mcg  50 mcg Intravenous Once Polo Blanco, DO   50 mcg at 25 0931    [] potassium chloride (Klor-Con M20) tab 40 mEq  40 mEq Oral Q4H Reva Smalls MD   40 mEq at 25 1825    [COMPLETED] dexamethasone (Decadron) 4 MG/ML injection 10 mg  10 mg Intravenous Once Martin Tobin MD   10 mg at 25 2345    [COMPLETED] HYDROmorphone (Dilaudid) 1 MG/ML injection 0.5 mg  0.5 mg Intravenous Once Deborah Caraballo, DO   0.5 mg at 03/15/25 1308    [COMPLETED] sodium chloride 0.9 % IV bolus 1,000 mL  1,000 mL Intravenous Once Deborah Caraballo DO   Stopped at 03/15/25 1640    [COMPLETED] zoledronic acid (Zometa) 4 mg in sodium chloride 0.9% 105 mL IVPB  4 mg Intravenous Once Reva Smalls  mL/hr at 03/15/25 1956 4 mg at 03/15/25 1956    [COMPLETED] calcitonin (Miacalcin) 200 Units/mL injection 432 Units  4 Units/kg Subcutaneous Q12H Reva Smalls MD   432 Units at 25 0531    [COMPLETED] iopamidol 76% (ISOVUE-370) injection for power injector  100 mL Intravenous ONCE PRN Deborah Caraballo, DO   100 mL at 03/15/25 1535    [COMPLETED] cefTRIAXone (Rocephin) 2 g in sodium chloride 0.9% 100 mL IVPB-ADDV  2 g Intravenous Once Deborah Caraballo,  mL/hr at 03/15/25 1653 2 g at 03/15/25 1653    [COMPLETED] azithromycin (Zithromax) 500 mg in sodium chloride 0.9% 250mL IVPB premix  500 mg Intravenous Once Deborah Caraballo,  mL/hr at 03/15/25 2126 500 mg at 03/15/25 2126    [COMPLETED] HYDROmorphone (Dilaudid) 1 MG/ML injection 0.5 mg  0.5 mg Intravenous Once Deborah Caraballo DO   0.5 mg at 03/15/25 1700    [COMPLETED] potassium chloride (Klor-Con M20) tab 40 mEq  40 mEq Oral Once Deborah Caraballo, DO   40 mEq at 03/15/25 1752    [COMPLETED] HYDROmorphone (Dilaudid) 1 MG/ML injection 0.5 mg  0.5 mg Intravenous Once Deborah Caraballo, DO   0.5 mg at 03/15/25 1825    [COMPLETED] potassium chloride (Klor-Con M20) tab 40 mEq  40 mEq  Oral Once Polo Blanco, DO   40 mEq at 03/15/25 2024    [COMPLETED] gadoterate meglumine (Dotarem) 10 MMOL/20ML injection 20 mL  20 mL Intravenous ONCE PRN Elsy Herring MD   20 mL at 03/09/25 1500    [COMPLETED] ondansetron (Zofran) 4 MG/2ML injection 4 mg  4 mg Intravenous Once Zach Adhikari MD   4 mg at 03/08/25 2138    [COMPLETED] iopamidol 76% (ISOVUE-370) injection for power injector  75 mL Intravenous ONCE PRN Zach Adhikari MD   75 mL at 03/08/25 2229    [COMPLETED] diazePAM (Valium) tab 5 mg  5 mg Oral Once Danay Zepeda MD   5 mg at 03/07/25 0042    [COMPLETED] dexAMETHasone PF (Decadron) 10 MG/ML injection 10 mg  10 mg Intravenous Once Vanesa Schmidt DO   10 mg at 03/06/25 0029    [COMPLETED] HYDROmorphone (Dilaudid) 1 MG/ML injection 1 mg  1 mg Intravenous Once Vanesa Schmidt DO   1 mg at 03/06/25 0030    [COMPLETED] ondansetron (Zofran) 4 MG/2ML injection 4 mg  4 mg Intravenous Once Vanesa Schmidt DO   4 mg at 03/06/25 0040    [COMPLETED] oxyCODONE ER (OxyCONTIN ER) 12 hr tab 10 mg  10 mg Oral Once Vanesa Schmidt DO   10 mg at 03/06/25 0352    [COMPLETED] diazepam (Valium) 5 mg/mL injection 2.5 mg  2.5 mg Intravenous Once Danay Zepeda MD   2.5 mg at 03/06/25 2152    [COMPLETED] acetaminophen (Tylenol Extra Strength) tab 1,000 mg  1,000 mg Oral Once Danay Zepeda MD   1,000 mg at 03/06/25 2148    [COMPLETED] metoclopramide (Reglan) 5 mg/mL injection 10 mg  10 mg Intravenous Once Danay Zepeda MD   10 mg at 03/06/25 2201    [COMPLETED] HYDROmorphone (Dilaudid) 1 MG/ML injection 0.5 mg  0.5 mg Intravenous Once Danay Zepeda MD   0.5 mg at 03/06/25 2223    [COMPLETED] sodium chloride 0.9 % IV bolus 500 mL  500 mL Intravenous Once Danay Zepeda MD   Stopped at 03/07/25 0032    [COMPLETED] dexAMETHasone PF (Decadron) 10 MG/ML injection 10 mg  10 mg Intravenous Once Brice Rojo MD   10 mg at 03/02/25 1249    [COMPLETED] morphINE PF 4  MG/ML injection 4 mg  4 mg Intravenous Once rBice Rojo MD   4 mg at 25 1245    [COMPLETED] ondansetron (Zofran) 4 MG/2ML injection 4 mg  4 mg Intravenous Once Brice Rojo MD   4 mg at 25 1245    [COMPLETED] HYDROmorphone (Dilaudid) 1 MG/ML injection 0.5 mg  0.5 mg Intravenous Once Brice Rojo MD   0.5 mg at 25 1322    [COMPLETED] HYDROmorphone (Dilaudid) 1 MG/ML injection 0.5 mg  0.5 mg Intravenous Once Brice Rojo MD   0.5 mg at 25 1449    [COMPLETED] iopamidol 76% (ISOVUE-370) injection for power injector  100 mL Intravenous ONCE PRN Elsy Herring MD   100 mL at 25 0919     Current Outpatient Medications on File Prior to Encounter   Medication Sig Dispense Refill    [] diazePAM 5 MG Oral Tab Take 1 tablet (5 mg total) by mouth every 8 (eight) hours as needed (muscle spasm). (Patient taking differently: Take 1 tablet (5 mg total) by mouth every 8 (eight) hours as needed (muscle spasm). Hasn't taken in a while) 20 tablet 0    OXYCONTIN 10 MG Oral Tablet Extended Release 12 hour Abuse-Deterrent Take 1 tablet (10 mg total) by mouth 2 (two) times daily as needed for Pain.      methocarbamol 750 MG Oral Tab TAKE 1 TABLET BY MOUTH DAILY AT NIGHTTIME      [] predniSONE 20 MG Oral Tab Take 2 tablets (40 mg total) by mouth daily for 4 days. (Patient not taking: Reported on 3/15/2025) 8 tablet 0    Acetaminophen-Caffeine (EXCEDRIN TENSION HEADACHE) 500-65 MG Oral Tab Take by mouth.      MAGNESIUM ASPARTATE OR Take by mouth. (Patient not taking: Reported on 3/15/2025)      lidocaine 5 % External Patch Place 1 patch onto the skin daily. Hips and low back      ASHWAGANDHA OR Take 1 capsule by mouth daily.      Turmeric (QC TUMERIC COMPLEX OR) Take 1 tablet by mouth 2 (two) times daily.      Cyanocobalamin (VITAMIN B 12 OR) Take 1 tablet by mouth daily. B 12 complex      oxyCODONE-acetaminophen  MG Oral Tab every 8 (eight) hours as needed for Pain. (Patient not taking:  Reported on 3/15/2025)  0    sucralfate 1 g Oral Tab Take 1 tablet (1 g total) by mouth 2 (two) times daily.  0    omeprazole 20 MG Oral Capsule Delayed Release TK ONE C PO BID 30 MIN B EATING  0    Cholecalciferol (VITAMIN D-3) 5000 units Oral Tab Take 1 tablet (5,000 Units total) by mouth daily.

## 2025-04-01 ENCOUNTER — TELEPHONE (OUTPATIENT)
Dept: INTERNAL MEDICINE CLINIC | Facility: CLINIC | Age: 50
End: 2025-04-01

## 2025-04-03 NOTE — TELEPHONE ENCOUNTER
Signed paperwork was faxed to [facility].Confirmation was received and paperwork was sent to [facility].

## (undated) NOTE — LETTER
[address]  [address]  Consent for Procedure/Sedation  Date: 03/20/2025         Time: 1615    I hereby authorize Dr. Fonseca, my physician and his/her assistants (if applicable), which may include medical students, residents, and/or fellows, to perform the following surgical operation/ procedure and administer such anesthesia as may be determined necessary by my physician: Peripherally inserted central catheter on Lisa MENARD Cabral  2.   I recognize that during the surgical operation/procedure, unforeseen conditions may necessitate additional or different procedures than those listed above.  I, therefore, further authorize and request that the above-named surgeon, assistants, or designees perform such procedures as are, in their judgment, necessary and desirable.    3.   My surgeon/physician has discussed prior to my surgery the potential benefits, risks and side effects of this procedure; the likelihood of achieving goals; and potential problems that might occur during recuperation.  They also discussed reasonable alternatives to the procedure, including risks, benefits, and side effects related to the alternatives and risks related to not receiving this procedure.  I have had all my questions answered and I acknowledge that no guarantee has been made as to the result that may be obtained.    4.   Should the need arise during my operation/procedure, which includes change of level of care prior to discharge, I also consent to the administration of blood and/or blood products.  Further, I understand that despite careful testing and screening of blood or blood products by collecting agencies, I may still be subject to ill effects as a result of receiving a blood transfusion and/or blood products.  The following are some, but not all, of the potential risks that can occur: fever and allergic reactions, hemolytic reactions, transmission of diseases such as Hepatitis, AIDS and  Cytomegalovirus (CMV) and fluid overload.  In the event that I wish to have an autologous transfusion of my own blood, or a directed donor transfusion, I will discuss this with my physician.   Check only if Refusing Blood or Blood Products  I understand refusal of blood or blood products as deemed necessary by my physician may have serious consequences to my condition to include possible death. I hereby assume responsibility for my refusal and release the hospital, its personnel, and my physicians from any responsibility for the consequences of my refusal.         o  Refuse         5.   I authorize the use of any specimen, organs, tissues, body parts or foreign objects that may be removed from my body during the operation/procedure for diagnosis, research or teaching purposes and their subsequent disposal by hospital authorities.  I also authorize the release of specimen test results and/or written reports to my treating physician on the hospital medical staff or other referring or consulting physicians involved in my care, at the discretion of the Pathologist or my treating physician.    6.   I consent to the photographing or videotaping of the operations or procedures to be performed, including appropriate portions of my body for medical, scientific, or educational purposes, provided my identity is not revealed by the pictures or by descriptive texts accompanying them.  If the procedure has been photographed/videotaped, the surgeon will obtain the original picture, image, videotape or CD.  The hospital will not be responsible for storage, release or maintenance of the picture, image, tape or CD.    7.   I consent to the presence of a  or observers in the operating room as deemed necessary by my physician or their designees.    8.   I recognize that in the event my procedure results in extended X-Ray/fluoroscopy time, I may develop a skin reaction.    9. If I have a Do Not Attempt Resuscitation  (DNAR) order in place, that status will be suspended while in the operating room, procedural suite, and during the recovery period unless otherwise explicitly stated by me (or a person authorized to consent on my behalf). The surgeon or my attending physician will determine when the applicable recovery period ends for purposes of reinstating the DNAR order.  10. Patients having a sterilization procedure: I understand that if the procedure is successful the results will be permanent and it will therefore be impossible for me to inseminate, conceive, or bear children.  I also understand that the procedure is intended to result in sterility, although the result has not been guaranteed.   11. I acknowledge that my physician has explained sedation/analgesia administration to me including the risk and benefits I consent to the administration of sedation/analgesia as may be necessary or desirable in the judgment of my physician.    I CERTIFY THAT I HAVE READ AND FULLY UNDERSTAND THE ABOVE CONSENT TO OPERATION and/or OTHER PROCEDURE.        ____________________________________       _________________________________      ______________________________  Signature of Patient         Signature of Responsible Person        Printed Name of Responsible Person        ____________________________________      _________________________________      ______________________________       Signature of Witness          Relationship to Patient                       Date                                       Time  Patient Name: Lisa MENARD Marian[place]  : 3/12/1975    Reviewed: 2024   Printed: 2025  Medical Record #: [id number] Page 1 of 1

## (undated) NOTE — LETTER
[address] [place] [address] [address]  [address] [place] [id number]  [facility]: [phone]  FAX: [phone]    10/25/23            To whom this may concern,       Vanessa Baker ( [birth year]) is under our care. It is ok for her to participate in alternative therapies. Please contact our office with any questions. Dr. Nellie Sanchez RN OCN

## (undated) NOTE — LETTER
[facility] [address]  [address] [address]  [phone]    Blood Transfusion Consent    In the course of your treatment, it may become necessary to administer a transfusion of blood or blood components. This form provides basic information concerning this procedure and, if signed by you, authorizes its administration. By signing this form, you agree that all of your questions about the administration of blood or blood products have been answered by the ordering medical professional or designee.    Description of Procedure  Blood is introduced into one of your veins, commonly in the arm, using a sterilized disposable needle. The amount of blood transfused, and whether the transfusion will be of blood or blood components is a judgement the physician will make based on your particular needs.    Risks  The transfusion is a common procedure of low risk.  MINOR AND TEMPORARY REACTIONS ARE NOT UNCOMMON, including a slight bruise, swelling or local reaction in the area where the needle pierces your skin, or a nonserious reaction to the transfused material itself, including headache, fever or mild skin reaction, such as rash.  Serious reactions are possible, though very unlikely, and include severe allergic reaction (shock) and destruction (hemolysis) of transfused blood cells.  Infectious diseases which are known to be transmitted by blood transfusion include certain types of viral Hepatitis(liver infection from a virus), Human Immunodeficiency Virus (HIV-1,2) infection, a viral infection known to cause Acquired Immunodeficiency Syndrome (AIDS), as well as certain other bacterial, viral, and parasitic diseases. While a minimal risk of acquiring an infectious disease from transfused blood exists, in accordance with the Federal and State law, all due care has been taken in donor selection and testing to avoid transmission of disease.    Alternatives  If loss of blood poses serious threats during your  treatment, THERE IS NO EFFECTIVE ALTERNATIVE TO BLOOD TRANSFUSION. However, if you have any further questions on this matter, your provider will fully explain the alternatives to you if it has not already been done.    I, ______________________________, have read/had read to me the above. I understand the matters bearing on the decision whether or not to authorize a transfusion of blood or blood components. I have no questions which have not been answered to my full satisfaction. I hereby consent to such transfusion as my physician may deem necessary or advisable in the course of my treatment.    ______________________________________________                    ___________________________  (Signature of Patient or Responsible party in case of minor,                 (Printed Name of Patient or incompetent, or unconscious patient)              Responsible Party)    ___________________________               _____________________  (Relationship to Patient if not self)                                    (Date and Time)    __________________________                                                           ______________________              (Signature of Witness)               (Printed Name of Witness)     Language line ()    Telephone/Verbal/Video Consent    __________________________                     ____________________  (Signature of 2nd Witness           (Printed Name of 2nd  Telephone/Verbal/Video Consent)           Witness)    Patient Name: Lisa Cabral     : 3/12/1975                 Printed: 2025     Medical Record #: [id number]      Rev: 2023

## (undated) NOTE — LETTER
Patient Name: Lisa Cabral        : 3/12/1975       Medical Record #: [id number]    CONSENT FOR PROCEDURES/SEDATION    Date: 3/17/2025       Time: 9:42 AM        1. I authorize the performance upon Lisa Cabral the following:    Left or Right Inguinal Lymph Node Biopsy    2. I authorize Dr. Green (and whomever is designated as the doctor’s assistant), to perform the above mentioned procedures.    3. If any unforeseen conditions arise during this procedure calling for additional procedures, operations, or medications (including anesthesia and blood transfusion), I  further request and authorize the doctor to do whatever he/she deems advisable in my interest.    4. I consent to the taking and reproduction of any photographs in the course of this procedure for professional purposes.    5. I consent to the administration of such sedation as may be considered necessary or advisable by the physician responsible for this service, with the exception of  _____________________________.    6. I have been informed by my doctor of the nature and purpose of this procedure/sedation, possible alternative methods of treatment, risk involved and possible complications.      Signature of Patient:  ___________________________    Signature of person authorized to consent for patient: Relationship to patient:  ___________________________    ___________________    Witness: ____________________     Date: ______________    Provider: ____________________     Date: ______________